# Patient Record
Sex: MALE | Race: WHITE | NOT HISPANIC OR LATINO | Employment: OTHER | URBAN - METROPOLITAN AREA
[De-identification: names, ages, dates, MRNs, and addresses within clinical notes are randomized per-mention and may not be internally consistent; named-entity substitution may affect disease eponyms.]

---

## 2023-08-24 ENCOUNTER — HOSPITAL ENCOUNTER (OUTPATIENT)
Dept: DATA CONVERSION | Facility: HOSPITAL | Age: 53
Discharge: HOME | End: 2023-08-24
Payer: COMMERCIAL

## 2023-08-24 DIAGNOSIS — M25.562 PAIN IN LEFT KNEE: ICD-10-CM

## 2023-08-24 DIAGNOSIS — I10 ESSENTIAL (PRIMARY) HYPERTENSION: ICD-10-CM

## 2023-08-24 DIAGNOSIS — Z87.828 PERSONAL HISTORY OF OTHER (HEALED) PHYSICAL INJURY AND TRAUMA: ICD-10-CM

## 2023-08-24 DIAGNOSIS — M70.42 PREPATELLAR BURSITIS, LEFT KNEE: ICD-10-CM

## 2023-08-24 DIAGNOSIS — M25.462 EFFUSION, LEFT KNEE: ICD-10-CM

## 2023-08-24 DIAGNOSIS — Z79.899 OTHER LONG TERM (CURRENT) DRUG THERAPY: ICD-10-CM

## 2023-09-05 ENCOUNTER — HOSPITAL ENCOUNTER (OUTPATIENT)
Dept: DATA CONVERSION | Facility: HOSPITAL | Age: 53
Discharge: HOME | End: 2023-09-05
Payer: COMMERCIAL

## 2023-09-05 DIAGNOSIS — I67.1 CEREBRAL ANEURYSM, NONRUPTURED (HHS-HCC): ICD-10-CM

## 2023-09-11 PROBLEM — R06.00 DYSPNEA: Status: ACTIVE | Noted: 2023-09-11

## 2023-09-11 PROBLEM — R41.3 MEMORY PROBLEM: Status: ACTIVE | Noted: 2023-09-11

## 2023-09-11 PROBLEM — S93.409A SPRAIN OF ANKLE: Status: ACTIVE | Noted: 2023-09-11

## 2023-09-11 PROBLEM — B95.8 STAPH INFECTION: Status: ACTIVE | Noted: 2020-05-14

## 2023-09-11 PROBLEM — Z95.2 S/P AVR (AORTIC VALVE REPLACEMENT): Status: ACTIVE | Noted: 2023-09-11

## 2023-09-11 PROBLEM — F33.1 MODERATE EPISODE OF RECURRENT MAJOR DEPRESSIVE DISORDER (MULTI): Status: ACTIVE | Noted: 2023-09-11

## 2023-09-11 PROBLEM — M50.20 HERNIATION OF INTERVERTEBRAL DISC OF CERVICAL REGION: Status: ACTIVE | Noted: 2023-09-11

## 2023-09-11 PROBLEM — S09.93XA INJURY OF FACE: Status: ACTIVE | Noted: 2023-09-11

## 2023-09-11 PROBLEM — L73.9 FOLLICULITIS: Status: ACTIVE | Noted: 2020-04-10

## 2023-09-11 PROBLEM — R01.1 HEART MURMUR: Status: ACTIVE | Noted: 2023-09-11

## 2023-09-11 PROBLEM — M16.0 OSTEOARTHRITIS OF BOTH HIPS: Status: ACTIVE | Noted: 2023-09-11

## 2023-09-11 PROBLEM — D75.1 POLYCYTHEMIA: Status: ACTIVE | Noted: 2023-09-11

## 2023-09-11 PROBLEM — G47.00 INSOMNIA: Status: ACTIVE | Noted: 2023-09-11
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
## 2023-09-11 PROBLEM — S80.819A ABRASION OF LOWER LIMB: Status: ACTIVE | Noted: 2023-09-11

## 2023-09-11 PROBLEM — I35.0 NONRHEUMATIC AORTIC VALVE STENOSIS: Status: ACTIVE | Noted: 2023-09-11

## 2023-09-11 PROBLEM — Z98.890 STATUS POST CERVICAL DISC REPLACEMENT: Status: ACTIVE | Noted: 2023-09-11

## 2023-09-11 PROBLEM — F41.0 PANIC ATTACK: Status: ACTIVE | Noted: 2023-09-11

## 2023-09-11 PROBLEM — S99.919A INJURY OF ANKLE: Status: ACTIVE | Noted: 2023-09-11

## 2023-09-11 PROBLEM — Z96.651 HISTORY OF TOTAL RIGHT KNEE REPLACEMENT (TKR): Status: ACTIVE | Noted: 2023-09-11

## 2023-09-11 PROBLEM — R10.9 FLANK PAIN: Status: ACTIVE | Noted: 2023-09-11

## 2023-09-11 PROBLEM — F41.9 ANXIETY DISORDER, UNSPECIFIED: Status: ACTIVE | Noted: 2023-09-11

## 2023-09-11 PROBLEM — Q23.1 BICUSPID AORTIC VALVE (HHS-HCC): Status: ACTIVE | Noted: 2019-06-08

## 2023-09-11 PROBLEM — I10 ESSENTIAL HYPERTENSION: Status: ACTIVE | Noted: 2023-09-11

## 2023-09-11 PROBLEM — R21 RASH: Status: ACTIVE | Noted: 2020-05-14

## 2023-09-11 PROBLEM — I31.9 PERICARDITIS (HHS-HCC): Status: ACTIVE | Noted: 2023-09-11

## 2023-09-11 PROBLEM — Q23.81 BICUSPID AORTIC VALVE: Status: ACTIVE | Noted: 2019-06-08

## 2023-09-11 PROBLEM — M54.12 CERVICAL RADICULOPATHY: Status: ACTIVE | Noted: 2023-09-11

## 2023-09-11 PROBLEM — I71.21 ASCENDING AORTIC ANEURYSM (CMS-HCC): Status: ACTIVE | Noted: 2023-09-11

## 2023-09-11 PROBLEM — R40.1 CLOUDED CONSCIOUSNESS: Status: ACTIVE | Noted: 2023-09-11

## 2023-09-11 RX ORDER — ACETAMINOPHEN 500 MG
3 TABLET ORAL
COMMUNITY
End: 2024-05-26 | Stop reason: HOSPADM

## 2023-09-11 RX ORDER — TESTOSTERONE CYPIONATE 200 MG/ML
0.8 INJECTION, SOLUTION INTRAMUSCULAR
COMMUNITY
Start: 2023-06-23 | End: 2024-03-11 | Stop reason: WASHOUT

## 2023-09-11 RX ORDER — MONTELUKAST SODIUM 10 MG/1
10 TABLET ORAL EVERY EVENING
COMMUNITY
Start: 2019-10-08 | End: 2024-03-11 | Stop reason: WASHOUT

## 2023-09-11 RX ORDER — ANASTROZOLE 1 MG/1
0.5 TABLET ORAL 3 TIMES WEEKLY
COMMUNITY
End: 2024-03-11 | Stop reason: WASHOUT

## 2023-09-11 RX ORDER — CETIRIZINE HYDROCHLORIDE 10 MG/1
10 TABLET ORAL DAILY
COMMUNITY
End: 2024-05-26 | Stop reason: HOSPADM

## 2023-09-11 RX ORDER — ASCORBIC ACID
1 CRYSTALS ORAL DAILY
COMMUNITY
End: 2024-05-26 | Stop reason: HOSPADM

## 2023-09-11 RX ORDER — TESTOSTERONE CYPIONATE 1000 MG/10ML
100 INJECTION, SOLUTION INTRAMUSCULAR
COMMUNITY
Start: 2019-01-15 | End: 2024-03-11 | Stop reason: WASHOUT

## 2023-09-11 RX ORDER — MELOXICAM 15 MG/1
15 TABLET ORAL DAILY
COMMUNITY
End: 2024-02-22 | Stop reason: WASHOUT

## 2023-09-11 RX ORDER — IBUPROFEN 800 MG/1
800 TABLET ORAL
COMMUNITY
Start: 2023-04-17 | End: 2024-03-11 | Stop reason: WASHOUT

## 2023-09-11 RX ORDER — CETIRIZINE HYDROCHLORIDE 10 MG/1
10 TABLET ORAL DAILY PRN
COMMUNITY
Start: 2019-06-27 | End: 2024-03-11 | Stop reason: WASHOUT

## 2023-09-11 RX ORDER — FLUOXETINE HYDROCHLORIDE 20 MG/1
20 CAPSULE ORAL DAILY
COMMUNITY
Start: 2023-08-01 | End: 2024-02-21 | Stop reason: SDUPTHER

## 2023-09-11 RX ORDER — TESTOSTERONE CYPIONATE 1000 MG/10ML
75 INJECTION, SOLUTION INTRAMUSCULAR
COMMUNITY

## 2023-09-11 RX ORDER — ANASTROZOLE 1 MG/1
0.5 TABLET ORAL 2 TIMES WEEKLY
COMMUNITY
Start: 2019-01-15

## 2023-09-11 RX ORDER — BUSPIRONE HYDROCHLORIDE 5 MG/1
5 TABLET ORAL 2 TIMES DAILY
COMMUNITY
Start: 2022-11-17 | End: 2023-02-15 | Stop reason: WASHOUT

## 2023-09-11 RX ORDER — SERTRALINE HYDROCHLORIDE 100 MG/1
100 TABLET, FILM COATED ORAL DAILY
COMMUNITY
Start: 2022-11-24 | End: 2024-03-11 | Stop reason: WASHOUT

## 2023-09-11 RX ORDER — MULTIVITAMIN/IRON/FOLIC ACID 18MG-0.4MG
TABLET ORAL
COMMUNITY
Start: 2019-06-27 | End: 2024-05-26 | Stop reason: HOSPADM

## 2023-09-11 RX ORDER — TAMOXIFEN CITRATE 20 MG/1
20 TABLET ORAL 2 TIMES WEEKLY
COMMUNITY

## 2023-09-11 RX ORDER — MELOXICAM 15 MG/1
15 TABLET ORAL DAILY PRN
COMMUNITY
Start: 2020-10-06 | End: 2024-03-11 | Stop reason: WASHOUT

## 2023-09-11 RX ORDER — TRAZODONE HYDROCHLORIDE 50 MG/1
50 TABLET ORAL NIGHTLY PRN
COMMUNITY
Start: 2023-08-06 | End: 2024-03-11 | Stop reason: SDUPTHER

## 2024-02-21 DIAGNOSIS — F41.1 GENERALIZED ANXIETY DISORDER: Primary | ICD-10-CM

## 2024-02-21 RX ORDER — FLUOXETINE HYDROCHLORIDE 20 MG/1
20 CAPSULE ORAL DAILY
Qty: 30 CAPSULE | Refills: 0 | Status: SHIPPED | OUTPATIENT
Start: 2024-02-21 | End: 2024-03-11 | Stop reason: SDUPTHER

## 2024-02-21 NOTE — TELEPHONE ENCOUNTER
Last seen 01/30/23.  Has upcoming appointment 03/11/24.  He is out of medication.  Previously prescribed by psychiatrist who he is no longer seeing.

## 2024-02-22 DIAGNOSIS — M50.20 HERNIATION OF INTERVERTEBRAL DISC OF CERVICAL REGION: Primary | ICD-10-CM

## 2024-02-22 RX ORDER — MELOXICAM 15 MG/1
15 TABLET ORAL DAILY
Qty: 30 TABLET | Refills: 1 | Status: SHIPPED | OUTPATIENT
Start: 2024-02-22 | End: 2024-04-22

## 2024-03-06 ENCOUNTER — HOSPITAL ENCOUNTER (EMERGENCY)
Facility: HOSPITAL | Age: 54
Discharge: HOME | End: 2024-03-06
Attending: EMERGENCY MEDICINE
Payer: COMMERCIAL

## 2024-03-06 VITALS
BODY MASS INDEX: 27.92 KG/M2 | WEIGHT: 195 LBS | OXYGEN SATURATION: 98 % | TEMPERATURE: 98.2 F | DIASTOLIC BLOOD PRESSURE: 116 MMHG | SYSTOLIC BLOOD PRESSURE: 142 MMHG | RESPIRATION RATE: 14 BRPM | HEIGHT: 70 IN | HEART RATE: 82 BPM

## 2024-03-06 DIAGNOSIS — L02.512 ABSCESS OF LEFT RING FINGER: Primary | ICD-10-CM

## 2024-03-06 PROCEDURE — 2500000004 HC RX 250 GENERAL PHARMACY W/ HCPCS (ALT 636 FOR OP/ED): Mod: JZ | Performed by: EMERGENCY MEDICINE

## 2024-03-06 PROCEDURE — 99284 EMERGENCY DEPT VISIT MOD MDM: CPT

## 2024-03-06 PROCEDURE — 96365 THER/PROPH/DIAG IV INF INIT: CPT

## 2024-03-06 RX ORDER — VANCOMYCIN 1.5 G/300ML
1500 INJECTION, SOLUTION INTRAVENOUS ONCE
Status: COMPLETED | OUTPATIENT
Start: 2024-03-06 | End: 2024-03-06

## 2024-03-06 RX ORDER — VANCOMYCIN HYDROCHLORIDE 1 G/20ML
INJECTION, POWDER, LYOPHILIZED, FOR SOLUTION INTRAVENOUS DAILY PRN
Status: DISCONTINUED | OUTPATIENT
Start: 2024-03-06 | End: 2024-03-06

## 2024-03-06 RX ORDER — CEPHALEXIN 500 MG/1
500 CAPSULE ORAL 4 TIMES DAILY
Qty: 28 CAPSULE | Refills: 0 | Status: SHIPPED | OUTPATIENT
Start: 2024-03-06 | End: 2024-03-13

## 2024-03-06 RX ADMIN — VANCOMYCIN 1.5 G: 1.5 INJECTION, SOLUTION INTRAVENOUS at 09:00

## 2024-03-06 ASSESSMENT — PAIN DESCRIPTION - LOCATION: LOCATION: HAND

## 2024-03-06 ASSESSMENT — LIFESTYLE VARIABLES
EVER HAD A DRINK FIRST THING IN THE MORNING TO STEADY YOUR NERVES TO GET RID OF A HANGOVER: NO
HAVE PEOPLE ANNOYED YOU BY CRITICIZING YOUR DRINKING: NO
EVER FELT BAD OR GUILTY ABOUT YOUR DRINKING: NO
HAVE YOU EVER FELT YOU SHOULD CUT DOWN ON YOUR DRINKING: NO

## 2024-03-06 ASSESSMENT — COLUMBIA-SUICIDE SEVERITY RATING SCALE - C-SSRS
1. IN THE PAST MONTH, HAVE YOU WISHED YOU WERE DEAD OR WISHED YOU COULD GO TO SLEEP AND NOT WAKE UP?: NO
2. HAVE YOU ACTUALLY HAD ANY THOUGHTS OF KILLING YOURSELF?: NO
6. HAVE YOU EVER DONE ANYTHING, STARTED TO DO ANYTHING, OR PREPARED TO DO ANYTHING TO END YOUR LIFE?: NO

## 2024-03-06 ASSESSMENT — PAIN SCALES - GENERAL: PAINLEVEL_OUTOF10: 7

## 2024-03-06 ASSESSMENT — PAIN - FUNCTIONAL ASSESSMENT: PAIN_FUNCTIONAL_ASSESSMENT: 0-10

## 2024-03-06 ASSESSMENT — PAIN DESCRIPTION - FREQUENCY: FREQUENCY: CONSTANT/CONTINUOUS

## 2024-03-06 NOTE — ED PROVIDER NOTES
HPI   Chief Complaint   Patient presents with    Finger Infection        HPI  See my MDM                  [place] Coma Scale Score: 15                     Patient History   No past medical history on file.  Past Surgical History:   Procedure Laterality Date    MR HEAD ANGIO WO IV CONTRAST  2/24/2023    MR HEAD ANGIO WO IV CONTRAST [facility]    MR HEAD ANGIO WO IV CONTRAST  9/5/2023    MR HEAD ANGIO WO IV CONTRAST [facility]    MR NECK ANGIO WO IV CONTRAST  12/20/2012    MR NECK ANGIO WO IV CONTRAST [facility]     Family History   Problem Relation Name Age of Onset    Autoimmune disease Mother      Hypertension Mother      PTSD Father      No Known Problems Sister      No Known Problems Brother      No Known Problems Brother      No Known Problems Daughter      No Known Problems Daughter      Other (watchman) Maternal Grandfather      Other (cath) Maternal Grandfather      Other (3x bypass) Maternal Grandfather      Heart disease Maternal Grandfather       Social History     Tobacco Use    Smoking status: Not on file    Smokeless tobacco: Not on file   Substance Use Topics    Alcohol use: Not on file    Drug use: Not on file       Physical Exam   ED Triage Vitals [03/06/24 0757]   Temperature Heart Rate Respirations BP   36.8 °C (98.2 °F) 82 14 (!) 142/116      Pulse Ox Temp src Heart Rate Source Patient Position   97 % -- -- --      BP Location FiO2 (%)     -- --       Physical Exam  CONSTITUTIONAL: Vital signs reviewed as charted, well-developed and in no distress  Eyes: Extraocular muscles are intact. Pupils equal round and reactive to light. Conjunctiva are pink.    ENT: Mucous membranes are moist. Tongue in the midline. Pharynx was without erythema or exudates, uvula midline  LUNGS: Breath sounds equal and clear to auscultation. Good air exchange, no wheezes rales or retractions, pulse oximetry is charted.  HEART: Regular rate and rhythm without murmur thrill or rub, strong tones,  auscultation is normal.  ABDOMEN: Soft and nontender without guarding rebound rigidity or mass. Bowel sounds are present and normal in all quadrants. There is no palpable masses or aneurysms identified. No hepatosplenomegaly, normal abdominal exam.  Neuro: The patient is awake, alert and oriented ×3. Moving all 4 extremities and answering questions appropriately.   MUSCULOSKELETAL: Examination of the left hand does show appears to be an abscess over the proximal phalanx dorsal surface with erythema and edema to the area.  Mild swelling into the dorsal aspect of the hand as well.  Does still have good range of motion.  Motor sensation pulses are intact distally.  PSYCH: Awake alert oriented, normal mood and affect.  Skin:  Dry, normal color, warm to the touch, no rash present.      ED Course & MDM   ED Course as of 03/06/24 1015   Wed Mar 06, 2024   0836 Consulted Dr. Veras, Ortho/hand who states none of the other hand specialist in his office are available today, however they would likely be available to see patient in the office tomorrow although cannot guarantee their availability [CRUZITO]   0933 Spoke with Karolyn at Dr. Leonard's Office and she will contact him to see if he can see the patient in ED today or in office tomorrow as he currently does not have any appointments until Friday, 2 days. [CRUZITO]   1003 Dr. Carbajal's office called back and scheduled patient for appointment 0850 tomorrow morning for close follow-up and further management. [CRUZITO]      ED Course User Index  [CRUZITO] Shelby Galicia MD         Diagnoses as of 03/06/24 1015   Abscess of left ring finger       Medical Decision Making  History obtained from: patient    Vital signs, nursing notes, current medications, past medical history, Surgical history, allergies, social history, family History were reviewed.         HPI:  Patient presenting emergency room today with abscess infection of the last fourth finger.  States this is the fourth abscess  "he has had over the last 10 years.  States it started for 5 days ago.  He has been on Bactrim for the last 3 after being seen at the urgent care.  No purulent drainage at this time.  He does have fluctuance present there is a landers present.  Denies dizziness, chest pain, shortness of breath, abdominal pain or lower extremity edema.  He is nontoxic well-appearing vital signs positive for hypertension otherwise unremarkable.      10 point ROS was reviewed and negative except Noted above in HPI.  DDX: as listed above          MDM Summary/considerations:  EMERGENCY DEPARTMENT COURSE and DIFFERENTIAL DIAGNOSIS/MDM:        The patient presented with a chief complaint of left fourth finger infection. The differential diagnosis associated with this patient's presentation includes abscess and cellulitis, extensor tendon tenosynovitis.       Vitals:    Vitals:    03/06/24 0757 03/06/24 0802   BP: (!) 142/116    Pulse: 82    Resp: 14    Temp: 36.8 °C (98.2 °F)    SpO2: 97% 98%   Weight: 88.5 kg (195 lb)    Height: 1.778 m (5' 10\")        ED Course as of 03/06/24 1015   Wed Mar 06, 2024   0836 Consulted Dr. Veras, Ortho/hand who states none of the other hand specialist in his office are available today, however they would likely be available to see patient in the office tomorrow although cannot guarantee their availability [CRUZITO]   0918 Spoke with Karolyn at Dr. Leonard's Office and she will contact him to see if he can see the patient in ED today or in office tomorrow as he currently does not have any appointments until Friday, 2 days. [CRUZITO]   1003 Dr. Carbajal's office called back and scheduled patient for appointment 0850 tomorrow morning for close follow-up and further management. [CRUZITO]      ED Course User Index  [CRUZITO] Shelby Galicia MD         Diagnoses as of 03/06/24 1015   Abscess of left ring finger       History Limited by:    None    Independent history obtained from:    None      External records " reviewed:    None      Diagnostics interpreted by me:    None      Discussions with other clinicians:    Orthopedic surgery      Chronic conditions impacting care:    None      Social determinants of health affecting care:    None    Diagnostic tests considered but not performed: none    ED Medications managed:    Medications   vancomycin 1.5 g in 300 mL (Xellia) IVPB 1.5 g (has no administration in time range)         Prescription drugs considered:    Antibiotics Keflex added to his current Bactrim regimen      Labs Reviewed - No data to display  No orders to display     Medications   vancomycin 1.5 g in 300 mL (Xellia) IVPB 1.5 g (has no administration in time range)     New Prescriptions    No medications on file     I estimate there is LOW risk for EPIGLOTTITIS, PNEUMONIA, MENINGITIS, OR URINARY TRACT INFECTION, thus I consider the discharge disposition reasonable. Also, there is no evidence for peritonitis, sepsis, or toxicity. We have discussed the diagnosis and risks, and we agree with discharging home to follow-up with their primary doctor. We also discussed returning to the Emergency Department immediately if new or worsening symptoms occur. We have discussed the symptoms which are most concerning (e.g., changing or worsening pain, trouble swallowing or breathing, neck stiffness, fever) that necessitate immediate return.    Was given IV dose of vancomycin here in the ED.  We did speak to orthopedic hand surgery Dr. Alcocer office he will see him first thing tomorrow morning at 815.  Patient did spontaneously started draining while here in the ED with 2 small dabs of pus coming out.  He was discharged home in stable condition will follow at orthopedic cancer's office for [place] tomorrow morning.  We did add Keflex to his Bactrim regimen.    All of the patient's questions were answered to the best of my ability.  Patient states understanding that they have been screened for an emergency today and we have  not found any etiology of symptoms that requires emergent treatment or admission to the hospital at this point. They understand that they have not had definitive care day and require follow-up for treatment of their condition. They also state understanding that they may have an emergent condition that may potentially have not of detected at this visit and they must return to the emergency department if they develop any worsening of symptoms or new complaints.            I saw this patient in conjunction with Dr. Galicia, please see her supervision note.    Critical Care: Not warranted at this time        This chart was completed using voice recognition transcription software. Please excuse any errors of transcription including grammatical, punctuation, syntax and spelling errors.  Please contact me with any questions regarding this chart.    Procedure  Procedures     AURORA Riggs-CNP  03/06/24 1017

## 2024-03-06 NOTE — PROGRESS NOTES
Cristhian Thornton, Age 53 is being initiated on pharmacy to dose vancomycin for L hand ring infection in the ED.  Patient is only ordered 1 dose of vancomycin therapy, as ED orders are not valid to continue when pt is transferred to a floor. Renal function is currently unknown.    Patient will be given an initial dose of 1500mg    Attending or ID Services must reorder if Vancomycin is to be continued.      Reviewed and approved by DIPAK PERALTA on 3/6/24 at 8:33 AM.

## 2024-03-06 NOTE — ED TRIAGE NOTES
L hand Ring finger infection starting Saturday morning. Seen at urgent care on Sunday put on Bactrim. No relief of symptoms yet, swelling and pain increasing. Aches and chills have begun this week.

## 2024-03-11 ENCOUNTER — OFFICE VISIT (OUTPATIENT)
Dept: PRIMARY CARE | Facility: CLINIC | Age: 54
End: 2024-03-11
Payer: COMMERCIAL

## 2024-03-11 VITALS
WEIGHT: 201 LBS | HEART RATE: 75 BPM | SYSTOLIC BLOOD PRESSURE: 132 MMHG | DIASTOLIC BLOOD PRESSURE: 82 MMHG | BODY MASS INDEX: 28.84 KG/M2 | OXYGEN SATURATION: 97 %

## 2024-03-11 DIAGNOSIS — F33.1 MODERATE EPISODE OF RECURRENT MAJOR DEPRESSIVE DISORDER (MULTI): Primary | ICD-10-CM

## 2024-03-11 DIAGNOSIS — Z13.1 SCREENING FOR DIABETES MELLITUS: ICD-10-CM

## 2024-03-11 DIAGNOSIS — F41.1 GENERALIZED ANXIETY DISORDER: ICD-10-CM

## 2024-03-11 DIAGNOSIS — Z13.220 LIPID SCREENING: ICD-10-CM

## 2024-03-11 DIAGNOSIS — Z12.5 SCREENING PSA (PROSTATE SPECIFIC ANTIGEN): ICD-10-CM

## 2024-03-11 PROCEDURE — 3075F SYST BP GE 130 - 139MM HG: CPT | Performed by: PHYSICIAN ASSISTANT

## 2024-03-11 PROCEDURE — 3079F DIAST BP 80-89 MM HG: CPT | Performed by: PHYSICIAN ASSISTANT

## 2024-03-11 PROCEDURE — 1036F TOBACCO NON-USER: CPT | Performed by: PHYSICIAN ASSISTANT

## 2024-03-11 PROCEDURE — 99213 OFFICE O/P EST LOW 20 MIN: CPT | Performed by: PHYSICIAN ASSISTANT

## 2024-03-11 RX ORDER — FLUOXETINE HYDROCHLORIDE 20 MG/1
20 CAPSULE ORAL DAILY
Qty: 90 CAPSULE | Refills: 1 | Status: SHIPPED | OUTPATIENT
Start: 2024-03-11

## 2024-03-11 RX ORDER — TRAZODONE HYDROCHLORIDE 50 MG/1
50 TABLET ORAL NIGHTLY PRN
Qty: 90 TABLET | Refills: 1 | Status: SHIPPED | OUTPATIENT
Start: 2024-03-11

## 2024-03-11 ASSESSMENT — PAIN SCALES - GENERAL: PAINLEVEL: 0-NO PAIN

## 2024-03-11 ASSESSMENT — ENCOUNTER SYMPTOMS
COUGH: 0
FATIGUE: 0
NAUSEA: 0
VOMITING: 0
MYALGIAS: 0
DIZZINESS: 0
FEVER: 0
SHORTNESS OF BREATH: 0

## 2024-03-11 ASSESSMENT — PATIENT HEALTH QUESTIONNAIRE - PHQ9
1. LITTLE INTEREST OR PLEASURE IN DOING THINGS: NOT AT ALL
2. FEELING DOWN, DEPRESSED OR HOPELESS: NOT AT ALL
SUM OF ALL RESPONSES TO PHQ9 QUESTIONS 1 AND 2: 0

## 2024-03-11 ASSESSMENT — COLUMBIA-SUICIDE SEVERITY RATING SCALE - C-SSRS
6. HAVE YOU EVER DONE ANYTHING, STARTED TO DO ANYTHING, OR PREPARED TO DO ANYTHING TO END YOUR LIFE?: NO
1. IN THE PAST MONTH, HAVE YOU WISHED YOU WERE DEAD OR WISHED YOU COULD GO TO SLEEP AND NOT WAKE UP?: NO
2. HAVE YOU ACTUALLY HAD ANY THOUGHTS OF KILLING YOURSELF?: NO

## 2024-03-11 NOTE — PROGRESS NOTES
Subjective   Patient ID: Cristhian Thornton is a 53 y.o. male who presents for Med Refill (Pt is here for medication refill on Fluoxetine and traxzadone / nr).    HPI:  OCD/anxiety - previously seeing psychiatrist. Stable on 20mg fluoxetine + 50mg trazodone for sleep. Has tried sleeping w/o trazodone but unable to. He was previously doing counseling but doesn't feel the need for it anymore.    He has been dealing w/recurrent staph infections. Most recent L ring finger. Saw Dr. Leonard and was rx'd keflex, nasal abx, and a body scrub.    Due for routine labs.    Review of Systems   Constitutional:  Negative for fatigue and fever.   Respiratory:  Negative for cough and shortness of breath.    Cardiovascular:  Negative for chest pain.   Gastrointestinal:  Negative for nausea and vomiting.   Musculoskeletal:  Negative for myalgias.   Skin:  Negative for rash.   Neurological:  Negative for dizziness.       Objective   /82   Pulse 75   Wt 91.2 kg (201 lb)   SpO2 97%   BMI 28.84 kg/m²     Physical Exam  Constitutional:       Appearance: Normal appearance.   HENT:      Head: Normocephalic and atraumatic.   Eyes:      Extraocular Movements: Extraocular movements intact.      Conjunctiva/sclera: Conjunctivae normal.   Cardiovascular:      Rate and Rhythm: Normal rate and regular rhythm.   Pulmonary:      Effort: Pulmonary effort is normal.      Breath sounds: Normal breath sounds. No wheezing or rhonchi.   Musculoskeletal:      Cervical back: Normal range of motion and neck supple.   Skin:     General: Skin is warm and dry.   Neurological:      General: No focal deficit present.      Mental Status: He is alert.   Psychiatric:         Mood and Affect: Mood normal.         Assessment/Plan   Problem List Items Addressed This Visit             ICD-10-CM    Anxiety disorder, unspecified F41.9    Relevant Medications    FLUoxetine (PROzac) 20 mg capsule    traZODone (Desyrel) 50 mg tablet    Moderate episode of recurrent  major depressive disorder (CMS/HCC) - Primary F33.1    Relevant Medications    traZODone (Desyrel) 50 mg tablet     Other Visit Diagnoses         Codes    Screening PSA (prostate specific antigen)     Z12.5    Relevant Orders    Prostate Specific Antigen    Screening for diabetes mellitus     Z13.1    Relevant Orders    Comprehensive Metabolic Panel    Lipid screening     Z13.220    Relevant Orders    Lipid Panel

## 2024-04-13 ENCOUNTER — LAB (OUTPATIENT)
Dept: LAB | Facility: LAB | Age: 54
End: 2024-04-13
Payer: COMMERCIAL

## 2024-04-13 DIAGNOSIS — Z12.5 SCREENING PSA (PROSTATE SPECIFIC ANTIGEN): ICD-10-CM

## 2024-04-13 DIAGNOSIS — Z13.1 SCREENING FOR DIABETES MELLITUS: ICD-10-CM

## 2024-04-13 DIAGNOSIS — Z13.220 LIPID SCREENING: ICD-10-CM

## 2024-04-13 LAB
ALBUMIN SERPL-MCNC: 4.3 G/DL (ref 3.5–5)
ALP BLD-CCNC: 79 U/L (ref 35–125)
ALT SERPL-CCNC: 28 U/L (ref 5–40)
ANION GAP SERPL CALC-SCNC: 11 MMOL/L
AST SERPL-CCNC: 29 U/L (ref 5–40)
BILIRUB SERPL-MCNC: 0.7 MG/DL (ref 0.1–1.2)
BUN SERPL-MCNC: 19 MG/DL (ref 8–25)
CALCIUM SERPL-MCNC: 9 MG/DL (ref 8.5–10.4)
CHLORIDE SERPL-SCNC: 104 MMOL/L (ref 97–107)
CHOLEST SERPL-MCNC: 144 MG/DL (ref 133–200)
CHOLEST/HDLC SERPL: 2.7 {RATIO}
CO2 SERPL-SCNC: 26 MMOL/L (ref 24–31)
CREAT SERPL-MCNC: 1.1 MG/DL (ref 0.4–1.6)
EGFRCR SERPLBLD CKD-EPI 2021: 81 ML/MIN/1.73M*2
GLUCOSE SERPL-MCNC: 95 MG/DL (ref 65–99)
HDLC SERPL-MCNC: 53 MG/DL
LDLC SERPL CALC-MCNC: 80 MG/DL (ref 65–130)
POTASSIUM SERPL-SCNC: 4.9 MMOL/L (ref 3.4–5.1)
PROT SERPL-MCNC: 6.7 G/DL (ref 5.9–7.9)
PSA SERPL-MCNC: 1.1 NG/ML
SODIUM SERPL-SCNC: 141 MMOL/L (ref 133–145)
TRIGL SERPL-MCNC: 53 MG/DL (ref 40–150)

## 2024-04-13 PROCEDURE — 80053 COMPREHEN METABOLIC PANEL: CPT

## 2024-04-13 PROCEDURE — 36415 COLL VENOUS BLD VENIPUNCTURE: CPT

## 2024-04-13 PROCEDURE — 84153 ASSAY OF PSA TOTAL: CPT

## 2024-04-13 PROCEDURE — 80061 LIPID PANEL: CPT

## 2024-04-22 DIAGNOSIS — M50.20 HERNIATION OF INTERVERTEBRAL DISC OF CERVICAL REGION: ICD-10-CM

## 2024-04-22 RX ORDER — MELOXICAM 15 MG/1
15 TABLET ORAL DAILY
Qty: 30 TABLET | Refills: 1 | Status: SHIPPED | OUTPATIENT
Start: 2024-04-22 | End: 2024-05-26 | Stop reason: HOSPADM

## 2024-05-21 ENCOUNTER — HOSPITAL ENCOUNTER (INPATIENT)
Facility: HOSPITAL | Age: 54
LOS: 4 days | Discharge: HOME HEALTH | DRG: 603 | End: 2024-05-25
Attending: STUDENT IN AN ORGANIZED HEALTH CARE EDUCATION/TRAINING PROGRAM | Admitting: INTERNAL MEDICINE
Payer: COMMERCIAL

## 2024-05-21 DIAGNOSIS — I33.0 ACUTE BACTERIAL ENDOCARDITIS (HHS-HCC): ICD-10-CM

## 2024-05-21 DIAGNOSIS — I38 ENDOCARDITIS, VALVE UNSPECIFIED: ICD-10-CM

## 2024-05-21 DIAGNOSIS — I33.9: ICD-10-CM

## 2024-05-21 DIAGNOSIS — L03.012 CELLULITIS OF FINGER OF LEFT HAND: Primary | ICD-10-CM

## 2024-05-21 DIAGNOSIS — B95.8 STAPH INFECTION: ICD-10-CM

## 2024-05-21 DIAGNOSIS — I33.0 INFECTIVE ENDOCARDITIS, DUE TO UNSPECIFIED ORGANISM, UNSPECIFIED CHRONICITY (HHS-HCC): ICD-10-CM

## 2024-05-21 DIAGNOSIS — R78.81 BACTEREMIA: ICD-10-CM

## 2024-05-21 PROBLEM — R79.89 LOW TESTOSTERONE: Status: ACTIVE | Noted: 2024-05-21

## 2024-05-21 LAB
ANION GAP SERPL CALC-SCNC: 12 MMOL/L
BASOPHILS # BLD AUTO: 0.06 X10*3/UL (ref 0–0.1)
BASOPHILS NFR BLD AUTO: 0.4 %
BUN SERPL-MCNC: 17 MG/DL (ref 8–25)
CALCIUM SERPL-MCNC: 9.5 MG/DL (ref 8.5–10.4)
CHLORIDE SERPL-SCNC: 99 MMOL/L (ref 97–107)
CO2 SERPL-SCNC: 26 MMOL/L (ref 24–31)
CREAT SERPL-MCNC: 1 MG/DL (ref 0.4–1.6)
CRP SERPL-MCNC: 5.8 MG/DL (ref 0–2)
EGFRCR SERPLBLD CKD-EPI 2021: >90 ML/MIN/1.73M*2
EOSINOPHIL # BLD AUTO: 0.03 X10*3/UL (ref 0–0.7)
EOSINOPHIL NFR BLD AUTO: 0.2 %
ERYTHROCYTE [DISTWIDTH] IN BLOOD BY AUTOMATED COUNT: 12.3 % (ref 11.5–14.5)
ERYTHROCYTE [SEDIMENTATION RATE] IN BLOOD BY WESTERGREN METHOD: 15 MM/H (ref 0–20)
GLUCOSE SERPL-MCNC: 101 MG/DL (ref 65–99)
HCT VFR BLD AUTO: 50.8 % (ref 41–52)
HGB BLD-MCNC: 17.6 G/DL (ref 13.5–17.5)
IMM GRANULOCYTES # BLD AUTO: 0.05 X10*3/UL (ref 0–0.7)
IMM GRANULOCYTES NFR BLD AUTO: 0.4 % (ref 0–0.9)
LYMPHOCYTES # BLD AUTO: 1.98 X10*3/UL (ref 1.2–4.8)
LYMPHOCYTES NFR BLD AUTO: 14.5 %
MCH RBC QN AUTO: 31 PG (ref 26–34)
MCHC RBC AUTO-ENTMCNC: 34.6 G/DL (ref 32–36)
MCV RBC AUTO: 89 FL (ref 80–100)
MONOCYTES # BLD AUTO: 0.92 X10*3/UL (ref 0.1–1)
MONOCYTES NFR BLD AUTO: 6.8 %
NEUTROPHILS # BLD AUTO: 10.58 X10*3/UL (ref 1.2–7.7)
NEUTROPHILS NFR BLD AUTO: 77.7 %
NRBC BLD-RTO: 0 /100 WBCS (ref 0–0)
PLATELET # BLD AUTO: 201 X10*3/UL (ref 150–450)
POTASSIUM SERPL-SCNC: 4 MMOL/L (ref 3.4–5.1)
RBC # BLD AUTO: 5.68 X10*6/UL (ref 4.5–5.9)
SODIUM SERPL-SCNC: 137 MMOL/L (ref 133–145)
WBC # BLD AUTO: 13.6 X10*3/UL (ref 4.4–11.3)

## 2024-05-21 PROCEDURE — 85652 RBC SED RATE AUTOMATED: CPT | Performed by: STUDENT IN AN ORGANIZED HEALTH CARE EDUCATION/TRAINING PROGRAM

## 2024-05-21 PROCEDURE — 96365 THER/PROPH/DIAG IV INF INIT: CPT

## 2024-05-21 PROCEDURE — 36415 COLL VENOUS BLD VENIPUNCTURE: CPT | Performed by: STUDENT IN AN ORGANIZED HEALTH CARE EDUCATION/TRAINING PROGRAM

## 2024-05-21 PROCEDURE — 85025 COMPLETE CBC W/AUTO DIFF WBC: CPT | Performed by: STUDENT IN AN ORGANIZED HEALTH CARE EDUCATION/TRAINING PROGRAM

## 2024-05-21 PROCEDURE — 87186 SC STD MICRODIL/AGAR DIL: CPT | Mod: WESLAB | Performed by: STUDENT IN AN ORGANIZED HEALTH CARE EDUCATION/TRAINING PROGRAM

## 2024-05-21 PROCEDURE — 2500000004 HC RX 250 GENERAL PHARMACY W/ HCPCS (ALT 636 FOR OP/ED): Performed by: NURSE PRACTITIONER

## 2024-05-21 PROCEDURE — 1210000001 HC SEMI-PRIVATE ROOM DAILY

## 2024-05-21 PROCEDURE — 2500000001 HC RX 250 WO HCPCS SELF ADMINISTERED DRUGS (ALT 637 FOR MEDICARE OP): Performed by: NURSE PRACTITIONER

## 2024-05-21 PROCEDURE — 87205 SMEAR GRAM STAIN: CPT | Mod: WESLAB | Performed by: STUDENT IN AN ORGANIZED HEALTH CARE EDUCATION/TRAINING PROGRAM

## 2024-05-21 PROCEDURE — 2500000004 HC RX 250 GENERAL PHARMACY W/ HCPCS (ALT 636 FOR OP/ED): Performed by: EMERGENCY MEDICINE

## 2024-05-21 PROCEDURE — 86140 C-REACTIVE PROTEIN: CPT | Performed by: STUDENT IN AN ORGANIZED HEALTH CARE EDUCATION/TRAINING PROGRAM

## 2024-05-21 PROCEDURE — 2500000004 HC RX 250 GENERAL PHARMACY W/ HCPCS (ALT 636 FOR OP/ED): Performed by: STUDENT IN AN ORGANIZED HEALTH CARE EDUCATION/TRAINING PROGRAM

## 2024-05-21 PROCEDURE — 96375 TX/PRO/DX INJ NEW DRUG ADDON: CPT

## 2024-05-21 PROCEDURE — 87040 BLOOD CULTURE FOR BACTERIA: CPT | Mod: WESLAB | Performed by: STUDENT IN AN ORGANIZED HEALTH CARE EDUCATION/TRAINING PROGRAM

## 2024-05-21 PROCEDURE — 80048 BASIC METABOLIC PNL TOTAL CA: CPT | Performed by: STUDENT IN AN ORGANIZED HEALTH CARE EDUCATION/TRAINING PROGRAM

## 2024-05-21 PROCEDURE — 2500000004 HC RX 250 GENERAL PHARMACY W/ HCPCS (ALT 636 FOR OP/ED): Mod: JZ

## 2024-05-21 PROCEDURE — 99285 EMERGENCY DEPT VISIT HI MDM: CPT | Mod: 25

## 2024-05-21 PROCEDURE — 96367 TX/PROPH/DG ADDL SEQ IV INF: CPT

## 2024-05-21 RX ORDER — ONDANSETRON HYDROCHLORIDE 2 MG/ML
4 INJECTION, SOLUTION INTRAVENOUS EVERY 8 HOURS PRN
Status: DISCONTINUED | OUTPATIENT
Start: 2024-05-21 | End: 2024-05-26 | Stop reason: HOSPADM

## 2024-05-21 RX ORDER — ONDANSETRON 4 MG/1
4 TABLET, FILM COATED ORAL EVERY 8 HOURS PRN
Status: DISCONTINUED | OUTPATIENT
Start: 2024-05-21 | End: 2024-05-26 | Stop reason: HOSPADM

## 2024-05-21 RX ORDER — FLUOXETINE HYDROCHLORIDE 20 MG/1
20 CAPSULE ORAL DAILY
Status: DISCONTINUED | OUTPATIENT
Start: 2024-05-22 | End: 2024-05-26 | Stop reason: HOSPADM

## 2024-05-21 RX ORDER — IBUPROFEN 200 MG
200 TABLET ORAL EVERY 6 HOURS PRN
COMMUNITY
End: 2024-05-26 | Stop reason: HOSPADM

## 2024-05-21 RX ORDER — ENOXAPARIN SODIUM 100 MG/ML
40 INJECTION SUBCUTANEOUS EVERY 24 HOURS
Status: DISCONTINUED | OUTPATIENT
Start: 2024-05-21 | End: 2024-05-26 | Stop reason: HOSPADM

## 2024-05-21 RX ORDER — ANASTROZOLE 1 MG/1
0.5 TABLET ORAL WEEKLY
Status: DISCONTINUED | OUTPATIENT
Start: 2024-05-23 | End: 2024-05-26 | Stop reason: HOSPADM

## 2024-05-21 RX ORDER — VANCOMYCIN 1.75 G/350ML
1250 INJECTION, SOLUTION INTRAVENOUS ONCE
Status: COMPLETED | OUTPATIENT
Start: 2024-05-21 | End: 2024-05-21

## 2024-05-21 RX ORDER — KETOROLAC TROMETHAMINE 30 MG/ML
30 INJECTION, SOLUTION INTRAMUSCULAR; INTRAVENOUS ONCE
Status: COMPLETED | OUTPATIENT
Start: 2024-05-21 | End: 2024-05-21

## 2024-05-21 RX ORDER — TRAZODONE HYDROCHLORIDE 50 MG/1
50 TABLET ORAL NIGHTLY
Status: DISCONTINUED | OUTPATIENT
Start: 2024-05-21 | End: 2024-05-26 | Stop reason: HOSPADM

## 2024-05-21 RX ORDER — KETOROLAC TROMETHAMINE 30 MG/ML
15 INJECTION, SOLUTION INTRAMUSCULAR; INTRAVENOUS ONCE
Status: COMPLETED | OUTPATIENT
Start: 2024-05-21 | End: 2024-05-21

## 2024-05-21 RX ORDER — KETOROLAC TROMETHAMINE 30 MG/ML
15 INJECTION, SOLUTION INTRAMUSCULAR; INTRAVENOUS EVERY 6 HOURS PRN
Status: DISCONTINUED | OUTPATIENT
Start: 2024-05-21 | End: 2024-05-26 | Stop reason: HOSPADM

## 2024-05-21 RX ORDER — ACETAMINOPHEN 325 MG/1
650 TABLET ORAL EVERY 4 HOURS PRN
Status: DISCONTINUED | OUTPATIENT
Start: 2024-05-21 | End: 2024-05-24

## 2024-05-21 RX ORDER — TESTOSTERONE CYPIONATE 1000 MG/10ML
75 INJECTION, SOLUTION INTRAMUSCULAR
Status: DISCONTINUED | OUTPATIENT
Start: 2024-05-26 | End: 2024-05-26 | Stop reason: HOSPADM

## 2024-05-21 RX ORDER — TAMOXIFEN CITRATE 10 MG/1
20 TABLET ORAL 2 TIMES WEEKLY
Status: DISCONTINUED | OUTPATIENT
Start: 2024-05-21 | End: 2024-05-26 | Stop reason: HOSPADM

## 2024-05-21 RX ORDER — VANCOMYCIN HYDROCHLORIDE 1 G/20ML
INJECTION, POWDER, LYOPHILIZED, FOR SOLUTION INTRAVENOUS DAILY PRN
Status: DISCONTINUED | OUTPATIENT
Start: 2024-05-21 | End: 2024-05-21

## 2024-05-21 RX ORDER — VANCOMYCIN HYDROCHLORIDE 1 G/20ML
INJECTION, POWDER, LYOPHILIZED, FOR SOLUTION INTRAVENOUS DAILY PRN
Status: DISCONTINUED | OUTPATIENT
Start: 2024-05-21 | End: 2024-05-26 | Stop reason: HOSPADM

## 2024-05-21 RX ORDER — VANCOMYCIN 1.5 G/300ML
1500 INJECTION, SOLUTION INTRAVENOUS EVERY 12 HOURS
Status: DISCONTINUED | OUTPATIENT
Start: 2024-05-21 | End: 2024-05-22

## 2024-05-21 RX ADMIN — ACETAMINOPHEN 650 MG: 325 TABLET ORAL at 22:27

## 2024-05-21 RX ADMIN — VANCOMYCIN 1250 MG: 1.75 INJECTION, SOLUTION INTRAVENOUS at 10:35

## 2024-05-21 RX ADMIN — VANCOMYCIN 1.5 G: 1.5 INJECTION, SOLUTION INTRAVENOUS at 23:03

## 2024-05-21 RX ADMIN — TRAZODONE HYDROCHLORIDE 50 MG: 50 TABLET ORAL at 23:03

## 2024-05-21 RX ADMIN — PIPERACILLIN SODIUM AND TAZOBACTAM SODIUM 3.38 G: 3; .375 INJECTION, SOLUTION INTRAVENOUS at 20:34

## 2024-05-21 RX ADMIN — KETOROLAC TROMETHAMINE 30 MG: 30 INJECTION, SOLUTION INTRAMUSCULAR at 17:45

## 2024-05-21 RX ADMIN — TAMOXIFEN CITRATE 20 MG: 10 TABLET ORAL at 23:02

## 2024-05-21 RX ADMIN — KETOROLAC TROMETHAMINE 15 MG: 30 INJECTION, SOLUTION INTRAMUSCULAR at 10:33

## 2024-05-21 RX ADMIN — PIPERACILLIN SODIUM AND TAZOBACTAM SODIUM 3.38 G: 3; .375 INJECTION, SOLUTION INTRAVENOUS at 10:34

## 2024-05-21 ASSESSMENT — ENCOUNTER SYMPTOMS
ENDOCRINE NEGATIVE: 1
RESPIRATORY NEGATIVE: 1
CHILLS: 1
NAUSEA: 1
CARDIOVASCULAR NEGATIVE: 1
PSYCHIATRIC NEGATIVE: 1
MUSCULOSKELETAL NEGATIVE: 1
WOUND: 1
EYES NEGATIVE: 1
NEUROLOGICAL NEGATIVE: 1

## 2024-05-21 ASSESSMENT — PAIN DESCRIPTION - ORIENTATION: ORIENTATION: LEFT

## 2024-05-21 ASSESSMENT — PAIN SCALES - GENERAL
PAINLEVEL_OUTOF10: 4
PAINLEVEL_OUTOF10: 2
PAINLEVEL_OUTOF10: 10 - WORST POSSIBLE PAIN
PAINLEVEL_OUTOF10: 1

## 2024-05-21 ASSESSMENT — COLUMBIA-SUICIDE SEVERITY RATING SCALE - C-SSRS
6. HAVE YOU EVER DONE ANYTHING, STARTED TO DO ANYTHING, OR PREPARED TO DO ANYTHING TO END YOUR LIFE?: NO
2. HAVE YOU ACTUALLY HAD ANY THOUGHTS OF KILLING YOURSELF?: NO
1. IN THE PAST MONTH, HAVE YOU WISHED YOU WERE DEAD OR WISHED YOU COULD GO TO SLEEP AND NOT WAKE UP?: NO

## 2024-05-21 ASSESSMENT — PAIN - FUNCTIONAL ASSESSMENT
PAIN_FUNCTIONAL_ASSESSMENT: 0-10

## 2024-05-21 ASSESSMENT — PAIN DESCRIPTION - LOCATION
LOCATION: FINGER (COMMENT WHICH ONE)
LOCATION: FINGER (COMMENT WHICH ONE)

## 2024-05-21 ASSESSMENT — PAIN DESCRIPTION - PAIN TYPE
TYPE: ACUTE PAIN
TYPE: ACUTE PAIN

## 2024-05-21 NOTE — ED TRIAGE NOTES
Infection started Sunday, 6th infection in 7 months. Dr. Carbajal wants him admitted for IV Antibiotics and infectious disease consult

## 2024-05-21 NOTE — ED PROVIDER NOTES
HPI   Chief Complaint   Patient presents with    Wound Infection       Patient presents with finger infection.  He was seen by his orthopedic specialist, Dr. Carbajal, who performed incision and drainage on his left index finger and sent him to the emergency department for need for admission.  Patient reports that approximately every 3 weeks, he has had serious infections requiring IV antibiotics.  He states that he believes that he is otherwise healthy except for high blood pressure.                          [place] Coma Scale Score: 15                     Patient History   No past medical history on file.  Past Surgical History:   Procedure Laterality Date    MR HEAD ANGIO WO IV CONTRAST  2/24/2023    MR HEAD ANGIO WO IV CONTRAST [facility]    MR HEAD ANGIO WO IV CONTRAST  9/5/2023    MR HEAD ANGIO WO IV CONTRAST [facility]    MR NECK ANGIO WO IV CONTRAST  12/20/2012    MR NECK ANGIO WO IV CONTRAST [facility]     Family History   Problem Relation Name Age of Onset    Autoimmune disease Mother      Hypertension Mother      PTSD Father      No Known Problems Sister      No Known Problems Brother      No Known Problems Brother      No Known Problems Daughter      No Known Problems Daughter      Other (watchman) Maternal Grandfather      Other (cath) Maternal Grandfather      Other (3x bypass) Maternal Grandfather      Heart disease Maternal Grandfather       Social History     Tobacco Use    Smoking status: Never    Smokeless tobacco: Never   Substance Use Topics    Alcohol use: Not on file    Drug use: Not on file       Physical Exam   ED Triage Vitals [05/21/24 0940]   Temp Heart Rate Respirations BP   -- 71 18 137/90      Pulse Ox Temp Source Heart Rate Source Patient Position   100 % Temporal Monitor Sitting      BP Location FiO2 (%)     Right arm --       Physical Exam  HENT:      Head: Normocephalic.   Eyes:      General: No scleral icterus.  Cardiovascular:      Rate and Rhythm:  Normal rate.      Comments: Brisk capillary refill of the left index finger.  Pulmonary:      Effort: Pulmonary effort is normal.   Musculoskeletal:      Comments: Swelling of the distal aspect of the left index finger with discoloration over the dorsal aspect spreading to the middle phalanx.   Neurological:      Mental Status: He is alert.      Comments: Motor and sensation intact in left index finger although motor limited secondary to pain.         ED Course & MDM   Diagnoses as of 05/21/24 1046   Cellulitis of finger of left hand       Medical Decision Making  Patient sent to the emergency department by his orthopedic specialist following incision and drainage.  Finger continues to be very swollen and erythematous with streaking up the hand and swelling.  Patient received broad-spectrum antibiotics, blood cultures were drawn, and patient accepted to hospitalist service for further management.  Parts of this chart were completed with dictation software, please excuse any errors in transcription.        Procedure  Procedures     Walt Verma MD  05/21/24 9424

## 2024-05-21 NOTE — CONSULTS
Vancomycin Dosing by Pharmacy- INITIAL    Cristhian Thornton is a 53 y.o. year old male who Pharmacy has been consulted for vancomycin dosing for cellulitis, skin and soft tissue. Based on the patient's indication and renal status this patient will be dosed based on a goal AUC of 400-600.     Renal function is currently stable.    Visit Vitals  BP (!) 136/93 (BP Location: Right arm, Patient Position: Sitting)   Pulse 83   Temp 36.8 °C (98.2 °F) (Oral)   Resp 18        Lab Results   Component Value Date    CREATININE 1.00 2024    CREATININE 1.10 2024    CREATININE 1.0 2023    CREATININE 1.2 2021    CREATININE 0.8 2020    CREATININE 0.9 2020        Patient weight is as follows:   Vitals:    24 0940   Weight: 91.2 kg (201 lb)       Cultures:  No results found for the encounter in last 14 days.        I/O last 3 completed shifts:  In: 250 (2.7 mL/kg) [IV Piggyback:250]  Out: - (0 mL/kg)   Weight: 91.2 kg   I/O during current shift:  No intake/output data recorded.    Temp (24hrs), Av.8 °C (98.2 °F), Min:36.8 °C (98.2 °F), Max:36.8 °C (98.2 °F)         Assessment/Plan     Patient has already been given a loading dose of 1250 mg.  Will initiate vancomycin maintenance,  1250 mg every 12 hours.    This dosing regimen is predicted by [employer] to result in the following pharmacokinetic parameters:  Loading dose: N/A  Regimen: 1250 mg IV every 12 hours.  Start time: 22:35 on 2024  Exposure target: AUC24 (range)400-600 mg/L.hr   AUC24,ss: 578 mg/L.hr  Probability of AUC24 > 400: 85 %  Ctrough,ss: 18.4 mg/L  Probability of Ctrough,ss > 20: 43 %  Probability of nephrotoxicity (Lodise DONNA ): 15 %      Follow-up level will be ordered on  with am labs unless clinically indicated sooner.  Will continue to monitor renal function daily while on vancomycin and order serum creatinine at least every 48 hours if not already ordered.  Follow for continued vancomycin needs, clinical  response, and signs/symptoms of toxicity.       DIPAK PERALTA, PharmD

## 2024-05-21 NOTE — CONSULTS
Vancomycin Dosing by Pharmacy- EMERGENCY DEPARTMENT    Cristhian Thornton is a 53 y.o. year old male who Pharmacy has been consulted to give a ONE TIME ONLY vancomycin dose in the Emergency Department for cellulitis, skin and soft tissue.     Visit Vitals  /87 (BP Location: Right arm, Patient Position: Sitting)   Pulse 78   Resp 20        Lab Results   Component Value Date    CREATININE 1.10 04/13/2024    CREATININE 1.0 01/30/2023    CREATININE 1.2 06/07/2021    CREATININE 0.8 11/07/2020    CREATININE 0.9 11/06/2020        Patient weight is   Wt Readings from Last 1 Encounters:   05/21/24 91.2 kg (201 lb)        Assessment/Plan     Patient will be given a one time dose of 1250 mg  Pharmacy will sign off at this time. If vancomycin is to be continued, please re-consult Pharmacy.       Cecile Izaguirre, PharmD

## 2024-05-21 NOTE — PROGRESS NOTES
Pharmacy Medication History Review    Cristhian Thornton is a 53 y.o. male admitted for Cellulitis of finger of left hand. Pharmacy reviewed the patient's [id number] medications and allergies for accuracy.    Medications ADDED:  Ibuprofen 200 mg  Medications CHANGED:  Anastrozole   Tamoxifen  Testosterone  Trazodone  Medications REMOVED:   Buspirone  Cholecalciferol     The list below reflects the updated PTA list. Comments regarding how patient may be taking medications differently can be found in the Admit Orders Activity  Prior to Admission Medications   Prescriptions Last Dose Informant   FLUoxetine (PROzac) 20 mg capsule 2024 Self   Sig: Take 1 capsule (20 mg) by mouth once daily.   anastrozole (Arimidex) 1 mg tablet Past Week Self   Sig: Take 0.5 tablets (0.5 mg total) by mouth 2 times a week.   b complex 0.4 mg tablet 2024 Self   Sig: Take by mouth.   calcium carbonate-vitamin D3 600 mg-20 mcg (800 unit) tablet 2024 Self   Sig: Take 3 tablets by mouth once daily with breakfast.   cetirizine (ZyrTEC) 10 mg tablet 2024 Self   Sig: Take 1 tablet (10 mg) by mouth once daily.   ibuprofen 200 mg tablet  Self   Sig: Take 1 tablet (200 mg) by mouth every 6 hours if needed for mild pain (1 - 3).   meloxicam (Mobic) 15 mg tablet 2024 Self   Sig: TAKE 1 TABLET BY MOUTH EVERY DAY   tamoxifen (Nolvadex) 20 mg tablet Past Week Self   Sig: Take 1 tablet (20 mg total) by mouth 2 times a week.   testosterone cypionate (Depo-Testosterone) 100 mg/mL injection Past Week Self   Si.75 mL (75 mg) every 7 days. Receives injection every    traZODone (Desyrel) 50 mg tablet 2024 Self   Sig: Take 1 tablet (50 mg) by mouth as needed at bedtime for sleep.   vitamin E mixed 100 unit tablet 2024 Self   Sig: Take 1 tablet by mouth once daily.      Facility-Administered Medications: None        The list below reflects the updated allergy list. Please review each documented allergy for  additional clarification and justification.  Allergies  Reviewed by Tonja Rubin RN on 5/21/2024        Severity Reactions Comments    Acyclovir Not Specified Unknown     Opioids - Morphine Analogues Not Specified Unknown     Hydromorphone Low Rash             Pharmacy has been updated to [facility].    Sources used to complete the med history include patient interview, PTA list, dispense history    Below are additional concerns with the patient's PTA list.  none    Cecile Izaguirre, PharmD  Please reach out via [employer] Secure Chat for questions

## 2024-05-21 NOTE — H&P
History Of Present Illness  Cristhian Thornton is a 53 y.o. male presenting with left index finger infection. States he noticed redness on left finger Sunday. He saw Dr. Leonard in office today. Dr. Leonard did I&D and instructed patient to come to ER for admission. Patient states he's unsure if he's had a fever, but has had chills today and still feels like he can't get warm. States site of infection is painful. Denies recent injury or trauma to site. Denies chest pain, shortness of breath, abdominal pain. Had some nausea related to pain on arrival, but resolved now that pain improving. States he has had multiple infections including right hip, right calf and left knee over the last several months.      Past Medical History  Past Medical History:   Diagnosis Date    Aortic valve stenosis     Infection     Multiple    Low testosterone        Surgical History  Past Surgical History:   Procedure Laterality Date    AORTIC VALVE REPLACEMENT      CERVICAL SPINE SURGERY      implant    ELBOW BURSA SURGERY Right     related to infection    HERNIA REPAIR      JOINT REPLACEMENT Right     knee    MR HEAD ANGIO WO IV CONTRAST  02/24/2023    MR HEAD ANGIO WO IV CONTRAST [facility]    MR HEAD ANGIO WO IV CONTRAST  09/05/2023    MR HEAD ANGIO WO IV CONTRAST [facility]    MR NECK ANGIO WO IV CONTRAST  12/20/2012    MR NECK ANGIO WO IV CONTRAST [facility]        Social History  He reports that he has never smoked. He has never used smokeless tobacco. He reports current alcohol use of about 1.0 standard drink of alcohol per week. He reports that he does not use drugs.    Family History  Family History   Problem Relation Name Age of Onset    Autoimmune disease Mother      Hypertension Mother      PTSD Father      Benign prostatic hyperplasia Father      No Known Problems Sister      Other (htn) Brother      Other (hld) Brother      No Known Problems Daughter      No Known Problems Daughter      Other  (watchman) Maternal Grandfather      Other (cath) Maternal Grandfather      Other (3x bypass) Maternal Grandfather      Heart disease Maternal Grandfather          Allergies  Acyclovir, Opioids - morphine analogues, and Hydromorphone    Review of Systems   Constitutional:  Positive for chills.   HENT: Negative.     Eyes: Negative.    Respiratory: Negative.     Cardiovascular: Negative.    Gastrointestinal:  Positive for nausea.   Endocrine: Negative.    Genitourinary: Negative.    Musculoskeletal: Negative.    Skin:  Positive for wound (See HPI).   Neurological: Negative.    Psychiatric/Behavioral: Negative.          Physical Exam  Constitutional:       Appearance: Normal appearance.   HENT:      Head: Normocephalic and atraumatic.      Mouth/Throat:      Mouth: Mucous membranes are moist.      Pharynx: Oropharynx is clear.   Eyes:      Extraocular Movements: Extraocular movements intact.      Conjunctiva/sclera: Conjunctivae normal.      Pupils: Pupils are equal, round, and reactive to light.   Cardiovascular:      Rate and Rhythm: Normal rate and regular rhythm.      Pulses: Normal pulses.      Heart sounds: Normal heart sounds.   Pulmonary:      Effort: Pulmonary effort is normal.      Breath sounds: Normal breath sounds.   Abdominal:      General: Bowel sounds are normal.      Palpations: Abdomen is soft.      Tenderness: There is no abdominal tenderness.   Musculoskeletal:         General: Signs of injury (Left finger ecchymotic, swollen. Left knuckles red, warm and swollen. Tender to light touch) present.      Cervical back: Normal range of motion and neck supple.   Skin:     General: Skin is warm and dry.      Capillary Refill: Capillary refill takes less than 2 seconds.   Neurological:      General: No focal deficit present.      Mental Status: He is alert and oriented to person, place, and time.   Psychiatric:         Mood and Affect: Mood normal.         Behavior: Behavior normal.          Last Recorded  "Vitals  Blood pressure 143/87, pulse 78, resp. rate 20, height 1.778 m (5' 10\"), weight 91.2 kg (201 lb), SpO2 100%.    Relevant Results  Results for orders placed or performed during the hospital encounter of 05/21/24 (from the past 24 hour(s))   CBC and Auto Differential   Result Value Ref Range    WBC 13.6 (H) 4.4 - 11.3 x10*3/uL    nRBC 0.0 0.0 - 0.0 /100 WBCs    RBC 5.68 4.50 - 5.90 x10*6/uL    Hemoglobin 17.6 (H) 13.5 - 17.5 g/dL    Hematocrit 50.8 41.0 - 52.0 %    MCV 89 80 - 100 fL    MCH 31.0 26.0 - 34.0 pg    MCHC 34.6 32.0 - 36.0 g/dL    RDW 12.3 11.5 - 14.5 %    Platelets 201 150 - 450 x10*3/uL    Neutrophils % 77.7 40.0 - 80.0 %    Immature Granulocytes %, Automated 0.4 0.0 - 0.9 %    Lymphocytes % 14.5 13.0 - 44.0 %    Monocytes % 6.8 2.0 - 10.0 %    Eosinophils % 0.2 0.0 - 6.0 %    Basophils % 0.4 0.0 - 2.0 %    Neutrophils Absolute 10.58 (H) 1.20 - 7.70 x10*3/uL    Immature Granulocytes Absolute, Automated 0.05 0.00 - 0.70 x10*3/uL    Lymphocytes Absolute 1.98 1.20 - 4.80 x10*3/uL    Monocytes Absolute 0.92 0.10 - 1.00 x10*3/uL    Eosinophils Absolute 0.03 0.00 - 0.70 x10*3/uL    Basophils Absolute 0.06 0.00 - 0.10 x10*3/uL   Basic Metabolic Panel   Result Value Ref Range    Glucose 101 (H) 65 - 99 mg/dL    Sodium 137 133 - 145 mmol/L    Potassium 4.0 3.4 - 5.1 mmol/L    Chloride 99 97 - 107 mmol/L    Bicarbonate 26 24 - 31 mmol/L    Urea Nitrogen 17 8 - 25 mg/dL    Creatinine 1.00 0.40 - 1.60 mg/dL    eGFR >90 >60 mL/min/1.73m*2    Calcium 9.5 8.5 - 10.4 mg/dL    Anion Gap 12 <=19 mmol/L   Sedimentation rate, automated   Result Value Ref Range    Sedimentation Rate 15 0 - 20 mm/h   C-reactive protein   Result Value Ref Range    C-Reactive Protein 5.80 (H) 0.00 - 2.00 mg/dL            Assessment/Plan   Principal Problem:    Cellulitis of finger of left hand   IV antibiotics    S/P I&D in orthopedic surgeon's office    Wound and blood cultures sent in ER    CRP elevated    Consult ID    Clean site " with sterile saline and cover [facility]    Wound care nurse    Toradol for pain as needed   Active Problems:    Moderate episode of recurrent major depressive disorder (Multi)   Continue Prozac     Low testosterone   Continue testosterone, Arimidex and Tamoxifen     DVT prophylaxis    Lovenox       AURORA Edmondson-CNP

## 2024-05-22 LAB
ALBUMIN SERPL-MCNC: 3.6 G/DL (ref 3.5–5)
ALP BLD-CCNC: 77 U/L (ref 35–125)
ALT SERPL-CCNC: 21 U/L (ref 5–40)
ANION GAP SERPL CALC-SCNC: 12 MMOL/L
AST SERPL-CCNC: 21 U/L (ref 5–40)
BASOPHILS # BLD AUTO: 0.06 X10*3/UL (ref 0–0.1)
BASOPHILS NFR BLD AUTO: 0.5 %
BILIRUB SERPL-MCNC: 1.1 MG/DL (ref 0.1–1.2)
BUN SERPL-MCNC: 20 MG/DL (ref 8–25)
CALCIUM SERPL-MCNC: 8.4 MG/DL (ref 8.5–10.4)
CHLORIDE SERPL-SCNC: 105 MMOL/L (ref 97–107)
CO2 SERPL-SCNC: 22 MMOL/L (ref 24–31)
CREAT SERPL-MCNC: 1.1 MG/DL (ref 0.4–1.6)
EGFRCR SERPLBLD CKD-EPI 2021: 81 ML/MIN/1.73M*2
EOSINOPHIL # BLD AUTO: 0.1 X10*3/UL (ref 0–0.7)
EOSINOPHIL NFR BLD AUTO: 0.8 %
ERYTHROCYTE [DISTWIDTH] IN BLOOD BY AUTOMATED COUNT: 12.2 % (ref 11.5–14.5)
GLUCOSE BLD MANUAL STRIP-MCNC: 123 MG/DL (ref 74–99)
GLUCOSE SERPL-MCNC: 94 MG/DL (ref 65–99)
HCT VFR BLD AUTO: 46.1 % (ref 41–52)
HGB BLD-MCNC: 15.6 G/DL (ref 13.5–17.5)
IMM GRANULOCYTES # BLD AUTO: 0.06 X10*3/UL (ref 0–0.7)
IMM GRANULOCYTES NFR BLD AUTO: 0.5 % (ref 0–0.9)
LYMPHOCYTES # BLD AUTO: 2.33 X10*3/UL (ref 1.2–4.8)
LYMPHOCYTES NFR BLD AUTO: 19.3 %
MCH RBC QN AUTO: 30.5 PG (ref 26–34)
MCHC RBC AUTO-ENTMCNC: 33.8 G/DL (ref 32–36)
MCV RBC AUTO: 90 FL (ref 80–100)
MONOCYTES # BLD AUTO: 1.02 X10*3/UL (ref 0.1–1)
MONOCYTES NFR BLD AUTO: 8.4 %
NEUTROPHILS # BLD AUTO: 8.52 X10*3/UL (ref 1.2–7.7)
NEUTROPHILS NFR BLD AUTO: 70.5 %
NRBC BLD-RTO: 0 /100 WBCS (ref 0–0)
PLATELET # BLD AUTO: 171 X10*3/UL (ref 150–450)
POTASSIUM SERPL-SCNC: 4 MMOL/L (ref 3.4–5.1)
PROT SERPL-MCNC: 6.5 G/DL (ref 5.9–7.9)
RBC # BLD AUTO: 5.11 X10*6/UL (ref 4.5–5.9)
SODIUM SERPL-SCNC: 139 MMOL/L (ref 133–145)
VANCOMYCIN SERPL-MCNC: 15.5 UG/ML (ref 10–20)
WBC # BLD AUTO: 12.1 X10*3/UL (ref 4.4–11.3)

## 2024-05-22 PROCEDURE — 87081 CULTURE SCREEN ONLY: CPT | Mod: WESLAB | Performed by: INTERNAL MEDICINE

## 2024-05-22 PROCEDURE — 99222 1ST HOSP IP/OBS MODERATE 55: CPT

## 2024-05-22 PROCEDURE — 36415 COLL VENOUS BLD VENIPUNCTURE: CPT | Performed by: NURSE PRACTITIONER

## 2024-05-22 PROCEDURE — 1200000002 HC GENERAL ROOM WITH TELEMETRY DAILY

## 2024-05-22 PROCEDURE — 2500000004 HC RX 250 GENERAL PHARMACY W/ HCPCS (ALT 636 FOR OP/ED): Performed by: NURSE PRACTITIONER

## 2024-05-22 PROCEDURE — 85025 COMPLETE CBC W/AUTO DIFF WBC: CPT | Performed by: NURSE PRACTITIONER

## 2024-05-22 PROCEDURE — 2500000001 HC RX 250 WO HCPCS SELF ADMINISTERED DRUGS (ALT 637 FOR MEDICARE OP): Performed by: NURSE PRACTITIONER

## 2024-05-22 PROCEDURE — 82947 ASSAY GLUCOSE BLOOD QUANT: CPT

## 2024-05-22 PROCEDURE — B246ZZ4 ULTRASONOGRAPHY OF RIGHT AND LEFT HEART, TRANSESOPHAGEAL: ICD-10-PCS | Performed by: INTERNAL MEDICINE

## 2024-05-22 PROCEDURE — 84075 ASSAY ALKALINE PHOSPHATASE: CPT | Performed by: NURSE PRACTITIONER

## 2024-05-22 PROCEDURE — 2500000004 HC RX 250 GENERAL PHARMACY W/ HCPCS (ALT 636 FOR OP/ED): Mod: JZ | Performed by: INTERNAL MEDICINE

## 2024-05-22 PROCEDURE — 80202 ASSAY OF VANCOMYCIN: CPT

## 2024-05-22 RX ORDER — VANCOMYCIN 1 G/200ML
1 INJECTION, SOLUTION INTRAVENOUS EVERY 12 HOURS
Status: DISCONTINUED | OUTPATIENT
Start: 2024-05-22 | End: 2024-05-23

## 2024-05-22 RX ORDER — CEFAZOLIN SODIUM 2 G/100ML
2 INJECTION, SOLUTION INTRAVENOUS EVERY 6 HOURS
Status: DISCONTINUED | OUTPATIENT
Start: 2024-05-22 | End: 2024-05-24

## 2024-05-22 RX ORDER — SODIUM CHLORIDE 9 MG/ML
100 INJECTION, SOLUTION INTRAVENOUS CONTINUOUS
Status: DISCONTINUED | OUTPATIENT
Start: 2024-05-23 | End: 2024-05-26 | Stop reason: HOSPADM

## 2024-05-22 RX ADMIN — VANCOMYCIN 1 G: 1 INJECTION, SOLUTION INTRAVENOUS at 11:06

## 2024-05-22 RX ADMIN — KETOROLAC TROMETHAMINE 15 MG: 30 INJECTION, SOLUTION INTRAMUSCULAR at 19:53

## 2024-05-22 RX ADMIN — KETOROLAC TROMETHAMINE 15 MG: 30 INJECTION, SOLUTION INTRAMUSCULAR at 12:32

## 2024-05-22 RX ADMIN — FLUOXETINE 20 MG: 20 CAPSULE ORAL at 08:42

## 2024-05-22 RX ADMIN — PIPERACILLIN SODIUM AND TAZOBACTAM SODIUM 3.38 G: 3; .375 INJECTION, SOLUTION INTRAVENOUS at 08:42

## 2024-05-22 RX ADMIN — PIPERACILLIN SODIUM AND TAZOBACTAM SODIUM 3.38 G: 3; .375 INJECTION, SOLUTION INTRAVENOUS at 02:46

## 2024-05-22 RX ADMIN — CEFAZOLIN SODIUM 2 G: 2 INJECTION, SOLUTION INTRAVENOUS at 14:08

## 2024-05-22 RX ADMIN — KETOROLAC TROMETHAMINE 15 MG: 30 INJECTION, SOLUTION INTRAMUSCULAR at 02:47

## 2024-05-22 RX ADMIN — ACETAMINOPHEN 650 MG: 325 TABLET ORAL at 14:25

## 2024-05-22 RX ADMIN — CEFAZOLIN SODIUM 2 G: 2 INJECTION, SOLUTION INTRAVENOUS at 19:36

## 2024-05-22 RX ADMIN — TRAZODONE HYDROCHLORIDE 50 MG: 50 TABLET ORAL at 21:22

## 2024-05-22 RX ADMIN — VANCOMYCIN 1 G: 1 INJECTION, SOLUTION INTRAVENOUS at 22:53

## 2024-05-22 SDOH — ECONOMIC STABILITY: TRANSPORTATION INSECURITY
IN THE PAST 12 MONTHS, HAS THE LACK OF TRANSPORTATION KEPT YOU FROM MEDICAL APPOINTMENTS OR FROM GETTING MEDICATIONS?: NO

## 2024-05-22 SDOH — ECONOMIC STABILITY: TRANSPORTATION INSECURITY
IN THE PAST 12 MONTHS, HAS LACK OF TRANSPORTATION KEPT YOU FROM MEETINGS, WORK, OR FROM GETTING THINGS NEEDED FOR DAILY LIVING?: NO

## 2024-05-22 SDOH — ECONOMIC STABILITY: INCOME INSECURITY: IN THE LAST 12 MONTHS, WAS THERE A TIME WHEN YOU WERE NOT ABLE TO PAY THE MORTGAGE OR RENT ON TIME?: NO

## 2024-05-22 SDOH — SOCIAL STABILITY: SOCIAL INSECURITY: DOES ANYONE TRY TO KEEP YOU FROM HAVING/CONTACTING OTHER FRIENDS OR DOING THINGS OUTSIDE YOUR HOME?: NO

## 2024-05-22 SDOH — SOCIAL STABILITY: SOCIAL INSECURITY: HAVE YOU HAD THOUGHTS OF HARMING ANYONE ELSE?: NO

## 2024-05-22 SDOH — SOCIAL STABILITY: SOCIAL INSECURITY: HAS ANYONE EVER THREATENED TO HURT YOUR FAMILY OR YOUR PETS?: NO

## 2024-05-22 SDOH — SOCIAL STABILITY: SOCIAL INSECURITY: ARE YOU OR HAVE YOU BEEN THREATENED OR ABUSED PHYSICALLY, EMOTIONALLY, OR SEXUALLY BY ANYONE?: NO

## 2024-05-22 SDOH — ECONOMIC STABILITY: HOUSING INSECURITY
IN THE LAST 12 MONTHS, WAS THERE A TIME WHEN YOU DID NOT HAVE A STEADY PLACE TO SLEEP OR SLEPT IN A SHELTER (INCLUDING NOW)?: NO

## 2024-05-22 SDOH — ECONOMIC STABILITY: HOUSING INSECURITY: IN THE LAST 12 MONTHS, HOW MANY PLACES HAVE YOU LIVED?: 1

## 2024-05-22 SDOH — ECONOMIC STABILITY: INCOME INSECURITY: HOW HARD IS IT FOR YOU TO PAY FOR THE VERY BASICS LIKE FOOD, HOUSING, MEDICAL CARE, AND HEATING?: NOT HARD AT ALL

## 2024-05-22 SDOH — SOCIAL STABILITY: SOCIAL INSECURITY: ABUSE: ADULT

## 2024-05-22 SDOH — SOCIAL STABILITY: SOCIAL INSECURITY: DO YOU FEEL ANYONE HAS EXPLOITED OR TAKEN ADVANTAGE OF YOU FINANCIALLY OR OF YOUR PERSONAL PROPERTY?: NO

## 2024-05-22 SDOH — SOCIAL STABILITY: SOCIAL INSECURITY: ARE THERE ANY APPARENT SIGNS OF INJURIES/BEHAVIORS THAT COULD BE RELATED TO ABUSE/NEGLECT?: NO

## 2024-05-22 SDOH — SOCIAL STABILITY: SOCIAL INSECURITY: HAVE YOU HAD ANY THOUGHTS OF HARMING ANYONE ELSE?: NO

## 2024-05-22 SDOH — SOCIAL STABILITY: SOCIAL INSECURITY: DO YOU FEEL UNSAFE GOING BACK TO THE PLACE WHERE YOU ARE LIVING?: NO

## 2024-05-22 ASSESSMENT — PATIENT HEALTH QUESTIONNAIRE - PHQ9
SUM OF ALL RESPONSES TO PHQ9 QUESTIONS 1 & 2: 0
2. FEELING DOWN, DEPRESSED OR HOPELESS: NOT AT ALL
1. LITTLE INTEREST OR PLEASURE IN DOING THINGS: NOT AT ALL

## 2024-05-22 ASSESSMENT — COGNITIVE AND FUNCTIONAL STATUS - GENERAL
DAILY ACTIVITIY SCORE: 24
MOBILITY SCORE: 24
DAILY ACTIVITIY SCORE: 24
PATIENT BASELINE BEDBOUND: NO
DAILY ACTIVITIY SCORE: 24

## 2024-05-22 ASSESSMENT — ACTIVITIES OF DAILY LIVING (ADL)
BATHING: INDEPENDENT
JUDGMENT_ADEQUATE_SAFELY_COMPLETE_DAILY_ACTIVITIES: YES
HEARING - LEFT EAR: FUNCTIONAL
FEEDING YOURSELF: INDEPENDENT
WALKS IN HOME: INDEPENDENT
TOILETING: INDEPENDENT
ADEQUATE_TO_COMPLETE_ADL: YES
HEARING - RIGHT EAR: FUNCTIONAL
LACK_OF_TRANSPORTATION: NO
PATIENT'S MEMORY ADEQUATE TO SAFELY COMPLETE DAILY ACTIVITIES?: YES
GROOMING: INDEPENDENT
DRESSING YOURSELF: INDEPENDENT

## 2024-05-22 ASSESSMENT — PAIN - FUNCTIONAL ASSESSMENT
PAIN_FUNCTIONAL_ASSESSMENT: 0-10

## 2024-05-22 ASSESSMENT — LIFESTYLE VARIABLES
AUDIT-C TOTAL SCORE: 0
AUDIT-C TOTAL SCORE: 0
SKIP TO QUESTIONS 9-10: 1
HOW OFTEN DO YOU HAVE 6 OR MORE DRINKS ON ONE OCCASION: NEVER
HOW OFTEN DO YOU HAVE A DRINK CONTAINING ALCOHOL: NEVER
HOW MANY STANDARD DRINKS CONTAINING ALCOHOL DO YOU HAVE ON A TYPICAL DAY: PATIENT DOES NOT DRINK

## 2024-05-22 ASSESSMENT — PAIN SCALES - GENERAL
PAINLEVEL_OUTOF10: 3
PAINLEVEL_OUTOF10: 0 - NO PAIN
PAINLEVEL_OUTOF10: 4
PAINLEVEL_OUTOF10: 6
PAINLEVEL_OUTOF10: 6
PAINLEVEL_OUTOF10: 2
PAINLEVEL_OUTOF10: 1
PAINLEVEL_OUTOF10: 5 - MODERATE PAIN
PAINLEVEL_OUTOF10: 2
PAINLEVEL_OUTOF10: 3
PAINLEVEL_OUTOF10: 1

## 2024-05-22 ASSESSMENT — PAIN DESCRIPTION - DESCRIPTORS: DESCRIPTORS: PENETRATING;SHARP

## 2024-05-22 ASSESSMENT — ENCOUNTER SYMPTOMS: CHILLS: 1

## 2024-05-22 ASSESSMENT — PAIN DESCRIPTION - LOCATION
LOCATION: FINGER (COMMENT WHICH ONE)

## 2024-05-22 ASSESSMENT — PAIN DESCRIPTION - ORIENTATION
ORIENTATION: LEFT

## 2024-05-22 ASSESSMENT — PAIN SCALES - PAIN ASSESSMENT IN ADVANCED DEMENTIA (PAINAD): TOTALSCORE: MEDICATION (SEE MAR)

## 2024-05-22 NOTE — PROGRESS NOTES
Vancomycin Dosing by Pharmacy- FOLLOW UP    Cristhian Thornton is a 53 y.o. year old male who Pharmacy has been consulted for vancomycin dosing for cellulitis, skin and soft tissue. Based on the patient's indication and renal status this patient is being dosed based on a goal AUC of 400-600.     Renal function is currently stable.    Current vancomycin dose: 1500 mg given every 12 hours    Estimated vancomycin AUC on current dose: 721 mg/L.hr     Visit Vitals  /77 (BP Location: Right arm, Patient Position: Sitting)   Pulse 69   Temp 36.7 °C (98.1 °F) (Oral)   Resp 18        Lab Results   Component Value Date    CREATININE 1.10 2024    CREATININE 1.00 2024    CREATININE 1.10 2024    CREATININE 1.0 2023    CREATININE 1.2 2021    CREATININE 0.8 2020    CREATININE 0.9 2020        Patient weight is as follows:   Vitals:    24 0940   Weight: 91.2 kg (201 lb)     I/O last 3 completed shifts:  In: 1000 (11 mL/kg) [P.O.:400; IV Piggyback:600]  Out: 0 (0 mL/kg)   Weight: 91.2 kg   I/O during current shift:  No intake/output data recorded.    Temp (24hrs), Av.8 °C (98.2 °F), Min:36.7 °C (98.1 °F), Max:36.8 °C (98.2 °F)      Assessment/Plan    Above goal AUC. Orders placed for new vancomcyin regimen of 1000 every 12 hours to begin at  @1100.    This dosing regimen is predicted by [employer] to result in the following pharmacokinetic parameters:    Regimen: 1000 mg IV every 12 hours.  Start time: 1100 on 2024  Exposure target: AUC24 (range)400-600 mg/L.hr   AUC24,ss: 487 mg/L.hr  Probability of AUC24 > 400: 80 %  Ctrough,ss: 16.1 mg/L  Probability of Ctrough,ss > 20: 25 %  Probability of nephrotoxicity (Lodise DONNA ): 11 %      The next level will be obtained on  at AM labs. May be obtained sooner if clinically indicated.   Will continue to monitor renal function daily while on vancomycin and order serum creatinine at least every 48 hours if not already  ordered.  Follow for continued vancomycin needs, clinical response, and signs/symptoms of toxicity.       Fatou Orellana, PharmD

## 2024-05-22 NOTE — CARE PLAN
The patient's goals for the shift include rest    The clinical goals for the shift include improve pain level    Over the shift, the patient did make progress toward the following goals.   Problem: Safety - Adult  Goal: Free from fall injury  Outcome: Progressing     Problem: Pain - Adult  Goal: Verbalizes/displays adequate comfort level or baseline comfort level  Outcome: Progressing     Problem: Chronic Conditions and Co-morbidities  Goal: Patient's chronic conditions and co-morbidity symptoms are monitored and maintained or improved  Outcome: Progressing

## 2024-05-22 NOTE — CONSULTS
INFECTIOUS DISEASES CONSULTATION NOTE      Referred by Jonathan Nur MD    Reason For Consult  Left index finger cellulitis/abscess  Bacteremia    History Of Present Illness  Cristhian Thornton is a 53 y.o. male with acute pyogenic infection of the left index finger.  He states that about 15 years ago he had an MRSA right olecranon bursitis which required incision and drainage at [facility].  There were no skin or soft tissue infections until about 8 months ago, when he had a pyogenic cutaneous infection over the right posterior arm that was treated with incision and drainage.  Subsequently he developed 2 additional episodes, 1 on the right hip and 1 on the right lower leg.  Another episode developed spontaneously over the left third finger, and more recently several weeks ago he had another episode overlying the left knee.  The latter episode resolved with soaking and squeezing purulent material, and he was seen for this by Dr. Phillips.    Abruptly, 2 days ago, he spontaneously developed painful erythema and swelling of the tip of the left index finger.  No injury.  When the patient was seen by Dr. Leonard several months ago for the left third finger infection, he was advised to do chlorhexidine and mupirocin topical treatments for possible MRSA colonization     Past Medical History  Congenital bicuspid aortic valve, S/P bovine aortic valve and aortic arch replacement  Low testosterone level  Surgical History  Cervical spine surgery with disc spacer placement  Right total knee arthroplasty  Hernia repair  Left third finger incision and drainage  Right olecranon bursa incision and drainage    Social History  Tobacco use: Non-smoker  Alcohol use: Social alcohol consumption    Family History  Autoimmune disease    Allergies  Acyclovir, Opioids - morphine analogues, and Hydromorphone     Review of Systems  Detailed review of systems completed.  No significant additional positives beyond what is mentioned  above    Physical Exam  Vital signs:  Visit Vitals  /88 (BP Location: Right arm, Patient Position: Lying)   Pulse 76   Temp 37.3 °C (99.1 °F) (Oral)   Resp 17      General: Healthy appearing middle-aged man in no acute distress.  Not toxic  HEENT:  No scleral icterus or conjunctival suffusion, oral mucosa moist  Nodes:  Negative  Lungs:  Clear to auscultation  Heart:  S1, S2 normal, buzzing-quality short systolic murmur appreciated throughout the left precordium.  No diastolic murmur  Abdomen:  Soft, nontender. No palpable organs or masses  Back:  No spinal or CVA tenderness  Genitalia:  Not examined  Extremities: Diffuse erythema and swelling of the left index finger with violaceous discoloration of the distal phalanx and erythema and swelling involving the MCP joint and surrounding tissue.  No fluctuance or crepitus appreciated.  No proximal lymphangitis or regional lymphadenopathy  Neurologic:  Alert.  Otherwise grossly non-focal.  No meningismus  Skin: No mucocutaneous signs of infectious endocarditis    Relevant Results  Results from last 72 hours   Lab Units 05/22/24  0523   WBC AUTO x10*3/uL 12.1*   HEMOGLOBIN g/dL 15.6   HEMATOCRIT % 46.1   PLATELETS AUTO x10*3/uL 171   NEUTROS PCT AUTO % 70.5   LYMPHS PCT AUTO % 19.3   MONOS PCT AUTO % 8.4   EOS PCT AUTO % 0.8     Results from last 72 hours   Lab Units 05/22/24  0523   CREATININE mg/dL 1.10   ANION GAP mmol/L 12   EGFR mL/min/1.73m*2 81     Results from last 72 hours   Lab Units 05/22/24  0523   AST U/L 21   ALT U/L 21   ALK PHOS U/L 77   BILIRUBIN TOTAL mg/dL 1.1     Microbiology:  Blood (5/21): GPC clusters X2  Abscess (5/21): Moderate GPC on stain, culture pending  Imaging:      ASSESSMENT:  Abscess/cellulitis left index finger.  GPC septicemia  Patient has had a remarkable sequence of pyogenic skin and soft tissue infections over the last several months, and is now admitted with a new episode, acute suppurative inflammation of the left index  finger.  He has a systolic murmur consistent with his prosthetic aortic valve, and no other physical findings to suggest infectious endocarditis, but we must maintain a high degree of suspicion for that entity.  May also be colonized with Staph aureus    Prosthetic aortic valve/arch  See above.  Rule out endocarditis      PLANS:  -   Continue vancomycin, pharmacy dosing  -   Change Zosyn to cefazolin  -   Consult Dr. Baltazar/TTE  -   Orthopedic follow-up with Dr. Carbajal  -   Await further observation, and identification of bloodstream organism  -   Anticipate repeat surveillance blood cultures 5/23  -   Culture nares, groin, axilla to rule out Staph aureus    Text message sent to Dr. Baltazar, responded  Spoke with Dr. Carbajal     THANK YOU FOR ASKING ME TO ASSIST YOU IN THE CARE OF YOUR PATIENT    Jonathan Lopez MD  [facility] [employer]  Office:  [phone]

## 2024-05-22 NOTE — CARE PLAN
Problem: Pain - Adult  Goal: Verbalizes/displays adequate comfort level or baseline comfort level  Outcome: Progressing   The patient's goals for the shift include rest    The clinical goals for the shift include improve pain level

## 2024-05-22 NOTE — CONSULTS
"Nutrition Assessement Note    Nutrition Assessment    Reason for Assessment: Admission nursing screening    Spoke with pt at bedside. Pt reported to be eating well and increased appetite. Pt reported decreased appetite d/t multiple infections. States he has had multiple infections including right hip, right calf and left knee over the last several months. Discussed eating more protein during infections. Pt has no other nutrition related questions or concerns.     Reason for Hospital Admission:  Cristhian Thornton is a 53 y.o. male who is admitted for cellulitis of finger of left hand.     Past Medical History:   Diagnosis Date    Aortic valve stenosis     Infection     Multiple    Low testosterone       Past Surgical History:   Procedure Laterality Date    AORTIC VALVE REPLACEMENT      CERVICAL SPINE SURGERY      implant    ELBOW BURSA SURGERY Right     related to infection    HERNIA REPAIR      JOINT REPLACEMENT Right     knee    MR HEAD ANGIO WO IV CONTRAST  02/24/2023    MR HEAD ANGIO WO IV CONTRAST [facility]    MR HEAD ANGIO WO IV CONTRAST  09/05/2023    MR HEAD ANGIO WO IV CONTRAST [facility]    MR NECK ANGIO WO IV CONTRAST  12/20/2012    MR NECK ANGIO WO IV CONTRAST [facility]     Nutrition History:  Food and Nutrient History: pt reported to be eating well and appetite has improved     Food Allergies/Intolerances:  None  GI Symptoms: None  Oral Problems: None    Anthropometrics:  Ht: 177.8 cm (5' 10\"), Wt: 91.2 kg (201 lb), BMI: 28.84  IBW/kg (Dietitian Calculated): 72.73 kg  Percent of IBW: 125.4 %  Adjusted Body Weight (kg): 77.27 kg    Weight Change:  Daily Weight  05/21/24 : 91.2 kg (201 lb)  03/11/24 : 91.2 kg (201 lb)  03/06/24 : 88.5 kg (195 lb)  07/20/23 : 81.6 kg (180 lb)  03/03/23 : 86.8 kg (191 lb 6.4 oz)  01/30/23 : 86.8 kg (191 lb 6.4 oz)  05/09/22 : 89.4 kg (197 lb)  01/04/22 : 87.1 kg (192 lb)  12/10/21 : 86.2 kg (190 lb)  11/22/21 : 87.3 kg (192 lb 8 oz)     Weight " History / % Weight Change: pt denies any weight changes             Nutrition Focused Physical Exam Findings:                       Nutrition Significant Labs:  Lab Results   Component Value Date    WBC 12.1 (H) 05/22/2024    HGB 15.6 05/22/2024    HCT 46.1 05/22/2024     05/22/2024    CHOL 144 04/13/2024    TRIG 53 04/13/2024    HDL 53.0 04/13/2024    ALT 21 05/22/2024    AST 21 05/22/2024     05/22/2024    K 4.0 05/22/2024     05/22/2024    CREATININE 1.10 05/22/2024    BUN 20 05/22/2024    CO2 22 (L) 05/22/2024    TSH 1.96 06/29/2020    PSA 1.10 04/13/2024    INR 3.8 (H) 03/08/2021    HGBA1C 5.0 10/28/2020     Nutrition Specific Medications:  [START ON 5/23/2024] anastrozole, 0.5 mg, oral, Weekly  enoxaparin, 40 mg, subcutaneous, q24h  FLUoxetine, 20 mg, oral, Daily  piperacillin-tazobactam, 3.375 g, intravenous, q6h  tamoxifen, 20 mg, oral, Once per day on Monday Thursday  [START ON 5/26/2024] testosterone cypionate, 75 mg, intramuscular, q7 days  traZODone, 50 mg, oral, Nightly  vancomycin (Xellia) 1 g in 200 mL, 1 g, intravenous, q12h           Dietary Orders (From admission, onward)       Start     Ordered    05/21/24 1933  Adult diet Regular  Diet effective now        Question:  Diet type  Answer:  Regular    05/21/24 1932                  Estimated Needs:   Estimated Energy Needs  Total Energy Estimated Needs (kCal): 1931 kCal  Total Estimated Energy Need per Day (kCal/kg): 25 kCal/kg  Method for Estimating Needs: ABW    Estimated Protein Needs  Total Protein Estimated Needs (g):  (77-93)  Total Protein Estimated Needs (g/kg):  (1-1.2)  Method for Estimating Needs: ABW    Estimated Fluid Needs  Total Fluid Estimated Needs (mL): 1931 mL  Method for Estimating Needs: 1 mL/kcal        Nutrition Diagnosis   Nutrition Diagnosis:       Nutrition Diagnosis  Patient has Nutrition Diagnosis: No       Nutrition Interventions/Recommendations   Nutrition Interventions and  Recommendations:    Nutrition Prescription:  Individualized Nutrition Prescription Provided for : 1935 kcals, 77-93 gm protein via diet    Nutrition Interventions:   Food and/or Nutrient Delivery Interventions  Interventions: Meals and snacks  Meals and Snacks: General healthful diet  Goal: provide diet as ordered    Education Documentation  No documentation found.           Nutrition Monitoring and Evaluation   Monitoring/Evaluation:   Food/Nutrient Related History Monitoring  Monitoring and Evaluation Plan: Energy intake  Energy Intake: Estimated energy intake  Criteria: pt to consume >/= 75% estimated needs            Time Spent/Follow-up:   Follow Up  Time Spent (min): 20 minutes  Last Date of Nutrition Visit: 05/22/24  Nutrition Follow-Up Needed?: 7-10 days  Follow up Comment: 5/30/24

## 2024-05-22 NOTE — PROGRESS NOTES
MSW met with pt at bedside to introduce self. Pt lives in a multistory home with his wife. Pt PCP is Amberly Del Castillo and uses [facility] on [address]. Pt reports being completely independent working, driving. [facility] following for potential need for IV abx. MSW briefly explained to him what that what look like if ID decides he needs IV abx at home. Pt showed understanding.     Safe dc plan TBD-possible IV abx   05/22/24 1204   Discharge Planning   Living Arrangements Spouse/significant other   Support Systems Spouse/significant other   Assistance Needed none   Type of Residence Private residence   Number of Stairs to Enter Residence 3   Number of Stairs Within Residence 12   Home or Post Acute Services None   Patient expects to be discharged to: Home unless pt needs IV abx-then pt will need HHC, will follow for ID rec   Does the patient need discharge transport arranged? No   Financial Resource Strain   How hard is it for you to pay for the very basics like food, housing, medical care, and heating? Not hard   Housing Stability   In the last 12 months, was there a time when you were not able to pay the mortgage or rent on time? N   In the last 12 months, how many places have you lived? 1   In the last 12 months, was there a time when you did not have a steady place to sleep or slept in a shelter (including now)? N   Transportation Needs   In the past 12 months, has lack of transportation kept you from medical appointments or from getting medications? no   In the past 12 months, has lack of transportation kept you from meetings, work, or from getting things needed for daily living? No

## 2024-05-22 NOTE — CONSULTS
Inpatient consult to Cardiology  Consult performed by: AURORA Melgar-CNP  Consult ordered by: Jonathan Lopez MD  Reason for consult: Prosthetic valve, positive blood cultures      History Of Present Illness:    Cristhian Thornton is a 53 y.o. male presenting with left index finger infection. Patient follows with Dr. Baltazar for cardiology. He has a past medical history of bicuspid aortic valve s/p bioprosthetic aortic valve replacement with ascending aorta replacement on 11/03/2024. Patient reports over the last several months he has had infections to the right hip, right calf and left knee. Patient reports pain in his left index finger as well as redness beginning on Sunday. He saw Dr. Leonard in office who completed an I&D and instructed him to present to the emergency department. He denies chest pain, pressure or palpitations. Denies shortness of breath.  Lab work was completed in the emergency department revealing sodium 137, potassium 4.0, creatinine 1.0 and hemoglobin of 17.6.  Patient's C-reactive protein was elevated at 5.80.  Blood and wound cultures were drawn. Patient was started on IV antibiotics and admitted to the hospital for further assessment and management.     Review of Systems   Constitutional: Positive for chills.   Skin:         Redness and pain of left index finger   All other systems reviewed and are negative.         Last Recorded Vitals:  Vitals:    05/21/24 1834 05/21/24 2354 05/22/24 0818 05/22/24 1255   BP: (!) 136/93 129/77 126/78 124/88   BP Location: Right arm Right arm Right arm Right arm   Patient Position: Sitting Sitting Lying Lying   Pulse: 83 69 72 76   Resp: 18 18 17 17   Temp:  36.7 °C (98.1 °F) 37.2 °C (99 °F) 37.3 °C (99.1 °F)   TempSrc:  Oral Oral Oral   SpO2: 96% 98% 97% 98%   Weight:       Height:           Last Labs:  CBC - 5/22/2024:  5:23 AM  12.1 15.6 171    46.1      CMP - 5/22/2024:  5:23 AM  8.4 6.5 21 --- 1.1   _ 3.6 21 77      PTT - No results in last  year.  _   _ _     Hemoglobin A1C   Date/Time Value Ref Range Status   10/28/2020 12:52 PM 5.0 4.0 - 6.0 % Final     Comment:     Hemoglobin A1C levels are related to mean blood glucose during the   preceding 2-3 months. The relationship table below may be used as a   general guide. Each 1% increase in HGB A1C is a reflection of an   increase in mean glucose of approximately 30 mg/dl.   Reference: Diabetes Care, volume 29, supplement 1 Jan. 2006                        HGB A1C ................. Approx. Mean Glucose   _______________________________________________   6%   ...............................  120 mg/dl   7%   ...............................  150 mg/dl   8%   ...............................  180 mg/dl   9%   ...............................  210 mg/dl   10%  ...............................  240 mg/dl  Performed at [address] [address]     06/29/2020 07:05 AM 4.9 4.0 - 6.0 % Final     Comment:     Hemoglobin A1C levels are related to mean blood glucose during the   preceding 2-3 months. The relationship table below may be used as a   general guide. Each 1% increase in HGB A1C is a reflection of an   increase in mean glucose of approximately 30 mg/dl.   Reference: Diabetes Care, volume 29, supplement 1 Jan. 2006                        HGB A1C ................. Approx. Mean Glucose   _______________________________________________   6%   ...............................  120 mg/dl   7%   ...............................  150 mg/dl   8%   ...............................  180 mg/dl   9%   ...............................  210 mg/dl   10%  ...............................  240 mg/dl  Performed at [place] [address] [address] [facility] [address]       LDL Calculated   Date/Time Value Ref Range Status   04/13/2024 08:08 AM 80 65 - 130 mg/dL Final   01/30/2023 10:18 AM 77 65 - 130 MG/DL Final   11/04/2020 04:02 AM 36 (L) 65 - 130 MG/DL Final   06/29/2020 07:05 AM 65 65 - 130 MG/DL Final      Last  "I/O:  I/O last 3 completed shifts:  In: 1000 (11 mL/kg) [P.O.:400; IV Piggyback:600]  Out: 0 (0 mL/kg)   Weight: 91.2 kg     Past Cardiology Tests (Last 3 Years):  EKG:  No results found for this or any previous visit from the past 1095 days.    Echo:  No results found for this or any previous visit from the past 1095 days.    Ejection Fractions:  No results found for: \"EF\"  Cath:  No results found for this or any previous visit from the past 1095 days.    Stress Test:  No results found for this or any previous visit from the past 1095 days.    Cardiac Imaging:  No results found for this or any previous visit from the past 1095 days.      Past Medical History:  He has a past medical history of Aortic valve stenosis, Infection, and Low testosterone.    Past Surgical History:  He has a past surgical history that includes MR angio neck wo IV contrast (12/20/2012); MR angio head wo IV contrast (02/24/2023); MR angio head wo IV contrast (09/05/2023); Joint replacement (Right); Aortic valve replacement; Elbow bursa surgery (Right); Cervical spine surgery; and Hernia repair.      Social History:  He reports that he has never smoked. He has never used smokeless tobacco. He reports current alcohol use of about 1.0 standard drink of alcohol per week. He reports that he does not use drugs.    Family History:  Family History   Problem Relation Name Age of Onset   • Autoimmune disease Mother     • Hypertension Mother     • PTSD Father     • Benign prostatic hyperplasia Father     • No Known Problems Sister     • Other (htn) Brother     • Other (hld) Brother     • No Known Problems Daughter     • No Known Problems Daughter     • Other (watchman) Maternal Grandfather     • Other (cath) Maternal Grandfather     • Other (3x bypass) Maternal Grandfather     • Heart disease Maternal Grandfather          Allergies:  Acyclovir, Opioids - morphine analogues, and Hydromorphone    Inpatient Medications:  Scheduled medications   Medication " Dose Route Frequency   • [START ON 5/23/2024] anastrozole  0.5 mg oral Weekly   • ceFAZolin  2 g intravenous q6h   • enoxaparin  40 mg subcutaneous q24h   • FLUoxetine  20 mg oral Daily   • tamoxifen  20 mg oral Once per day on Monday Thursday   • [START ON 5/26/2024] testosterone cypionate  75 mg intramuscular q7 days   • traZODone  50 mg oral Nightly   • vancomycin (Xellia) 1 g in 200 mL  1 g intravenous q12h     PRN medications   Medication   • acetaminophen   • benzocaine-menthol   • ketorolac   • ondansetron    Or   • ondansetron   • vancomycin     Continuous Medications   Medication Dose Last Rate   • [START ON 5/23/2024] sodium chloride 0.9%  100 mL/hr       Outpatient Medications:  Current Outpatient Medications   Medication Instructions   • anastrozole (ARIMIDEX) 0.5 mg, oral, 2 times weekly   • b complex 0.4 mg tablet oral   • calcium carbonate-vitamin D3 600 mg-20 mcg (800 unit) tablet 3 tablets, oral, Daily with breakfast   • cetirizine (ZYRTEC) 10 mg, oral, Daily   • FLUoxetine (PROZAC) 20 mg, oral, Daily   • ibuprofen 200 mg, oral, Every 6 hours PRN   • meloxicam (MOBIC) 15 mg, oral, Daily   • tamoxifen (NOLVADEX) 20 mg, oral, 2 times weekly   • testosterone cypionate (DEPO-TESTOSTERONE) 75 mg, Every 7 days, Receives injection every Sunday   • traZODone (DESYREL) 50 mg, oral, Nightly PRN   • vitamin E mixed 100 unit tablet 1 tablet, oral, Daily       Physical Exam  HENT:      Head: Normocephalic and atraumatic.   Cardiovascular:      Rate and Rhythm: Normal rate and regular rhythm.      Heart sounds: Murmur heard.      Systolic murmur is present with a grade of 2/6.      No friction rub. No gallop.      Comments: Murmur is loudest at second intercostal space left of the sternal border   Pulmonary:      Effort: Pulmonary effort is normal.      Breath sounds: No wheezing, rhonchi or rales.   Abdominal:      General: Bowel sounds are normal.   Musculoskeletal:      Right lower leg: No edema.      Left  lower leg: No edema.      Comments: Left finger dressing: clean, dry and intact   Skin:     Capillary Refill: Capillary refill takes less than 2 seconds.   Neurological:      Mental Status: He is alert and oriented to person, place, and time.   Psychiatric:         Mood and Affect: Mood normal.         Assessment/Plan   Cellulitis of Left Index Finger   Septicemia  Bicuspid Aortic Valve & Aortic Valve Stenosis s/p Bioprosthetic Aortic Valve Replacement and Aortic Arch Repair 11/03/2020    Impression and Plan:    5/22: As above.  Patient presenting with cellulitis of left finger.  History of bicuspid aortic valve requiring a bioprosthetic aortic valve replacement and aortic arch repair.  Cardiac catheterization completed in October 2020 revealing normal-appearing coronary arteries.  Echocardiogram at this time revealed a preserved ejection fraction of greater than 60%.  Blood cultures resulted with gram-positive cultures.  Wound cultures with moderate gram-positive cocci.  On my exam the patient is alert and oriented.  Appears euvolemic on examination.  He is breathing comfortably on room air with pulse oximetry of 98%.  Blood pressures have been well-controlled with last recorded at 124/88.  Patient does have a slightly elevated temperature at 37.3 °C.  Transthoracic echocardiogram has been ordered to be completed.  Given this patient's history there is suspicion for endocarditis, so I have discussed with him further workup via a transesophageal echocardiogram which he is agreeable to. We will plan for this tomorrow with Dr. Quick.  He will be n.p.o. at midnight and we will start him on IV fluids at midnight leading up to the procedure.  We will follow with you.       Code Status:  Full Code    I spent 60 minutes in the professional and overall care of this patient.        Kasandra Thompson, APRN-CNP

## 2024-05-22 NOTE — PROGRESS NOTES
"Cristhian Thornton is a 53 y.o. male on day 1 of admission presenting with Cellulitis of finger of left hand.    Subjective   Patient resting in bed. States his pain is controlled with current regimen. Denies chest pain, shortness of breath, abdominal pain. Chills seem improved as well.        Objective     Physical Exam  Constitutional:       Appearance: Normal appearance.   HENT:      Head: Normocephalic and atraumatic.      Mouth/Throat:      Mouth: Mucous membranes are moist.      Pharynx: Oropharynx is clear.   Eyes:      Extraocular Movements: Extraocular movements intact.      Conjunctiva/sclera: Conjunctivae normal.      Pupils: Pupils are equal, round, and reactive to light.   Cardiovascular:      Rate and Rhythm: Normal rate.      Pulses: Normal pulses.      Heart sounds: Normal heart sounds.   Pulmonary:      Effort: Pulmonary effort is normal.      Breath sounds: Normal breath sounds.   Abdominal:      General: Bowel sounds are normal.      Palpations: Abdomen is soft.      Tenderness: There is no abdominal tenderness.   Musculoskeletal:      Cervical back: Normal range of motion and neck supple.      Comments: Left index finger covered with sterile, intact dressing. Knuckles remain red and warm today, but swelling improved.    Skin:     General: Skin is warm and dry.      Capillary Refill: Capillary refill takes less than 2 seconds.   Neurological:      General: No focal deficit present.      Mental Status: He is alert and oriented to person, place, and time.   Psychiatric:         Mood and Affect: Mood normal.         Behavior: Behavior normal.         Last Recorded Vitals  Blood pressure 126/78, pulse 72, temperature 37.2 °C (99 °F), temperature source Oral, resp. rate 17, height 1.778 m (5' 10\"), weight 91.2 kg (201 lb), SpO2 97%.  Intake/Output last 3 Shifts:  I/O last 3 completed shifts:  In: 1000 (11 mL/kg) [P.O.:400; IV Piggyback:600]  Out: 0 (0 mL/kg)   Weight: 91.2 kg     Relevant Results  Results " for orders placed or performed during the hospital encounter of 05/21/24 (from the past 24 hour(s))   Comprehensive metabolic panel   Result Value Ref Range    Glucose 94 65 - 99 mg/dL    Sodium 139 133 - 145 mmol/L    Potassium 4.0 3.4 - 5.1 mmol/L    Chloride 105 97 - 107 mmol/L    Bicarbonate 22 (L) 24 - 31 mmol/L    Urea Nitrogen 20 8 - 25 mg/dL    Creatinine 1.10 0.40 - 1.60 mg/dL    eGFR 81 >60 mL/min/1.73m*2    Calcium 8.4 (L) 8.5 - 10.4 mg/dL    Albumin 3.6 3.5 - 5.0 g/dL    Alkaline Phosphatase 77 35 - 125 U/L    Total Protein 6.5 5.9 - 7.9 g/dL    AST 21 5 - 40 U/L    Bilirubin, Total 1.1 0.1 - 1.2 mg/dL    ALT 21 5 - 40 U/L    Anion Gap 12 <=19 mmol/L   CBC and Auto Differential   Result Value Ref Range    WBC 12.1 (H) 4.4 - 11.3 x10*3/uL    nRBC 0.0 0.0 - 0.0 /100 WBCs    RBC 5.11 4.50 - 5.90 x10*6/uL    Hemoglobin 15.6 13.5 - 17.5 g/dL    Hematocrit 46.1 41.0 - 52.0 %    MCV 90 80 - 100 fL    MCH 30.5 26.0 - 34.0 pg    MCHC 33.8 32.0 - 36.0 g/dL    RDW 12.2 11.5 - 14.5 %    Platelets 171 150 - 450 x10*3/uL    Neutrophils % 70.5 40.0 - 80.0 %    Immature Granulocytes %, Automated 0.5 0.0 - 0.9 %    Lymphocytes % 19.3 13.0 - 44.0 %    Monocytes % 8.4 2.0 - 10.0 %    Eosinophils % 0.8 0.0 - 6.0 %    Basophils % 0.5 0.0 - 2.0 %    Neutrophils Absolute 8.52 (H) 1.20 - 7.70 x10*3/uL    Immature Granulocytes Absolute, Automated 0.06 0.00 - 0.70 x10*3/uL    Lymphocytes Absolute 2.33 1.20 - 4.80 x10*3/uL    Monocytes Absolute 1.02 (H) 0.10 - 1.00 x10*3/uL    Eosinophils Absolute 0.10 0.00 - 0.70 x10*3/uL    Basophils Absolute 0.06 0.00 - 0.10 x10*3/uL   Vancomycin   Result Value Ref Range    Vancomycin 15.5 10.0 - 20.0 ug/mL         Assessment/Plan   Principal Problem:    Cellulitis of finger of left hand              IV antibiotics               S/P I&D in orthopedic surgeon's office               Wound and blood cultures sent in ER - continue to monitor               CRP elevated               Consult ID     Consult orthopedic surgery               Clean site with sterile saline and cover with Kerlix               Wound care nurse - appreciate noel               Toradol for pain as needed   Active Problems:    Moderate episode of recurrent major depressive disorder (Multi)              Continue Prozac     Low testosterone              Continue testosterone, Arimidex and Tamoxifen     DVT prophylaxis               Lovenox      Nyla Ring, APRN-CNP

## 2024-05-22 NOTE — CONSULTS
Wound Care Consult     Visit Date: 5/22/2024      Patient Name: Cristhian Thornton         MRN: [id number]           YOB: 1970    Consulted for wound care. A 53 y.o. male patient admitted for   Cellulitis of finger of left hand [L03.012]   Past Medical History:   Diagnosis Date    Aortic valve stenosis     Infection     Multiple    Low testosterone       Past Surgical History:   Procedure Laterality Date    AORTIC VALVE REPLACEMENT      CERVICAL SPINE SURGERY      implant    ELBOW BURSA SURGERY Right     related to infection    HERNIA REPAIR      JOINT REPLACEMENT Right     knee    MR HEAD ANGIO WO IV CONTRAST  02/24/2023    MR HEAD ANGIO WO IV CONTRAST [facility]    MR HEAD ANGIO WO IV CONTRAST  09/05/2023    MR HEAD ANGIO WO IV CONTRAST [facility]    MR NECK ANGIO WO IV CONTRAST  12/20/2012    MR NECK ANGIO WO IV CONTRAST [facility]      Scheduled medications  [START ON 5/23/2024] anastrozole, 0.5 mg, oral, Weekly  enoxaparin, 40 mg, subcutaneous, q24h  FLUoxetine, 20 mg, oral, Daily  piperacillin-tazobactam, 3.375 g, intravenous, q6h  tamoxifen, 20 mg, oral, Once per day on Monday Thursday  [START ON 5/26/2024] testosterone cypionate, 75 mg, intramuscular, q7 days  traZODone, 50 mg, oral, Nightly  vancomycin (Xellia) 1 g in 200 mL, 1 g, intravenous, q12h      Continuous medications     PRN medications  PRN medications: acetaminophen, benzocaine-menthol, ketorolac, ondansetron **OR** ondansetron, vancomycin     Allergies   Allergen Reactions    Acyclovir Unknown    Opioids - Morphine Analogues Unknown    Hydromorphone Rash        No results found for the last 90 days.         Pertinent Labs:   Albumin   Date Value Ref Range Status   05/22/2024 3.6 3.5 - 5.0 g/dL Final       Wound Assessment:       Reason for Consult: Left middle finger infection        Wound History: Left middle finger I&D performed in Dr. Leonard's office and referred for admission.     Wound Team  Assessment: Reviewed record, no photograph at time of this note, dressing change orders in place for nursing to change per shift, clean with saline, cover with 2x2, wrap with kerlix and secure with tape. No further needs at this time and will defer continued care and evaluation to medical team. Reconsult as needed.      Thelma Martinez RN bedside nurse updated via [facility], continue pressure injury prevention interventions and nursing to continue to follow provider orders. Re consult wound RN PRN.    Idania Knox BSN RN [facility] OMS  5/22/2024  11:21 AM

## 2024-05-22 NOTE — NURSING NOTE
Patient stated that DR Carbajal changed dressing yesterday and then said that someone would be in today form surgery to look at wound and re wrap  right index finger, dressing was reinforced during night shift,dressing is clean dry and intact. Patient is A&Ox4.

## 2024-05-22 NOTE — CONSULTS
Reason For Consult  Left index finger cellulitis and abscess    History Of Present Illness  Cristhian Thornton is a 53 y.o. male presenting with severe pain and swelling and cellulitis of his left index finger.  I had seen the patient yesterday in the office for what appeared to be a fairly significant cellulitis of his left hand and index finger.  An I&D of the finger was performed in the office and the patient was sent to the ER for admission due to the severity of the cellulitis.  States it took a while for him to get actually admitted and started on IV antibiotics and since doing the IV antibiotics he is feeling somewhat better he states that pain in the finger is lessened to some degree.  No other new issues.  Had blood cultures drawn which are positive at this point as well as the wound culture from the finger showing gram-positive cocci.       Past Medical History  He has a past medical history of Aortic valve stenosis, Infection, and Low testosterone.    Surgical History  He has a past surgical history that includes MR angio neck wo IV contrast (12/20/2012); MR angio head wo IV contrast (02/24/2023); MR angio head wo IV contrast (09/05/2023); Joint replacement (Right); Aortic valve replacement; Elbow bursa surgery (Right); Cervical spine surgery; and Hernia repair.     Social History  He reports that he has never smoked. He has never used smokeless tobacco. He reports current alcohol use of about 1.0 standard drink of alcohol per week. He reports that he does not use drugs.    Family History  Family History   Problem Relation Name Age of Onset    Autoimmune disease Mother      Hypertension Mother      PTSD Father      Benign prostatic hyperplasia Father      No Known Problems Sister      Other (htn) Brother      Other (hld) Brother      No Known Problems Daughter      No Known Problems Daughter      Other (watchman) Maternal Grandfather      Other (cath) Maternal Grandfather      Other (3x bypass) Maternal  "Grandfather      Heart disease Maternal Grandfather          Allergies  Acyclovir, Opioids - morphine analogues, and Hydromorphone    Review of Systems  Notes pain about the index finger does not feel systemically ill otherwise at this point     Physical Exam  Normocephalic atraumatic respirations nonlabored heart rate regular rate and rhythm abdomen soft nontender  Left index finger with notable swelling appears slightly improved over yesterday erythema present up to the level of the MCP joint able to actively flex and extend at the MCP and PIP joint and to some degree the DIP joint with some discomfort more towards the DIP joint some tenderness palpation more towards the distal aspect of the finger packing removed from the wound and some viscous purulent material was obtained.  Notable ecchymosis on the dorsum of the finger consistent with I&D yesterday.  Sensation intact light touch otherwise brisk cap refill to the fingertip     Last Recorded Vitals  Blood pressure 124/88, pulse 76, temperature 37.3 °C (99.1 °F), temperature source Oral, resp. rate 17, height 1.778 m (5' 10\"), weight 91.2 kg (201 lb), SpO2 98%.    Relevant Results  Results for orders placed or performed during the hospital encounter of 05/21/24 (from the past 24 hour(s))   Comprehensive metabolic panel   Result Value Ref Range    Glucose 94 65 - 99 mg/dL    Sodium 139 133 - 145 mmol/L    Potassium 4.0 3.4 - 5.1 mmol/L    Chloride 105 97 - 107 mmol/L    Bicarbonate 22 (L) 24 - 31 mmol/L    Urea Nitrogen 20 8 - 25 mg/dL    Creatinine 1.10 0.40 - 1.60 mg/dL    eGFR 81 >60 mL/min/1.73m*2    Calcium 8.4 (L) 8.5 - 10.4 mg/dL    Albumin 3.6 3.5 - 5.0 g/dL    Alkaline Phosphatase 77 35 - 125 U/L    Total Protein 6.5 5.9 - 7.9 g/dL    AST 21 5 - 40 U/L    Bilirubin, Total 1.1 0.1 - 1.2 mg/dL    ALT 21 5 - 40 U/L    Anion Gap 12 <=19 mmol/L   CBC and Auto Differential   Result Value Ref Range    WBC 12.1 (H) 4.4 - 11.3 x10*3/uL    nRBC 0.0 0.0 - 0.0 /100 " WBCs    RBC 5.11 4.50 - 5.90 x10*6/uL    Hemoglobin 15.6 13.5 - 17.5 g/dL    Hematocrit 46.1 41.0 - 52.0 %    MCV 90 80 - 100 fL    MCH 30.5 26.0 - 34.0 pg    MCHC 33.8 32.0 - 36.0 g/dL    RDW 12.2 11.5 - 14.5 %    Platelets 171 150 - 450 x10*3/uL    Neutrophils % 70.5 40.0 - 80.0 %    Immature Granulocytes %, Automated 0.5 0.0 - 0.9 %    Lymphocytes % 19.3 13.0 - 44.0 %    Monocytes % 8.4 2.0 - 10.0 %    Eosinophils % 0.8 0.0 - 6.0 %    Basophils % 0.5 0.0 - 2.0 %    Neutrophils Absolute 8.52 (H) 1.20 - 7.70 x10*3/uL    Immature Granulocytes Absolute, Automated 0.06 0.00 - 0.70 x10*3/uL    Lymphocytes Absolute 2.33 1.20 - 4.80 x10*3/uL    Monocytes Absolute 1.02 (H) 0.10 - 1.00 x10*3/uL    Eosinophils Absolute 0.10 0.00 - 0.70 x10*3/uL    Basophils Absolute 0.06 0.00 - 0.10 x10*3/uL   Vancomycin   Result Value Ref Range    Vancomycin 15.5 10.0 - 20.0 ug/mL         Assessment/Plan     53-year-old male with a fairly severe left index finger cellulitis.  At this point we will start the patient doing some Betadine soaks and wound packing with iodoform.  Will keep close eye on this and reassess tomorrow.  If not seeing significant clinical improvement we will consider I&D tomorrow.  Will hold patient n.p.o. overnight for now.  Appreciate ID assessment and recommendations.  Case discussed directly with Dr. Lopez as well    I spent 30 minutes in the professional and overall care of this patient.      Cristhian Carbajal MD

## 2024-05-23 ENCOUNTER — APPOINTMENT (OUTPATIENT)
Dept: CARDIOLOGY | Facility: HOSPITAL | Age: 54
DRG: 603 | End: 2024-05-23
Payer: COMMERCIAL

## 2024-05-23 ENCOUNTER — DOCUMENTATION (OUTPATIENT)
Dept: RESEARCH | Age: 54
End: 2024-05-23

## 2024-05-23 LAB
ALBUMIN SERPL-MCNC: 3.8 G/DL (ref 3.5–5)
ALP BLD-CCNC: 76 U/L (ref 35–125)
ALT SERPL-CCNC: 19 U/L (ref 5–40)
ANION GAP SERPL CALC-SCNC: 13 MMOL/L
AORTIC VALVE MEAN GRADIENT: 34.1 MMHG
AORTIC VALVE PEAK VELOCITY: 3.71 M/S
AST SERPL-CCNC: 19 U/L (ref 5–40)
AV PEAK GRADIENT: 55.1 MMHG
AVA (PEAK VEL): 0.99 CM2
AVA (VTI): 0.99 CM2
BACTERIA SPEC CULT: ABNORMAL
BASOPHILS # BLD AUTO: 0.05 X10*3/UL (ref 0–0.1)
BASOPHILS NFR BLD AUTO: 0.5 %
BILIRUB SERPL-MCNC: 0.7 MG/DL (ref 0.1–1.2)
BUN SERPL-MCNC: 14 MG/DL (ref 8–25)
CALCIUM SERPL-MCNC: 8.8 MG/DL (ref 8.5–10.4)
CHLORIDE SERPL-SCNC: 104 MMOL/L (ref 97–107)
CO2 SERPL-SCNC: 21 MMOL/L (ref 24–31)
CREAT SERPL-MCNC: 1 MG/DL (ref 0.4–1.6)
EGFRCR SERPLBLD CKD-EPI 2021: >90 ML/MIN/1.73M*2
EJECTION FRACTION APICAL 4 CHAMBER: 51.4
EOSINOPHIL # BLD AUTO: 0.06 X10*3/UL (ref 0–0.7)
EOSINOPHIL NFR BLD AUTO: 0.6 %
ERYTHROCYTE [DISTWIDTH] IN BLOOD BY AUTOMATED COUNT: 12.1 % (ref 11.5–14.5)
GLUCOSE SERPL-MCNC: 84 MG/DL (ref 65–99)
GRAM STN SPEC: ABNORMAL
GRAM STN SPEC: ABNORMAL
HCT VFR BLD AUTO: 46.7 % (ref 41–52)
HGB BLD-MCNC: 15.7 G/DL (ref 13.5–17.5)
IMM GRANULOCYTES # BLD AUTO: 0.03 X10*3/UL (ref 0–0.7)
IMM GRANULOCYTES NFR BLD AUTO: 0.3 % (ref 0–0.9)
LEFT ATRIUM VOLUME AREA LENGTH INDEX BSA: 36.7 ML/M2
LEFT VENTRICLE INTERNAL DIMENSION DIASTOLE: 4.49 CM (ref 3.5–6)
LEFT VENTRICULAR OUTFLOW TRACT DIAMETER: 1.86 CM
LV EJECTION FRACTION BIPLANE: 57 %
LYMPHOCYTES # BLD AUTO: 1.96 X10*3/UL (ref 1.2–4.8)
LYMPHOCYTES NFR BLD AUTO: 18.2 %
MCH RBC QN AUTO: 30.3 PG (ref 26–34)
MCHC RBC AUTO-ENTMCNC: 33.6 G/DL (ref 32–36)
MCV RBC AUTO: 90 FL (ref 80–100)
MITRAL VALVE E/A RATIO: 0.97
MITRAL VALVE E/E' RATIO: 7.83
MONOCYTES # BLD AUTO: 0.83 X10*3/UL (ref 0.1–1)
MONOCYTES NFR BLD AUTO: 7.7 %
NEUTROPHILS # BLD AUTO: 7.86 X10*3/UL (ref 1.2–7.7)
NEUTROPHILS NFR BLD AUTO: 72.7 %
NRBC BLD-RTO: 0 /100 WBCS (ref 0–0)
PLATELET # BLD AUTO: 196 X10*3/UL (ref 150–450)
POTASSIUM SERPL-SCNC: 4.1 MMOL/L (ref 3.4–5.1)
PROT SERPL-MCNC: 7 G/DL (ref 5.9–7.9)
RBC # BLD AUTO: 5.19 X10*6/UL (ref 4.5–5.9)
RIGHT VENTRICLE FREE WALL PEAK S': 12.19 CM/S
RIGHT VENTRICLE PEAK SYSTOLIC PRESSURE: 25.6 MMHG
SODIUM SERPL-SCNC: 138 MMOL/L (ref 133–145)
VANCOMYCIN SERPL-MCNC: 8.9 UG/ML (ref 10–20)
WBC # BLD AUTO: 10.8 X10*3/UL (ref 4.4–11.3)

## 2024-05-23 PROCEDURE — 2500000001 HC RX 250 WO HCPCS SELF ADMINISTERED DRUGS (ALT 637 FOR MEDICARE OP): Performed by: NURSE PRACTITIONER

## 2024-05-23 PROCEDURE — 93318 ECHO TRANSESOPHAGEAL INTRAOP: CPT | Performed by: INTERNAL MEDICINE

## 2024-05-23 PROCEDURE — 80053 COMPREHEN METABOLIC PANEL: CPT | Performed by: NURSE PRACTITIONER

## 2024-05-23 PROCEDURE — 2500000005 HC RX 250 GENERAL PHARMACY W/O HCPCS: Performed by: INTERNAL MEDICINE

## 2024-05-23 PROCEDURE — 99153 MOD SED SAME PHYS/QHP EA: CPT

## 2024-05-23 PROCEDURE — 87040 BLOOD CULTURE FOR BACTERIA: CPT | Mod: WESLAB | Performed by: INTERNAL MEDICINE

## 2024-05-23 PROCEDURE — 2500000004 HC RX 250 GENERAL PHARMACY W/ HCPCS (ALT 636 FOR OP/ED): Performed by: NURSE PRACTITIONER

## 2024-05-23 PROCEDURE — 2500000001 HC RX 250 WO HCPCS SELF ADMINISTERED DRUGS (ALT 637 FOR MEDICARE OP): Performed by: INTERNAL MEDICINE

## 2024-05-23 PROCEDURE — 99231 SBSQ HOSP IP/OBS SF/LOW 25: CPT | Performed by: INTERNAL MEDICINE

## 2024-05-23 PROCEDURE — 93005 ELECTROCARDIOGRAM TRACING: CPT

## 2024-05-23 PROCEDURE — 93306 TTE W/DOPPLER COMPLETE: CPT | Performed by: INTERNAL MEDICINE

## 2024-05-23 PROCEDURE — 99152 MOD SED SAME PHYS/QHP 5/>YRS: CPT

## 2024-05-23 PROCEDURE — 36415 COLL VENOUS BLD VENIPUNCTURE: CPT | Performed by: INTERNAL MEDICINE

## 2024-05-23 PROCEDURE — 2500000004 HC RX 250 GENERAL PHARMACY W/ HCPCS (ALT 636 FOR OP/ED): Performed by: INTERNAL MEDICINE

## 2024-05-23 PROCEDURE — 1200000002 HC GENERAL ROOM WITH TELEMETRY DAILY

## 2024-05-23 PROCEDURE — 99152 MOD SED SAME PHYS/QHP 5/>YRS: CPT | Performed by: INTERNAL MEDICINE

## 2024-05-23 PROCEDURE — 80202 ASSAY OF VANCOMYCIN: CPT | Performed by: INTERNAL MEDICINE

## 2024-05-23 PROCEDURE — 99153 MOD SED SAME PHYS/QHP EA: CPT | Performed by: INTERNAL MEDICINE

## 2024-05-23 PROCEDURE — 36415 COLL VENOUS BLD VENIPUNCTURE: CPT | Performed by: NURSE PRACTITIONER

## 2024-05-23 PROCEDURE — 2500000004 HC RX 250 GENERAL PHARMACY W/ HCPCS (ALT 636 FOR OP/ED): Mod: JZ | Performed by: INTERNAL MEDICINE

## 2024-05-23 PROCEDURE — 93318 ECHO TRANSESOPHAGEAL INTRAOP: CPT

## 2024-05-23 PROCEDURE — 2500000004 HC RX 250 GENERAL PHARMACY W/ HCPCS (ALT 636 FOR OP/ED)

## 2024-05-23 PROCEDURE — 85025 COMPLETE CBC W/AUTO DIFF WBC: CPT | Performed by: NURSE PRACTITIONER

## 2024-05-23 PROCEDURE — 93306 TTE W/DOPPLER COMPLETE: CPT

## 2024-05-23 RX ORDER — VANCOMYCIN 1.5 G/300ML
1500 INJECTION, SOLUTION INTRAVENOUS EVERY 12 HOURS
Status: DISCONTINUED | OUTPATIENT
Start: 2024-05-23 | End: 2024-05-26 | Stop reason: HOSPADM

## 2024-05-23 RX ORDER — MIDAZOLAM HYDROCHLORIDE 1 MG/ML
INJECTION, SOLUTION INTRAMUSCULAR; INTRAVENOUS AS NEEDED
Status: DISCONTINUED | OUTPATIENT
Start: 2024-05-23 | End: 2024-05-26 | Stop reason: HOSPADM

## 2024-05-23 RX ORDER — FENTANYL CITRATE 50 UG/ML
INJECTION, SOLUTION INTRAMUSCULAR; INTRAVENOUS AS NEEDED
Status: DISCONTINUED | OUTPATIENT
Start: 2024-05-23 | End: 2024-05-26 | Stop reason: HOSPADM

## 2024-05-23 RX ORDER — DIPHENHYDRAMINE HCL 25 MG
25 TABLET ORAL EVERY 6 HOURS PRN
Status: DISCONTINUED | OUTPATIENT
Start: 2024-05-23 | End: 2024-05-24

## 2024-05-23 RX ADMIN — FLUOXETINE 20 MG: 20 CAPSULE ORAL at 10:27

## 2024-05-23 RX ADMIN — MIDAZOLAM 1 MG: 1 INJECTION INTRAMUSCULAR; INTRAVENOUS at 13:17

## 2024-05-23 RX ADMIN — Medication 3 L/MIN: at 13:13

## 2024-05-23 RX ADMIN — MIDAZOLAM 2 MG: 1 INJECTION INTRAMUSCULAR; INTRAVENOUS at 13:10

## 2024-05-23 RX ADMIN — VANCOMYCIN 1.5 G: 1.5 INJECTION, SOLUTION INTRAVENOUS at 23:03

## 2024-05-23 RX ADMIN — SODIUM CHLORIDE 100 ML/HR: 900 INJECTION, SOLUTION INTRAVENOUS at 00:03

## 2024-05-23 RX ADMIN — VANCOMYCIN 1 G: 1 INJECTION, SOLUTION INTRAVENOUS at 10:26

## 2024-05-23 RX ADMIN — FENTANYL CITRATE 50 MCG: 50 INJECTION, SOLUTION INTRAMUSCULAR; INTRAVENOUS at 13:10

## 2024-05-23 RX ADMIN — CEFAZOLIN SODIUM 2 G: 2 INJECTION, SOLUTION INTRAVENOUS at 02:12

## 2024-05-23 RX ADMIN — CEFAZOLIN SODIUM 2 G: 2 INJECTION, SOLUTION INTRAVENOUS at 15:05

## 2024-05-23 RX ADMIN — FENTANYL CITRATE 25 MCG: 50 INJECTION, SOLUTION INTRAMUSCULAR; INTRAVENOUS at 13:19

## 2024-05-23 RX ADMIN — TAMOXIFEN CITRATE 20 MG: 10 TABLET ORAL at 10:26

## 2024-05-23 RX ADMIN — KETOROLAC TROMETHAMINE 15 MG: 30 INJECTION, SOLUTION INTRAMUSCULAR at 22:26

## 2024-05-23 RX ADMIN — CEFAZOLIN SODIUM 2 G: 2 INJECTION, SOLUTION INTRAVENOUS at 21:00

## 2024-05-23 RX ADMIN — SODIUM CHLORIDE 100 ML/HR: 900 INJECTION, SOLUTION INTRAVENOUS at 15:11

## 2024-05-23 RX ADMIN — BENZOCAINE 2 SPRAY: 200 SPRAY DENTAL; ORAL; PERIODONTAL at 13:06

## 2024-05-23 RX ADMIN — TRAZODONE HYDROCHLORIDE 50 MG: 50 TABLET ORAL at 21:00

## 2024-05-23 ASSESSMENT — COGNITIVE AND FUNCTIONAL STATUS - GENERAL
WALKING IN HOSPITAL ROOM: A LITTLE
DAILY ACTIVITIY SCORE: 24
MOBILITY SCORE: 18
TURNING FROM BACK TO SIDE WHILE IN FLAT BAD: A LITTLE
MOVING TO AND FROM BED TO CHAIR: A LITTLE
CLIMB 3 TO 5 STEPS WITH RAILING: A LITTLE
MOVING FROM LYING ON BACK TO SITTING ON SIDE OF FLAT BED WITH BEDRAILS: A LITTLE
STANDING UP FROM CHAIR USING ARMS: A LITTLE

## 2024-05-23 ASSESSMENT — PAIN DESCRIPTION - ORIENTATION: ORIENTATION: LEFT

## 2024-05-23 ASSESSMENT — PAIN DESCRIPTION - DESCRIPTORS: DESCRIPTORS: PENETRATING

## 2024-05-23 ASSESSMENT — PAIN SCALES - GENERAL
PAINLEVEL_OUTOF10: 0 - NO PAIN
PAINLEVEL_OUTOF10: 0 - NO PAIN
PAINLEVEL_OUTOF10: 4
PAINLEVEL_OUTOF10: 0 - NO PAIN
PAINLEVEL_OUTOF10: 0 - NO PAIN

## 2024-05-23 ASSESSMENT — PAIN - FUNCTIONAL ASSESSMENT
PAIN_FUNCTIONAL_ASSESSMENT: 0-10

## 2024-05-23 ASSESSMENT — PAIN DESCRIPTION - LOCATION: LOCATION: FINGER (COMMENT WHICH ONE)

## 2024-05-23 NOTE — CARE PLAN
Problem: Pain - Adult  Goal: Verbalizes/displays adequate comfort level or baseline comfort level  Outcome: Progressing     Problem: Safety - Adult  Goal: Free from fall injury  Outcome: Progressing     Problem: Discharge Planning  Goal: Discharge to home or other facility with appropriate resources  Outcome: Progressing     Problem: Chronic Conditions and Co-morbidities  Goal: Patient's chronic conditions and co-morbidity symptoms are monitored and maintained or improved  Outcome: Progressing   The patient's goals for the shift include rest    The clinical goals for the shift include MART

## 2024-05-23 NOTE — PROGRESS NOTES
Spiritual Care Visit    Clinical Encounter Type  Visited With: Patient  Routine Visit: Introduction  Continue Visiting: No         Values/Beliefs  Spiritual Requests During Hospitalization: [place] only    Sacramental Encounters  Communion: Does not want communion  Communion Given Indicator: No  Sacrament of Sick-Anointing: Patient declined anointing     Xavier Leigh

## 2024-05-23 NOTE — PROGRESS NOTES
05/23/24 1642   Discharge Planning   Patient expects to be discharged to: Home with [facility] IV infusion     On IV antibiotics. Per ID note, patient will discharge home on IV antibiotics.  Cultures pending. Will need PICC line.  Confirmed with patient and spouse at bedside that spouse will be teachable caregiver. Patient would like [facility]. Will wait for ID recommendations.

## 2024-05-23 NOTE — NURSING NOTE
Assumed patient care. BSSR received from previous Nurse. Seen patient lying on bed awake, no distress or discomfort noted. Needs attended. Safety measures ensured and call light in reach.   Plan of care ongoing.    2140H:  Dressing changed done with betadine soaks as ordered every shift.  Patient well tolerated.

## 2024-05-23 NOTE — PROGRESS NOTES
Subjective Data:  none    Overnight Events:    none     Objective Data:  Last Recorded Vitals:  Vitals:    05/23/24 1312 05/23/24 1314 05/23/24 1321 05/23/24 1345   BP:  148/89 138/85 132/84   BP Location:       Patient Position:       Pulse:  76 82 78   Resp:  20 16 18   Temp:       TempSrc:       SpO2: 97% 97% 97% 98%   Weight:       Height:           Last Labs:  CBC - 5/23/2024:  6:19 AM  10.8 15.7 196    46.7      CMP - 5/23/2024:  6:19 AM  8.8 7.0 19 --- 0.7   _ 3.8 19 76      PTT - No results in last year.  _   _ _     HGBA1C   Date/Time Value Ref Range Status   10/28/2020 12:52 PM 5.0 4.0 - 6.0 % Final     Comment:     Hemoglobin A1C levels are related to mean blood glucose during the   preceding 2-3 months. The relationship table below may be used as a   general guide. Each 1% increase in HGB A1C is a reflection of an   increase in mean glucose of approximately 30 mg/dl.   Reference: Diabetes Care, volume 29, supplement 1 Jan. 2006                        HGB A1C ................. Approx. Mean Glucose   _______________________________________________   6%   ...............................  120 mg/dl   7%   ...............................  150 mg/dl   8%   ...............................  180 mg/dl   9%   ...............................  210 mg/dl   10%  ...............................  240 mg/dl  Performed at [address] [place] [address]     06/29/2020 07:05 AM 4.9 4.0 - 6.0 % Final     Comment:     Hemoglobin A1C levels are related to mean blood glucose during the   preceding 2-3 months. The relationship table below may be used as a   general guide. Each 1% increase in HGB A1C is a reflection of an   increase in mean glucose of approximately 30 mg/dl.   Reference: Diabetes Care, volume 29, supplement 1 Jan. 2006                        HGB A1C ................. Approx. Mean Glucose   _______________________________________________   6%   ...............................  120 mg/dl   7%    "...............................  150 mg/dl   8%   ...............................  180 mg/dl   9%   ...............................  210 mg/dl   10%  ...............................  240 mg/dl  Performed at [address] [address]       LDLCALC   Date/Time Value Ref Range Status   04/13/2024 08:08 AM 80 65 - 130 mg/dL Final   01/30/2023 10:18 AM 77 65 - 130 MG/DL Final   11/04/2020 04:02 AM 36 65 - 130 MG/DL Final   06/29/2020 07:05 AM 65 65 - 130 MG/DL Final      Last I/O:  I/O last 3 completed shifts:  In: 2890 (31.7 mL/kg) [P.O.:650; I.V.:1590 (17.4 mL/kg); IV Piggyback:650]  Out: 0 (0 mL/kg)   Weight: 91.2 kg     Past Cardiology Tests (Last 3 Years):  EKG:  No results found for this or any previous visit from the past 1095 days.    Echo:  Transthoracic Echo (TTE) Complete 05/23/2024    Ejection Fractions:  No results found for: \"EF\"  Cath:  No results found for this or any previous visit from the past 1095 days.    Stress Test:  No results found for this or any previous visit from the past 1095 days.    Cardiac Imaging:  No results found for this or any previous visit from the past 1095 days.      Inpatient Medications:  Scheduled medications   Medication Dose Route Frequency    anastrozole  0.5 mg oral Weekly    ceFAZolin  2 g intravenous q6h    enoxaparin  40 mg subcutaneous q24h    FLUoxetine  20 mg oral Daily    tamoxifen  20 mg oral Once per day on Monday Thursday    [START ON 5/26/2024] testosterone cypionate  75 mg intramuscular q7 days    traZODone  50 mg oral Nightly    vancomycin 1.5 g in 300 mL  1,500 mg intravenous q12h     PRN medications   Medication    acetaminophen    benzocaine    benzocaine-menthol    fentaNYL PF    ketorolac    midazolam    ondansetron    Or    ondansetron    oxygen    vancomycin     Continuous Medications   Medication Dose Last Rate    oxygen   3 L/min (05/23/24 1313)    sodium chloride 0.9%  100 mL/hr 100 mL/hr (05/23/24 1358)       Physical " Exam:         Assessment/Plan   Cellulitis of Left Index Finger   Septicemia  Bicuspid Aortic Valve & Aortic Valve Stenosis s/p Bioprosthetic Aortic Valve Replacement and Aortic Arch Repair 11/03/2020     Impression and Plan:     5/22: As above.  Patient presenting with cellulitis of left finger.  History of bicuspid aortic valve requiring a bioprosthetic aortic valve replacement and aortic arch repair.  Cardiac catheterization completed in October 2020 revealing normal-appearing coronary arteries.  Echocardiogram at this time revealed a preserved ejection fraction of greater than 60%.  Blood cultures resulted with gram-positive cultures.  Wound cultures with moderate gram-positive cocci.  On my exam the patient is alert and oriented.  Appears euvolemic on examination.  He is breathing comfortably on room air with pulse oximetry of 98%.  Blood pressures have been well-controlled with last recorded at 124/88.  Patient does have a slightly elevated temperature at 37.3 °C.  Transthoracic echocardiogram has been ordered to be completed.  Given this patient's history there is suspicion for endocarditis, so I have discussed with him further workup via a transesophageal echocardiogram which he is agreeable to. We will plan for this tomorrow with Dr. Quick.  He will be n.p.o. at midnight and we will start him on IV fluids at midnight leading up to the procedure.  We will follow with you.    5/23: EEG was performed without complications and showed no evidence of prosthetic aortic valve endocarditis and his mitral, tricuspid, and pulmonic valves were unremarkable as well.  No obvious abscess noted.  He did have turbulence in his aortic valve prosthesis consistent with moderate valvular stenosis but no evidence of left ventricular outflow tract obstruction.  I spoke with his infectious disease consultant, Dr. Lopez who will treat him with prolonged intravenous antibiotic therapy in the setting of his sepsis.      Code  Status:  Full Code    I spent 15 minutes in the professional and overall care of this patient.        Gustavo Quick, DO

## 2024-05-23 NOTE — PROGRESS NOTES
"Cristhian Thornton is a 53 y.o. male on day 2 of admission presenting with Cellulitis of finger of left hand.    Subjective   HPI   Patient seen and examined, complains with discomfort in his left index.  He was soaking his left index on Betadine.  Waiting for the ultrasound to see him.  just came from the echo. No fever or chills.  Patient denies any chest pain, shortness of breath in room air.  Patient tolerates well her diet with no nausea vomiting or abdominal pain.   No headache or dizziness.      Objective     Last Recorded Vitals  Blood pressure 132/84, pulse 78, temperature 36.6 °C (97.9 °F), temperature source Oral, resp. rate 18, height 1.778 m (5' 10\"), weight 91.2 kg (201 lb), SpO2 98%.      Intake/Output Summary (Last 24 hours) at 5/23/2024 1347  Last data filed at 5/23/2024 1344  Gross per 24 hour   Intake 2390 ml   Output 0 ml   Net 2390 ml         Physical Exam   General: alert, no diaphoresis   HENT: mucous membranes moist, external ears normal, no rhinorrhea   Eyes: no icterus or injection, no discharge   Lungs: CTA BL   Heart: RRR, no murmurs, no LE edema BL   GI: abdomen soft, nontender, nondistended, BS present   MSK: no joint effusion or deformity   Skin: no rashes, erythema, or ecchymosis   Neuro: grossly normal cognition, motor strength, sensation   Relevant Results    Lab Results   Component Value Date    WBC 10.8 05/23/2024    HGB 15.7 05/23/2024    HCT 46.7 05/23/2024    MCV 90 05/23/2024     05/23/2024       Lab Results   Component Value Date    GLUCOSE 84 05/23/2024    CALCIUM 8.8 05/23/2024     05/23/2024    K 4.1 05/23/2024    CO2 21 (L) 05/23/2024     05/23/2024    BUN 14 05/23/2024    CREATININE 1.00 05/23/2024       Lab Results   Component Value Date    HGBA1C 5.0 10/28/2020       Transthoracic Echo (TTE) Complete    Result Date: 5/23/2024           [address] [address]            Phone [phone] TRANSTHORACIC " ECHOCARDIOGRAM REPORT  Patient Name:     SARMAD SHETTY      Reading Physician:  [id number] Agustin Craig MD Study Date:       5/23/2024            Ordering Provider:  [id number] CONOR MOBLEY SABI MRN/PID:          [id number]             Fellow: Accession#:       [id number]         Nurse: Date of           1970 / 53      Sonographer:        [facility] Birth/Age:        years Gender:           M                    Additional Staff: Height:           177.80 cm            Admit Date: Weight:           91.17 kg             Admission Status:   Inpatient - Routine BSA / BMI:        2.09 m2 / 28.84      Department          [facility]                   kg/m2                Location: Blood Pressure: 124 /81 mmHg Study Type:    TRANSTHORACIC ECHO (TTE) COMPLETE Diagnosis/ICD: Endocarditis, valve unspecified-I38 Indication:    endocarditis CPT Codes:     Echo Complete w Full [id number] Patient History: Pertinent         AAA, BAV, HTN, nonrheumatic AS, pericarditis, S/P AVR [place] History:          2020. Study Detail: The following Echo studies were performed: 2D, M-Mode, Doppler and               color flow.  PHYSICIAN INTERPRETATION: Left Ventricle: Left ventricular systolic function is normal, with an estimated ejection fraction of 60-65%. There are no regional wall motion abnormalities. The left ventricular cavity size is normal. Spectral Doppler shows a normal pattern of left ventricular diastolic filling. Left Atrium: The left atrium is normal in size. Right Ventricle: The right ventricle is normal in size. There is normal right ventricular global systolic function. Right Atrium: The right atrium is normal in size. Aortic Valve: The aortic valve is trileaflet. There is diffuse moderate aortic valve thickening. There is evidence of moderate to severe aortic valve stenosis. There is no evidence of aortic valve regurgitation. The peak instantaneous gradient of  the aortic valve is 55.1 mmHg. The mean gradient of the aortic valve is 34.1 mmHg. Mitral Valve: The mitral valve is normal in structure. There is trace to mild mitral valve regurgitation. Tricuspid Valve: The tricuspid valve is structurally normal. There is trace to mild tricuspid regurgitation. The Doppler estimated RVSP is within normal limits at 25.6 mmHg. Pulmonic Valve: The pulmonic valve is not well visualized. There is no indication of pulmonic valve regurgitation. Pericardium: There is no pericardial effusion noted. Aorta: The aortic root is normal.  CONCLUSIONS:  1. Left ventricular systolic function is normal with a 60-65% estimated ejection fraction.  2. Trace to mild mitral valve regurgitation.  3. RVSP within normal limits.  4. Trace to mild tricuspid regurgitation.  5. Moderate to severe aortic valve stenosis. QUANTITATIVE DATA SUMMARY: 2D MEASUREMENTS:                          Normal Ranges: LAs:           3.55 cm   (2.7-4.0cm) IVSd:          1.29 cm   (0.6-1.1cm) LVPWd:         1.09 cm   (0.6-1.1cm) LVIDd:         4.49 cm   (3.9-5.9cm) LVIDs:         3.16 cm LV Mass Index: 93.3 g/m2 LV % FS        29.6 % LA VOLUME:                               Normal Ranges: LA Vol A4C:        71.2 ml    (22+/-6mL/m2) LA Vol A2C:        72.1 ml LA Vol BP:         76.8 ml LA Vol Index A4C:  34.0 ml/m2 LA Vol Index A2C:  34.5 ml/m2 LA Vol Index BP:   36.7 ml/m2 LA Area A4C:       22.6 cm2 LA Area A2C:       21.2 cm2 LA Major Axis A4C: 6.1 cm LA Major Axis A2C: 5.3 cm LA Volume Index:   36.5 ml/m2 LA Vol A4C:        71.0 ml LA Vol A2C:        72.0 ml RA VOLUME BY A/L METHOD:                               Normal Ranges: RA Vol A4C:        59.0 ml    (8.3-19.5ml) RA Vol Index A4C:  28.2 ml/m2 RA Area A4C:       20.4 cm2 RA Major Axis A4C: 6.0 cm AORTA MEASUREMENTS:                      Normal Ranges: Ao Sinus, d: 2.40 cm (2.1-3.5cm) Asc Ao, d:   3.00 cm (2.1-3.4cm) LV SYSTOLIC FUNCTION BY 2D PLANIMETRY (MOD):                      Normal Ranges: EF-A4C View: 51.4 % (>=55%) EF-A2C View: 61.9 % EF-Biplane:  56.6 % LV DIASTOLIC FUNCTION:                        Normal Ranges: MV Peak E:    0.98 m/s (0.7-1.2 m/s) MV Peak A:    1.01 m/s (0.42-0.7 m/s) E/A Ratio:    0.97     (1.0-2.2) MV e'         0.12 m/s (>8.0) MV lateral e' 0.16 m/s MV medial e'  0.09 m/s E/e' Ratio:   7.83     (<8.0) MITRAL VALVE:                 Normal Ranges: MV DT: 193 msec (150-240msec) AORTIC VALVE:                                    Normal Ranges: AoV Vmax:                3.71 m/s  (<=1.7m/s) AoV Peak P.1 mmHg (<20mmHg) AoV Mean P.1 mmHg (1.7-11.5mmHg) LVOT Max Jose Enrique:            1.36 m/s  (<=1.1m/s) AoV VTI:                 77.26 cm  (18-25cm) LVOT VTI:                28.26 cm LVOT Diameter:           1.86 cm   (1.8-2.4cm) AoV Area, VTI:           0.99 cm2  (2.5-5.5cm2) AoV Area,Vmax:           0.99 cm2  (2.5-4.5cm2) AoV Dimensionless Index: 0.37  RIGHT VENTRICLE: RV Basal 5.01 cm RV Mid   3.50 cm RV Major 9.0 cm RV s'    0.12 m/s TRICUSPID VALVE/RVSP:                             Normal Ranges: Peak TR Velocity: 2.38 m/s RV Syst Pressure: 25.6 mmHg (< 30mmHg) IVC Diam:         1.25 cm AORTA: Asc Ao Diam 3.03 cm  [id number] Agustin Craig MD Electronically signed on 2024 at 9:42:06 AM  ** Final **     Scheduled medications  anastrozole, 0.5 mg, oral, Weekly  ceFAZolin, 2 g, intravenous, q6h  enoxaparin, 40 mg, subcutaneous, q24h  FLUoxetine, 20 mg, oral, Daily  tamoxifen, 20 mg, oral, Once per day on   [START ON 2024] testosterone cypionate, 75 mg, intramuscular, q7 days  traZODone, 50 mg, oral, Nightly  vancomycin 1.5 g in 300 mL, 1,500 mg, intravenous, q12h      Continuous medications  oxygen, , Last Rate: 3 L/min (24 1313)  sodium chloride 0.9%, 100 mL/hr, Last Rate: 100 mL/hr (24 1044)      PRN medications  PRN medications: acetaminophen, benzocaine, benzocaine-menthol, fentaNYL PF, ketorolac,  midazolam, ondansetron **OR** ondansetron, oxygen, vancomycin    Assessment/Plan   Principal Problem:    Cellulitis of finger of left hand  Active Problems:    Moderate episode of recurrent major depressive disorder (Multi)    Low testosterone  Cellulitis of finger of left hand              IV antibiotics               S/P I&D in orthopedic surgeon's office               Wound and blood cultures sent in ER - continue to monitor               CRP elevated               Follow up with  ID               Follow up with orthopedic surgery               Clean site with sterile saline and cover with Kerlix               Wound care nurse - appreciate recs               Toradol for pain as needed   Active Problems:    Moderate episode of recurrent major depressive disorder (Multi)              Continue Prozac     Low testosterone              Continue testosterone, Arimidex and Tamoxifen     DVT prophylaxis               Lovenox     Discharge plan:  Anticipate discharging patient home vs [facility] when medically clear    I spent 35 minutes in the professional and overall care of this patient.    AURORA Martinez-CNP

## 2024-05-23 NOTE — RESEARCH NOTES
Artificial Intelligence Monitoring in Nursing (AIMS Nursing) Study    Principle Investigator - Dr. Des Ellis  Research Coordinator - SHRUTI Woodson     Patient Name - Cristhian Thornton  Date - 5/23/2024 6:06 PM  Location - [place]    Cristhian Thornton was approached by SHRUTI Woodson to talk about participating in the AIMS Nursing Study. The consent form was signed by the patient and they were given a copy for their records. Study protocol was followed and patient was given study contact information.     SHRUTI Woodson

## 2024-05-23 NOTE — CARE PLAN
The patient's goals for the shift include rest    The clinical goals for the shift include Adequate sleep, keep NPO status at Midnight    Over the shift, the patient did not make progress toward the following goals. Barriers to progression include . Recommendations to address these barriers include .      Problem: Pain - Adult  Goal: Verbalizes/displays adequate comfort level or baseline comfort level  5/23/2024 0558 by Yoel Mckenna RN  Outcome: Progressing  5/23/2024 0133 by Yoel Mckenna RN  Flowsheets (Taken 5/23/2024 0133)  Verbalizes/displays adequate comfort level or baseline comfort level:   Encourage patient to monitor pain and request assistance   Administer analgesics based on type and severity of pain and evaluate response   Consider cultural and social influences on pain and pain management   Assess pain using appropriate pain scale   Implement non-pharmacological measures as appropriate and evaluate response   Notify Licensed Independent Practitioner if interventions unsuccessful or patient reports new pain     Problem: Safety - Adult  Goal: Free from fall injury  5/23/2024 0558 by Yoel Mckenna RN  Outcome: Progressing  5/23/2024 0133 by Yoel Mckenna RN  Flowsheets (Taken 5/23/2024 0133)  Free from fall injury:   Instruct family/caregiver on patient safety   Based on caregiver fall risk screen, instruct family/caregiver to ask for assistance with transferring infant if caregiver noted to have fall risk factors     Problem: Discharge Planning  Goal: Discharge to home or other facility with appropriate resources  5/23/2024 0558 by Yoel Mckenna RN  Outcome: Progressing  5/23/2024 0133 by Yoel Mckenna RN  Flowsheets (Taken 5/23/2024 0133)  Discharge to home or other facility with appropriate resources:   Identify barriers to discharge with patient and caregiver   Identify discharge learning needs (meds, wound care, etc)   Refer to discharge planning if patient needs post-hospital services based on physician order  or complex needs related to functional status, cognitive ability or social support system   Arrange for needed discharge resources and transportation as appropriate   Arrange for interpreters to assist at discharge as needed     Problem: Chronic Conditions and Co-morbidities  Goal: Patient's chronic conditions and co-morbidity symptoms are monitored and maintained or improved  5/23/2024 0558 by Yoel Mckenna RN  Outcome: Progressing  5/23/2024 0133 by Yoel Mckenna RN  Flowsheets (Taken 5/23/2024 0133)  Care Plan - Patient's Chronic Conditions and Co-Morbidity Symptoms are Monitored and Maintained or Improved:   Monitor and assess patient's chronic conditions and comorbid symptoms for stability, deterioration, or improvement   Collaborate with multidisciplinary team to address chronic and comorbid conditions and prevent exacerbation or deterioration   Update acute care plan with appropriate goals if chronic or comorbid symptoms are exacerbated and prevent overall improvement and discharge

## 2024-05-23 NOTE — PROGRESS NOTES
INFECTIOUS DISEASES PROGRESS NOTE    Consulted / following patient for:  Left index finger abscess/cellulitis  Staph aureus septicemia    Subjective   Interval History:   Overall feeling better and finger and hand feel better.  Has developed some loose stool which she attributes to the antibiotics  Is familiar with PICC line from outpatient therapy 15 years ago    Objective   PHYSICAL EXAMINATION  Vital signs:  Visit Vitals  /81 (BP Location: Right arm, Patient Position: Lying)   Pulse 75   Temp 36.6 °C (97.9 °F) (Oral)   Resp 18      General: Alert, not toxic, no distress  HEENT:  No scleral icterus or conjunctival suffusion, oral mucosa moist  Nodes:  Negative  Lungs:  Clear to auscultation  Heart:  S1, S2 normal, buzzing-quality short systolic murmur appreciated throughout the left precordium, unchanged from prior exam.  No diastolic murmur  Abdomen:  Soft, nontender. No palpable organs or masses  Extremities: Dry dressing in place on the left index finger, not removed.  Erythema and swelling over the MCP and surrounding tissue markedly improved compared with prior exam.  No proximal lymphangitis or regional lymphadenopathy  Neurologic:  Alert.  Otherwise grossly non-focal.    Skin: No mucocutaneous signs of infectious endocarditis    Relevant Results  WBC: 13,600 --> 12,100 ---> 10,800    Results from last 72 hours   Lab Units 05/23/24  0619   CREATININE mg/dL 1.00   ANION GAP mmol/L 13   EGFR mL/min/1.73m*2 >90     Results from last 72 hours   Lab Units 05/23/24  0619   AST U/L 19   ALT U/L 19   ALK PHOS U/L 76   BILIRUBIN TOTAL mg/dL 0.7     Microbiology:  Blood (5/21): Staph aureus X2, susceptibility pending  Blood (5/23): Pending X2  Index finger wound: Staph aureus, susceptibility pending  Staph aureus colonization swab nares/axilla/groin: Pending      Imaging:  TTE: Pending  MART: Scheduled for this afternoon if needed      ASSESSMENT:  Abscess/cellulitis left index finger.  Staph aureus  septicemia  Patient has had a remarkable sequence of pyogenic skin and soft tissue infections over the last several months, and was admitted with a new episode, acute suppurative inflammation of the left index finger.  He has a systolic murmur consistent with his prosthetic aortic valve, and no other physical findings to suggest infectious endocarditis, but we must maintain a high degree of suspicion for that entity.  May also be colonized with Staph aureus.    Examination today shows improvement in the appearance of the left hand, with repeat blood cultures and echocardiogram pending.  Staph aureus in the bloodstream, as expected, susceptibility pending.  Leukocytosis resolved     Prosthetic aortic valve/arch  See above.  Rule out endocarditis        PLANS:  -   Continue vancomycin, pharmacy dosing, and cefazolin  -   Await TTE, possible MART  -   Orthopedic follow-up with Dr. Carbajal  -   Await Staph aureus susceptibility and incubation of repeat blood cultures  -   When sterile bloodstream is achieved, anticipate placement of PICC line for outpatient IV antibiotic therapy  -   Await culture of nares, groin, axilla to rule out Staph aureus colonization    Jonathan Lopez MD  [facility]  Office:  [phone]

## 2024-05-23 NOTE — PROGRESS NOTES
Vancomycin Dosing by Pharmacy- FOLLOW UP    Cristhian Thornton is a 53 y.o. year old male who Pharmacy has been consulted for vancomycin dosing for cellulitis, skin and soft tissue. Based on the patient's indication and renal status this patient is being dosed based on a goal AUC of 400-600.     Renal function is currently stable.    Current vancomycin dose: 1000 mg given every 12 hours    Estimated vancomycin AUC on current dose: 310 mg/L.hr     Visit Vitals  /81 (BP Location: Right arm, Patient Position: Lying)   Pulse 75   Temp 36.6 °C (97.9 °F) (Oral)   Resp 18        Lab Results   Component Value Date    CREATININE 1.00 2024    CREATININE 1.10 2024    CREATININE 1.00 2024    CREATININE 1.10 2024        Patient weight is as follows:   Vitals:    24 0940   Weight: 91.2 kg (201 lb)       Cultures:  Susceptibility data for the encounter in last 14 days.  Collected Specimen Info Organism   24 Tissue/Biopsy from Skin/Superficial Abscess Staphylococcus aureus   24 Blood culture from Peripheral Venipuncture Staphylococcus aureus   24 Blood culture from Peripheral Venipuncture Staphylococcus aureus        I/O last 3 completed shifts:  In: 2890 (31.7 mL/kg) [P.O.:650; I.V.:1590 (17.4 mL/kg); IV Piggyback:650]  Out: 0 (0 mL/kg)   Weight: 91.2 kg   I/O during current shift:  I/O this shift:  In: 500 [I.V.:500]  Out: -     Temp (24hrs), Av.9 °C (98.5 °F), Min:36.6 °C (97.9 °F), Max:37.3 °C (99.1 °F)      Assessment/Plan    Below goal AUC. Orders placed for new vancomcyin regimen of 1500 every 12 hours to begin at  @ 2300.     This dosing regimen is predicted by [employer] to result in the following pharmacokinetic parameters:    Regimen: 1500 mg IV every 12 hours.  Start time: 2300 on 2024  Exposure target: AUC24 (range)400-600 mg/L.hr   AUC24,ss: 465 mg/L.hr  Probability of AUC24 > 400: 87 %  Ctrough,ss: 10.7 mg/L  Probability of Ctrough,ss > 20: 0  %  Probability of nephrotoxicity (Lodise DONNA 2009): 6 %      The next level will be obtained on 5/24 at AM labs. May be obtained sooner if clinically indicated.   Will continue to monitor renal function daily while on vancomycin and order serum creatinine at least every 48 hours if not already ordered.  Follow for continued vancomycin needs, clinical response, and signs/symptoms of toxicity.       Fatou Orellana, PharmD

## 2024-05-24 ENCOUNTER — HOME INFUSION (OUTPATIENT)
Dept: INFUSION THERAPY | Age: 54
End: 2024-05-24
Payer: COMMERCIAL

## 2024-05-24 ENCOUNTER — HOME HEALTH ADMISSION (OUTPATIENT)
Dept: HOME HEALTH SERVICES | Facility: HOME HEALTH | Age: 54
End: 2024-05-24
Payer: COMMERCIAL

## 2024-05-24 ENCOUNTER — PHARMACY VISIT (OUTPATIENT)
Dept: PHARMACY | Facility: CLINIC | Age: 54
End: 2024-05-24
Payer: COMMERCIAL

## 2024-05-24 ENCOUNTER — APPOINTMENT (OUTPATIENT)
Dept: CARDIOLOGY | Facility: HOSPITAL | Age: 54
DRG: 603 | End: 2024-05-24
Payer: COMMERCIAL

## 2024-05-24 ENCOUNTER — DOCUMENTATION (OUTPATIENT)
Dept: HOME HEALTH SERVICES | Facility: HOME HEALTH | Age: 54
End: 2024-05-24
Payer: COMMERCIAL

## 2024-05-24 PROBLEM — R79.89 LOW TESTOSTERONE: Status: RESOLVED | Noted: 2024-05-21 | Resolved: 2024-05-24

## 2024-05-24 PROBLEM — F33.1 MODERATE EPISODE OF RECURRENT MAJOR DEPRESSIVE DISORDER (MULTI): Status: RESOLVED | Noted: 2023-09-11 | Resolved: 2024-05-24

## 2024-05-24 LAB
ALBUMIN SERPL-MCNC: 3.5 G/DL (ref 3.5–5)
ALP BLD-CCNC: 72 U/L (ref 35–125)
ALT SERPL-CCNC: 16 U/L (ref 5–40)
ANION GAP SERPL CALC-SCNC: 10 MMOL/L
AST SERPL-CCNC: 17 U/L (ref 5–40)
BACTERIA BLD AEROBE CULT: ABNORMAL
BACTERIA BLD AEROBE CULT: ABNORMAL
BACTERIA BLD CULT: ABNORMAL
BACTERIA BLD CULT: ABNORMAL
BASOPHILS # BLD AUTO: 0.05 X10*3/UL (ref 0–0.1)
BASOPHILS NFR BLD AUTO: 0.7 %
BILIRUB SERPL-MCNC: 0.6 MG/DL (ref 0.1–1.2)
BUN SERPL-MCNC: 12 MG/DL (ref 8–25)
CALCIUM SERPL-MCNC: 8.7 MG/DL (ref 8.5–10.4)
CHLORIDE SERPL-SCNC: 106 MMOL/L (ref 97–107)
CO2 SERPL-SCNC: 24 MMOL/L (ref 24–31)
CREAT SERPL-MCNC: 0.9 MG/DL (ref 0.4–1.6)
EGFRCR SERPLBLD CKD-EPI 2021: >90 ML/MIN/1.73M*2
EOSINOPHIL # BLD AUTO: 0.12 X10*3/UL (ref 0–0.7)
EOSINOPHIL NFR BLD AUTO: 1.7 %
ERYTHROCYTE [DISTWIDTH] IN BLOOD BY AUTOMATED COUNT: 12 % (ref 11.5–14.5)
GLUCOSE SERPL-MCNC: 77 MG/DL (ref 65–99)
GRAM STN SPEC: ABNORMAL
HCT VFR BLD AUTO: 44 % (ref 41–52)
HGB BLD-MCNC: 15 G/DL (ref 13.5–17.5)
IMM GRANULOCYTES # BLD AUTO: 0.02 X10*3/UL (ref 0–0.7)
IMM GRANULOCYTES NFR BLD AUTO: 0.3 % (ref 0–0.9)
LYMPHOCYTES # BLD AUTO: 2 X10*3/UL (ref 1.2–4.8)
LYMPHOCYTES NFR BLD AUTO: 28.1 %
MCH RBC QN AUTO: 30.5 PG (ref 26–34)
MCHC RBC AUTO-ENTMCNC: 34.1 G/DL (ref 32–36)
MCV RBC AUTO: 89 FL (ref 80–100)
MONOCYTES # BLD AUTO: 0.69 X10*3/UL (ref 0.1–1)
MONOCYTES NFR BLD AUTO: 9.7 %
NEUTROPHILS # BLD AUTO: 4.24 X10*3/UL (ref 1.2–7.7)
NEUTROPHILS NFR BLD AUTO: 59.5 %
NRBC BLD-RTO: 0 /100 WBCS (ref 0–0)
PLATELET # BLD AUTO: 193 X10*3/UL (ref 150–450)
POTASSIUM SERPL-SCNC: 3.8 MMOL/L (ref 3.4–5.1)
PROT SERPL-MCNC: 6.5 G/DL (ref 5.9–7.9)
RBC # BLD AUTO: 4.92 X10*6/UL (ref 4.5–5.9)
SODIUM SERPL-SCNC: 140 MMOL/L (ref 133–145)
STAPHYLOCOCCUS SPEC CULT: ABNORMAL
VANCOMYCIN SERPL-MCNC: 14 UG/ML (ref 10–20)
WBC # BLD AUTO: 7.1 X10*3/UL (ref 4.4–11.3)

## 2024-05-24 PROCEDURE — 2500000001 HC RX 250 WO HCPCS SELF ADMINISTERED DRUGS (ALT 637 FOR MEDICARE OP): Performed by: INTERNAL MEDICINE

## 2024-05-24 PROCEDURE — 2500000005 HC RX 250 GENERAL PHARMACY W/O HCPCS

## 2024-05-24 PROCEDURE — 80053 COMPREHEN METABOLIC PANEL: CPT | Performed by: NURSE PRACTITIONER

## 2024-05-24 PROCEDURE — 36415 COLL VENOUS BLD VENIPUNCTURE: CPT | Performed by: NURSE PRACTITIONER

## 2024-05-24 PROCEDURE — 85025 COMPLETE CBC W/AUTO DIFF WBC: CPT | Performed by: NURSE PRACTITIONER

## 2024-05-24 PROCEDURE — 80202 ASSAY OF VANCOMYCIN: CPT | Performed by: INTERNAL MEDICINE

## 2024-05-24 PROCEDURE — 2500000004 HC RX 250 GENERAL PHARMACY W/ HCPCS (ALT 636 FOR OP/ED)

## 2024-05-24 PROCEDURE — 36569 INSJ PICC 5 YR+ W/O IMAGING: CPT

## 2024-05-24 PROCEDURE — 02HV33Z INSERTION OF INFUSION DEVICE INTO SUPERIOR VENA CAVA, PERCUTANEOUS APPROACH: ICD-10-PCS | Performed by: INTERNAL MEDICINE

## 2024-05-24 PROCEDURE — 2780000003 HC OR 278 NO HCPCS

## 2024-05-24 PROCEDURE — 1200000002 HC GENERAL ROOM WITH TELEMETRY DAILY

## 2024-05-24 PROCEDURE — 2500000004 HC RX 250 GENERAL PHARMACY W/ HCPCS (ALT 636 FOR OP/ED): Performed by: ORTHOPAEDIC SURGERY

## 2024-05-24 PROCEDURE — C1751 CATH, INF, PER/CENT/MIDLINE: HCPCS

## 2024-05-24 PROCEDURE — 2500000001 HC RX 250 WO HCPCS SELF ADMINISTERED DRUGS (ALT 637 FOR MEDICARE OP): Performed by: NURSE PRACTITIONER

## 2024-05-24 PROCEDURE — RXMED WILLOW AMBULATORY MEDICATION CHARGE

## 2024-05-24 PROCEDURE — 2500000004 HC RX 250 GENERAL PHARMACY W/ HCPCS (ALT 636 FOR OP/ED): Mod: JZ | Performed by: INTERNAL MEDICINE

## 2024-05-24 RX ORDER — LIDOCAINE HYDROCHLORIDE 20 MG/ML
INJECTION, SOLUTION INFILTRATION; PERINEURAL
Status: COMPLETED
Start: 2024-05-24 | End: 2024-05-24

## 2024-05-24 RX ORDER — HYDROMORPHONE HYDROCHLORIDE 1 MG/ML
0.6 INJECTION, SOLUTION INTRAMUSCULAR; INTRAVENOUS; SUBCUTANEOUS
Status: DISCONTINUED | OUTPATIENT
Start: 2024-05-24 | End: 2024-05-26 | Stop reason: HOSPADM

## 2024-05-24 RX ORDER — MUPIROCIN 20 MG/G
OINTMENT TOPICAL 2 TIMES DAILY
Qty: 88 G | Refills: 1 | Status: SHIPPED | OUTPATIENT
Start: 2024-05-24 | End: 2024-06-23

## 2024-05-24 RX ORDER — MUPIROCIN 20 MG/G
OINTMENT TOPICAL 2 TIMES DAILY
Status: DISCONTINUED | OUTPATIENT
Start: 2024-05-24 | End: 2024-05-26 | Stop reason: HOSPADM

## 2024-05-24 RX ORDER — VANCOMYCIN 1.5 G/300ML
1.5 INJECTION, SOLUTION INTRAVENOUS EVERY 12 HOURS
Qty: 23400 ML | Refills: 0 | Status: SHIPPED | OUTPATIENT
Start: 2024-05-24 | End: 2024-05-24

## 2024-05-24 RX ORDER — VANCOMYCIN 1.5 G/300ML
1.5 INJECTION, SOLUTION INTRAVENOUS EVERY 12 HOURS
Qty: 23400 ML | Refills: 0 | Status: SHIPPED | OUTPATIENT
Start: 2024-05-24 | End: 2024-07-02

## 2024-05-24 RX ORDER — LIDOCAINE HYDROCHLORIDE 10 MG/ML
10 INJECTION INFILTRATION; PERINEURAL ONCE
Status: DISCONTINUED | OUTPATIENT
Start: 2024-05-24 | End: 2024-05-26 | Stop reason: HOSPADM

## 2024-05-24 RX ORDER — LIDOCAINE HYDROCHLORIDE 10 MG/ML
5 INJECTION INFILTRATION; PERINEURAL ONCE
Status: DISCONTINUED | OUTPATIENT
Start: 2024-05-24 | End: 2024-05-26 | Stop reason: HOSPADM

## 2024-05-24 RX ORDER — DIPHENHYDRAMINE HYDROCHLORIDE 50 MG/ML
25 INJECTION INTRAMUSCULAR; INTRAVENOUS 2 TIMES DAILY PRN
Status: DISCONTINUED | OUTPATIENT
Start: 2024-05-24 | End: 2024-05-26 | Stop reason: HOSPADM

## 2024-05-24 RX ADMIN — DIPHENHYDRAMINE HYDROCHLORIDE 25 MG: 50 INJECTION, SOLUTION INTRAMUSCULAR; INTRAVENOUS at 09:33

## 2024-05-24 RX ADMIN — TRAZODONE HYDROCHLORIDE 50 MG: 50 TABLET ORAL at 21:19

## 2024-05-24 RX ADMIN — SODIUM CHLORIDE 100 ML/HR: 900 INJECTION, SOLUTION INTRAVENOUS at 05:35

## 2024-05-24 RX ADMIN — HYDROMORPHONE HYDROCHLORIDE 0.6 MG: 1 INJECTION, SOLUTION INTRAMUSCULAR; INTRAVENOUS; SUBCUTANEOUS at 22:36

## 2024-05-24 RX ADMIN — SODIUM CHLORIDE 100 ML/HR: 900 INJECTION, SOLUTION INTRAVENOUS at 17:57

## 2024-05-24 RX ADMIN — HYDROMORPHONE HYDROCHLORIDE 0.6 MG: 1 INJECTION, SOLUTION INTRAMUSCULAR; INTRAVENOUS; SUBCUTANEOUS at 09:33

## 2024-05-24 RX ADMIN — FLUOXETINE 20 MG: 20 CAPSULE ORAL at 08:50

## 2024-05-24 RX ADMIN — CEFAZOLIN SODIUM 2 G: 2 INJECTION, SOLUTION INTRAVENOUS at 08:50

## 2024-05-24 RX ADMIN — MUPIROCIN: 20 OINTMENT TOPICAL at 22:21

## 2024-05-24 RX ADMIN — CEFAZOLIN SODIUM 2 G: 2 INJECTION, SOLUTION INTRAVENOUS at 01:31

## 2024-05-24 RX ADMIN — LIDOCAINE HYDROCHLORIDE 5 ML: 20 INJECTION, SOLUTION INFILTRATION; PERINEURAL at 08:45

## 2024-05-24 RX ADMIN — MUPIROCIN: 20 OINTMENT TOPICAL at 12:32

## 2024-05-24 RX ADMIN — VANCOMYCIN 1.5 G: 1.5 INJECTION, SOLUTION INTRAVENOUS at 12:32

## 2024-05-24 RX ADMIN — DIPHENHYDRAMINE HYDROCHLORIDE 25 MG: 50 INJECTION, SOLUTION INTRAMUSCULAR; INTRAVENOUS at 22:34

## 2024-05-24 ASSESSMENT — PAIN DESCRIPTION - ORIENTATION
ORIENTATION: LEFT
ORIENTATION: LEFT

## 2024-05-24 ASSESSMENT — COGNITIVE AND FUNCTIONAL STATUS - GENERAL
DAILY ACTIVITIY SCORE: 24
MOBILITY SCORE: 24

## 2024-05-24 ASSESSMENT — PAIN - FUNCTIONAL ASSESSMENT
PAIN_FUNCTIONAL_ASSESSMENT: 0-10
PAIN_FUNCTIONAL_ASSESSMENT: WONG-BAKER FACES
PAIN_FUNCTIONAL_ASSESSMENT: 0-10
PAIN_FUNCTIONAL_ASSESSMENT: 0-10

## 2024-05-24 ASSESSMENT — PAIN SCALES - GENERAL
PAINLEVEL_OUTOF10: 4
PAINLEVEL_OUTOF10: 3
PAINLEVEL_OUTOF10: 2
PAINLEVEL_OUTOF10: 0 - NO PAIN
PAINLEVEL_OUTOF10: 9
PAINLEVEL_OUTOF10: 0 - NO PAIN

## 2024-05-24 ASSESSMENT — PAIN DESCRIPTION - LOCATION
LOCATION: FINGER (COMMENT WHICH ONE)
LOCATION: FINGER (COMMENT WHICH ONE)

## 2024-05-24 ASSESSMENT — PAIN DESCRIPTION - DESCRIPTORS: DESCRIPTORS: ACHING

## 2024-05-24 NOTE — CARE PLAN
The patient's goals for the shift include rest    The clinical goals for the shift include Adequate sleep    Over the shift, the patient did not make progress toward the following goals. Barriers to progression include . Recommendations to address these barriers include .      Problem: Pain - Adult  Goal: Verbalizes/displays adequate comfort level or baseline comfort level  Outcome: Progressing     Problem: Safety - Adult  Goal: Free from fall injury  Outcome: Progressing     Problem: Discharge Planning  Goal: Discharge to home or other facility with appropriate resources  Outcome: Progressing     Problem: Chronic Conditions and Co-morbidities  Goal: Patient's chronic conditions and co-morbidity symptoms are monitored and maintained or improved  Outcome: Progressing     Problem: Pain  Goal: My pain/discomfort is manageable  Outcome: Progressing     Problem: Safety  Goal: Patient will be injury free during hospitalization  Outcome: Progressing  Goal: I will remain free of falls  5/24/2024 0553 by Yoel Mckenna RN  Outcome: Progressing  5/24/2024 0351 by Yoel Mckenna RN  Flowsheets (Taken 5/24/2024 0351)  Resident will remain free of falls:   Utilize fall response kit   Apply bed/chair alarms as appropriate   Assist with toileting as orderd   Maintain bed at position as ordered (chair height, low bed)   Consider camera monitoring   Assess and monitor medications that may increase fall risk   Use gait belt for all transfers   Consider transfer to room close to nurses' station   Consult with physical therapy as needed   Visual checks per facility policy     Problem: Daily Care  Goal: Daily care needs are met  5/24/2024 0553 by Yoel Mckenna RN  Outcome: Progressing  5/24/2024 0351 by Yoel Mckenna RN  Flowsheets (Taken 5/24/2024 0351)  Daily care needs are met:   Assess and monitor ability to perform self care and identify potential discharge needs   Assist patient with activities of daily living as needed   Provide mouth  care   Assess skin integrity/risk for skin breakdown   Include patient/family/caregiver in decisions related to daily care   Encourage independent activity per ability     Problem: Psychosocial Needs  Goal: Demonstrates ability to cope with hospitalization/illness  5/24/2024 0553 by Yoel Mckenna RN  Outcome: Progressing  5/24/2024 0351 by Yoel Mckenna RN  Flowsheets (Taken 5/24/2024 0351)  Demonstrates ability to cope with hospitalization/illness:   Encourage verbalization of feelings/concerns/expectations   Encourage participation in diversional activities   Include resident/family/caregiver in decisions related to psychosocial needs   Assist resident to identify and practice own strengths and abilities   Encourage resident to set and complete small goals for self   Reinforce positive adaptation of new coping behaviors   Provide low-stimulation environment as needed  Goal: Collaborate with me, my family, and caregiver to identify my specific goals  5/24/2024 0553 by Yoel Mckenna RN  Outcome: Progressing  5/24/2024 0351 by Yoel Mckenna RN  Flowsheets  Taken 5/23/2024 1057 by Xavier Leigh  Spiritual Requests During Hospitalization: [place] only  Taken 5/22/2024 0344 by Karolyn Riddle RN  Cultural Requests During Hospitalization: none     Problem: Discharge Barriers  Goal: My discharge needs are met  5/24/2024 0553 by Yoel Mckenna RN  Outcome: Progressing  5/24/2024 0351 by Yoel Mckenna RN  Flowsheets (Taken 5/24/2024 0351)  Resident's discharge needs are met:   Identify potential discharge barriers on admission and throughout stay   Involve resident/family/caregiver in discharge planning process

## 2024-05-24 NOTE — HH CARE COORDINATION
Home Care received a Referral for Infusion and Nursing. We have processed the referral for a Start of Care on 5/26.     If you have any questions or concerns, please feel free to contact us at [phone]. Follow the prompts, enter your five digit zip code, and you will be directed to your care team on [facility] 1.

## 2024-05-24 NOTE — PROGRESS NOTES
"Cristhian Thornton is a 53 y.o. male on day 3 of admission presenting with Cellulitis of finger of left hand.    Subjective   Doing better but does seem still fairly persistently swollen down towards the tip no other new issues no other new concerns.       Objective     Physical Exam: Proximal erythema on left index finger is largely resolved starting about the level of the PIP joint distally on the dorsal surface of the finger there is a lot of flexion bolus masses consistent with intracutaneous abscesses.  Able to flex and extend finger fairly readily.  Sensation intact light touch over the tip of the finger    Last Recorded Vitals  Blood pressure 127/76, pulse 63, temperature 36.9 °C (98.4 °F), temperature source Oral, resp. rate 18, height 1.778 m (5' 10\"), weight 91.2 kg (201 lb), SpO2 99%.  Intake/Output last 3 Shifts:  I/O last 3 completed shifts:  In: 3790 (41.6 mL/kg) [I.V.:3190 (35 mL/kg); IV Piggyback:600]  Out: 0 (0 mL/kg)   Weight: 91.2 kg     Relevant Results  Results for orders placed or performed during the hospital encounter of 05/21/24 (from the past 24 hour(s))   Transthoracic Echo (TTE) Complete   Result Value Ref Range    AV pk minh 3.71 m/s    LVOT diam 1.86 cm    AV mn grad 34.1 mmHg    MV E/A ratio 0.97     LV Biplane EF 57 %    LA vol index A/L 36.7 ml/m2    MV avg E/e' ratio 7.83     RV free wall pk S' 12.19 cm/s    RVSP 25.6 mmHg    LVIDd 4.49 cm    AV pk grad 55.1 mmHg    Aortic Valve Area by Continuity of VTI 0.99 cm2    Aortic Valve Area by Continuity of Peak Velocity 0.99 cm2    LV A4C EF 51.4    Vancomycin   Result Value Ref Range    Vancomycin 14.0 10.0 - 20.0 ug/mL   CBC and Auto Differential   Result Value Ref Range    WBC 7.1 4.4 - 11.3 x10*3/uL    nRBC 0.0 0.0 - 0.0 /100 WBCs    RBC 4.92 4.50 - 5.90 x10*6/uL    Hemoglobin 15.0 13.5 - 17.5 g/dL    Hematocrit 44.0 41.0 - 52.0 %    MCV 89 80 - 100 fL    MCH 30.5 26.0 - 34.0 pg    MCHC 34.1 32.0 - 36.0 g/dL    RDW 12.0 11.5 - 14.5 %    " Platelets 193 150 - 450 x10*3/uL    Neutrophils % 59.5 40.0 - 80.0 %    Immature Granulocytes %, Automated 0.3 0.0 - 0.9 %    Lymphocytes % 28.1 13.0 - 44.0 %    Monocytes % 9.7 2.0 - 10.0 %    Eosinophils % 1.7 0.0 - 6.0 %    Basophils % 0.7 0.0 - 2.0 %    Neutrophils Absolute 4.24 1.20 - 7.70 x10*3/uL    Immature Granulocytes Absolute, Automated 0.02 0.00 - 0.70 x10*3/uL    Lymphocytes Absolute 2.00 1.20 - 4.80 x10*3/uL    Monocytes Absolute 0.69 0.10 - 1.00 x10*3/uL    Eosinophils Absolute 0.12 0.00 - 0.70 x10*3/uL    Basophils Absolute 0.05 0.00 - 0.10 x10*3/uL   Comprehensive metabolic panel   Result Value Ref Range    Glucose 77 65 - 99 mg/dL    Sodium 140 133 - 145 mmol/L    Potassium 3.8 3.4 - 5.1 mmol/L    Chloride 106 97 - 107 mmol/L    Bicarbonate 24 24 - 31 mmol/L    Urea Nitrogen 12 8 - 25 mg/dL    Creatinine 0.90 0.40 - 1.60 mg/dL    eGFR >90 >60 mL/min/1.73m*2    Calcium 8.7 8.5 - 10.4 mg/dL    Albumin 3.5 3.5 - 5.0 g/dL    Alkaline Phosphatase 72 35 - 125 U/L    Total Protein 6.5 5.9 - 7.9 g/dL    AST 17 5 - 40 U/L    Bilirubin, Total 0.6 0.1 - 1.2 mg/dL    ALT 16 5 - 40 U/L    Anion Gap 10 <=19 mmol/L               Assessment/Plan   Principal Problem:    Cellulitis of finger of left hand  Active Problems:    Moderate episode of recurrent major depressive disorder (Multi)    Low testosterone    53-year-old male with a left index finger abscess and cellulitis.  Cellulitis appears to be resolving.  Discussed with the patient was like he is developing some intracutaneous abscesses which I see somewhat commonly with MRSA infection.  Recommended bedside I&D of these.  Risks and benefits of this were discussed patient agreeable therefore after prep with Betadine alcohol finger is anesthetized with 10 cc of 2% lidocaine.  After is allowed to set up for several minutes I&D incision site previously was enlarged proximally and distally and subcu tissue spread epidermal layer was then elevated off the deeper  skin structures to unroofed the abscess.  Wound packed with iodoform gauze and Xeroform placed on the raw area of skin.  Otherwise would anticipate likely clearance for discharge tomorrow as long as this is looking better.  Follow-up in office Tuesday as previously planned.  Continue with twice daily soaks and packing changes at home       I spent 30 minutes in the professional and overall care of this patient.      Cristhian Carbajal MD

## 2024-05-24 NOTE — CARE PLAN
The patient's goals for the shift include rest    The clinical goals for the shift include Get PICC line inserted      Problem: Pain - Adult  Goal: Verbalizes/displays adequate comfort level or baseline comfort level  Outcome: Progressing     Problem: Safety - Adult  Goal: Free from fall injury  Outcome: Progressing     Problem: Discharge Planning  Goal: Discharge to home or other facility with appropriate resources  Outcome: Progressing     Problem: Chronic Conditions and Co-morbidities  Goal: Patient's chronic conditions and co-morbidity symptoms are monitored and maintained or improved  Outcome: Progressing     Problem: Pain  Goal: My pain/discomfort is manageable  Outcome: Progressing     Problem: Safety  Goal: Patient will be injury free during hospitalization  Outcome: Progressing  Goal: I will remain free of falls  Outcome: Progressing     Problem: Daily Care  Goal: Daily care needs are met  Outcome: Progressing     Problem: Psychosocial Needs  Goal: Demonstrates ability to cope with hospitalization/illness  Outcome: Progressing  Goal: Collaborate with me, my family, and caregiver to identify my specific goals  Outcome: Progressing     Problem: Discharge Barriers  Goal: My discharge needs are met  Outcome: Progressing     Problem: Fall/Injury  Goal: Not fall by end of shift  Outcome: Progressing  Goal: Be free from injury by end of the shift  Outcome: Progressing  Goal: Verbalize understanding of personal risk factors for fall in the hospital  Outcome: Progressing  Goal: Verbalize understanding of risk factor reduction measures to prevent injury from fall in the home  Outcome: Progressing  Goal: Use assistive devices by end of the shift  Outcome: Progressing  Goal: Pace activities to prevent fatigue by end of the shift  Outcome: Progressing     Problem: Pain  Goal: Takes deep breaths with improved pain control throughout the shift  Outcome: Progressing  Goal: Turns in bed with improved pain control throughout  the shift  Outcome: Progressing  Goal: Walks with improved pain control throughout the shift  Outcome: Progressing  Goal: Performs ADL's with improved pain control throughout shift  Outcome: Progressing  Goal: Participates in PT with improved pain control throughout the shift  Outcome: Progressing  Goal: Free from opioid side effects throughout the shift  Outcome: Progressing  Goal: Free from acute confusion related to pain meds throughout the shift  Outcome: Progressing

## 2024-05-24 NOTE — PROGRESS NOTES
05/24/24 1746   Discharge Planning   Patient expects to be discharged to: Home with [facility] IV infusion     Insurance approval obtained for IV antibiotic therapy.  IV antibiotic cost approved by patient.  Confirmed SOC for [facility] on Sunday 5/26.  Patient will  receive both doses of IV Vanco on Saturday then discharge home after the second dose in the evening.  [facility] will start infusions on Sunday in the am.  Patient's spouse will be teachable caregiver. PICC line has been placed. Patient updated at bedside and is agreeable.  Spouse will provide transportation home.     DC PLAN IS SECURE FOR 5/25 AFTER SECOND DOSE OF IV VANCO HAS BEEN GIVEN

## 2024-05-24 NOTE — PROGRESS NOTES
Reviewed pt info as correct  Dx - stap infection  Allergies - acyclovir, opioids, hydromorphone  No medication interactions  Labs are - weekly cbc w diff, cr, esr, crp, vanco trough  Pt has SL PICC placed 5/24 to flush per [facility] protocol  Med and med system - vancomycin 1.5 gm HP thru 7/2  Care plan done today    Cristhian Thornton is a 53 y.o. patient discharged from hospital to [facility] for skilled nursing and pharmacy services.  Pt ordered vancomycin 1.5 gm q12 SOC 5/26. Med ordered verified as accurate and appropriate for therapy  Dr Lopez following.    Spoke at length with pt. Verified correct address and phone number. Reviewed [facility] servieces and answered all questions. RN to call pt with time of appt. Pt agreeable to delivery by 9 pm this evening and confirmed will refrigerate med as appropriate.  to call when dropping off medication.    Pharmacy to mix 5/24 and deliver straight with supplies to match.  12x vancomycin 1.5 gm HP  DOS - 5/26 - 5/31    Follow up - 5/31 - check labs, check progress, deliver straight

## 2024-05-24 NOTE — PROGRESS NOTES
"Sarmad Thornton is a 53 y.o. male on day 3 of admission presenting with Cellulitis of finger of left hand.    Subjective   HPI   Patient seen and examined, dr carrillo at bedside dressing change. No fever or chills.  Patient denies any chest pain, shortness of breath in room air.  Patient tolerates well her diet with no nausea vomiting or abdominal pain.   No headache or dizziness.      Objective     Last Recorded Vitals  Blood pressure 121/77, pulse 71, temperature 36.8 °C (98.2 °F), temperature source Oral, resp. rate 18, height 1.778 m (5' 10\"), weight 91.2 kg (201 lb), SpO2 96%.      Intake/Output Summary (Last 24 hours) at 5/24/2024 1043  Last data filed at 5/24/2024 0852  Gross per 24 hour   Intake 2600 ml   Output 0 ml   Net 2600 ml         Physical Exam   General: alert, no diaphoresis   HENT: mucous membranes moist, external ears normal, no rhinorrhea   Eyes: no icterus or injection, no discharge   Lungs: CTA BL   Heart: RRR, no murmurs, no LE edema BL   GI: abdomen soft, nontender, nondistended, BS present   MSK: no joint effusion or deformity   Skin: no rashes, erythema, or ecchymosis   Neuro: grossly normal cognition, motor strength, sensation   Relevant Results    Lab Results   Component Value Date    WBC 7.1 05/24/2024    HGB 15.0 05/24/2024    HCT 44.0 05/24/2024    MCV 89 05/24/2024     05/24/2024       Lab Results   Component Value Date    GLUCOSE 77 05/24/2024    CALCIUM 8.7 05/24/2024     05/24/2024    K 3.8 05/24/2024    CO2 24 05/24/2024     05/24/2024    BUN 12 05/24/2024    CREATININE 0.90 05/24/2024       Lab Results   Component Value Date    HGBA1C 5.0 10/28/2020       Transthoracic Echo (TTE) Complete    Result Date: 5/23/2024           [facility] [id number] [place], [place] [address]            Phone [phone] TRANSTHORACIC ECHOCARDIOGRAM REPORT  Patient Name:     SARMAD THORNTON      Reading Physician:  Kaylie Craig"                                   MD Study Date:       5/23/2024            Ordering Provider:  [id number] CONOR LEANDRA CELESTIN MRN/PID:          [id number]             Fellow: Accession#:       [id number]         Nurse: Date of           1970 / 53      Sonographer:        Azra Swift RDCS Birth/Age:        years Gender:           M                    Additional Staff: Height:           177.80 cm            Admit Date: Weight:           91.17 kg             Admission Status:   Inpatient - Routine BSA / BMI:        2.09 m2 / 28.84      Department          [facility]                   kg/m2                Location: Blood Pressure: 124 /81 mmHg Study Type:    TRANSTHORACIC ECHO (TTE) COMPLETE Diagnosis/ICD: Endocarditis, valve unspecified-I38 Indication:    endocarditis CPT Codes:     Echo Complete w Full [id number] Patient History: Pertinent         AAA, BAV, HTN, nonrheumatic AS, pericarditis, S/P AVR [place] History:          2020. Study Detail: The following Echo studies were performed: 2D, M-Mode, Doppler and               color flow.  PHYSICIAN INTERPRETATION: Left Ventricle: Left ventricular systolic function is normal, with an estimated ejection fraction of 60-65%. There are no regional wall motion abnormalities. The left ventricular cavity size is normal. Spectral Doppler shows a normal pattern of left ventricular diastolic filling. Left Atrium: The left atrium is normal in size. Right Ventricle: The right ventricle is normal in size. There is normal right ventricular global systolic function. Right Atrium: The right atrium is normal in size. Aortic Valve: The aortic valve is trileaflet. There is diffuse moderate aortic valve thickening. There is evidence of moderate to severe aortic valve stenosis. There is no evidence of aortic valve regurgitation. The peak instantaneous gradient of the aortic valve is 55.1 mmHg. The mean gradient of the aortic valve is 34.1 mmHg. Mitral Valve: The mitral valve is normal in  structure. There is trace to mild mitral valve regurgitation. Tricuspid Valve: The tricuspid valve is structurally normal. There is trace to mild tricuspid regurgitation. The Doppler estimated RVSP is within normal limits at 25.6 mmHg. Pulmonic Valve: The pulmonic valve is not well visualized. There is no indication of pulmonic valve regurgitation. Pericardium: There is no pericardial effusion noted. Aorta: The aortic root is normal.  CONCLUSIONS:  1. Left ventricular systolic function is normal with a 60-65% estimated ejection fraction.  2. Trace to mild mitral valve regurgitation.  3. RVSP within normal limits.  4. Trace to mild tricuspid regurgitation.  5. Moderate to severe aortic valve stenosis. QUANTITATIVE DATA SUMMARY: 2D MEASUREMENTS:                          Normal Ranges: LAs:           3.55 cm   (2.7-4.0cm) IVSd:          1.29 cm   (0.6-1.1cm) LVPWd:         1.09 cm   (0.6-1.1cm) LVIDd:         4.49 cm   (3.9-5.9cm) LVIDs:         3.16 cm LV Mass Index: 93.3 g/m2 LV % FS        29.6 % LA VOLUME:                               Normal Ranges: LA Vol A4C:        71.2 ml    (22+/-6mL/m2) LA Vol A2C:        72.1 ml LA Vol BP:         76.8 ml LA Vol Index A4C:  34.0 ml/m2 LA Vol Index A2C:  34.5 ml/m2 LA Vol Index BP:   36.7 ml/m2 LA Area A4C:       22.6 cm2 LA Area A2C:       21.2 cm2 LA Major Axis A4C: 6.1 cm LA Major Axis A2C: 5.3 cm LA Volume Index:   36.5 ml/m2 LA Vol A4C:        71.0 ml LA Vol A2C:        72.0 ml RA VOLUME BY A/L METHOD:                               Normal Ranges: RA Vol A4C:        59.0 ml    (8.3-19.5ml) RA Vol Index A4C:  28.2 ml/m2 RA Area A4C:       20.4 cm2 RA Major Axis A4C: 6.0 cm AORTA MEASUREMENTS:                      Normal Ranges: Ao Sinus, d: 2.40 cm (2.1-3.5cm) Asc Ao, d:   3.00 cm (2.1-3.4cm) LV SYSTOLIC FUNCTION BY 2D PLANIMETRY (MOD):                     Normal Ranges: EF-A4C View: 51.4 % (>=55%) EF-A2C View: 61.9 % EF-Biplane:  56.6 % LV DIASTOLIC FUNCTION:                         Normal Ranges: MV Peak E:    0.98 m/s (0.7-1.2 m/s) MV Peak A:    1.01 m/s (0.42-0.7 m/s) E/A Ratio:    0.97     (1.0-2.2) MV e'         0.12 m/s (>8.0) MV lateral e' 0.16 m/s MV medial e'  0.09 m/s E/e' Ratio:   7.83     (<8.0) MITRAL VALVE:                 Normal Ranges: MV DT: 193 msec (150-240msec) AORTIC VALVE:                                    Normal Ranges: AoV Vmax:                3.71 m/s  (<=1.7m/s) AoV Peak P.1 mmHg (<20mmHg) AoV Mean P.1 mmHg (1.7-11.5mmHg) LVOT Max Jose Enrique:            1.36 m/s  (<=1.1m/s) AoV VTI:                 77.26 cm  (18-25cm) LVOT VTI:                28.26 cm LVOT Diameter:           1.86 cm   (1.8-2.4cm) AoV Area, VTI:           0.99 cm2  (2.5-5.5cm2) AoV Area,Vmax:           0.99 cm2  (2.5-4.5cm2) AoV Dimensionless Index: 0.37  RIGHT VENTRICLE: RV Basal 5.01 cm RV Mid   3.50 cm RV Major 9.0 cm RV s'    0.12 m/s TRICUSPID VALVE/RVSP:                             Normal Ranges: Peak TR Velocity: 2.38 m/s RV Syst Pressure: 25.6 mmHg (< 30mmHg) IVC Diam:         1.25 cm AORTA: Asc Ao Diam 3.03 cm  [id number] Agustin Craig MD Electronically signed on 2024 at 9:42:06 AM  ** Final **     Scheduled medications  anastrozole, 0.5 mg, oral, Weekly  enoxaparin, 40 mg, subcutaneous, q24h  FLUoxetine, 20 mg, oral, Daily  lidocaine, 10 mL, subcutaneous, Once  tamoxifen, 20 mg, oral, Once per day on   [START ON 2024] testosterone cypionate, 75 mg, intramuscular, q7 days  traZODone, 50 mg, oral, Nightly  vancomycin 1.5 g in 300 mL, 1,500 mg, intravenous, q12h      Continuous medications  oxygen, , Last Rate: 3 L/min (24 1313)  sodium chloride 0.9%, 100 mL/hr, Last Rate: 100 mL/hr (24 0751)      PRN medications  PRN medications: benzocaine, benzocaine-menthol, diphenhydrAMINE, fentaNYL PF, HYDROmorphone, ketorolac, midazolam, ondansetron **OR** ondansetron, oxygen, vancomycin    Assessment/Plan   Principal Problem:     Cellulitis of finger of left hand  Active Problems:    Moderate episode of recurrent major depressive disorder (Multi)    Low testosterone  Cellulitis of finger of left hand              IV antibiotics  per ID              S/P I&D in orthopedic surgeon's office               Wound and blood cultures sent in ER - continue to monitor               CRP elevated               Follow up with  ID               Follow up with orthopedic surgery               Clean site with sterile saline and cover with Kerlix               Wound care nurse - appreciate recs               Toradol for pain as needed   Active Problems:    Moderate episode of recurrent major depressive disorder (Multi)              Continue Prozac     Low testosterone              Continue testosterone, Arimidex and Tamoxifen     DVT prophylaxis               Lovenox     Discharge plan:  Anticipate discharging patient [facility] today with IV ATB per ID     I spent 35 minutes in the professional and overall care of this patient.    AURORA Martinez-CNP

## 2024-05-24 NOTE — DISCHARGE SUMMARY
Discharge Diagnosis  Cellulitis of finger of left hand  No problems updated.        Issues Requiring Follow-Up  Follow up with your PCP Amberly Del Castillo PA-C, within 1 week     Imaging results   Transesophageal Echo (MART)    Result Date: 5/23/2024           [place] [id number]            Phone [phone] TRANSTHORACIC ECHOCARDIOGRAM REPORT  Patient Name:     SARMAD SHETTY      Reading Physician:   [id number] Sandra Serra DO Study Date:       5/23/2024            Ordering Provider:   [id number] SANDRA SERRA MRN/PID:          [id number]             Fellow: Accession#:       [id number]         Nurse: Date of           1970 / 53      Sonographer:         Azra Swift Birth/Age:        years Gender:           M                    Additional Staff: Height:           177.80 cm            Admit Date: Weight:           91.17 kg             Admission Status:    Inpatient - Routine BSA / BMI:        2.09 m2 / 28.84      Department Location: [facility]                   kg/m2 Blood Pressure: 124 /81 mmHg Study Type:    TRANSESOPHAGEAL ECHO (MART) Diagnosis/ICD: Bacteremia-R78.81 Indication:    Endocarditis CPT Codes:     MART Complete-[id number] Patient History: Pertinent History: AAA BAV, Htn, AS, Bicuspid Valve, Pericarditis, S/P AVR                    Bovine 2020. Study Detail: The following Echo studies were performed: 2D and color flow.  PHYSICIAN INTERPRETATION: MART Details: The MART probe used was 51120. Technically adequate omniplane transesophageal echocardiogram performed. MART Medication: The patient was sedated using moderate sedation. MART Procedure: The probe was passed without difficulty. The following complications were encountered during the procedure: Patient tolerated the procedure well without any apparent complications. Left Ventricle: Left ventricular systolic function is normal. There are no  regional wall motion abnormalities. The left ventricular cavity size is normal. Left ventricular diastolic filling was not assessed. Left Atrium: The left atrium is normal in size. Right Ventricle: The right ventricle is normal in size. There is normal right ventricular global systolic function. Right Atrium: The right atrium is normal in size. Aortic Valve: There is a prosthetic aortic valve present. There is evidence of moderate aortic valve stenosis. There is no evidence of aortic valve regurgitation. No evidence of prosthetic aortic valve endocarditis TTE from 5/23/24 confirmed moderate aortic valve stenosis. Mitral Valve: The mitral valve is normal in structure. There is no evidence of mitral valve regurgitation. Tricuspid Valve: The tricuspid valve is structurally normal. There is trace tricuspid regurgitation. Pulmonic Valve: The pulmonic valve is structurally normal. There is no indication of pulmonic valve regurgitation. Pericardium: There is no pericardial effusion noted. Aorta: The aortic root is normal.  CONCLUSIONS:  1. Left ventricular systolic function is normal.  2. Moderate aortic valve stenosis.  3. No evidence of prosthetic aortic valve endocarditis         TTE from 5/23/24 confirmed moderate aortic valve stenosis. QUANTITATIVE DATA SUMMARY:  [id number] Gustavo Quick DO Electronically signed on 5/23/2024 at 3:07:39 PM  ** Final **     Transthoracic Echo (TTE) Complete    Result Date: 5/23/2024           [place] [id number]            Phone [phone] TRANSTHORACIC ECHOCARDIOGRAM REPORT  Patient Name:     SARMAD SHETTY      Donis Physician:  [id number] Agustin Craig MD Study Date:       5/23/2024            Ordering Provider:  [id number] CONOR CELESTIN MRN/PID:          [id number]             Fellow: Accession#:       [id number]         Nurse: Date of           1970 / 53      Sonographer:         Azra Swift [facility] Birth/Age:        years Gender:           M                    Additional Staff: Height:           177.80 cm            Admit Date: Weight:           91.17 kg             Admission Status:   Inpatient - Routine BSA / BMI:        2.09 m2 / 28.84      Department          [facility]                   kg/m2                Location: Blood Pressure: 124 /81 mmHg Study Type:    TRANSTHORACIC ECHO (TTE) COMPLETE Diagnosis/ICD: Endocarditis, valve unspecified-I38 Indication:    endocarditis CPT Codes:     Echo Complete w Full Doppler-[id number] Patient History: Pertinent         AAA, BAV, HTN, nonrheumatic AS, pericarditis, S/P AVR Bovine History:          2020. Study Detail: The following Echo studies were performed: 2D, M-Mode, Doppler and               color flow.  PHYSICIAN INTERPRETATION: Left Ventricle: Left ventricular systolic function is normal, with an estimated ejection fraction of 60-65%. There are no regional wall motion abnormalities. The left ventricular cavity size is normal. Spectral Doppler shows a normal pattern of left ventricular diastolic filling. Left Atrium: The left atrium is normal in size. Right Ventricle: The right ventricle is normal in size. There is normal right ventricular global systolic function. Right Atrium: The right atrium is normal in size. Aortic Valve: The aortic valve is trileaflet. There is diffuse moderate aortic valve thickening. There is evidence of moderate to severe aortic valve stenosis. There is no evidence of aortic valve regurgitation. The peak instantaneous gradient of the aortic valve is 55.1 mmHg. The mean gradient of the aortic valve is 34.1 mmHg. Mitral Valve: The mitral valve is normal in structure. There is trace to mild mitral valve regurgitation. Tricuspid Valve: The tricuspid valve is structurally normal. There is trace to mild tricuspid regurgitation. The Doppler estimated RVSP is within normal limits at 25.6 mmHg. Pulmonic Valve: The pulmonic  valve is not well visualized. There is no indication of pulmonic valve regurgitation. Pericardium: There is no pericardial effusion noted. Aorta: The aortic root is normal.  CONCLUSIONS:  1. Left ventricular systolic function is normal with a 60-65% estimated ejection fraction.  2. Trace to mild mitral valve regurgitation.  3. RVSP within normal limits.  4. Trace to mild tricuspid regurgitation.  5. Moderate to severe aortic valve stenosis. QUANTITATIVE DATA SUMMARY: 2D MEASUREMENTS:                          Normal Ranges: LAs:           3.55 cm   (2.7-4.0cm) IVSd:          1.29 cm   (0.6-1.1cm) LVPWd:         1.09 cm   (0.6-1.1cm) LVIDd:         4.49 cm   (3.9-5.9cm) LVIDs:         3.16 cm LV Mass Index: 93.3 g/m2 LV % FS        29.6 % LA VOLUME:                               Normal Ranges: LA Vol A4C:        71.2 ml    (22+/-6mL/m2) LA Vol A2C:        72.1 ml LA Vol BP:         76.8 ml LA Vol Index A4C:  34.0 ml/m2 LA Vol Index A2C:  34.5 ml/m2 LA Vol Index BP:   36.7 ml/m2 LA Area A4C:       22.6 cm2 LA Area A2C:       21.2 cm2 LA Major Axis A4C: 6.1 cm LA Major Axis A2C: 5.3 cm LA Volume Index:   36.5 ml/m2 LA Vol A4C:        71.0 ml LA Vol A2C:        72.0 ml RA VOLUME BY A/L METHOD:                               Normal Ranges: RA Vol A4C:        59.0 ml    (8.3-19.5ml) RA Vol Index A4C:  28.2 ml/m2 RA Area A4C:       20.4 cm2 RA Major Axis A4C: 6.0 cm AORTA MEASUREMENTS:                      Normal Ranges: Ao Sinus, d: 2.40 cm (2.1-3.5cm) Asc Ao, d:   3.00 cm (2.1-3.4cm) LV SYSTOLIC FUNCTION BY 2D PLANIMETRY (MOD):                     Normal Ranges: EF-A4C View: 51.4 % (>=55%) EF-A2C View: 61.9 % EF-Biplane:  56.6 % LV DIASTOLIC FUNCTION:                        Normal Ranges: MV Peak E:    0.98 m/s (0.7-1.2 m/s) MV Peak A:    1.01 m/s (0.42-0.7 m/s) E/A Ratio:    0.97     (1.0-2.2) MV e'         0.12 m/s (>8.0) MV lateral e' 0.16 m/s MV medial e'  0.09 m/s E/e' Ratio:   7.83     (<8.0) MITRAL VALVE:                  Normal Ranges: MV DT: 193 msec (150-240msec) AORTIC VALVE:                                    Normal Ranges: AoV Vmax:                3.71 m/s  (<=1.7m/s) AoV Peak P.1 mmHg (<20mmHg) AoV Mean P.1 mmHg (1.7-11.5mmHg) LVOT Max Jose Enrique:            1.36 m/s  (<=1.1m/s) AoV VTI:                 77.26 cm  (18-25cm) LVOT VTI:                28.26 cm LVOT Diameter:           1.86 cm   (1.8-2.4cm) AoV Area, VTI:           0.99 cm2  (2.5-5.5cm2) AoV Area,Vmax:           0.99 cm2  (2.5-4.5cm2) AoV Dimensionless Index: 0.37  RIGHT VENTRICLE: RV Basal 5.01 cm RV Mid   3.50 cm RV Major 9.0 cm RV s'    0.12 m/s TRICUSPID VALVE/RVSP:                             Normal Ranges: Peak TR Velocity: 2.38 m/s RV Syst Pressure: 25.6 mmHg (< 30mmHg) IVC Diam:         1.25 cm AORTA: Asc Ao Diam 3.03 cm  [id number] Agustin Craig MD Electronically signed on 2024 at 9:42:06 AM  ** Final **       Hospital Course  HPI from admission:  Cristhian Thornton is a 53 y.o. male presenting with left index finger infection. States he noticed redness on left finger . He saw Dr. Leonard in office today. Dr. Leonard did I&D and instructed patient to come to ER for admission. Patient states he's unsure if he's had a fever, but has had chills today and still feels like he can't get warm. States site of infection is painful. Denies recent injury or trauma to site. Denies chest pain, shortness of breath, abdominal pain. Had some nausea related to pain on arrival, but resolved now that pain improving. States he has had multiple infections including right hip, right calf and left knee over the last several months.     Brief Hospital course:  Admitted with cellulitis of finger of left hand.  Evaluated by infectious disease Dr. Johnson patient will go with IV antibiotic vancomycin 1.5 g IV every 12 hours through 7/2. Use mupirocin ointment twice a day for 3 days.  Labs weekly and has to follow-up with infectious disease on  7/30 at 9:30 AM.  Evaluated by surgeon Dr. Uribe had I&D of left index finger abscess with cellulitis patient has to soak and packing dressing at home twice a day.  Patient has to follow-up with Dr. Uribe on Tuesday.  Patient will be discharged with oxycodone 10 mg daily as needed with change of dressing for 5 days.   arranged his home health care IV infusion will start tomorrow morning.  Patient will discharge home with RN.  Patient is hemodynamically stable      Physical exam  Physical Exam  General: alert, no diaphoresis   HENT: mucous membranes moist, external ears normal, no rhinorrhea   Eyes: no icterus or injection, no discharge   Lungs: CTA BL   Heart: RRR, no murmurs, no LE edema BL   GI: abdomen soft, nontender, nondistended, BS present   MSK: no joint effusion or deformity   Skin: no rashes, erythema, or ecchymosis   Neuro: grossly normal cognition, motor strength, sensation     Relevant Results    Lab Results   Component Value Date    WBC 5.9 05/25/2024    HGB 14.5 05/25/2024    HCT 42.3 05/25/2024    MCV 90 05/25/2024     05/25/2024     Lab Results   Component Value Date    GLUCOSE 89 05/25/2024    CALCIUM 8.5 05/25/2024     05/25/2024    K 3.9 05/25/2024    CO2 24 05/25/2024     05/25/2024    BUN 12 05/25/2024    CREATININE 0.90 05/25/2024     Lab Results   Component Value Date    INR 3.8 (H) 03/08/2021    INR 1.8 (H) 02/08/2021    INR 2.8 (H) 01/19/2021    PROTIME 15.4 (H) 11/07/2020    PROTIME 13.2 (H) 11/06/2020    PROTIME 13.5 (H) 11/05/2020            Medication List      START taking these medications     mupirocin 2 % ointment; Commonly known as: Bactroban; Apply topically 2   times a day.   oxyCODONE 5 mg immediate release tablet; Commonly known as: Roxicodone;   Take 2 tablets (10 mg) by mouth once daily as needed for severe pain (7 -   10) for up to 5 days. Take 10 mg of oxycodone before dressing change   vancomycin 1.5 g in 300 mL IVPB; Commonly  known as: Xellia; Infuse 300   mL (1.5 g) at 200 mL/hr over 90 minutes into a venous catheter every 12   hours. Routine PICC line care Every Monday or Tuesday CBC with   differential, creatinine, ESR, CRP, vancomycin trough Fax all lab results   to Dr. Lopez [phone] Follow-up appointment with Dr. Lopez 7/3 at 9:30   AM     CONTINUE taking these medications     anastrozole 1 mg tablet; Commonly known as: Arimidex   FLUoxetine 20 mg capsule; Commonly known as: PROzac; Take 1 capsule (20   mg) by mouth once daily.   tamoxifen 20 mg tablet; Commonly known as: Nolvadex   testosterone cypionate 100 mg/mL injection; Commonly known as:   Depo-Testosterone   traZODone 50 mg tablet; Commonly known as: Desyrel; Take 1 tablet (50   mg) by mouth as needed at bedtime for sleep.     STOP taking these medications     b complex 0.4 mg tablet   calcium carbonate-vitamin D3 600 mg-20 mcg (800 unit) tablet   cetirizine 10 mg tablet; Commonly known as: ZyrTEC   ibuprofen 200 mg tablet   meloxicam 15 mg tablet; Commonly known as: Mobic   vitamin E mixed 100 unit tablet       Outpatient Follow-Up  Future Appointments   Date Time Provider Department Center   7/18/2024  9:00 AM Fco Baltazar DO [id number] [place]         Time overall spent on discharge summary 55 minutes    AURORA Martinez-CNP

## 2024-05-24 NOTE — NURSING NOTE
Assumed patient care. BSSR received from previous Nurse. Seen patient lying on bed awake, no distress or discomfort noted. Safety measures ensured and call light in reach.   Plan of care ongoing.    2035H:  Dressing changed per order.  Patient well tolerated.

## 2024-05-24 NOTE — PROGRESS NOTES
INFECTIOUS DISEASES PROGRESS NOTE    Consulted / following patient for:  Left index finger abscess/cellulitis  Staph aureus septicemia    Subjective   Interval History:   Overall feeling better and finger and hand feel better.  Had further bedside debridement this morning by Dr. Carbajal    Objective   PHYSICAL EXAMINATION  Vital signs:  Visit Vitals  /77 (BP Location: Left arm, Patient Position: Lying)   Pulse 71   Temp 36.8 °C (98.2 °F) (Oral)   Resp 18      General: Alert, not toxic, no distress  HEENT:  No scleral icterus or conjunctival suffusion, oral mucosa moist  Nodes:  Negative  Lungs:  Clear to auscultation  Heart:  S1, S2 normal, buzzing-quality short systolic murmur appreciated throughout the left precordium, unchanged from prior exam.  No diastolic murmur  Abdomen:  Soft, nontender. No palpable organs or masses  Extremities: Dry dressing in place on the left index finger, not removed.  Erythema and swelling over the MCP and surrounding tissue markedly improved compared with prior exam.  No proximal lymphangitis or regional lymphadenopathy  Neurologic:  Alert.  Otherwise grossly non-focal.    Skin: No mucocutaneous signs of infectious endocarditis    Relevant Results  WBC: 13,600 --> 12,100 ---> 10,800 ---> 7100    Results from last 72 hours   Lab Units 05/24/24  0532   CREATININE mg/dL 0.90   ANION GAP mmol/L 10   EGFR mL/min/1.73m*2 >90     Results from last 72 hours   Lab Units 05/24/24  0532   AST U/L 17   ALT U/L 16   ALK PHOS U/L 72   BILIRUBIN TOTAL mg/dL 0.6     Microbiology:  Blood (5/21): MRSA X2  Blood (5/23): Negative X2  Index finger wound: MRSA  Staph aureus colonization swab nares/axilla/groin: Positive MRSA      Imaging:  TTE:  No vegetations noted  MART:  No vegetations noted, no compromise of valve integrity    ASSESSMENT:  Abscess/cellulitis left index finger.  Staph aureus septicemia  Patient has had a remarkable sequence of pyogenic skin and soft tissue infections over the  "last several months, and was admitted with a new episode, acute suppurative inflammation of the left index finger.  He has a systolic murmur consistent with his prosthetic aortic valve, and no other physical findings to suggest infectious endocarditis, no echocardiographic evidence for valve dysfunction or vegetations.  No embolic lesions.    Niccoli stable.  Despite the negative echocardiogram I strongly suggest prolonged course of intravenous vancomycin, explained to the patient in detail.  Will also need to undergo \"decolonization\" for skin and mucosal MRSA     Prosthetic aortic valve/arch  See above.          PLANS:  -   Continue vancomycin 1.5 g IV every 12 hours through 7/2  -   Stop cefazolin  -   Orthopedic follow-up with Dr. Carbajal  -   Mupirocin ointment intranasally twice daily for 3 days, on repeat at the end of therapy  -   Discussed outpatient chlorhexidine showers  -   Place PICC line  -   When all arrangements are complete, discharge okay from my perspective.  Detailed orders are in the medical record with a hard copy on the chart and the patient has an appointment to see me in follow-up on 7/3 at 9:30 AM    DR. TRIPLETT COVERING 5/25-5/27 AND WILL SEE PRN YOUR CALL    Jonathan Lopez MD  [facility] [employer]  Office:  [phone]  "

## 2024-05-24 NOTE — PROCEDURES
Vascular Access Team Procedure Note     Visit Date: 5/24/2024      Patient Name: Cristhian Thonrton         MRN: [id number]             Procedure: 4FR single lumen picc line inserted to R brachial vein without any difficulty, brisk blood return noted and flushes easily with NS, curos cap applied, length 37cm, arm circumference 33cm, external length 1 cm. Pt tolerated well. Tip confirmation with 3CG Sapiens, RN aware that picc line is good to use.                           Mahogany Ricketts RN  5/24/2024  4:30 PM

## 2024-05-24 NOTE — PROGRESS NOTES
Vancomycin Dosing by Pharmacy- FOLLOW UP    Cristhian Thornton is a 53 y.o. year old male who Pharmacy has been consulted for vancomycin dosing for cellulitis, skin and soft tissue. Based on the patient's indication and renal status this patient is being dosed based on a goal AUC of 400-600.     Renal function is currently stable.    Current vancomycin dose: 1500 mg given every 12 hours    Estimated vancomycin AUC on current dose: 426 mg/L.hr     Visit Vitals  /76 (BP Location: Left arm, Patient Position: Lying)   Pulse 63   Temp 36.9 °C (98.4 °F) (Oral)   Resp 18        Lab Results   Component Value Date    CREATININE 0.90 2024    CREATININE 1.00 2024    CREATININE 1.10 2024    CREATININE 1.00 2024        Patient weight is as follows:   Vitals:    24 0940   Weight: 91.2 kg (201 lb)       Cultures:  Susceptibility data for the encounter in last 14 days.  Collected Specimen Info Organism Clindamycin Erythromycin Oxacillin Tetracycline Trimethoprim/Sulfamethoxazole Vancomycin   24 Tissue/Biopsy from Skin/Superficial Abscess Methicillin Resistant Staphylococcus aureus (MRSA)  S  R  R  S  S  S   24 Blood culture from Peripheral Venipuncture Methicillin Resistant Staphylococcus aureus (MRSA)  S  R  R  S  S  S   24 Blood culture from Peripheral Venipuncture Staphylococcus aureus              I/O last 3 completed shifts:  In: 3790 (41.6 mL/kg) [I.V.:3190 (35 mL/kg); IV Piggyback:600]  Out: 0 (0 mL/kg)   Weight: 91.2 kg   I/O during current shift:  No intake/output data recorded.    Temp (24hrs), Av.8 °C (98.2 °F), Min:36.6 °C (97.9 °F), Max:36.9 °C (98.4 °F)      Assessment/Plan    Within goal AUC range. Continue current vancomycin regimen.    This dosing regimen is predicted by [employer] to result in the following pharmacokinetic parameters:  Loading dose: N/A  Regimen: 1500 mg IV every 12 hours.  Start time: 11:03 on 2024  Exposure target: AUC24 (range)400-600  mg/L.hr   AUC24,ss: 426 mg/L.hr  Probability of AUC24 > 400: 70 %  Ctrough,ss: 9.3 mg/L  Probability of Ctrough,ss > 20: 0 %  Probability of nephrotoxicity (Lodise DONNA 2009): 5 %      The next level will be obtained on 05-31 with am labs. May be obtained sooner if clinically indicated.   Will continue to monitor renal function daily while on vancomycin and order serum creatinine at least every 48 hours if not already ordered.  Follow for continued vancomycin needs, clinical response, and signs/symptoms of toxicity.       DIPAK PERALTA, PharmD

## 2024-05-25 VITALS
RESPIRATION RATE: 18 BRPM | BODY MASS INDEX: 28.77 KG/M2 | OXYGEN SATURATION: 94 % | WEIGHT: 201 LBS | HEART RATE: 62 BPM | DIASTOLIC BLOOD PRESSURE: 84 MMHG | SYSTOLIC BLOOD PRESSURE: 115 MMHG | HEIGHT: 70 IN | TEMPERATURE: 97.7 F

## 2024-05-25 LAB
ALBUMIN SERPL-MCNC: 3.4 G/DL (ref 3.5–5)
ALP BLD-CCNC: 66 U/L (ref 35–125)
ALT SERPL-CCNC: 14 U/L (ref 5–40)
ANION GAP SERPL CALC-SCNC: 10 MMOL/L
AST SERPL-CCNC: 17 U/L (ref 5–40)
BASOPHILS # BLD AUTO: 0.05 X10*3/UL (ref 0–0.1)
BASOPHILS NFR BLD AUTO: 0.9 %
BILIRUB SERPL-MCNC: 0.4 MG/DL (ref 0.1–1.2)
BUN SERPL-MCNC: 12 MG/DL (ref 8–25)
CALCIUM SERPL-MCNC: 8.5 MG/DL (ref 8.5–10.4)
CHLORIDE SERPL-SCNC: 107 MMOL/L (ref 97–107)
CO2 SERPL-SCNC: 24 MMOL/L (ref 24–31)
CREAT SERPL-MCNC: 0.9 MG/DL (ref 0.4–1.6)
EGFRCR SERPLBLD CKD-EPI 2021: >90 ML/MIN/1.73M*2
EOSINOPHIL # BLD AUTO: 0.14 X10*3/UL (ref 0–0.7)
EOSINOPHIL NFR BLD AUTO: 2.4 %
ERYTHROCYTE [DISTWIDTH] IN BLOOD BY AUTOMATED COUNT: 11.9 % (ref 11.5–14.5)
GLUCOSE SERPL-MCNC: 89 MG/DL (ref 65–99)
HCT VFR BLD AUTO: 42.3 % (ref 41–52)
HGB BLD-MCNC: 14.5 G/DL (ref 13.5–17.5)
IMM GRANULOCYTES # BLD AUTO: 0.01 X10*3/UL (ref 0–0.7)
IMM GRANULOCYTES NFR BLD AUTO: 0.2 % (ref 0–0.9)
LYMPHOCYTES # BLD AUTO: 1.98 X10*3/UL (ref 1.2–4.8)
LYMPHOCYTES NFR BLD AUTO: 33.7 %
MCH RBC QN AUTO: 30.7 PG (ref 26–34)
MCHC RBC AUTO-ENTMCNC: 34.3 G/DL (ref 32–36)
MCV RBC AUTO: 90 FL (ref 80–100)
MONOCYTES # BLD AUTO: 0.46 X10*3/UL (ref 0.1–1)
MONOCYTES NFR BLD AUTO: 7.8 %
NEUTROPHILS # BLD AUTO: 3.24 X10*3/UL (ref 1.2–7.7)
NEUTROPHILS NFR BLD AUTO: 55 %
NRBC BLD-RTO: 0 /100 WBCS (ref 0–0)
PLATELET # BLD AUTO: 190 X10*3/UL (ref 150–450)
POTASSIUM SERPL-SCNC: 3.9 MMOL/L (ref 3.4–5.1)
PROT SERPL-MCNC: 6.3 G/DL (ref 5.9–7.9)
RBC # BLD AUTO: 4.72 X10*6/UL (ref 4.5–5.9)
SODIUM SERPL-SCNC: 141 MMOL/L (ref 133–145)
WBC # BLD AUTO: 5.9 X10*3/UL (ref 4.4–11.3)

## 2024-05-25 PROCEDURE — 85025 COMPLETE CBC W/AUTO DIFF WBC: CPT | Performed by: NURSE PRACTITIONER

## 2024-05-25 PROCEDURE — 2500000001 HC RX 250 WO HCPCS SELF ADMINISTERED DRUGS (ALT 637 FOR MEDICARE OP): Performed by: NURSE PRACTITIONER

## 2024-05-25 PROCEDURE — 2500000004 HC RX 250 GENERAL PHARMACY W/ HCPCS (ALT 636 FOR OP/ED): Performed by: ORTHOPAEDIC SURGERY

## 2024-05-25 PROCEDURE — 80053 COMPREHEN METABOLIC PANEL: CPT | Performed by: NURSE PRACTITIONER

## 2024-05-25 PROCEDURE — 2500000004 HC RX 250 GENERAL PHARMACY W/ HCPCS (ALT 636 FOR OP/ED): Mod: JZ | Performed by: INTERNAL MEDICINE

## 2024-05-25 PROCEDURE — 1200000002 HC GENERAL ROOM WITH TELEMETRY DAILY

## 2024-05-25 PROCEDURE — 2500000004 HC RX 250 GENERAL PHARMACY W/ HCPCS (ALT 636 FOR OP/ED)

## 2024-05-25 RX ORDER — OXYCODONE HYDROCHLORIDE 5 MG/1
10 TABLET ORAL DAILY PRN
Qty: 5 TABLET | Refills: 0 | Status: SHIPPED | OUTPATIENT
Start: 2024-05-25 | End: 2024-05-30

## 2024-05-25 RX ORDER — CEFAZOLIN SODIUM 1 G/50ML
1 SOLUTION INTRAVENOUS EVERY 8 HOURS
Status: DISCONTINUED | OUTPATIENT
Start: 2024-05-25 | End: 2024-05-25

## 2024-05-25 RX ADMIN — DIPHENHYDRAMINE HYDROCHLORIDE 25 MG: 50 INJECTION, SOLUTION INTRAMUSCULAR; INTRAVENOUS at 10:23

## 2024-05-25 RX ADMIN — MUPIROCIN: 20 OINTMENT TOPICAL at 10:24

## 2024-05-25 RX ADMIN — SODIUM CHLORIDE 100 ML/HR: 900 INJECTION, SOLUTION INTRAVENOUS at 03:59

## 2024-05-25 RX ADMIN — VANCOMYCIN 1.5 G: 1.5 INJECTION, SOLUTION INTRAVENOUS at 00:17

## 2024-05-25 RX ADMIN — FLUOXETINE 20 MG: 20 CAPSULE ORAL at 10:23

## 2024-05-25 RX ADMIN — HYDROMORPHONE HYDROCHLORIDE 0.6 MG: 1 INJECTION, SOLUTION INTRAMUSCULAR; INTRAVENOUS; SUBCUTANEOUS at 10:23

## 2024-05-25 RX ADMIN — VANCOMYCIN 1.5 G: 1.5 INJECTION, SOLUTION INTRAVENOUS at 10:00

## 2024-05-25 RX ADMIN — VANCOMYCIN 1.5 G: 1.5 INJECTION, SOLUTION INTRAVENOUS at 21:00

## 2024-05-25 ASSESSMENT — PAIN - FUNCTIONAL ASSESSMENT: PAIN_FUNCTIONAL_ASSESSMENT: 0-10

## 2024-05-25 ASSESSMENT — PAIN DESCRIPTION - ORIENTATION: ORIENTATION: LEFT

## 2024-05-25 ASSESSMENT — PAIN DESCRIPTION - LOCATION: LOCATION: FINGER (COMMENT WHICH ONE)

## 2024-05-25 ASSESSMENT — PAIN SCALES - GENERAL
PAINLEVEL_OUTOF10: 5 - MODERATE PAIN
PAINLEVEL_OUTOF10: 4

## 2024-05-25 NOTE — CARE PLAN
Problem: Pain - Adult  Goal: Verbalizes/displays adequate comfort level or baseline comfort level  Outcome: Progressing     Problem: Safety - Adult  Goal: Free from fall injury  Outcome: Progressing     Problem: Discharge Planning  Goal: Discharge to home or other facility with appropriate resources  Outcome: Progressing     Problem: Chronic Conditions and Co-morbidities  Goal: Patient's chronic conditions and co-morbidity symptoms are monitored and maintained or improved  Outcome: Progressing     Problem: Pain  Goal: My pain/discomfort is manageable  Outcome: Progressing     Problem: Safety  Goal: Patient will be injury free during hospitalization  Outcome: Progressing  Goal: I will remain free of falls  Outcome: Progressing     Problem: Psychosocial Needs  Goal: Demonstrates ability to cope with hospitalization/illness  Outcome: Progressing  Goal: Collaborate with me, my family, and caregiver to identify my specific goals  Outcome: Progressing     Problem: Discharge Barriers  Goal: My discharge needs are met  Outcome: Progressing   The patient's goals for the shift include rest    The clinical goals for the shift include Discharge patient with all his needs for IV ATB at home

## 2024-05-25 NOTE — DOCUMENTATION CLARIFICATION NOTE
"    PATIENT:               SARMAD SHETTY  ACCT #:                  [id number]  MRN:                       [id number]  :                       1970  ADMIT DATE:       2024 9:43 AM  DISCH DATE:  RESPONDING PROVIDER #:        [id number]          PROVIDER RESPONSE TEXT:    Bacteremia without sepsis    CDI QUERY TEXT:    Clarification      Instruction:  Based on your assessment of the patient and the clinical information, please provide the requested documentation by clicking on the appropriate radio button and enter any additional information if prompted.    Question: Sepsis was documented in the medical record. Based on the documentation and the clinical information, can the diagnosis be further clarified as    When answering this query, please exercise your independent professional judgment. The fact that a question is being asked, does not imply that any particular answer is desired or expected.    The patient's clinical indicators include:  Clinical Information:  53 year old presented with left index finger infection    Documented Diagnosis:  24- Cardiology Consult- \"I spoke with his infectious disease consultant, Dr. Lopez who will treat him with prolonged intravenous antibiotic therapy in the setting of his sepsis\".    Clinical Indicators:  24- ER --Vital Signs: 36.8, 71, 18, 137/90, 100%  24-WBC: 13.6  24-Microbiology Results: Nasal Swab- Isolated MRSA  -Band Neutrophil Count/percent Band Neutrophil: none  -Lactic acid: not ordered  24- BUN/Creat:  17/1.00  24--Blood cultures: + MRSA  Gram Stain- Gram positive cocci, clusters  24- Bilirubin: 1.1  24 -MAP: 105  24- Kasie Coma Scale: 15  24- PAO2/FIO2: 100% on Room Air  -Procalcitonin: not ordered  24- Platelets: 201  -Other clinical indicators:  24- ID Consult- \"Staph aureus septicemia\".  24- Cellulitis of Left Index Finger, Septicemia\".  24- GONZALO Prater CNP- \" Cellulitis of finger of " "left hand\".    Treatment:  No pressors  5/21/24- Zosyn 3.375 GM IV x 1 and then q6  5/21/24- Vancomycin 1.25 GM IV x 1  5/21/24- Vancomycin 1.5GM IV q12  5/22/24- Vancomycin 1 GM IV q12  5/22/24- Ancef 2 GM IV q6  5/24/24- Vancomycin 1.5 GM IV q 12 for 39 days      Risk Factors:  Cellulitis  Options provided:  -- Sepsis was a differential diagnosis and ruled out after study  -- Sepsis with other organ dysfunction, Please specify sepsis associated organ dysfunction below  -- Bacteremia without sepsis  -- Other - I will add my own diagnosis  -- Refer to Clinical Documentation Reviewer    Query created by: Armida Russo on 5/24/2024 1:00 PM      Electronically signed by:  ALCON LANDIS 5/25/2024 5:17 PM          "

## 2024-05-25 NOTE — PROGRESS NOTES
"Cristhian Thornton is a 53 y.o. male on day 4 of admission presenting with Cellulitis of finger of left hand.    Subjective   HPI   Patient seen and examined, no pain in his left finger.  But very painful when they change the dressing.  Did not state that he knows how to change his send he had wound to back before and also his wife will help him.  To change his dressing.  Wants to go home agreed to leave late after the second dose of Vanco around 10 PM.  No fever or chills. Patient denies any chest pain, shortness of breath in room air.  Patient tolerates well her diet with no nausea vomiting or abdominal pain.      Objective     Last Recorded Vitals  Blood pressure 144/77, pulse 59, temperature 36.6 °C (97.9 °F), temperature source Oral, resp. rate 18, height 1.778 m (5' 10\"), weight 91.2 kg (201 lb), SpO2 94%.      Intake/Output Summary (Last 24 hours) at 5/25/2024 1535  Last data filed at 5/25/2024 1410  Gross per 24 hour   Intake 2478.33 ml   Output --   Net 2478.33 ml         Physical Exam   General: alert, no diaphoresis   HENT: mucous membranes moist, external ears normal, no rhinorrhea   Eyes: no icterus or injection, no discharge   Lungs: CTA BL   Heart: RRR, no murmurs, no LE edema BL   GI: abdomen soft, nontender, nondistended, BS present   MSK: no joint effusion or deformity   Skin: no rashes, erythema, or ecchymosis   Neuro: grossly normal cognition, motor strength, sensation   Relevant Results    Lab Results   Component Value Date    WBC 5.9 05/25/2024    HGB 14.5 05/25/2024    HCT 42.3 05/25/2024    MCV 90 05/25/2024     05/25/2024       Lab Results   Component Value Date    GLUCOSE 89 05/25/2024    CALCIUM 8.5 05/25/2024     05/25/2024    K 3.9 05/25/2024    CO2 24 05/25/2024     05/25/2024    BUN 12 05/25/2024    CREATININE 0.90 05/25/2024       Lab Results   Component Value Date    HGBA1C 5.0 10/28/2020       Transthoracic Echo (TTE) Complete    Result Date: 5/23/2024           [facility] " [place] [id number]            Phone [phone] TRANSTHORACIC ECHOCARDIOGRAM REPORT  Patient Name:     SARMAD COOLEY DIOGENES      Reading Physician:  [id number] Agustin Craig MD Study Date:       5/23/2024            Ordering Provider:  [id number] CONOR CELESTIN MRN/PID:          [id number]             Fellow: Accession#:       [id number]         Nurse: Date of           1970 / 53      Sonographer:        Azra Swift RDCS Birth/Age:        years Gender:           M                    Additional Staff: Height:           177.80 cm            Admit Date: Weight:           91.17 kg             Admission Status:   Inpatient - Routine BSA / BMI:        2.09 m2 / 28.84      Department          [facility]                   kg/m2                Location: Blood Pressure: 124 /81 mmHg Study Type:    TRANSTHORACIC ECHO (TTE) COMPLETE Diagnosis/ICD: Endocarditis, valve unspecified-I38 Indication:    endocarditis CPT Codes:     Echo Complete w Full [id number] Patient History: Pertinent         AAA, BAV, HTN, nonrheumatic AS, pericarditis, S/P AVR [place] History:          2020. Study Detail: The following Echo studies were performed: 2D, M-Mode, Doppler and               color flow.  PHYSICIAN INTERPRETATION: Left Ventricle: Left ventricular systolic function is normal, with an estimated ejection fraction of 60-65%. There are no regional wall motion abnormalities. The left ventricular cavity size is normal. Spectral Doppler shows a normal pattern of left ventricular diastolic filling. Left Atrium: The left atrium is normal in size. Right Ventricle: The right ventricle is normal in size. There is normal right ventricular global systolic function. Right Atrium: The right atrium is normal in size. Aortic Valve: The aortic valve is trileaflet. There is diffuse moderate aortic valve thickening. There is evidence of moderate to severe  aortic valve stenosis. There is no evidence of aortic valve regurgitation. The peak instantaneous gradient of the aortic valve is 55.1 mmHg. The mean gradient of the aortic valve is 34.1 mmHg. Mitral Valve: The mitral valve is normal in structure. There is trace to mild mitral valve regurgitation. Tricuspid Valve: The tricuspid valve is structurally normal. There is trace to mild tricuspid regurgitation. The Doppler estimated RVSP is within normal limits at 25.6 mmHg. Pulmonic Valve: The pulmonic valve is not well visualized. There is no indication of pulmonic valve regurgitation. Pericardium: There is no pericardial effusion noted. Aorta: The aortic root is normal.  CONCLUSIONS:  1. Left ventricular systolic function is normal with a 60-65% estimated ejection fraction.  2. Trace to mild mitral valve regurgitation.  3. RVSP within normal limits.  4. Trace to mild tricuspid regurgitation.  5. Moderate to severe aortic valve stenosis. QUANTITATIVE DATA SUMMARY: 2D MEASUREMENTS:                          Normal Ranges: LAs:           3.55 cm   (2.7-4.0cm) IVSd:          1.29 cm   (0.6-1.1cm) LVPWd:         1.09 cm   (0.6-1.1cm) LVIDd:         4.49 cm   (3.9-5.9cm) LVIDs:         3.16 cm LV Mass Index: 93.3 g/m2 LV % FS        29.6 % LA VOLUME:                               Normal Ranges: LA Vol A4C:        71.2 ml    (22+/-6mL/m2) LA Vol A2C:        72.1 ml LA Vol BP:         76.8 ml LA Vol Index A4C:  34.0 ml/m2 LA Vol Index A2C:  34.5 ml/m2 LA Vol Index BP:   36.7 ml/m2 LA Area A4C:       22.6 cm2 LA Area A2C:       21.2 cm2 LA Major Axis A4C: 6.1 cm LA Major Axis A2C: 5.3 cm LA Volume Index:   36.5 ml/m2 LA Vol A4C:        71.0 ml LA Vol A2C:        72.0 ml RA VOLUME BY A/L METHOD:                               Normal Ranges: RA Vol A4C:        59.0 ml    (8.3-19.5ml) RA Vol Index A4C:  28.2 ml/m2 RA Area A4C:       20.4 cm2 RA Major Axis A4C: 6.0 cm AORTA MEASUREMENTS:                      Normal Ranges: Ao Sinus,  d: 2.40 cm (2.1-3.5cm) Asc Ao, d:   3.00 cm (2.1-3.4cm) LV SYSTOLIC FUNCTION BY 2D PLANIMETRY (MOD):                     Normal Ranges: EF-A4C View: 51.4 % (>=55%) EF-A2C View: 61.9 % EF-Biplane:  56.6 % LV DIASTOLIC FUNCTION:                        Normal Ranges: MV Peak E:    0.98 m/s (0.7-1.2 m/s) MV Peak A:    1.01 m/s (0.42-0.7 m/s) E/A Ratio:    0.97     (1.0-2.2) MV e'         0.12 m/s (>8.0) MV lateral e' 0.16 m/s MV medial e'  0.09 m/s E/e' Ratio:   7.83     (<8.0) MITRAL VALVE:                 Normal Ranges: MV DT: 193 msec (150-240msec) AORTIC VALVE:                                    Normal Ranges: AoV Vmax:                3.71 m/s  (<=1.7m/s) AoV Peak P.1 mmHg (<20mmHg) AoV Mean P.1 mmHg (1.7-11.5mmHg) LVOT Max Jose Enrique:            1.36 m/s  (<=1.1m/s) AoV VTI:                 77.26 cm  (18-25cm) LVOT VTI:                28.26 cm LVOT Diameter:           1.86 cm   (1.8-2.4cm) AoV Area, VTI:           0.99 cm2  (2.5-5.5cm2) AoV Area,Vmax:           0.99 cm2  (2.5-4.5cm2) AoV Dimensionless Index: 0.37  RIGHT VENTRICLE: RV Basal 5.01 cm RV Mid   3.50 cm RV Major 9.0 cm RV s'    0.12 m/s TRICUSPID VALVE/RVSP:                             Normal Ranges: Peak TR Velocity: 2.38 m/s RV Syst Pressure: 25.6 mmHg (< 30mmHg) IVC Diam:         1.25 cm AORTA: Asc Ao Diam 3.03 cm  [id number] Agustin Craig MD Electronically signed on 2024 at 9:42:06 AM  ** Final **     Scheduled medications  anastrozole, 0.5 mg, oral, Weekly  enoxaparin, 40 mg, subcutaneous, q24h  FLUoxetine, 20 mg, oral, Daily  lidocaine, 10 mL, subcutaneous, Once  lidocaine, 5 mL, infiltration, Once  mupirocin, , Topical, BID  tamoxifen, 20 mg, oral, Once per day on   [START ON 2024] testosterone cypionate, 75 mg, intramuscular, q7 days  traZODone, 50 mg, oral, Nightly  vancomycin 1.5 g in 300 mL, 1,500 mg, intravenous, q12h      Continuous medications  oxygen, , Last Rate: 3 L/min (24  1313)  sodium chloride 0.9%, 100 mL/hr, Last Rate: Stopped (05/25/24 1246)      PRN medications  PRN medications: alteplase, benzocaine, benzocaine-menthol, diphenhydrAMINE, fentaNYL PF, HYDROmorphone, ketorolac, midazolam, ondansetron **OR** ondansetron, oxygen, vancomycin    Assessment/Plan   Principal Problem:    Cellulitis of finger of left hand  Cellulitis of finger of left hand              IV antibiotics  per ID              S/P I&D              Follow up with orthopedic surgery                 Wound care nurse - [facility]               Toradol for pain as needed   Active Problems:    Moderate episode of recurrent major depressive disorder (Multi)              Continue Prozac     Low testosterone              Continue testosterone, Arimidex and Tamoxifen     DVT prophylaxis               Lovenox     Discharge plan:  Anticipate discharging patient [facility] today with IV ATB per ID     I spent 35 minutes in the professional and overall care of this patient.    Ayah Prater, APRN-CNP

## 2024-05-25 NOTE — NURSING NOTE
Assumed patient care. BSSR received from previous Nurse. Seen patient lying on bed awake, no distress or discomfort noted. Wife was in the room. Safety measures ensured and call light in reach.   Plan of care ongoing.    2245H:  Dressing done on left index finger as ordered.  Patient well tolerated.

## 2024-05-25 NOTE — PROGRESS NOTES
Patient with an active discharge. Patient can leave today after second Vancomycin dose. Patient will start with [facility] for IV infusions tomorrow, 5/26/2024. Patient with a safe discharge plan. Will follow as needed.      05/25/24 0828   Discharge Planning   Home or Post Acute Services In home services   Type of Home Care Services Home nursing visits   Patient expects to be discharged to: Home with [facility] IV infusions

## 2024-05-25 NOTE — PROGRESS NOTES
"Cristhian Thornton is a 53 y.o. male on day 4 of admission presenting with Cellulitis of finger of left hand.    Subjective   Pain and symptoms overall doing better.  Awaiting discharge home today.  On antibiotics.       Objective     Physical Exam  Left hand: Dressings removed.  Packing removed.  Raw skin over the distal aspect of the finger.  Localized erythema.  No significant purulent fluid.  Decreased finger flexion and extension at the DIP.  Good cap refill.  Last Recorded Vitals  Blood pressure 144/77, pulse 59, temperature 36.6 °C (97.9 °F), temperature source Oral, resp. rate 18, height 1.778 m (5' 10\"), weight 91.2 kg (201 lb), SpO2 94%.  Intake/Output last 3 Shifts:  I/O last 3 completed shifts:  In: 3490 (38.3 mL/kg) [P.O.:1190; I.V.:2000 (21.9 mL/kg); IV Piggyback:300]  Out: - (0 mL/kg)   Weight: 91.2 kg     Relevant Results      Scheduled medications  anastrozole, 0.5 mg, oral, Weekly  enoxaparin, 40 mg, subcutaneous, q24h  FLUoxetine, 20 mg, oral, Daily  lidocaine, 10 mL, subcutaneous, Once  lidocaine, 5 mL, infiltration, Once  mupirocin, , Topical, BID  tamoxifen, 20 mg, oral, Once per day on Monday Thursday  [START ON 5/26/2024] testosterone cypionate, 75 mg, intramuscular, q7 days  traZODone, 50 mg, oral, Nightly  vancomycin 1.5 g in 300 mL, 1,500 mg, intravenous, q12h      Continuous medications  oxygen, , Last Rate: 3 L/min (05/23/24 1313)  sodium chloride 0.9%, 100 mL/hr, Last Rate: 100 mL/hr (05/25/24 1040)      PRN medications  PRN medications: alteplase, benzocaine, benzocaine-menthol, diphenhydrAMINE, fentaNYL PF, HYDROmorphone, ketorolac, midazolam, ondansetron **OR** ondansetron, oxygen, vancomycin  Results for orders placed or performed during the hospital encounter of 05/21/24 (from the past 24 hour(s))   CBC and Auto Differential   Result Value Ref Range    WBC 5.9 4.4 - 11.3 x10*3/uL    nRBC 0.0 0.0 - 0.0 /100 WBCs    RBC 4.72 4.50 - 5.90 x10*6/uL    Hemoglobin 14.5 13.5 - 17.5 g/dL    " Hematocrit 42.3 41.0 - 52.0 %    MCV 90 80 - 100 fL    MCH 30.7 26.0 - 34.0 pg    MCHC 34.3 32.0 - 36.0 g/dL    RDW 11.9 11.5 - 14.5 %    Platelets 190 150 - 450 x10*3/uL    Neutrophils % 55.0 40.0 - 80.0 %    Immature Granulocytes %, Automated 0.2 0.0 - 0.9 %    Lymphocytes % 33.7 13.0 - 44.0 %    Monocytes % 7.8 2.0 - 10.0 %    Eosinophils % 2.4 0.0 - 6.0 %    Basophils % 0.9 0.0 - 2.0 %    Neutrophils Absolute 3.24 1.20 - 7.70 x10*3/uL    Immature Granulocytes Absolute, Automated 0.01 0.00 - 0.70 x10*3/uL    Lymphocytes Absolute 1.98 1.20 - 4.80 x10*3/uL    Monocytes Absolute 0.46 0.10 - 1.00 x10*3/uL    Eosinophils Absolute 0.14 0.00 - 0.70 x10*3/uL    Basophils Absolute 0.05 0.00 - 0.10 x10*3/uL   Comprehensive metabolic panel   Result Value Ref Range    Glucose 89 65 - 99 mg/dL    Sodium 141 133 - 145 mmol/L    Potassium 3.9 3.4 - 5.1 mmol/L    Chloride 107 97 - 107 mmol/L    Bicarbonate 24 24 - 31 mmol/L    Urea Nitrogen 12 8 - 25 mg/dL    Creatinine 0.90 0.40 - 1.60 mg/dL    eGFR >90 >60 mL/min/1.73m*2    Calcium 8.5 8.5 - 10.4 mg/dL    Albumin 3.4 (L) 3.5 - 5.0 g/dL    Alkaline Phosphatase 66 35 - 125 U/L    Total Protein 6.3 5.9 - 7.9 g/dL    AST 17 5 - 40 U/L    Bilirubin, Total 0.4 0.1 - 1.2 mg/dL    ALT 14 5 - 40 U/L    Anion Gap 10 <=19 mmol/L                 Assessment/Plan   Principal Problem:    Cellulitis of finger of left hand    Status post I&D of left index finger abscess with cellulitis.  Plan to continue antibiotics per infectious disease.  Appreciate their help. Continue with twice daily soaks and packing changes at home.  Okay for discharge home.  Follow-up with Dr. Uribe as scheduled this Tuesday.             Ronnie Grijalva MD

## 2024-05-25 NOTE — NURSING NOTE
1350 Patient was given enough supplies for 5 days worth of dressing changes per ortho's request.

## 2024-05-25 NOTE — CARE PLAN
The patient's goals for the shift include rest    The clinical goals for the shift include Pain control.  Adequate sleep    Over the shift, the patient did not make progress toward the following goals. Barriers to progression include . Recommendations to address these barriers include .      Problem: Pain - Adult  Goal: Verbalizes/displays adequate comfort level or baseline comfort level  5/25/2024 0558 by Yoel Mckenna RN  Outcome: Progressing  5/25/2024 0210 by Yoel Mckenna RN  Flowsheets (Taken 5/23/2024 0133)  Verbalizes/displays adequate comfort level or baseline comfort level:   Encourage patient to monitor pain and request assistance   Administer analgesics based on type and severity of pain and evaluate response   Consider cultural and social influences on pain and pain management   Assess pain using appropriate pain scale   Implement non-pharmacological measures as appropriate and evaluate response   Notify Licensed Independent Practitioner if interventions unsuccessful or patient reports new pain     Problem: Safety - Adult  Goal: Free from fall injury  5/25/2024 0558 by Yoel Mckenna RN  Outcome: Progressing  5/25/2024 0210 by Yoel Mckenna RN  Flowsheets (Taken 5/23/2024 0133)  Free from fall injury:   Instruct family/caregiver on patient safety   Based on caregiver fall risk screen, instruct family/caregiver to ask for assistance with transferring infant if caregiver noted to have fall risk factors     Problem: Discharge Planning  Goal: Discharge to home or other facility with appropriate resources  5/25/2024 0558 by Yoel Mckenna RN  Outcome: Progressing  5/25/2024 0210 by Yoel Mckenna RN  Flowsheets (Taken 5/23/2024 0133)  Discharge to home or other facility with appropriate resources:   Identify barriers to discharge with patient and caregiver   Identify discharge learning needs (meds, wound care, etc)   Refer to discharge planning if patient needs post-hospital services based on physician order or complex  needs related to functional status, cognitive ability or social support system   Arrange for needed discharge resources and transportation as appropriate   Arrange for interpreters to assist at discharge as needed     Problem: Chronic Conditions and Co-morbidities  Goal: Patient's chronic conditions and co-morbidity symptoms are monitored and maintained or improved  5/25/2024 0558 by Yoel Mckenna RN  Outcome: Progressing  5/25/2024 0210 by Yoel Mckenna RN  Flowsheets (Taken 5/23/2024 0133)  Care Plan - Patient's Chronic Conditions and Co-Morbidity Symptoms are Monitored and Maintained or Improved:   Monitor and assess patient's chronic conditions and comorbid symptoms for stability, deterioration, or improvement   Collaborate with multidisciplinary team to address chronic and comorbid conditions and prevent exacerbation or deterioration   Update acute care plan with appropriate goals if chronic or comorbid symptoms are exacerbated and prevent overall improvement and discharge     Problem: Pain  Goal: My pain/discomfort is manageable  Outcome: Progressing     Problem: Safety  Goal: I will remain free of falls  Outcome: Progressing     Problem: Daily Care  Goal: Daily care needs are met  Outcome: Met     Problem: Psychosocial Needs  Goal: Demonstrates ability to cope with hospitalization/illness  Outcome: Progressing  Goal: Collaborate with me, my family, and caregiver to identify my specific goals  Outcome: Progressing     Problem: Discharge Barriers  Goal: My discharge needs are met  Outcome: Progressing     Problem: Fall/Injury  Goal: Not fall by end of shift  Outcome: Met  Goal: Be free from injury by end of the shift  Outcome: Met  Goal: Verbalize understanding of personal risk factors for fall in the hospital  Outcome: Met  Goal: Verbalize understanding of risk factor reduction measures to prevent injury from fall in the home  Outcome: Met  Goal: Use assistive devices by end of the shift  Outcome: Met  Goal: Pace  activities to prevent fatigue by end of the shift  Outcome: Met

## 2024-05-26 ENCOUNTER — HOME CARE VISIT (OUTPATIENT)
Dept: HOME HEALTH SERVICES | Facility: HOME HEALTH | Age: 54
End: 2024-05-26
Payer: COMMERCIAL

## 2024-05-26 VITALS
SYSTOLIC BLOOD PRESSURE: 128 MMHG | OXYGEN SATURATION: 98 % | DIASTOLIC BLOOD PRESSURE: 88 MMHG | WEIGHT: 194 LBS | HEART RATE: 80 BPM | TEMPERATURE: 98 F | RESPIRATION RATE: 16 BRPM | BODY MASS INDEX: 27.77 KG/M2 | HEIGHT: 70 IN

## 2024-05-26 PROCEDURE — 0023 HH SOC

## 2024-05-26 PROCEDURE — G0299 HHS/HOSPICE OF RN EA 15 MIN: HCPCS

## 2024-05-26 RX ADMIN — VANCOMYCIN 1.5 G: 1.5 INJECTION, SOLUTION INTRAVENOUS at 10:30

## 2024-05-26 RX ADMIN — SODIUM CHLORIDE 10 ML: 9 INJECTION, SOLUTION INTRAVENOUS at 11:30

## 2024-05-26 RX ADMIN — SODIUM CHLORIDE 10 ML: 9 INJECTION, SOLUTION INTRAVENOUS at 10:30

## 2024-05-27 ENCOUNTER — DOCUMENTATION (OUTPATIENT)
Dept: PRIMARY CARE | Facility: CLINIC | Age: 54
End: 2024-05-27
Payer: COMMERCIAL

## 2024-05-27 LAB
BACTERIA BLD CULT: NORMAL
BACTERIA BLD CULT: NORMAL

## 2024-05-28 ENCOUNTER — PATIENT OUTREACH (OUTPATIENT)
Dept: PRIMARY CARE | Facility: CLINIC | Age: 54
End: 2024-05-28
Payer: COMMERCIAL

## 2024-05-28 ASSESSMENT — ENCOUNTER SYMPTOMS
PERSON REPORTING PAIN: PATIENT
APPETITE LEVEL: GOOD
PAIN LOCATION: LEFT HAND
MUSCLE WEAKNESS: 1
CHANGE IN APPETITE: UNCHANGED
PAIN: 1
SKIN LESIONS: 1
PAIN LOCATION - PAIN SEVERITY: 5/10

## 2024-05-28 ASSESSMENT — PAIN SCALES - GENERAL: PAINLEVEL_OUTOF10: 0 - NO PAIN

## 2024-05-28 ASSESSMENT — ACTIVITIES OF DAILY LIVING (ADL)
OASIS_M1830: 05
ENTERING_EXITING_HOME: SUPERVISION

## 2024-05-31 ENCOUNTER — HOME INFUSION (OUTPATIENT)
Dept: INFUSION THERAPY | Age: 54
End: 2024-05-31

## 2024-05-31 ENCOUNTER — LAB REQUISITION (OUTPATIENT)
Dept: LAB | Facility: LAB | Age: 54
End: 2024-05-31
Payer: COMMERCIAL

## 2024-05-31 ENCOUNTER — HOME CARE VISIT (OUTPATIENT)
Dept: HOME HEALTH SERVICES | Facility: HOME HEALTH | Age: 54
End: 2024-05-31
Payer: COMMERCIAL

## 2024-05-31 VITALS
OXYGEN SATURATION: 99 % | HEART RATE: 74 BPM | DIASTOLIC BLOOD PRESSURE: 82 MMHG | RESPIRATION RATE: 16 BRPM | TEMPERATURE: 98.2 F | SYSTOLIC BLOOD PRESSURE: 130 MMHG

## 2024-05-31 DIAGNOSIS — L03.012 CELLULITIS OF LEFT FINGER: ICD-10-CM

## 2024-05-31 DIAGNOSIS — F33.1 MAJOR DEPRESSIVE DISORDER, RECURRENT, MODERATE (MULTI): ICD-10-CM

## 2024-05-31 DIAGNOSIS — F41.1 GENERALIZED ANXIETY DISORDER: ICD-10-CM

## 2024-05-31 LAB
BASOPHILS # BLD AUTO: 0.09 X10*3/UL (ref 0–0.1)
BASOPHILS NFR BLD AUTO: 1 %
CREAT SERPL-MCNC: 0.9 MG/DL (ref 0.4–1.6)
CRP SERPL-MCNC: 0.3 MG/DL (ref 0–2)
EGFRCR SERPLBLD CKD-EPI 2021: >90 ML/MIN/1.73M*2
EOSINOPHIL # BLD AUTO: 0.33 X10*3/UL (ref 0–0.7)
EOSINOPHIL NFR BLD AUTO: 3.8 %
ERYTHROCYTE [DISTWIDTH] IN BLOOD BY AUTOMATED COUNT: 12.2 % (ref 11.5–14.5)
ERYTHROCYTE [SEDIMENTATION RATE] IN BLOOD BY WESTERGREN METHOD: 4 MM/H (ref 0–20)
HCT VFR BLD AUTO: 50.5 % (ref 41–52)
HGB BLD-MCNC: 17 G/DL (ref 13.5–17.5)
IMM GRANULOCYTES # BLD AUTO: 0.02 X10*3/UL (ref 0–0.7)
IMM GRANULOCYTES NFR BLD AUTO: 0.2 % (ref 0–0.9)
LYMPHOCYTES # BLD AUTO: 2.01 X10*3/UL (ref 1.2–4.8)
LYMPHOCYTES NFR BLD AUTO: 23.2 %
MCH RBC QN AUTO: 30.5 PG (ref 26–34)
MCHC RBC AUTO-ENTMCNC: 33.7 G/DL (ref 32–36)
MCV RBC AUTO: 91 FL (ref 80–100)
MONOCYTES # BLD AUTO: 0.7 X10*3/UL (ref 0.1–1)
MONOCYTES NFR BLD AUTO: 8.1 %
NEUTROPHILS # BLD AUTO: 5.52 X10*3/UL (ref 1.2–7.7)
NEUTROPHILS NFR BLD AUTO: 63.7 %
NRBC BLD-RTO: 0 /100 WBCS (ref 0–0)
PLATELET # BLD AUTO: 246 X10*3/UL (ref 150–450)
RBC # BLD AUTO: 5.57 X10*6/UL (ref 4.5–5.9)
VANCOMYCIN TROUGH SERPL-MCNC: 9.7 UG/ML (ref 10–20)
WBC # BLD AUTO: 8.7 X10*3/UL (ref 4.4–11.3)

## 2024-05-31 PROCEDURE — 82565 ASSAY OF CREATININE: CPT

## 2024-05-31 PROCEDURE — G0299 HHS/HOSPICE OF RN EA 15 MIN: HCPCS

## 2024-05-31 PROCEDURE — 86140 C-REACTIVE PROTEIN: CPT

## 2024-05-31 PROCEDURE — 85025 COMPLETE CBC W/AUTO DIFF WBC: CPT

## 2024-05-31 PROCEDURE — 80202 ASSAY OF VANCOMYCIN: CPT

## 2024-05-31 PROCEDURE — 85652 RBC SED RATE AUTOMATED: CPT

## 2024-05-31 RX ADMIN — SODIUM CHLORIDE 10 ML: 9 INJECTION, SOLUTION INTRAVENOUS at 09:00

## 2024-05-31 NOTE — PROGRESS NOTES
Chart review - Cristhian Thornton is a 53 y.o. patient on home care for staph infection.  Med - vancomycin 1.5 gm q12 thru 7/2. Dr. Lopez following    Labs from 5/31 reviewed- vanco trough 9.7, will not adjust at this fill, monitor at next fill    [facility] call to pt, tolerating infusions well, confirmed doses in home thru 5/13. Agreeable to delivery of another week of medication and supplies.     Pharmacy to mix 5/31 and deliver straight with supplies to match:  14x vanco 1.5 gm HP  DOS: 6/1 - 6/7    Follow up 6/7 - check labs, check progress, deliver straight

## 2024-06-03 ENCOUNTER — PATIENT MESSAGE (OUTPATIENT)
Dept: PRIMARY CARE | Facility: CLINIC | Age: 54
End: 2024-06-03
Payer: COMMERCIAL

## 2024-06-03 DIAGNOSIS — R11.0 NAUSEA: Primary | ICD-10-CM

## 2024-06-03 RX ORDER — ONDANSETRON 4 MG/1
4 TABLET, FILM COATED ORAL EVERY 8 HOURS PRN
Qty: 21 TABLET | Refills: 0 | Status: SHIPPED | OUTPATIENT
Start: 2024-06-03 | End: 2024-06-10

## 2024-06-04 ENCOUNTER — HOME CARE VISIT (OUTPATIENT)
Dept: HOME HEALTH SERVICES | Facility: HOME HEALTH | Age: 54
End: 2024-06-04
Payer: COMMERCIAL

## 2024-06-04 ENCOUNTER — LAB REQUISITION (OUTPATIENT)
Dept: LAB | Facility: LAB | Age: 54
End: 2024-06-04
Payer: COMMERCIAL

## 2024-06-04 ENCOUNTER — HOME INFUSION (OUTPATIENT)
Dept: INFUSION THERAPY | Age: 54
End: 2024-06-04

## 2024-06-04 DIAGNOSIS — T82.898A OCCLUSION OF PERIPHERALLY INSERTED CENTRAL CATHETER (PICC) LINE, INITIAL ENCOUNTER (CMS-HCC): Primary | ICD-10-CM

## 2024-06-04 DIAGNOSIS — F33.1 MAJOR DEPRESSIVE DISORDER, RECURRENT, MODERATE (MULTI): ICD-10-CM

## 2024-06-04 DIAGNOSIS — L03.012 CELLULITIS OF LEFT FINGER: ICD-10-CM

## 2024-06-04 DIAGNOSIS — F41.1 GENERALIZED ANXIETY DISORDER: ICD-10-CM

## 2024-06-04 LAB
BASOPHILS # BLD AUTO: 0.07 X10*3/UL (ref 0–0.1)
BASOPHILS NFR BLD AUTO: 1.1 %
CREAT SERPL-MCNC: 0.9 MG/DL (ref 0.4–1.6)
CRP SERPL-MCNC: <0.3 MG/DL (ref 0–2)
EGFRCR SERPLBLD CKD-EPI 2021: >90 ML/MIN/1.73M*2
EOSINOPHIL # BLD AUTO: 0.31 X10*3/UL (ref 0–0.7)
EOSINOPHIL NFR BLD AUTO: 4.9 %
ERYTHROCYTE [DISTWIDTH] IN BLOOD BY AUTOMATED COUNT: 12.2 % (ref 11.5–14.5)
ERYTHROCYTE [SEDIMENTATION RATE] IN BLOOD BY WESTERGREN METHOD: 6 MM/H (ref 0–20)
HCT VFR BLD AUTO: 48.3 % (ref 41–52)
HGB BLD-MCNC: 16.2 G/DL (ref 13.5–17.5)
IMM GRANULOCYTES # BLD AUTO: 0.02 X10*3/UL (ref 0–0.7)
IMM GRANULOCYTES NFR BLD AUTO: 0.3 % (ref 0–0.9)
LYMPHOCYTES # BLD AUTO: 1.87 X10*3/UL (ref 1.2–4.8)
LYMPHOCYTES NFR BLD AUTO: 29.3 %
MCH RBC QN AUTO: 29.9 PG (ref 26–34)
MCHC RBC AUTO-ENTMCNC: 33.5 G/DL (ref 32–36)
MCV RBC AUTO: 89 FL (ref 80–100)
MONOCYTES # BLD AUTO: 0.4 X10*3/UL (ref 0.1–1)
MONOCYTES NFR BLD AUTO: 6.3 %
NEUTROPHILS # BLD AUTO: 3.71 X10*3/UL (ref 1.2–7.7)
NEUTROPHILS NFR BLD AUTO: 58.1 %
NRBC BLD-RTO: 0 /100 WBCS (ref 0–0)
PLATELET # BLD AUTO: 205 X10*3/UL (ref 150–450)
RBC # BLD AUTO: 5.41 X10*6/UL (ref 4.5–5.9)
VANCOMYCIN TROUGH SERPL-MCNC: 9.6 UG/ML (ref 10–20)
WBC # BLD AUTO: 6.4 X10*3/UL (ref 4.4–11.3)

## 2024-06-04 PROCEDURE — 85652 RBC SED RATE AUTOMATED: CPT

## 2024-06-04 PROCEDURE — 86140 C-REACTIVE PROTEIN: CPT

## 2024-06-04 PROCEDURE — G0299 HHS/HOSPICE OF RN EA 15 MIN: HCPCS

## 2024-06-04 PROCEDURE — 82565 ASSAY OF CREATININE: CPT

## 2024-06-04 PROCEDURE — 85025 COMPLETE CBC W/AUTO DIFF WBC: CPT

## 2024-06-04 PROCEDURE — 80202 ASSAY OF VANCOMYCIN: CPT

## 2024-06-04 RX ADMIN — SODIUM CHLORIDE 10 ML: 9 INJECTION, SOLUTION INTRAVENOUS at 08:50

## 2024-06-04 NOTE — PROGRESS NOTES
Tel call from Wyatt ROMANO. Patient with no blood return from picc line and getting sluggish. Cathflo requested.    Order entered into [employer] and sent to [facility] for insurance check. Approval to send received from intake.    Processed fill for 1 x cathflo and 1 x swi PFS for straight delivery 6/4/24 for dose on 6/5/24.    Chat  message sent to Fawn Schneider letting her know delivery today by 7p.

## 2024-06-05 ENCOUNTER — HOME CARE VISIT (OUTPATIENT)
Dept: HOME HEALTH SERVICES | Facility: HOME HEALTH | Age: 54
End: 2024-06-05
Payer: COMMERCIAL

## 2024-06-05 ENCOUNTER — TELEPHONE (OUTPATIENT)
Dept: INPATIENT UNIT | Facility: HOSPITAL | Age: 54
End: 2024-06-05
Payer: COMMERCIAL

## 2024-06-05 PROCEDURE — G0299 HHS/HOSPICE OF RN EA 15 MIN: HCPCS

## 2024-06-05 RX ADMIN — SODIUM CHLORIDE 10 ML: 9 INJECTION, SOLUTION INTRAVENOUS at 10:00

## 2024-06-05 RX ADMIN — SODIUM CHLORIDE 20 ML: 9 INJECTION, SOLUTION INTRAVENOUS at 12:00

## 2024-06-06 ENCOUNTER — HOME INFUSION (OUTPATIENT)
Dept: INFUSION THERAPY | Age: 54
End: 2024-06-06
Payer: COMMERCIAL

## 2024-06-06 NOTE — PROGRESS NOTES
Chart review - Cristhian Thornton is a 53 y.o. patient on home care for L index finger abscess/cellulitis, staph infection.  Med - vancomycin 1.5 gm q12 thru 7/2. Dr. Lopez following     Labs from 6/4 reviewed, vanco trough 9.6, creatinine stable at 0.9. Secure chat to Dr. Lopez, no adjustments made at this time. Continue to monitor.     [facility] call to pt, tolerating infusions well, confirmed doses in home thru 6/7. Agreeable to delivery of another week of medication and supplies.      Pharmacy to mix 6/7 and deliver straight with supplies to match:  14x vanco 1.5 gm HP  DOS: 6/8 - 6/14     Follow up 6/13 - check labs, check progress, deliver OVN

## 2024-06-07 ENCOUNTER — TREATMENT (OUTPATIENT)
Dept: OCCUPATIONAL THERAPY | Facility: CLINIC | Age: 54
End: 2024-06-07
Payer: COMMERCIAL

## 2024-06-07 DIAGNOSIS — M20.012 MALLET FINGER OF LEFT FINGER(S): ICD-10-CM

## 2024-06-07 DIAGNOSIS — M20.012 MALLET DEFORMITY OF LEFT INDEX FINGER: Primary | ICD-10-CM

## 2024-06-07 DIAGNOSIS — L03.114 CELLULITIS OF HAND, LEFT: ICD-10-CM

## 2024-06-07 PROCEDURE — 97165 OT EVAL LOW COMPLEX 30 MIN: CPT | Mod: GO | Performed by: OCCUPATIONAL THERAPIST

## 2024-06-07 PROCEDURE — L3933 FO W/O JOINTS CF: HCPCS | Performed by: OCCUPATIONAL THERAPIST

## 2024-06-07 ASSESSMENT — PAIN - FUNCTIONAL ASSESSMENT: PAIN_FUNCTIONAL_ASSESSMENT: 0-10

## 2024-06-07 ASSESSMENT — PAIN SCALES - GENERAL: PAINLEVEL_OUTOF10: 4

## 2024-06-07 ASSESSMENT — PAIN DESCRIPTION - DESCRIPTORS: DESCRIPTORS: SORE

## 2024-06-07 NOTE — PROGRESS NOTES
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    Relevant Orders    Follow Up In Occupational Therapy     Other Visit Diagnoses         Codes    Mallet finger of left finger(s)     M20.012          Insurance Type: Payor: XO1 / Plan: XO1 HEALTH PLAN / Product Type: *No Product type* /    Assessment:     OT Assessment Results: Decreased ADL status, Decreased upper extremity range of motion, Decreased upper extremity strength, Decreased fine motor control  Strengths: Ability to acquire knowledge, Coping skills, Insight into problems  Plan:  Treatment Interventions: UE strengthening/ROM, Neuromuscular reeducation, UE splinting  Treatment Interventions: UE strengthening/ROM, Neuromuscular reeducation, UE splinting  OT Plan: Recheck splint PRN, recheck in 2 weeks  Frequency: 1 x week  Duration: 6 weeks  Onset Date: 05/19/24  Certification Period Start Date: 06/07/24  Certification Period End Date: 09/05/24  Number of Treatments Authorized: MN  Rehab Potential: Good  Plan of Care Agreement: Patient    Subjective   Current Problem:  1. Mallet deformity of left index finger  Follow Up In Occupational Therapy      2. Cellulitis of hand, left  Follow Up In Occupational Therapy      3. Mallet finger of left finger(s)  Referral to Occupational Therapy        General:      General  Reason for Referral: ADL impairment Left hand  Referred By: Dr. Carbajal  Past Medical History Relevant to Rehab: pt works in  construction (pmhx of other hand injuries), left index finger cellulitis, absess, pain I and D; antibiotics, relates other infections.  Preferred Learning Style: verbal, visual, written  Precautions:  UE Weight Bearing Status: Left Non-Weight Bearing, Other (Comment)  Splinting: maintain DIP in extension RIF  Precautions Comment: open wound, healing, 2mm  Pain:  Pain Assessment  Pain Assessment: 0-10  Pain Score: 4  Pain Location: Hand  Pain Orientation: Left  Pain Radiating Towards: volar tip at incision  Pain Descriptors: Sore  Pain Frequency: Intermittent  Patient's Stated Pain Goal: No pain    Objective   Cognition:  Overall Cognitive Status: Within Functional Limits           Home Living:  Type of Home: House  Prior Function:  Level of [place]: Independent with ADLs and functional transfers, Independent with homemaking with ambulation  Hand Dominance: Right  Prior Function Comments: left handed with some sports  IADL History:  Occupation: Full time employment  Type of Occupation: construction/contractor  ADL:  ADL Comments: UEFI completed see for details  Modalities:  NA     Splinting:  Location: FO  Type: Mallet extension  Splinting: Custom Fabrication  Splinting Education: Fitting, [place], [place], Wear schedule, Precautions  Splinting Comments: fabricated mallet DIP L index extension with coban, padding, wound care  Therapy/Activity:    Pt instructed with PIP flexion and light use with left hand gripping, avoid forces to tip of left index, use of splint full time; wound care per Dr. Carbajal.             Sensation:  Light Touch: Partial deficits in the LUE  Strength: NA     Extremities:   and LUE   LUE: Within Functional Limits  except Left index finger, -10 DIP extension; passive correction to -5/0 with dorsal edema.      Outcome Measures: OT Adult Other Outcome Measures  Other Outcome Measures: UEFI score 75/80    OP EDUCATION:  Education  Individual(s) Educated: Patient  Education Provided:  Diagnosis & Precautions, Symptom management, Orthotics wearing schedule and precautions, POC discussed and agreed upon, Monitor for infection, Wound care  Home Program: AROM, Activity modification, Orthotic wearing schedule, care and precautions, Wound/scar care, Handout issued  Diagnosis and Precautions: pain and weakness left hand with index flexion; precautions with healing no DIP flexion  Risk and Benefits Discussed with Patient/Caregiver/Other: yes  Patient/Caregiver Demonstrated Understanding: yes  Plan of Care Discussed and Agreed Upon: yes  Patient Response to Education: Patient/Caregiver Verbalized Understanding of Information, Patient/Caregiver Performed Return Demonstration of Exercises/Activities, Patient/Caregiver Asked Appropriate Questions    Goals:  Active       OT Problem       Patient will complete indep don doff of left index protective mallet splint and verbalize good wound care and precautions. (Progressing)       Start:  06/07/24    Expected End:  09/05/24            PATIENT WILL improve PIP flexion to 95 degrees with splint in place and demo light use spontaneously with left index finger.       Start:  06/07/24    Expected End:  09/05/24            PATIENT WILL SHOW A SIGNIFICANT CHANGE IN UEFI 80/80 PATIENT REPORTED OUTCOME TOOL TO DEMONSTRATE SUBJECTIVE IMPROVEMENT       Start:  06/07/24    Expected End:  09/05/24            PATIENT WILL DEMONSTRATE INDEPENDENCE IN HOME PROGRAM FOR SUPPORT OF PROGRESSION (Progressing)       Start:  06/07/24    Expected End:  09/05/24            PATIENT WILL REPORT PAIN OF 0-2/10 DEMONSTRATING A REDUCTION OF OVERALL PAIN       Start:  06/07/24    Expected End:  09/05/24

## 2024-06-11 ASSESSMENT — ENCOUNTER SYMPTOMS
PAIN LOCATION - PAIN SEVERITY: 4/10
APPETITE LEVEL: GOOD
PERSON REPORTING PAIN: PATIENT
PAIN LOCATION: LEFT HAND
SKIN LESIONS: 1
PAIN: 1
MUSCLE WEAKNESS: 1
CHANGE IN APPETITE: UNCHANGED
PAIN LOCATION - PAIN SEVERITY: 4/10
PAIN: 1
SKIN LESIONS: 1
APPETITE LEVEL: GOOD
PAIN LOCATION: LEFT HAND
CHANGE IN APPETITE: UNCHANGED
PERSON REPORTING PAIN: PATIENT

## 2024-06-12 ENCOUNTER — HOME CARE VISIT (OUTPATIENT)
Dept: HOME HEALTH SERVICES | Facility: HOME HEALTH | Age: 54
End: 2024-06-12
Payer: COMMERCIAL

## 2024-06-12 ENCOUNTER — LAB REQUISITION (OUTPATIENT)
Dept: LAB | Facility: LAB | Age: 54
End: 2024-06-12
Payer: COMMERCIAL

## 2024-06-12 VITALS
DIASTOLIC BLOOD PRESSURE: 76 MMHG | SYSTOLIC BLOOD PRESSURE: 124 MMHG | OXYGEN SATURATION: 99 % | HEART RATE: 68 BPM | RESPIRATION RATE: 16 BRPM | TEMPERATURE: 97 F

## 2024-06-12 DIAGNOSIS — L03.012 CELLULITIS OF LEFT FINGER: ICD-10-CM

## 2024-06-12 DIAGNOSIS — F33.1 MAJOR DEPRESSIVE DISORDER, RECURRENT, MODERATE (MULTI): ICD-10-CM

## 2024-06-12 DIAGNOSIS — F41.1 GENERALIZED ANXIETY DISORDER: ICD-10-CM

## 2024-06-12 LAB
BASOPHILS # BLD AUTO: 0.08 X10*3/UL (ref 0–0.1)
BASOPHILS NFR BLD AUTO: 2 %
CREAT SERPL-MCNC: 1 MG/DL (ref 0.4–1.6)
EGFRCR SERPLBLD CKD-EPI 2021: >90 ML/MIN/1.73M*2
EOSINOPHIL # BLD AUTO: 0.19 X10*3/UL (ref 0–0.7)
EOSINOPHIL NFR BLD AUTO: 4.7 %
ERYTHROCYTE [DISTWIDTH] IN BLOOD BY AUTOMATED COUNT: 12.5 % (ref 11.5–14.5)
ERYTHROCYTE [SEDIMENTATION RATE] IN BLOOD BY WESTERGREN METHOD: 3 MM/H (ref 0–20)
HCT VFR BLD AUTO: 46.7 % (ref 41–52)
HGB BLD-MCNC: 16 G/DL (ref 13.5–17.5)
IMM GRANULOCYTES # BLD AUTO: 0.01 X10*3/UL (ref 0–0.7)
IMM GRANULOCYTES NFR BLD AUTO: 0.2 % (ref 0–0.9)
LYMPHOCYTES # BLD AUTO: 1.3 X10*3/UL (ref 1.2–4.8)
LYMPHOCYTES NFR BLD AUTO: 32.1 %
MCH RBC QN AUTO: 30.5 PG (ref 26–34)
MCHC RBC AUTO-ENTMCNC: 34.3 G/DL (ref 32–36)
MCV RBC AUTO: 89 FL (ref 80–100)
MONOCYTES # BLD AUTO: 0.32 X10*3/UL (ref 0.1–1)
MONOCYTES NFR BLD AUTO: 7.9 %
NEUTROPHILS # BLD AUTO: 2.15 X10*3/UL (ref 1.2–7.7)
NEUTROPHILS NFR BLD AUTO: 53.1 %
NRBC BLD-RTO: 0 /100 WBCS (ref 0–0)
PLATELET # BLD AUTO: 169 X10*3/UL (ref 150–450)
RBC # BLD AUTO: 5.24 X10*6/UL (ref 4.5–5.9)
VANCOMYCIN TROUGH SERPL-MCNC: 9.1 UG/ML (ref 10–20)
WBC # BLD AUTO: 4.1 X10*3/UL (ref 4.4–11.3)

## 2024-06-12 PROCEDURE — 82565 ASSAY OF CREATININE: CPT

## 2024-06-12 PROCEDURE — G0299 HHS/HOSPICE OF RN EA 15 MIN: HCPCS

## 2024-06-12 PROCEDURE — 85652 RBC SED RATE AUTOMATED: CPT

## 2024-06-12 PROCEDURE — 80202 ASSAY OF VANCOMYCIN: CPT

## 2024-06-12 PROCEDURE — 85025 COMPLETE CBC W/AUTO DIFF WBC: CPT

## 2024-06-12 ASSESSMENT — ENCOUNTER SYMPTOMS
APPETITE LEVEL: GOOD
PAIN: 1
CHANGE IN APPETITE: UNCHANGED
PAIN LOCATION: LEFT HAND
CHANGE IN APPETITE: UNCHANGED
PERSON REPORTING PAIN: PATIENT
APPETITE LEVEL: GOOD
PAIN LOCATION: LEFT HAND
SKIN LESIONS: 1
CHANGE IN APPETITE: UNCHANGED
PAIN: 1
PAIN LOCATION - PAIN SEVERITY: 3/10
APPETITE LEVEL: GOOD
MUSCLE WEAKNESS: 1
SKIN LESIONS: 1
MUSCLE WEAKNESS: 1
MUSCLE WEAKNESS: 1
SKIN LESIONS: 1
PERSON REPORTING PAIN: PATIENT
PAIN LOCATION - PAIN SEVERITY: 3/10
PAIN LOCATION: LEFT HAND
PAIN: 1
PERSON REPORTING PAIN: PATIENT

## 2024-06-13 ENCOUNTER — HOME INFUSION (OUTPATIENT)
Dept: INFUSION THERAPY | Age: 54
End: 2024-06-13
Payer: COMMERCIAL

## 2024-06-13 NOTE — PROGRESS NOTES
Chart Review - patient ordered Vanco 1.5G IV q12 [facility] 7/2 for Left index finger abscess/cellulitis    Review of labs from 6/12 ESR/creatinine wnl  Vanco trough 9.1  No changes to Vanco dose needed      Dispense x7 doses of vanco for cmpd 6/13 and delivery on 6/14  Infusions 6/15 through 6/21    NF 6/20 ovn lab check

## 2024-06-17 ENCOUNTER — LAB REQUISITION (OUTPATIENT)
Dept: LAB | Facility: LAB | Age: 54
End: 2024-06-17
Payer: COMMERCIAL

## 2024-06-17 ENCOUNTER — HOME CARE VISIT (OUTPATIENT)
Dept: HOME HEALTH SERVICES | Facility: HOME HEALTH | Age: 54
End: 2024-06-17
Payer: COMMERCIAL

## 2024-06-17 DIAGNOSIS — L03.012 CELLULITIS OF LEFT FINGER: ICD-10-CM

## 2024-06-17 DIAGNOSIS — F41.1 GENERALIZED ANXIETY DISORDER: ICD-10-CM

## 2024-06-17 DIAGNOSIS — F33.1 MAJOR DEPRESSIVE DISORDER, RECURRENT, MODERATE (MULTI): ICD-10-CM

## 2024-06-17 LAB
BASOPHILS # BLD AUTO: 0.06 X10*3/UL (ref 0–0.1)
BASOPHILS NFR BLD AUTO: 1.3 %
CREAT SERPL-MCNC: 1.1 MG/DL (ref 0.4–1.6)
CRP SERPL-MCNC: <0.3 MG/DL (ref 0–2)
EGFRCR SERPLBLD CKD-EPI 2021: 81 ML/MIN/1.73M*2
EOSINOPHIL # BLD AUTO: 0.18 X10*3/UL (ref 0–0.7)
EOSINOPHIL NFR BLD AUTO: 4 %
ERYTHROCYTE [DISTWIDTH] IN BLOOD BY AUTOMATED COUNT: 12.8 % (ref 11.5–14.5)
ERYTHROCYTE [SEDIMENTATION RATE] IN BLOOD BY WESTERGREN METHOD: 5 MM/H (ref 0–20)
HCT VFR BLD AUTO: 48.7 % (ref 41–52)
HGB BLD-MCNC: 16.5 G/DL (ref 13.5–17.5)
IMM GRANULOCYTES # BLD AUTO: 0.01 X10*3/UL (ref 0–0.7)
IMM GRANULOCYTES NFR BLD AUTO: 0.2 % (ref 0–0.9)
LYMPHOCYTES # BLD AUTO: 1.42 X10*3/UL (ref 1.2–4.8)
LYMPHOCYTES NFR BLD AUTO: 31.3 %
MCH RBC QN AUTO: 30.3 PG (ref 26–34)
MCHC RBC AUTO-ENTMCNC: 33.9 G/DL (ref 32–36)
MCV RBC AUTO: 89 FL (ref 80–100)
MONOCYTES # BLD AUTO: 0.54 X10*3/UL (ref 0.1–1)
MONOCYTES NFR BLD AUTO: 11.9 %
NEUTROPHILS # BLD AUTO: 2.33 X10*3/UL (ref 1.2–7.7)
NEUTROPHILS NFR BLD AUTO: 51.3 %
NRBC BLD-RTO: 0 /100 WBCS (ref 0–0)
PLATELET # BLD AUTO: 163 X10*3/UL (ref 150–450)
RBC # BLD AUTO: 5.45 X10*6/UL (ref 4.5–5.9)
VANCOMYCIN TROUGH SERPL-MCNC: 9.5 UG/ML (ref 10–20)
WBC # BLD AUTO: 4.5 X10*3/UL (ref 4.4–11.3)

## 2024-06-17 PROCEDURE — 85652 RBC SED RATE AUTOMATED: CPT

## 2024-06-17 PROCEDURE — 85025 COMPLETE CBC W/AUTO DIFF WBC: CPT

## 2024-06-17 PROCEDURE — 82565 ASSAY OF CREATININE: CPT

## 2024-06-17 PROCEDURE — 80202 ASSAY OF VANCOMYCIN: CPT

## 2024-06-17 PROCEDURE — 86140 C-REACTIVE PROTEIN: CPT

## 2024-06-17 PROCEDURE — G0299 HHS/HOSPICE OF RN EA 15 MIN: HCPCS

## 2024-06-17 RX ADMIN — SODIUM CHLORIDE 10 ML: 9 INJECTION, SOLUTION INTRAVENOUS at 08:45

## 2024-06-20 ENCOUNTER — HOME INFUSION (OUTPATIENT)
Dept: INFUSION THERAPY | Age: 54
End: 2024-06-20
Payer: COMMERCIAL

## 2024-06-20 NOTE — PROGRESS NOTES
Cristhian Thornton is receiving vanco for L hand abscess/cellulitis thru 7/2     Followed by Dr Lopez    Labs from 6/17 show vanc trough 9.5, all others unremarkable     RX contacted pt, confirmed doses in home thru 6/21. He is agreeable to delivery that day for another week of meds and supplies/flushes to match.    Mixing 6/20 and dispensing 6/21 with supplies to match   14x vanco HP  DOS 6/22-6/28    Follow up 6/27 check labs, RMDR OVN

## 2024-06-23 ASSESSMENT — ENCOUNTER SYMPTOMS
SKIN LESIONS: 1
CHANGE IN APPETITE: UNCHANGED
PAIN LOCATION - PAIN SEVERITY: 3/10
PERSON REPORTING PAIN: PATIENT
PAIN LOCATION: LEFT HAND
APPETITE LEVEL: GOOD
PAIN: 1

## 2024-06-25 ENCOUNTER — LAB REQUISITION (OUTPATIENT)
Dept: LAB | Facility: LAB | Age: 54
End: 2024-06-25
Payer: COMMERCIAL

## 2024-06-25 ENCOUNTER — HOME CARE VISIT (OUTPATIENT)
Dept: HOME HEALTH SERVICES | Facility: HOME HEALTH | Age: 54
End: 2024-06-25
Payer: COMMERCIAL

## 2024-06-25 DIAGNOSIS — L03.012 CELLULITIS OF LEFT FINGER: ICD-10-CM

## 2024-06-25 DIAGNOSIS — F41.1 GENERALIZED ANXIETY DISORDER: ICD-10-CM

## 2024-06-25 DIAGNOSIS — F33.1 MAJOR DEPRESSIVE DISORDER, RECURRENT, MODERATE (MULTI): ICD-10-CM

## 2024-06-25 LAB
BASOPHILS # BLD AUTO: 0.07 X10*3/UL (ref 0–0.1)
BASOPHILS NFR BLD AUTO: 1.3 %
CREAT SERPL-MCNC: 1 MG/DL (ref 0.4–1.6)
CRP SERPL-MCNC: <0.3 MG/DL (ref 0–2)
EGFRCR SERPLBLD CKD-EPI 2021: >90 ML/MIN/1.73M*2
EOSINOPHIL # BLD AUTO: 0.12 X10*3/UL (ref 0–0.7)
EOSINOPHIL NFR BLD AUTO: 2.2 %
ERYTHROCYTE [DISTWIDTH] IN BLOOD BY AUTOMATED COUNT: 12.4 % (ref 11.5–14.5)
ERYTHROCYTE [SEDIMENTATION RATE] IN BLOOD BY WESTERGREN METHOD: 1 MM/H (ref 0–20)
HCT VFR BLD AUTO: 47.9 % (ref 41–52)
HGB BLD-MCNC: 16.2 G/DL (ref 13.5–17.5)
IMM GRANULOCYTES # BLD AUTO: 0.01 X10*3/UL (ref 0–0.7)
IMM GRANULOCYTES NFR BLD AUTO: 0.2 % (ref 0–0.9)
LYMPHOCYTES # BLD AUTO: 1.39 X10*3/UL (ref 1.2–4.8)
LYMPHOCYTES NFR BLD AUTO: 25.2 %
MCH RBC QN AUTO: 30.3 PG (ref 26–34)
MCHC RBC AUTO-ENTMCNC: 33.8 G/DL (ref 32–36)
MCV RBC AUTO: 90 FL (ref 80–100)
MONOCYTES # BLD AUTO: 0.45 X10*3/UL (ref 0.1–1)
MONOCYTES NFR BLD AUTO: 8.2 %
NEUTROPHILS # BLD AUTO: 3.48 X10*3/UL (ref 1.2–7.7)
NEUTROPHILS NFR BLD AUTO: 62.9 %
NRBC BLD-RTO: 0 /100 WBCS (ref 0–0)
PLATELET # BLD AUTO: 172 X10*3/UL (ref 150–450)
RBC # BLD AUTO: 5.35 X10*6/UL (ref 4.5–5.9)
VANCOMYCIN TROUGH SERPL-MCNC: 10.1 UG/ML (ref 10–20)
WBC # BLD AUTO: 5.5 X10*3/UL (ref 4.4–11.3)

## 2024-06-25 PROCEDURE — 85025 COMPLETE CBC W/AUTO DIFF WBC: CPT

## 2024-06-25 PROCEDURE — 80202 ASSAY OF VANCOMYCIN: CPT

## 2024-06-25 PROCEDURE — 82565 ASSAY OF CREATININE: CPT

## 2024-06-25 PROCEDURE — G0299 HHS/HOSPICE OF RN EA 15 MIN: HCPCS

## 2024-06-25 PROCEDURE — 86140 C-REACTIVE PROTEIN: CPT

## 2024-06-25 PROCEDURE — 85652 RBC SED RATE AUTOMATED: CPT

## 2024-06-27 ENCOUNTER — HOME INFUSION (OUTPATIENT)
Dept: INFUSION THERAPY | Age: 54
End: 2024-06-27
Payer: COMMERCIAL

## 2024-06-27 NOTE — PROGRESS NOTES
Patient ordered IV Vanco 1.5g q12 for left hand cellulitis/abscess    Labs from 6/25 unremarkable  CRP and ESR WNL  Vanco trough 10.1 - appropriate for cellulitis  Creatinine WNL    Telephone call with patient - appt with Dr Lopez on 7/3 at 9:30a - PICC line to be removed at this appt and patient then driving to [place].  Denies drainage, redness of left hand - some pain from tendon damage, may need surgery later     Dispense x9 doses of Vanco in HP for cmpd on 6/27 and delivery on 6/28  Infusions 6/29 through am dose on 7/3    7/3 confirm PICC pulled and discharge

## 2024-06-28 ENCOUNTER — APPOINTMENT (OUTPATIENT)
Dept: OCCUPATIONAL THERAPY | Facility: CLINIC | Age: 54
End: 2024-06-28
Payer: COMMERCIAL

## 2024-07-03 ENCOUNTER — HOME INFUSION (OUTPATIENT)
Dept: INFUSION THERAPY | Age: 54
End: 2024-07-03
Payer: COMMERCIAL

## 2024-07-03 ENCOUNTER — HOME CARE VISIT (OUTPATIENT)
Dept: HOME HEALTH SERVICES | Facility: HOME HEALTH | Age: 54
End: 2024-07-03
Payer: COMMERCIAL

## 2024-07-03 DIAGNOSIS — B95.62 BACTEREMIA DUE TO METHICILLIN RESISTANT STAPHYLOCOCCUS AUREUS: Primary | ICD-10-CM

## 2024-07-03 DIAGNOSIS — R78.81 BACTEREMIA DUE TO METHICILLIN RESISTANT STAPHYLOCOCCUS AUREUS: Primary | ICD-10-CM

## 2024-07-03 NOTE — PROGRESS NOTES
Cristhian Thornton is a 52 yo male on home care for L hand cellulitis/abscess treated with vancomycin 1.25 gm q12 thru 7/3    ID note from 7/3 indicates PICC line removed in office and IV therapy complete    Gold copy to intake  Chart forwarded to patient care rep to discharge from pharmacy service

## 2024-07-04 ASSESSMENT — ENCOUNTER SYMPTOMS
MUSCLE WEAKNESS: 1
APPETITE LEVEL: GOOD
PERSON REPORTING PAIN: PATIENT
DENIES PAIN: 1
SKIN LESIONS: 1
CHANGE IN APPETITE: UNCHANGED

## 2024-07-07 DIAGNOSIS — F33.1 MODERATE EPISODE OF RECURRENT MAJOR DEPRESSIVE DISORDER (MULTI): ICD-10-CM

## 2024-07-07 DIAGNOSIS — F41.1 GENERALIZED ANXIETY DISORDER: ICD-10-CM

## 2024-07-07 ASSESSMENT — ACTIVITIES OF DAILY LIVING (ADL)
OASIS_M1830: 00
HOME_HEALTH_OASIS: 00

## 2024-07-07 ASSESSMENT — ENCOUNTER SYMPTOMS: DENIES PAIN: 1

## 2024-07-08 ENCOUNTER — LAB (OUTPATIENT)
Dept: LAB | Facility: LAB | Age: 54
End: 2024-07-08
Payer: COMMERCIAL

## 2024-07-08 DIAGNOSIS — M16.0 PRIMARY OSTEOARTHRITIS OF BOTH HIPS: Primary | ICD-10-CM

## 2024-07-08 DIAGNOSIS — L03.012 CELLULITIS OF FINGER OF LEFT HAND: ICD-10-CM

## 2024-07-08 LAB
ERYTHROCYTE [DISTWIDTH] IN BLOOD BY AUTOMATED COUNT: 12.6 % (ref 11.5–14.5)
HCT VFR BLD AUTO: 50.1 % (ref 41–52)
HGB BLD-MCNC: 16.9 G/DL (ref 13.5–17.5)
MCH RBC QN AUTO: 30 PG (ref 26–34)
MCHC RBC AUTO-ENTMCNC: 33.7 G/DL (ref 32–36)
MCV RBC AUTO: 89 FL (ref 80–100)
NRBC BLD-RTO: 0 /100 WBCS (ref 0–0)
PLATELET # BLD AUTO: 209 X10*3/UL (ref 150–450)
RBC # BLD AUTO: 5.64 X10*6/UL (ref 4.5–5.9)
WBC # BLD AUTO: 6.4 X10*3/UL (ref 4.4–11.3)

## 2024-07-08 PROCEDURE — 85027 COMPLETE CBC AUTOMATED: CPT

## 2024-07-08 RX ORDER — TRAZODONE HYDROCHLORIDE 50 MG/1
50 TABLET ORAL NIGHTLY PRN
Qty: 90 TABLET | Refills: 1 | Status: SHIPPED | OUTPATIENT
Start: 2024-07-08

## 2024-07-08 RX ORDER — FLUOXETINE HYDROCHLORIDE 20 MG/1
20 CAPSULE ORAL DAILY
Qty: 90 CAPSULE | Refills: 1 | Status: SHIPPED | OUTPATIENT
Start: 2024-07-08

## 2024-07-08 NOTE — TELEPHONE ENCOUNTER
----- Message from Cristhian Thornton sent at 7/7/2024  8:27 PM EDT -----  Regarding: Meloxicam  Contact: [phone]  Good morning!  Somehow my meloxicam prescription got deleted while I was in the hospital, can I please get a refill?

## 2024-07-09 ENCOUNTER — DOCUMENTATION (OUTPATIENT)
Dept: OCCUPATIONAL THERAPY | Facility: CLINIC | Age: 54
End: 2024-07-09
Payer: COMMERCIAL

## 2024-07-09 NOTE — PROGRESS NOTES
Occupational Therapy    Discharge Summary    Name: Cristhian Thornton  MRN: [id number]  : 1970  Date: 24    Discharge Summary: OT    Discharge Information: Date of discharge 24    Therapy Summary: Pt seen once for splinting    Discharge Status: per last note     Rehab Discharge Reason: Failed to schedule and/or keep follow-up appointment(s)

## 2024-07-10 RX ORDER — MELOXICAM 15 MG/1
15 TABLET ORAL DAILY
COMMUNITY
End: 2024-07-10 | Stop reason: SDUPTHER

## 2024-07-10 NOTE — TELEPHONE ENCOUNTER
Called and spoke with pt, who states that while in the hospital, pt was taken off of meloxicam, as he was given vancomycin at the time. Pt would like to go back on meloxicam and needs a refill. Pt is taking meloxicam, 15mg tablets, takes one tablet once a day, for arthritis pain. Pt has scheduled follow up appt for hospital discharge on Monday, July 15th at 11:20am.

## 2024-07-11 RX ORDER — MELOXICAM 15 MG/1
15 TABLET ORAL DAILY
Qty: 30 TABLET | Refills: 1 | Status: SHIPPED | OUTPATIENT
Start: 2024-07-11

## 2024-07-15 ENCOUNTER — OFFICE VISIT (OUTPATIENT)
Dept: PRIMARY CARE | Facility: CLINIC | Age: 54
End: 2024-07-15
Payer: COMMERCIAL

## 2024-07-15 VITALS
DIASTOLIC BLOOD PRESSURE: 82 MMHG | BODY MASS INDEX: 28.35 KG/M2 | WEIGHT: 197.6 LBS | HEART RATE: 87 BPM | OXYGEN SATURATION: 98 % | SYSTOLIC BLOOD PRESSURE: 120 MMHG

## 2024-07-15 DIAGNOSIS — M13.0 POLYARTHRITIS: Primary | ICD-10-CM

## 2024-07-15 DIAGNOSIS — Z12.11 SCREEN FOR COLON CANCER: ICD-10-CM

## 2024-07-15 PROCEDURE — 99214 OFFICE O/P EST MOD 30 MIN: CPT | Performed by: PHYSICIAN ASSISTANT

## 2024-07-15 PROCEDURE — 3074F SYST BP LT 130 MM HG: CPT | Performed by: PHYSICIAN ASSISTANT

## 2024-07-15 PROCEDURE — 3079F DIAST BP 80-89 MM HG: CPT | Performed by: PHYSICIAN ASSISTANT

## 2024-07-15 PROCEDURE — 1036F TOBACCO NON-USER: CPT | Performed by: PHYSICIAN ASSISTANT

## 2024-07-15 ASSESSMENT — COLUMBIA-SUICIDE SEVERITY RATING SCALE - C-SSRS
1. IN THE PAST MONTH, HAVE YOU WISHED YOU WERE DEAD OR WISHED YOU COULD GO TO SLEEP AND NOT WAKE UP?: NO
6. HAVE YOU EVER DONE ANYTHING, STARTED TO DO ANYTHING, OR PREPARED TO DO ANYTHING TO END YOUR LIFE?: NO
2. HAVE YOU ACTUALLY HAD ANY THOUGHTS OF KILLING YOURSELF?: NO

## 2024-07-15 ASSESSMENT — PATIENT HEALTH QUESTIONNAIRE - PHQ9
SUM OF ALL RESPONSES TO PHQ9 QUESTIONS 1 AND 2: 0
2. FEELING DOWN, DEPRESSED OR HOPELESS: NOT AT ALL
1. LITTLE INTEREST OR PLEASURE IN DOING THINGS: NOT AT ALL

## 2024-07-15 ASSESSMENT — ENCOUNTER SYMPTOMS
VOMITING: 0
SHORTNESS OF BREATH: 0
NAUSEA: 0
COUGH: 0

## 2024-07-15 ASSESSMENT — PAIN SCALES - GENERAL: PAINLEVEL: 0-NO PAIN

## 2024-07-15 NOTE — PROGRESS NOTES
Subjective   Patient ID: Cristhian Thornton is a 53 y.o. male who presents for Follow-up (Pt is here for ER follow up due to staph infection on his finger / nr).    HPI   Pt here for hospital FU. Over the past 7mos has had 6 different skin infections - thigh, calf, knee, ring finger, knee, pointer finger. He had I&D of pointer finger by Dr. Leonard 5/2024 and was referred to ED for admission. He was admitted x 5 days and tx w/IV abx. Managed by ID (Dr. Lopez).     He was discharged w/picc line in place. Last dose of vancomycin 7/3 picc line removed. He started a probiotic and is feeling better. He was also given instructions to use bactroban x3 days every month and hibiclens 1-2x per week.      He has a hx of polyarthritis. Daughter was dx w/RA. He has not had any testing for this but did have nml CRP + ESR w/routine labs.      Review of Systems   Respiratory:  Negative for cough and shortness of breath.    Cardiovascular:  Negative for chest pain.   Gastrointestinal:  Negative for nausea and vomiting.   Skin:  Negative for rash.       Objective   /82   Pulse 87   Wt 89.6 kg (197 lb 9.6 oz)   SpO2 98%   BMI 28.35 kg/m²     Physical Exam  Constitutional:       Appearance: Normal appearance.   HENT:      Head: Normocephalic and atraumatic.   Eyes:      Extraocular Movements: Extraocular movements intact.      Conjunctiva/sclera: Conjunctivae normal.   Cardiovascular:      Rate and Rhythm: Normal rate and regular rhythm.      Heart sounds: Murmur (holosystolic murmur LSB 3/5) heard.   Pulmonary:      Effort: Pulmonary effort is normal.      Breath sounds: Normal breath sounds. No wheezing or rhonchi.   Musculoskeletal:      Cervical back: Normal range of motion and neck supple.   Skin:     General: Skin is warm and dry.   Neurological:      General: No focal deficit present.      Mental Status: He is alert.   Psychiatric:         Mood and Affect: Mood normal.         Assessment/Plan   Problem List Items  Addressed This Visit    None  Visit Diagnoses         Codes    Polyarthritis    -  Primary M13.0    Relevant Orders    Rheumatoid factor    Citrulline Antibody, IgG    SOLANGE    Screen for colon cancer     Z12.11    Relevant Orders    Cologuard® colon cancer screening          No current infection, recovering well. Continue mupirocin + hibiclens as discussed w/ID. Will FU 3/2025 for CPE, sooner as needed.    >30 mins spent on pt case today

## 2024-07-18 ENCOUNTER — OFFICE VISIT (OUTPATIENT)
Dept: CARDIOLOGY | Facility: CLINIC | Age: 54
End: 2024-07-18
Payer: COMMERCIAL

## 2024-07-18 VITALS
DIASTOLIC BLOOD PRESSURE: 74 MMHG | SYSTOLIC BLOOD PRESSURE: 126 MMHG | BODY MASS INDEX: 28.41 KG/M2 | WEIGHT: 198 LBS | OXYGEN SATURATION: 98 % | HEART RATE: 73 BPM

## 2024-07-18 DIAGNOSIS — I35.0 NONRHEUMATIC AORTIC VALVE STENOSIS: ICD-10-CM

## 2024-07-18 DIAGNOSIS — I10 ESSENTIAL HYPERTENSION: ICD-10-CM

## 2024-07-18 DIAGNOSIS — Z95.2 S/P AVR (AORTIC VALVE REPLACEMENT): Primary | ICD-10-CM

## 2024-07-18 DIAGNOSIS — Q23.1 BICUSPID AORTIC VALVE (HHS-HCC): ICD-10-CM

## 2024-07-18 PROCEDURE — 1036F TOBACCO NON-USER: CPT | Performed by: INTERNAL MEDICINE

## 2024-07-18 PROCEDURE — 3074F SYST BP LT 130 MM HG: CPT | Performed by: INTERNAL MEDICINE

## 2024-07-18 PROCEDURE — 3078F DIAST BP <80 MM HG: CPT | Performed by: INTERNAL MEDICINE

## 2024-07-18 PROCEDURE — 99214 OFFICE O/P EST MOD 30 MIN: CPT | Performed by: INTERNAL MEDICINE

## 2024-07-18 ASSESSMENT — ENCOUNTER SYMPTOMS
WEIGHT LOSS: 0
LOSS OF SENSATION IN FEET: 0
ORTHOPNEA: 0
COUGH: 0
OCCASIONAL FEELINGS OF UNSTEADINESS: 0
DEPRESSION: 0
SHORTNESS OF BREATH: 0
DYSPNEA ON EXERTION: 0
MYALGIAS: 0
WHEEZING: 0
NEAR-SYNCOPE: 0
DIZZINESS: 0
CLAUDICATION: 0
IRREGULAR HEARTBEAT: 0
PND: 0
WEIGHT GAIN: 0
WEAKNESS: 0
DIAPHORESIS: 0
PALPITATIONS: 0
FEVER: 0
SYNCOPE: 0

## 2024-07-18 NOTE — PROGRESS NOTES
Subjective      Chief Complaint   Patient presents with    annual appt      Hosp follow up         54 yo male with h/o bicuspid AoV and severe Aortic stenosis s/p AVR/Arch repair in 2020, cardiac catheterization at the time revealed normal coronaries. I sees me on a regular basis. He was hospitalized May 2024 with 6 different infections in 7 months (thigh, calf, left knee, left ring finger). He was admitted, cultures positive for GPC. 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 Inconclusive for veg, MART was without veg. He was on prolonged IV Abx via picc line, last was July 3. He does physical labor all day. He has no functional limitations.           Review of Systems   Constitutional: Negative for diaphoresis, fever, weight gain and weight loss.   Eyes:  Negative for visual disturbance.   Cardiovascular:  Negative for chest pain, claudication, dyspnea on exertion, irregular heartbeat, leg swelling, near-syncope, orthopnea, palpitations, paroxysmal nocturnal dyspnea and syncope.   Respiratory:  Negative for cough, shortness of breath and wheezing.    Musculoskeletal:  Negative for muscle weakness and myalgias.   Neurological:  Negative for dizziness and weakness.   All other systems reviewed and are negative.       Past Medical History:   Diagnosis Date    Aortic valve stenosis     Infection     Multiple    Low testosterone         Past Surgical History:   Procedure Laterality Date    AORTIC VALVE REPLACEMENT      CERVICAL SPINE SURGERY      implant    ELBOW BURSA SURGERY Right     related to infection    HERNIA REPAIR      JOINT REPLACEMENT Right     knee    MR HEAD ANGIO WO IV CONTRAST  02/24/2023    MR HEAD ANGIO WO IV CONTRAST [facility]    MR HEAD ANGIO WO IV CONTRAST  09/05/2023    MR HEAD ANGIO WO IV CONTRAST [facility]    MR NECK ANGIO WO IV CONTRAST  12/20/2012    MR NECK ANGIO WO IV CONTRAST [facility]        Social History      Socioeconomic History    Marital status:      Spouse name: Not on file    Number of children: Not on file    Years of education: Not on file    Highest education level: Not on file   Occupational History    Not on file   Tobacco Use    Smoking status: Never    Smokeless tobacco: Never   Substance and Sexual Activity    Alcohol use: Yes     Alcohol/week: 1.0 standard drink of alcohol     Types: 1 Standard drinks or equivalent per week    Drug use: Never    Sexual activity: Not on file   Other Topics Concern    Not on file   Social History Narrative    Not on file     Social Determinants of Health     Financial Resource Strain: Low Risk  (5/22/2024)    Overall Financial Resource Strain (CARDIA)     Difficulty of Paying Living Expenses: Not hard at all   Food Insecurity: Not on file   Transportation Needs: No Transportation Needs (7/3/2024)    OASIS : Transportation     Lack of Transportation (Medical): No     Lack of Transportation (Non-Medical): No     Patient Unable or Declines to Respond: No   Physical Activity: Not on file   Stress: Not on file   Social Connections: Feeling Socially Integrated (7/3/2024)    OASIS : Social Isolation     Frequency of experiencing loneliness or isolation: Never   Intimate Partner Violence: Not on file   Housing Stability: Low Risk  (5/22/2024)    Housing Stability Vital Sign     Unable to Pay for Housing in the Last Year: No     Number of Places Lived in the Last Year: 1     Unstable Housing in the Last Year: No        Family History   Problem Relation Name Age of Onset    Autoimmune disease Mother      Hypertension Mother      PTSD Father      Benign prostatic hyperplasia Father      No Known Problems Sister      Other (htn) Brother      Other (hld) Brother      No Known Problems Daughter      No Known Problems Daughter      Other (watchman) Maternal Grandfather      Other (cath) Maternal Grandfather      Other (3x bypass) Maternal Grandfather      Heart disease  Maternal Grandfather          OBJECTIVE:    Vitals:    07/18/24 0956   BP: 126/74   Pulse: 73   SpO2: 98%        Vitals reviewed.   Constitutional:       Appearance: Normal and healthy appearance. Not in distress.   Pulmonary:      Effort: Pulmonary effort is normal.      Breath sounds: Normal breath sounds.   Cardiovascular:      Normal rate. Regular rhythm. Normal S1. Normal S2.       Murmurs: There is a grade 2/6 early systolic murmur at the URSB.      No gallop.  No click.   Pulses:     Intact distal pulses.   Edema:     Peripheral edema absent.   Skin:     General: Skin is warm and dry.   Neurological:      General: No focal deficit present.          Lab Review:   Lab Results   Component Value Date    CHOL 144 04/13/2024    TRIG 53 04/13/2024    HDL 53.0 04/13/2024       Lab Results   Component Value Date    LDLCALC 80 04/13/2024        No problem-specific Assessment & Plan notes found for this encounter.

## 2024-07-25 LAB — NONINV COLON CA DNA+OCC BLD SCRN STL QL: NEGATIVE

## 2024-09-19 DIAGNOSIS — M16.0 PRIMARY OSTEOARTHRITIS OF BOTH HIPS: ICD-10-CM

## 2024-09-19 RX ORDER — MELOXICAM 15 MG/1
15 TABLET ORAL DAILY
Qty: 30 TABLET | Refills: 3 | Status: SHIPPED | OUTPATIENT
Start: 2024-09-19

## 2024-09-24 ENCOUNTER — LAB (OUTPATIENT)
Dept: LAB | Facility: LAB | Age: 54
End: 2024-09-24
Payer: COMMERCIAL

## 2024-09-24 DIAGNOSIS — M13.0 POLYARTHRITIS: ICD-10-CM

## 2024-09-24 LAB
CCP IGG SERPL-ACNC: <1 U/ML
RHEUMATOID FACT SER NEPH-ACNC: <10 IU/ML (ref 0–15)

## 2024-09-24 PROCEDURE — 86038 ANTINUCLEAR ANTIBODIES: CPT

## 2024-09-24 PROCEDURE — 86200 CCP ANTIBODY: CPT

## 2024-09-24 PROCEDURE — 86431 RHEUMATOID FACTOR QUANT: CPT

## 2024-09-25 LAB — ANA SER QL HEP2 SUBST: NEGATIVE

## 2025-02-05 DIAGNOSIS — M16.0 PRIMARY OSTEOARTHRITIS OF BOTH HIPS: ICD-10-CM

## 2025-02-05 RX ORDER — MELOXICAM 15 MG/1
15 TABLET ORAL DAILY
Qty: 30 TABLET | Refills: 1 | Status: SHIPPED | OUTPATIENT
Start: 2025-02-05

## 2025-02-07 ENCOUNTER — OFFICE VISIT (OUTPATIENT)
Dept: URGENT CARE | Age: 55
End: 2025-02-07
Payer: COMMERCIAL

## 2025-02-07 VITALS
SYSTOLIC BLOOD PRESSURE: 148 MMHG | TEMPERATURE: 96.8 F | HEART RATE: 73 BPM | HEIGHT: 70 IN | WEIGHT: 195 LBS | DIASTOLIC BLOOD PRESSURE: 90 MMHG | OXYGEN SATURATION: 99 % | BODY MASS INDEX: 27.92 KG/M2

## 2025-02-07 DIAGNOSIS — J01.90 ACUTE SINUSITIS, RECURRENCE NOT SPECIFIED, UNSPECIFIED LOCATION: Primary | ICD-10-CM

## 2025-02-07 RX ORDER — AMOXICILLIN AND CLAVULANATE POTASSIUM 875; 125 MG/1; MG/1
875 TABLET, FILM COATED ORAL 2 TIMES DAILY
Qty: 14 TABLET | Refills: 0 | Status: SHIPPED | OUTPATIENT
Start: 2025-02-07 | End: 2025-02-14

## 2025-02-07 ASSESSMENT — ENCOUNTER SYMPTOMS
COUGH: 1
SORE THROAT: 1
SHORTNESS OF BREATH: 0
RHINORRHEA: 1
SHORTNESS OF BREATH: 1
CONSTITUTIONAL NEGATIVE: 1

## 2025-02-07 NOTE — PATIENT INSTRUCTIONS
Consider taking Mucinex for congestion. Make sure to drink plenty of fluids while taking this medication as it increases its effectiveness.     Consider Zyrtec or Claritin    Consider Flonase Nasal Spray    Consider taking Sudafed to help decrease any congestion. If no history of high blood pressure.  Consider Corcedin BP for high blood pressure.    Use throat lozenges, buckwheat honey, gargling salt water to help relieve sore throat symptoms.     Recommend inhaling steam from a hot shower or hot bath as well as using saline sinus rinse for congestion. Recommend Minister[facility] Med sinus rinse. Make sure to use bottled or distilled water.

## 2025-02-07 NOTE — PROGRESS NOTES
Subjective   Patient ID: Cristhian Thornton is a 54 y.o. male. They present today with a chief complaint of Nasal Congestion (Has had chest cold for a couple weeks but now it feels he has a sinus infection. Burning up both nostrils ) and Generalized Body Aches.    History of Present Illness  54-year-old male presents clinic today with complaints of nasal and chest congestion.  Patient states been ongoing for couple weeks.  Patient states that he has tried over-the-counter medication to get the congestion persist.  He denies sore throat.  States that there is popping in his ears.  Denies chest pain or shortness of breath.  Denies known fever.  Denies body aches or chills.      Cough  This is a new problem. The current episode started 1 to 4 weeks ago. The problem has been waxing and waning. The problem occurs every few minutes. The cough is Productive of purulent sputum. Associated symptoms include ear congestion, nasal congestion, rhinorrhea and a sore throat. Pertinent negatives include no chest pain or shortness of breath. Nothing aggravates the symptoms.       Past Medical History  Allergies as of 02/07/2025 - Reviewed 02/07/2025   Allergen Reaction Noted    Acyclovir Unknown 09/11/2023    Opioids - morphine analogues Unknown 09/11/2023    Hydromorphone Rash 09/11/2023       (Not in a hospital admission)       Past Medical History:   Diagnosis Date    Aortic valve stenosis     Infection     Multiple    Low testosterone        Past Surgical History:   Procedure Laterality Date    AORTIC VALVE REPLACEMENT      CERVICAL SPINE SURGERY      implant    ELBOW BURSA SURGERY Right     related to infection    HERNIA REPAIR      JOINT REPLACEMENT Right     knee    MR HEAD ANGIO WO IV CONTRAST  02/24/2023    MR HEAD ANGIO WO IV CONTRAST [facility]    MR HEAD ANGIO WO IV CONTRAST  09/05/2023    MR HEAD ANGIO WO IV CONTRAST [facility]    MR NECK ANGIO WO IV CONTRAST  12/20/2012    MR NECK ANGIO WO IV CONTRAST  [facility]        reports that he has never smoked. He has never used smokeless tobacco. He reports current alcohol use of about 1.0 standard drink of alcohol per week. He reports that he does not use drugs.    Review of Systems  Review of Systems   Constitutional: Negative.    HENT:  Positive for rhinorrhea and sore throat.    Respiratory:  Positive for cough. Negative for shortness of breath.    Cardiovascular:  Negative for chest pain.                                  Objective    Vitals:    02/07/25 1030   BP: 148/90   Pulse: 73   Temp: 36 °C (96.8 °F)   TempSrc: Temporal   SpO2: 99%   Weight: 88.5 kg (195 lb)   Height: 1.778 m (5' 10\")     No LMP for male patient.    Physical Exam  Constitutional:       General: He is not in acute distress.     Appearance: Normal appearance.   HENT:      Right Ear: Tympanic membrane and ear canal normal.      Left Ear: Tympanic membrane and ear canal normal.      Mouth/Throat:      Mouth: Mucous membranes are moist.      Pharynx: No oropharyngeal exudate or posterior oropharyngeal erythema.   Cardiovascular:      Rate and Rhythm: Normal rate and regular rhythm.      Heart sounds: Normal heart sounds.   Pulmonary:      Effort: Pulmonary effort is normal.      Breath sounds: Normal breath sounds.   Neurological:      Mental Status: He is alert.         Procedures    Point of Care Test & Imaging Results from this visit  No results found for this visit on 02/07/25.   No results found.    Diagnostic study results (if any) were reviewed by Shane Edmonds PA-C.    Assessment/Plan   Allergies, medications, history, and pertinent labs/EKGs/Imaging reviewed by Shane Edmonds PA-C.     Medical Decision Making  Patient pleasant cooperative on examination.    Cardiopulmonary exam within normal limits.  No other acute abnormality noted on physical examination.  Vital signs stable.    Based on patient's continued congestion despite over-the-counter medications I started him on " Augmentin for suspected sinusitis.  Advised him on proper over-the-counter medications to supplement with.    Monitor for persistent signs and if this occurs please follow-up with primary care for further evaluation.    Patient agreement with plan.  Patient alert and oriented, no acute distress upon discharge.    Orders and Diagnoses  There are no diagnoses linked to this encounter.    Medical Admin Record      Patient disposition: Home    Electronically signed by Shane Edmonds PA-C  10:41 AM

## 2025-03-07 NOTE — PROGRESS NOTES
"Subjective   Patient ID: Cristhian Thornton is a 54 y.o. male who presents for Annual Exam (Pt is here for physical / nr) and Neck Pain (Pt has been having neck pain for months ).    HPI   Hx severe aortic stenosis s/p AVR/arch repair 2020 (managed by cardiology), OCD/anxiety, recurrent staph infx, low testosterone    Low testosterone - managed by [facility]. Stable on testosterone, anastrazole, tamoxifen    Neck pain - Last week he was standing on a painters platform and fell and landed on the ground. Felt a pull in the R side of his neck. Has been having neck tightness since then w/limited ROM. Has occasional HA, no vision changes, dizziness. Has chronic L arm paresthesias since C5-C6 disc implant 2019. Denies paresthesias otherwise.   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     Leg weakness - pain in b/l legs, R worse than L. Occurs w/prolonged use. Worse w/climbing stairs - states FDC up, legs feel \"exhausted.\" He did have a fall 2mos ago and injured his back. Denies numbness/tingling in the LE, back pain. Nonsmoker.    HM: cologuard 7/19/24, due for PSA    Review of Systems   HENT:  Negative for hearing loss.    Eyes:  Negative for visual disturbance.   Respiratory:  Negative for shortness of breath.    Cardiovascular:  Negative for chest pain and leg swelling.   Gastrointestinal:  Negative for abdominal pain and blood in stool.   Musculoskeletal:  Positive for myalgias, neck pain and neck stiffness. Negative for back pain.   Neurological:  Positive for weakness (b/l lower extremities) and headaches. Negative for dizziness and light-headedness.       Objective   /82   Pulse 67   Wt 91.4 kg (201 lb 9.6 oz)   SpO2 98%   BMI 28.93 kg/m²     Physical Exam  Vitals reviewed.   Constitutional:       Appearance: Normal appearance.   HENT:      Head: Normocephalic and atraumatic.      Right Ear: Tympanic membrane and ear canal normal.      Left " Ear: Tympanic membrane and ear canal normal.      Nose: Nose normal.      Mouth/Throat:      Mouth: Mucous membranes are moist.      Pharynx: No oropharyngeal exudate.   Eyes:      Extraocular Movements: Extraocular movements intact.      Conjunctiva/sclera: Conjunctivae normal.      Pupils: Pupils are equal, round, and reactive to light.   Cardiovascular:      Rate and Rhythm: Normal rate and regular rhythm.      Heart sounds: Normal heart sounds.   Pulmonary:      Effort: Pulmonary effort is normal.      Breath sounds: Normal breath sounds. No wheezing.   Abdominal:      General: There is no distension.      Palpations: Abdomen is soft.      Tenderness: There is no abdominal tenderness.   Musculoskeletal:         General: No tenderness.      Cervical back: Normal range of motion and neck supple.   Skin:     General: Skin is warm and dry.      Findings: No rash.   Neurological:      General: No focal deficit present.      Mental Status: He is alert. Mental status is at baseline.   Psychiatric:         Mood and Affect: Mood normal.         Assessment/Plan   Problem List Items Addressed This Visit    None  Visit Diagnoses         Codes    Routine medical exam    -  Primary Z00.00    Vitamin D deficiency     E55.9    Relevant Orders    Vitamin D 25-Hydroxy,Total (for eval of Vitamin D levels)    Screening PSA (prostate specific antigen)     Z12.5    Relevant Orders    Prostate Specific Antigen    Screening for diabetes mellitus     Z13.1    Relevant Orders    Comprehensive Metabolic Panel    Lipid screening     Z13.220    Relevant Orders    Lipid Panel    Screening for hematuria or proteinuria     Z13.89    Relevant Orders    Urinalysis with Reflex Culture and Microscopic    Muscle ache     M79.10    Relevant Orders    CK    Sedimentation Rate    C-reactive protein    Vascular US ankle brachial index (NIKHIL) without exercise    Neck pain     M54.2    Relevant Orders    XR cervical spine 2-3 views

## 2025-03-10 ENCOUNTER — HOSPITAL ENCOUNTER (OUTPATIENT)
Dept: RADIOLOGY | Facility: CLINIC | Age: 55
Discharge: HOME | End: 2025-03-10
Payer: COMMERCIAL

## 2025-03-10 ENCOUNTER — OFFICE VISIT (OUTPATIENT)
Dept: PRIMARY CARE | Facility: CLINIC | Age: 55
End: 2025-03-10
Payer: COMMERCIAL

## 2025-03-10 VITALS
DIASTOLIC BLOOD PRESSURE: 82 MMHG | WEIGHT: 201.6 LBS | BODY MASS INDEX: 28.93 KG/M2 | HEART RATE: 67 BPM | OXYGEN SATURATION: 98 % | SYSTOLIC BLOOD PRESSURE: 120 MMHG

## 2025-03-10 DIAGNOSIS — F41.1 GENERALIZED ANXIETY DISORDER: ICD-10-CM

## 2025-03-10 DIAGNOSIS — Z13.89 SCREENING FOR HEMATURIA OR PROTEINURIA: ICD-10-CM

## 2025-03-10 DIAGNOSIS — Z12.5 SCREENING PSA (PROSTATE SPECIFIC ANTIGEN): ICD-10-CM

## 2025-03-10 DIAGNOSIS — E55.9 VITAMIN D DEFICIENCY: ICD-10-CM

## 2025-03-10 DIAGNOSIS — Z13.220 LIPID SCREENING: ICD-10-CM

## 2025-03-10 DIAGNOSIS — M54.2 NECK PAIN: ICD-10-CM

## 2025-03-10 DIAGNOSIS — Z13.1 SCREENING FOR DIABETES MELLITUS: ICD-10-CM

## 2025-03-10 DIAGNOSIS — M79.10 MUSCLE ACHE: ICD-10-CM

## 2025-03-10 DIAGNOSIS — F33.1 MODERATE EPISODE OF RECURRENT MAJOR DEPRESSIVE DISORDER: ICD-10-CM

## 2025-03-10 DIAGNOSIS — Z00.00 ROUTINE MEDICAL EXAM: Primary | ICD-10-CM

## 2025-03-10 PROBLEM — A41.02 MRSA (METHICILLIN RESISTANT STAPHYLOCOCCUS AUREUS) SEPTICEMIA (MULTI): Status: RESOLVED | Noted: 2024-07-03 | Resolved: 2025-03-10

## 2025-03-10 PROBLEM — L03.012 CELLULITIS OF FINGER OF LEFT HAND: Status: RESOLVED | Noted: 2024-05-21 | Resolved: 2025-03-10

## 2025-03-10 PROBLEM — S09.93XA INJURY OF FACE: Status: RESOLVED | Noted: 2023-09-11 | Resolved: 2025-03-10

## 2025-03-10 PROBLEM — R06.00 DYSPNEA: Status: RESOLVED | Noted: 2023-09-11 | Resolved: 2025-03-10

## 2025-03-10 PROBLEM — M25.529 ELBOW PAIN: Status: RESOLVED | Noted: 2025-03-10 | Resolved: 2025-03-10
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
## 2025-03-10 PROBLEM — L03.114 CELLULITIS OF HAND, LEFT: Status: RESOLVED | Noted: 2024-06-07 | Resolved: 2025-03-10

## 2025-03-10 PROBLEM — S42.409A CLOSED FRACTURE OF DISTAL END OF HUMERUS: Status: RESOLVED | Noted: 2025-03-10 | Resolved: 2025-03-10

## 2025-03-10 PROBLEM — R41.3 MEMORY PROBLEM: Status: RESOLVED | Noted: 2023-09-11 | Resolved: 2025-03-10

## 2025-03-10 PROBLEM — M99.04 SOMATIC DYSFUNCTION OF SACRAL REGION: Status: ACTIVE | Noted: 2025-03-10

## 2025-03-10 PROBLEM — S99.919A INJURY OF ANKLE: Status: RESOLVED | Noted: 2023-09-11 | Resolved: 2025-03-10

## 2025-03-10 PROBLEM — D75.1 POLYCYTHEMIA: Status: RESOLVED | Noted: 2023-09-11 | Resolved: 2025-03-10

## 2025-03-10 PROBLEM — S80.819A ABRASION OF LOWER LIMB: Status: RESOLVED | Noted: 2023-09-11 | Resolved: 2025-03-10

## 2025-03-10 PROBLEM — M41.9 SCOLIOSIS: Status: ACTIVE | Noted: 2025-03-10

## 2025-03-10 PROBLEM — M77.12 LATERAL EPICONDYLITIS OF LEFT ELBOW: Status: ACTIVE | Noted: 2025-03-10

## 2025-03-10 PROBLEM — R10.9 FLANK PAIN: Status: RESOLVED | Noted: 2023-09-11 | Resolved: 2025-03-10

## 2025-03-10 PROBLEM — L02.512 ABSCESS OF LEFT INDEX FINGER: Status: RESOLVED | Noted: 2024-07-03 | Resolved: 2025-03-10

## 2025-03-10 PROBLEM — I10 ESSENTIAL HYPERTENSION: Status: RESOLVED | Noted: 2023-09-11 | Resolved: 2025-03-10

## 2025-03-10 PROBLEM — R21 RASH: Status: RESOLVED | Noted: 2020-05-14 | Resolved: 2025-03-10

## 2025-03-10 PROBLEM — R40.1 CLOUDED CONSCIOUSNESS: Status: RESOLVED | Noted: 2023-09-11 | Resolved: 2025-03-10

## 2025-03-10 PROBLEM — I31.9 PERICARDITIS (HHS-HCC): Status: RESOLVED | Noted: 2023-09-11 | Resolved: 2025-03-10

## 2025-03-10 PROBLEM — M70.40 PREPATELLAR BURSITIS: Status: ACTIVE | Noted: 2025-03-10

## 2025-03-10 PROCEDURE — 72040 X-RAY EXAM NECK SPINE 2-3 VW: CPT | Performed by: RADIOLOGY

## 2025-03-10 PROCEDURE — 72040 X-RAY EXAM NECK SPINE 2-3 VW: CPT

## 2025-03-10 PROCEDURE — 1036F TOBACCO NON-USER: CPT | Performed by: PHYSICIAN ASSISTANT

## 2025-03-10 PROCEDURE — 99396 PREV VISIT EST AGE 40-64: CPT | Performed by: PHYSICIAN ASSISTANT

## 2025-03-10 RX ORDER — TRAZODONE HYDROCHLORIDE 50 MG/1
50 TABLET ORAL NIGHTLY PRN
Qty: 90 TABLET | Refills: 3 | Status: SHIPPED | OUTPATIENT
Start: 2025-03-10

## 2025-03-10 RX ORDER — FLUOXETINE HYDROCHLORIDE 20 MG/1
20 CAPSULE ORAL DAILY
Qty: 90 CAPSULE | Refills: 3 | Status: SHIPPED | OUTPATIENT
Start: 2025-03-10

## 2025-03-10 ASSESSMENT — ENCOUNTER SYMPTOMS
SHORTNESS OF BREATH: 0
BACK PAIN: 0
LIGHT-HEADEDNESS: 0
DEPRESSION: 0
WEAKNESS: 1
LOSS OF SENSATION IN FEET: 0
NECK STIFFNESS: 1
HEADACHES: 1
MYALGIAS: 1
NECK PAIN: 1
BLOOD IN STOOL: 0
DIZZINESS: 0
ABDOMINAL PAIN: 0
OCCASIONAL FEELINGS OF UNSTEADINESS: 0

## 2025-03-10 ASSESSMENT — PAIN SCALES - GENERAL: PAINLEVEL_OUTOF10: 4

## 2025-03-10 NOTE — PATIENT INSTRUCTIONS
Sometimes, the brain loses its communication with certain muscles, causing these muscles to become weak.  To compensate for this, other surrounding muscles have to work harder, which can lead to muscle fatigue and frequent spasms. Neurokinetic therapy (NKT) is a type of physical therapy that focuses on re-establishing the connection between the brain and the muscles.     To learn more about NKT or to schedule an appointment, please call:  [facility].  [phone] [email or web address]

## 2025-03-31 DIAGNOSIS — M16.0 PRIMARY OSTEOARTHRITIS OF BOTH HIPS: ICD-10-CM

## 2025-03-31 RX ORDER — MELOXICAM 15 MG/1
15 TABLET ORAL DAILY
Qty: 30 TABLET | Refills: 11 | Status: SHIPPED | OUTPATIENT
Start: 2025-03-31

## 2025-04-02 ENCOUNTER — APPOINTMENT (OUTPATIENT)
Dept: RADIOLOGY | Facility: HOSPITAL | Age: 55
End: 2025-04-02
Payer: COMMERCIAL

## 2025-04-02 ENCOUNTER — HOSPITAL ENCOUNTER (EMERGENCY)
Facility: HOSPITAL | Age: 55
Discharge: HOME | End: 2025-04-02
Payer: COMMERCIAL

## 2025-04-02 VITALS
BODY MASS INDEX: 27.77 KG/M2 | OXYGEN SATURATION: 100 % | TEMPERATURE: 98.6 F | HEIGHT: 70 IN | DIASTOLIC BLOOD PRESSURE: 83 MMHG | HEART RATE: 71 BPM | WEIGHT: 194 LBS | SYSTOLIC BLOOD PRESSURE: 137 MMHG | RESPIRATION RATE: 16 BRPM

## 2025-04-02 DIAGNOSIS — M54.31 SCIATICA OF RIGHT SIDE: Primary | ICD-10-CM

## 2025-04-02 PROCEDURE — 96372 THER/PROPH/DIAG INJ SC/IM: CPT | Performed by: NURSE PRACTITIONER

## 2025-04-02 PROCEDURE — 2500000004 HC RX 250 GENERAL PHARMACY W/ HCPCS (ALT 636 FOR OP/ED): Performed by: NURSE PRACTITIONER

## 2025-04-02 PROCEDURE — 99284 EMERGENCY DEPT VISIT MOD MDM: CPT | Mod: 25

## 2025-04-02 PROCEDURE — 72100 X-RAY EXAM L-S SPINE 2/3 VWS: CPT | Performed by: RADIOLOGY

## 2025-04-02 PROCEDURE — 93971 EXTREMITY STUDY: CPT | Mod: FOREIGN READ | Performed by: RADIOLOGY

## 2025-04-02 PROCEDURE — 93971 EXTREMITY STUDY: CPT

## 2025-04-02 PROCEDURE — 72100 X-RAY EXAM L-S SPINE 2/3 VWS: CPT

## 2025-04-02 RX ORDER — NAPROXEN 500 MG/1
500 TABLET ORAL 2 TIMES DAILY PRN
Qty: 14 TABLET | Refills: 0 | Status: SHIPPED | OUTPATIENT
Start: 2025-04-02 | End: 2025-04-09

## 2025-04-02 RX ORDER — PREDNISONE 20 MG/1
40 TABLET ORAL ONCE
Status: COMPLETED | OUTPATIENT
Start: 2025-04-02 | End: 2025-04-02

## 2025-04-02 RX ORDER — METHOCARBAMOL 500 MG/1
500 TABLET, FILM COATED ORAL 3 TIMES DAILY PRN
Qty: 15 TABLET | Refills: 0 | Status: SHIPPED | OUTPATIENT
Start: 2025-04-02 | End: 2025-04-07

## 2025-04-02 RX ORDER — KETOROLAC TROMETHAMINE 30 MG/ML
30 INJECTION, SOLUTION INTRAMUSCULAR; INTRAVENOUS ONCE
Status: COMPLETED | OUTPATIENT
Start: 2025-04-02 | End: 2025-04-02

## 2025-04-02 RX ORDER — PREDNISONE 20 MG/1
40 TABLET ORAL DAILY
Qty: 10 TABLET | Refills: 0 | Status: SHIPPED | OUTPATIENT
Start: 2025-04-02 | End: 2025-04-07

## 2025-04-02 RX ADMIN — KETOROLAC TROMETHAMINE 30 MG: 30 INJECTION, SOLUTION INTRAMUSCULAR at 11:24

## 2025-04-02 RX ADMIN — PREDNISONE 40 MG: 20 TABLET ORAL at 11:24

## 2025-04-02 ASSESSMENT — PAIN SCALES - GENERAL
PAINLEVEL_OUTOF10: 5 - MODERATE PAIN
PAINLEVEL_OUTOF10: 10 - WORST POSSIBLE PAIN
PAINLEVEL_OUTOF10: 0 - NO PAIN

## 2025-04-02 ASSESSMENT — PAIN DESCRIPTION - LOCATION: LOCATION: LEG

## 2025-04-02 ASSESSMENT — COLUMBIA-SUICIDE SEVERITY RATING SCALE - C-SSRS
2. HAVE YOU ACTUALLY HAD ANY THOUGHTS OF KILLING YOURSELF?: NO
6. HAVE YOU EVER DONE ANYTHING, STARTED TO DO ANYTHING, OR PREPARED TO DO ANYTHING TO END YOUR LIFE?: NO
1. IN THE PAST MONTH, HAVE YOU WISHED YOU WERE DEAD OR WISHED YOU COULD GO TO SLEEP AND NOT WAKE UP?: NO

## 2025-04-02 ASSESSMENT — PAIN - FUNCTIONAL ASSESSMENT
PAIN_FUNCTIONAL_ASSESSMENT: 0-10
PAIN_FUNCTIONAL_ASSESSMENT: 0-10

## 2025-04-02 NOTE — ED PROVIDER NOTES
HPI   Chief Complaint   Patient presents with    Leg Pain    Back Pain       HPI  See my MDM      Patient History   Past Medical History:   Diagnosis Date    Abscess of left index finger 07/03/2024    Aortic valve stenosis     Closed fracture of distal end of humerus 03/10/2025    Elbow pain 03/10/2025    Infection     Multiple    Low testosterone     MRSA (methicillin resistant Staphylococcus aureus) septicemia (Multi) 07/03/2024     Past Surgical History:   Procedure Laterality Date    AORTIC VALVE REPLACEMENT      CERVICAL SPINE SURGERY      implant    ELBOW BURSA SURGERY Right     related to infection    HERNIA REPAIR      JOINT REPLACEMENT Right     knee    MR HEAD ANGIO WO IV CONTRAST  02/24/2023    MR HEAD ANGIO WO IV CONTRAST [facility]    MR HEAD ANGIO WO IV CONTRAST  09/05/2023    MR HEAD ANGIO WO IV CONTRAST [facility]    MR NECK ANGIO WO IV CONTRAST  12/20/2012    MR NECK ANGIO WO IV CONTRAST [facility]     Family History   Problem Relation Name Age of Onset    Autoimmune disease Mother      Hypertension Mother      PTSD Father      Benign prostatic hyperplasia Father      No Known Problems Sister      Other (htn) Brother      Other (hld) Brother      No Known Problems Daughter      No Known Problems Daughter      Other (watchman) Maternal Grandfather      Other (cath) Maternal Grandfather      Other (3x bypass) Maternal Grandfather      Heart disease Maternal Grandfather       Social History     Tobacco Use    Smoking status: Never    Smokeless tobacco: Never   Substance Use Topics    Alcohol use: Yes     Alcohol/week: 1.0 standard drink of alcohol     Types: 1 Standard drinks or equivalent per week    Drug use: Never       Physical Exam   ED Triage Vitals [04/02/25 1102]   Temperature Heart Rate Respirations BP   37 °C (98.6 °F) 78 16 (!) 145/97      Pulse Ox Temp Source Heart Rate Source Patient Position   100 % Oral -- --      BP Location FiO2 (%)     -- --       Physical  Exam  CONSTITUTIONAL: Vital signs reviewed as charted, well-developed and in no distress  Eyes: Extraocular muscles are intact. Pupils equal round and reactive to light. Conjunctiva are pink.    ENT: Mucous membranes are moist. Tongue in the midline. Pharynx was without erythema or exudates, uvula midline  LUNGS: Breath sounds equal and clear to auscultation. Good air exchange, no wheezes rales or retractions, pulse oximetry is charted.  HEART: Regular rate and rhythm without murmur thrill or rub, strong tones, auscultation is normal.  ABDOMEN: Soft and nontender without guarding rebound rigidity or mass. Bowel sounds are present and normal in all quadrants. There is no palpable masses or aneurysms identified. No hepatosplenomegaly, normal abdominal exam.  Neuro: The patient is awake, alert and oriented ×3. Moving all 4 extremities and answering questions appropriately.   MUSCULOSKELETAL: No midline tenderness of cervical thoracic or lumbar spine.  Does have right lower paraspinal muscle tenderness in the lumbar spine.  Lower extremity muscle exquisite 5 and equal bilaterally.  Sensation and pulse are intact.  Deep tendon reflexes present equal bilaterally.  No saddle anesthesia.  Does have some mild tenderness to the right calf.  L2 Adduct Thigh (cross legs) 5/5 bilaterally  L3 Extend Knee 5/5 bilaterally  L4 Dorsiflex Ankle (Up) 5/5 bilaterally  L5 Point Great Toe Up 5/5 bilaterally  L2 L3-L4 Knee Reflex normal bilaterally   S1 Flex Knee 5/5 bilaterally  S2 Plantarflex Toes 5/5 bilaterally  PSYCH: Awake alert oriented, normal mood and affect.  Skin:  Dry, normal color, warm to the touch, no rash present.        ED Course & MDM   Diagnoses as of 04/02/25 1312   Sciatica of right side                 No data recorded     Kasie Coma Scale Score: 15 (04/02/25 1104 : Sammie Munguia RN)                           Medical Decision Making  History obtained from: patient    Vital signs, nursing notes, current medications,  past medical history, Surgical history, allergies, social history, family History were reviewed.         HPI:  Patient is a 54-year-old male presenting emergency room today for evaluation of acute on chronic right-sided low back pain.  States he is also been having right calf pain for about 3 weeks since he traveled up into a condo.  Denies edema.  Denies dizziness, chest pain, shortness of breath, abdominal pain or lower extremity edema.  He is nontoxic and well-appearing.  Does admit to recent travel.  Denies personal or family history of embolism.  Denies history of malignancy.  Denies history of IV drug abuse.  He is nontoxic well-appearing.  Does admit he had a follow-up of  painters [place] about a month ago had an x-ray of his neck by his PCP.  States he has had a longstanding history of sciatica and thinks that is where this pain is coming from.      10 point ROS was reviewed and negative except Noted above in HPI.  DDX: as listed above          MDM Summary/considerations:    Labs Reviewed - No data to display  XR lumbar spine 2-3 views   Final Result   Moderate mid to lower lumbar degenerative changes.   No fracture identified        MACRO:   None        Signed by: Manny Santiago 4/2/2025 12:09 PM   Dictation workstation:   [id number]      Lower extremity venous duplex right   Final Result   No evidence for DVT within the right lower extremity.     Signed by Keny Juarez MD        Medications   ketorolac (Toradol) injection 30 mg (30 mg intramuscular Given 4/2/25 1124)   predniSONE (Deltasone) tablet 40 mg (40 mg oral Given 4/2/25 1124)     Discharge Medication List as of 4/2/2025 12:21 PM        START taking these medications    Details   methocarbamol (Robaxin) 500 mg tablet Take 1 tablet (500 mg) by mouth 3 times a day as needed for muscle spasms for up to 5 days., Starting Wed 4/2/2025, Until Mon 4/7/2025 at 2359, Normal      naproxen (Naprosyn) 500 mg tablet Take 1 tablet (500 mg) by mouth 2 times a day  as needed for moderate pain (4 - 6) for up to 7 days., Starting Wed 4/2/2025, Until Wed 4/9/2025 at 2359, Normal      predniSONE (Deltasone) 20 mg tablet Take 2 tablets (40 mg) by mouth once daily for 5 days., Starting Wed 4/2/2025, Until Mon 4/7/2025, Normal           I estimate there is a low risk for abdominal aortic aneurysm, cauda equina syndrome, epidural mass lesion, spinal stenosis, herniated disc causing severe stenosis, thus I considered the discharge disposition reasonable. I have discussed the diagnosis and risks with the patient and we agreed with discharging home to follow up with her primary care doctor. I also discussed returning to the emergency department immediately if new or worsening symptoms occur. I have discussed the symptoms which are most concerning such as saddle anesthesia, urinary or bowel incontinence or retention, and changing or worsening pain that would necessitate immediate return.      Ultrasound shows no evidence of embolism, x-ray shows degenerative changes but no acute process.  Patient will be discharged home stable condition was feeling better after meds here in the ER.  Recommended steroids and anti-inflammatories.  Will follow with PCP for reevaluation does have a scheduled appointment tomorrow to start physical therapy.    All of the patient's questions were answered to the best of my ability.  Patient states understanding that they have been screened for an emergency today and we have not found any etiology of symptoms that requires emergent treatment or admission to the hospital at this point. They understand that they have not had definitive care day and require follow-up for treatment of their condition. They also state understanding that they may have an emergent condition that may potentially have not of detected at this visit and they must return to the emergency department if they develop any worsening of symptoms or new complaints.      I have evaluated this  patient, my supervising physician was available for consultation.            Critical Care: Not warranted at this time        This chart was completed using voice recognition transcription software. Please excuse any errors of transcription including grammatical, punctuation, syntax and spelling errors.  Please contact me with any questions regarding this chart.    Procedure  Procedures     AURORA Riggs-BHARGAV  04/02/25 9222

## 2025-04-02 NOTE — ED TRIAGE NOTES
Pt arrives to ED with c/o chronic mid lower back pain as well as R calf/knee pain that started about 3 days ago. States the pain was so bad he could not work yesterday. Attempted to work today however pain got worse while ambulating. Endorses tingling to R foot as well. R pedal pulses intact. Denies any hx blood clots, denies tobacco use, denies taking blood thinners. Pt also endorses neck pain r/t a fall, states he had an xray done by his PCP and is due to follow up with neuro for this pain.

## 2025-04-04 ENCOUNTER — TELEPHONE (OUTPATIENT)
Dept: PRIMARY CARE | Facility: CLINIC | Age: 55
End: 2025-04-04
Payer: COMMERCIAL

## 2025-04-04 LAB
25(OH)D3+25(OH)D2 SERPL-MCNC: 67 NG/ML (ref 30–100)
ALBUMIN SERPL-MCNC: 4.1 G/DL (ref 3.6–5.1)
ALP SERPL-CCNC: 55 U/L (ref 35–144)
ALT SERPL-CCNC: 19 U/L (ref 9–46)
ANION GAP SERPL CALCULATED.4IONS-SCNC: 10 MMOL/L (CALC) (ref 7–17)
APPEARANCE UR: CLEAR
AST SERPL-CCNC: 19 U/L (ref 10–35)
BACTERIA #/AREA URNS HPF: ABNORMAL /HPF
BACTERIA UR CULT: ABNORMAL
BILIRUB SERPL-MCNC: 0.4 MG/DL (ref 0.2–1.2)
BILIRUB UR QL STRIP: NEGATIVE
BUN SERPL-MCNC: 16 MG/DL (ref 7–25)
CALCIUM SERPL-MCNC: 8.7 MG/DL (ref 8.6–10.3)
CHLORIDE SERPL-SCNC: 108 MMOL/L (ref 98–110)
CHOLEST SERPL-MCNC: 127 MG/DL
CHOLEST/HDLC SERPL: 2.6 (CALC)
CK SERPL-CCNC: 154 U/L (ref 23–325)
CO2 SERPL-SCNC: 24 MMOL/L (ref 20–32)
COLOR UR: YELLOW
CREAT SERPL-MCNC: 0.99 MG/DL (ref 0.7–1.3)
CRP SERPL-MCNC: 6.8 MG/L
EGFRCR SERPLBLD CKD-EPI 2021: 91 ML/MIN/1.73M2
ERYTHROCYTE [SEDIMENTATION RATE] IN BLOOD BY WESTERGREN METHOD: 2 MM/H
GLUCOSE SERPL-MCNC: 90 MG/DL (ref 65–99)
GLUCOSE UR QL STRIP: NEGATIVE
HDLC SERPL-MCNC: 48 MG/DL
HGB UR QL STRIP: NEGATIVE
HYALINE CASTS #/AREA URNS LPF: ABNORMAL /LPF
KETONES UR QL STRIP: ABNORMAL
LDLC SERPL CALC-MCNC: 65 MG/DL (CALC)
LEUKOCYTE ESTERASE UR QL STRIP: NEGATIVE
NITRITE UR QL STRIP: NEGATIVE
NONHDLC SERPL-MCNC: 79 MG/DL (CALC)
PH UR STRIP: 5.5 [PH] (ref 5–8)
POTASSIUM SERPL-SCNC: 4 MMOL/L (ref 3.5–5.3)
PROT SERPL-MCNC: 6.3 G/DL (ref 6.1–8.1)
PROT UR QL STRIP: ABNORMAL
PSA SERPL-MCNC: 0.89 NG/ML
RBC #/AREA URNS HPF: ABNORMAL /HPF
SERVICE CMNT-IMP: ABNORMAL
SODIUM SERPL-SCNC: 142 MMOL/L (ref 135–146)
SP GR UR STRIP: 1.03 (ref 1–1.03)
SQUAMOUS #/AREA URNS HPF: ABNORMAL /HPF
TRIGL SERPL-MCNC: 62 MG/DL
WBC #/AREA URNS HPF: ABNORMAL /HPF

## 2025-04-04 NOTE — TELEPHONE ENCOUNTER
Labs all normal including cholesterol, kidney/liver function, prostate, etc. Additional labs w/no evidence of muscle breakdown. Recommend vascular testing as discussed as next step of investigating leg weakness.

## 2025-05-05 ENCOUNTER — TELEPHONE (OUTPATIENT)
Facility: CLINIC | Age: 55
End: 2025-05-05
Payer: COMMERCIAL

## 2025-05-05 NOTE — TELEPHONE ENCOUNTER
Call placed to patient regarding message he sent to Dr. Baltazar. No answer. Left message for patient to call office.

## 2025-05-06 ENCOUNTER — OFFICE VISIT (OUTPATIENT)
Facility: CLINIC | Age: 55
End: 2025-05-06
Payer: COMMERCIAL

## 2025-05-06 VITALS
OXYGEN SATURATION: 98 % | SYSTOLIC BLOOD PRESSURE: 110 MMHG | WEIGHT: 192 LBS | HEART RATE: 78 BPM | DIASTOLIC BLOOD PRESSURE: 70 MMHG | BODY MASS INDEX: 27.55 KG/M2

## 2025-05-06 DIAGNOSIS — Q23.81 BICUSPID AORTIC VALVE: ICD-10-CM

## 2025-05-06 DIAGNOSIS — Z95.2 S/P AVR (AORTIC VALVE REPLACEMENT): Primary | ICD-10-CM

## 2025-05-06 PROCEDURE — 99213 OFFICE O/P EST LOW 20 MIN: CPT | Performed by: INTERNAL MEDICINE

## 2025-05-06 PROCEDURE — 1036F TOBACCO NON-USER: CPT | Performed by: INTERNAL MEDICINE

## 2025-05-06 ASSESSMENT — ENCOUNTER SYMPTOMS
PND: 0
PALPITATIONS: 0
LOSS OF SENSATION IN FEET: 1
WEIGHT LOSS: 0
IRREGULAR HEARTBEAT: 0
DEPRESSION: 0
CLAUDICATION: 0
DIZZINESS: 0
FEVER: 0
MYALGIAS: 0
DIAPHORESIS: 0
COUGH: 0
WEAKNESS: 0
ORTHOPNEA: 0
FATIGUE: 1
DYSPNEA ON EXERTION: 0
OCCASIONAL FEELINGS OF UNSTEADINESS: 0
WHEEZING: 0
SHORTNESS OF BREATH: 0
SYNCOPE: 0
CHILLS: 1
WEIGHT GAIN: 0
NEAR-SYNCOPE: 0

## 2025-05-06 ASSESSMENT — PAIN SCALES - GENERAL: PAINLEVEL_OUTOF10: 6

## 2025-05-06 ASSESSMENT — PATIENT HEALTH QUESTIONNAIRE - PHQ9
2. FEELING DOWN, DEPRESSED OR HOPELESS: NOT AT ALL
1. LITTLE INTEREST OR PLEASURE IN DOING THINGS: NOT AT ALL
SUM OF ALL RESPONSES TO PHQ9 QUESTIONS 1 AND 2: 0

## 2025-05-06 ASSESSMENT — LIFESTYLE VARIABLES: TOTAL SCORE: 0

## 2025-05-06 NOTE — PROGRESS NOTES
Subjective      No chief complaint on file.       55 yo male with h/o bicuspid AoV and severe Aortic stenosis s/p AVR/Arch repair in 2020, cardiac catheterization at the time revealed normal coronaries. I sees me on a regular basis. He was hospitalized May 2024 with 6 different infections in 7 months (thigh, calf, left knee, left ring finger). He was admitted, cultures positive for GPC. 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 Inconclusive for veg, MART was without veg. He was on prolonged IV Abx via picc line, last was July 3.     He mentions over the last few weeks he has experienced a significant decline in functional capacity, lack of appetite. He mentions chills/rigors with this at night occasionally.        Review of Systems   Constitutional: Positive for chills and malaise/fatigue. Negative for diaphoresis, fever, weight gain and weight loss.   Eyes:  Negative for visual disturbance.   Cardiovascular:  Negative for chest pain, claudication, dyspnea on exertion, irregular heartbeat, leg swelling, near-syncope, orthopnea, palpitations, paroxysmal nocturnal dyspnea and syncope.   Respiratory:  Negative for cough, shortness of breath and wheezing.    Musculoskeletal:  Negative for muscle weakness and myalgias.   Neurological:  Negative for dizziness and weakness.   All other systems reviewed and are negative.       Medical History[1]     Surgical History[2]     Social History     Socioeconomic History    Marital status:      Spouse name: Not on file    Number of children: Not on file    Years of education: Not on file    Highest education level: Not on file   Occupational History    Not on file   Tobacco Use    Smoking status: Never    Smokeless tobacco: Never   Substance and Sexual Activity    Alcohol use: Yes     Alcohol/week: 1.0 standard drink of alcohol     Types: 1 Standard drinks or equivalent per week    Drug use: Never    Sexual activity: Not  on file   Other Topics Concern    Not on file   Social History Narrative    Not on file     Social Drivers of Health     Financial Resource Strain: Low Risk  (5/22/2024)    Overall Financial Resource Strain (CARDIA)     Difficulty of Paying Living Expenses: Not hard at all   Food Insecurity: Not on file   Transportation Needs: No Transportation Needs (7/3/2024)    OASIS : Transportation     Lack of Transportation (Medical): No     Lack of Transportation (Non-Medical): No     Patient Unable or Declines to Respond: No   Physical Activity: Not on file   Stress: Not on file   Social Connections: Feeling Socially Integrated (7/3/2024)    OASIS : Social Isolation     Frequency of experiencing loneliness or isolation: Never   Intimate Partner Violence: Not on file   Housing Stability: Low Risk  (5/22/2024)    Housing Stability Vital Sign     Unable to Pay for Housing in the Last Year: No     Number of Places Lived in the Last Year: 1     Unstable Housing in the Last Year: No        Family History[3]     OBJECTIVE:    There were no vitals filed for this visit.     Vitals reviewed.   Constitutional:       Appearance: Normal and healthy appearance. Not in distress.   Pulmonary:      Effort: Pulmonary effort is normal.      Breath sounds: Normal breath sounds.   Cardiovascular:      Normal rate. Regular rhythm. Normal S1. Normal S2.       Murmurs: There is a grade 2/6 early systolic murmur at the URSB.      No gallop.  No click.   Pulses:     Intact distal pulses.   Edema:     Peripheral edema absent.   Skin:     General: Skin is warm and dry.   Neurological:      General: No focal deficit present.          Lab Review:   Lab Results   Component Value Date    CHOL 127 04/03/2025    TRIG 62 04/03/2025    HDL 48 04/03/2025       Lab Results   Component Value Date    LDLCALC 65 04/03/2025        No problem-specific Assessment & Plan notes found for this encounter.            [1]   Past Medical History:  Diagnosis Date     Abscess of left index finger 07/03/2024    Aortic valve stenosis     Closed fracture of distal end of humerus 03/10/2025    Elbow pain 03/10/2025    Infection     Multiple    Low testosterone     MRSA (methicillin resistant Staphylococcus aureus) septicemia (Multi) 07/03/2024   [2]   Past Surgical History:  Procedure Laterality Date    AORTIC VALVE REPLACEMENT      CERVICAL SPINE SURGERY      implant    ELBOW BURSA SURGERY Right     related to infection    HERNIA REPAIR      JOINT REPLACEMENT Right     knee    MR HEAD ANGIO WO IV CONTRAST  02/24/2023    MR HEAD ANGIO WO IV CONTRAST [facility]    MR HEAD ANGIO WO IV CONTRAST  09/05/2023    MR HEAD ANGIO WO IV CONTRAST [facility]    MR NECK ANGIO WO IV CONTRAST  12/20/2012    MR NECK ANGIO WO IV CONTRAST [facility]   [3]   Family History  Problem Relation Name Age of Onset    Autoimmune disease Mother      Hypertension Mother      PTSD Father      Benign prostatic hyperplasia Father      No Known Problems Sister      Other (htn) Brother      Other (hld) Brother      No Known Problems Daughter      No Known Problems Daughter      Other (watchman) Maternal Grandfather      Other (cath) Maternal Grandfather      Other (3x bypass) Maternal Grandfather      Heart disease Maternal Grandfather

## 2025-05-06 NOTE — ASSESSMENT & PLAN NOTE
His echocardiogram performed last year suggested already some stenosis of his prosthetic valve.  I am moving up his yearly echo due to his constitutional symptoms.  Also arranging for blood cultures, a CBC, and metabolic panel.  Will be in contact with him regarding the results.

## 2025-05-06 NOTE — PROGRESS NOTES
Subjective      Chief Complaint   Patient presents with    Fatigue        55 yo male with h/o bicuspid AoV and severe Aortic stenosis s/p AVR/Arch repair in 2020, cardiac catheterization at the time revealed normal coronaries. I sees me on a regular basis. He was hospitalized May 2024 with 6 different infections in 7 months (thigh, calf, left knee, left ring finger). He was admitted, cultures positive for GPC. 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 Inconclusive for veg, MART was without veg. He was on prolonged IV Abx via picc line, last was July 3.     He mentions over the last few weeks he has experienced a significant decline in functional capacity, lack of appetite. He mentions chills/rigors with this at night occasionally.     Fatigue  Associated symptoms include chills and fatigue. Pertinent negatives include no chest pain, coughing, diaphoresis, fever, myalgias or weakness.        Review of Systems   Constitutional: Positive for chills, fatigue and malaise/fatigue. Negative for diaphoresis, fever, weight gain and weight loss.   Eyes:  Negative for visual disturbance.   Cardiovascular:  Negative for chest pain, claudication, dyspnea on exertion, irregular heartbeat, leg swelling, near-syncope, orthopnea, palpitations, paroxysmal nocturnal dyspnea and syncope.   Respiratory:  Negative for cough, shortness of breath and wheezing.    Musculoskeletal:  Negative for muscle weakness and myalgias.   Neurological:  Negative for dizziness and weakness.   All other systems reviewed and are negative.       Medical History[1]     Surgical History[2]     Social History     Socioeconomic History    Marital status:      Spouse name: Not on file    Number of children: Not on file    Years of education: Not on file    Highest education level: Not on file   Occupational History    Not on file   Tobacco Use    Smoking status: Never    Smokeless tobacco: Never    Substance and Sexual Activity    Alcohol use: Yes     Alcohol/week: 1.0 standard drink of alcohol     Types: 1 Standard drinks or equivalent per week    Drug use: Never    Sexual activity: Not on file   Other Topics Concern    Not on file   Social History Narrative    Not on file     Social Drivers of Health     Financial Resource Strain: Low Risk  (5/22/2024)    Overall Financial Resource Strain (CARDIA)     Difficulty of Paying Living Expenses: Not hard at all   Food Insecurity: Not on file   Transportation Needs: No Transportation Needs (7/3/2024)    OASIS : Transportation     Lack of Transportation (Medical): No     Lack of Transportation (Non-Medical): No     Patient Unable or Declines to Respond: No   Physical Activity: Not on file   Stress: Not on file   Social Connections: Feeling Socially Integrated (7/3/2024)    OASIS : Social Isolation     Frequency of experiencing loneliness or isolation: Never   Intimate Partner Violence: Not on file   Housing Stability: Low Risk  (5/22/2024)    Housing Stability Vital Sign     Unable to Pay for Housing in the Last Year: No     Number of Places Lived in the Last Year: 1     Unstable Housing in the Last Year: No        Family History[3]     OBJECTIVE:    Vitals:    05/06/25 0936   BP: 110/70   Pulse: 78   SpO2: 98%        Vitals reviewed.   Constitutional:       Appearance: Normal and healthy appearance. Not in distress.   Pulmonary:      Effort: Pulmonary effort is normal.      Breath sounds: Normal breath sounds.   Cardiovascular:      Normal rate. Regular rhythm. Normal S1. Normal S2.       Murmurs: There is a grade 2/6 early systolic murmur at the URSB.      No gallop.  No click.   Pulses:     Intact distal pulses.   Edema:     Peripheral edema absent.   Skin:     General: Skin is warm and dry.   Neurological:      General: No focal deficit present.          Lab Review:   Lab Results   Component Value Date    CHOL 127 04/03/2025    TRIG 62 04/03/2025     HDL 48 04/03/2025       Lab Results   Component Value Date    LDLCALC 65 04/03/2025        S/P AVR (aortic valve replacement)  His echocardiogram performed last year suggested already some stenosis of his prosthetic valve.  I am moving up his yearly echo due to his constitutional symptoms.  Also arranging for blood cultures, a CBC, and metabolic panel.  Will be in contact with him regarding the results.            [1]   Past Medical History:  Diagnosis Date    Abscess of left index finger 07/03/2024    Aortic valve stenosis     Closed fracture of distal end of humerus 03/10/2025    Elbow pain 03/10/2025    Infection     Multiple    Low testosterone     MRSA (methicillin resistant Staphylococcus aureus) septicemia (Multi) 07/03/2024   [2]   Past Surgical History:  Procedure Laterality Date    AORTIC VALVE REPLACEMENT      CERVICAL SPINE SURGERY      implant    ELBOW BURSA SURGERY Right     related to infection    HERNIA REPAIR      JOINT REPLACEMENT Right     knee    MR HEAD ANGIO WO IV CONTRAST  02/24/2023    MR HEAD ANGIO WO IV CONTRAST [facility]    MR HEAD ANGIO WO IV CONTRAST  09/05/2023    MR HEAD ANGIO WO IV CONTRAST [facility]    MR NECK ANGIO WO IV CONTRAST  12/20/2012    MR NECK ANGIO WO IV CONTRAST [facility]   [3]   Family History  Problem Relation Name Age of Onset    Autoimmune disease Mother      Hypertension Mother      PTSD Father      Benign prostatic hyperplasia Father      No Known Problems Sister      Other (htn) Brother      Other (hld) Brother      No Known Problems Daughter      No Known Problems Daughter      Other (watchman) Maternal Grandfather      Other (cath) Maternal Grandfather      Other (3x bypass) Maternal Grandfather      Heart disease Maternal Grandfather

## 2025-05-08 LAB
ANION GAP SERPL CALCULATED.4IONS-SCNC: 9 MMOL/L (CALC) (ref 7–17)
BUN SERPL-MCNC: 16 MG/DL (ref 7–25)
BUN/CREAT SERPL: ABNORMAL (CALC) (ref 6–22)
CALCIUM SERPL-MCNC: 8.9 MG/DL (ref 8.6–10.3)
CHLORIDE SERPL-SCNC: 105 MMOL/L (ref 98–110)
CO2 SERPL-SCNC: 25 MMOL/L (ref 20–32)
CREAT SERPL-MCNC: 1.05 MG/DL (ref 0.7–1.3)
EGFRCR SERPLBLD CKD-EPI 2021: 85 ML/MIN/1.73M2
ERYTHROCYTE [DISTWIDTH] IN BLOOD BY AUTOMATED COUNT: 11.9 % (ref 11–15)
GLUCOSE SERPL-MCNC: 103 MG/DL (ref 65–99)
HCT VFR BLD AUTO: 44.7 % (ref 38.5–50)
HGB BLD-MCNC: 14.4 G/DL (ref 13.2–17.1)
MCH RBC QN AUTO: 29.2 PG (ref 27–33)
MCHC RBC AUTO-ENTMCNC: 32.2 G/DL (ref 32–36)
MCV RBC AUTO: 90.7 FL (ref 80–100)
PLATELET # BLD AUTO: 188 THOUSAND/UL (ref 140–400)
PMV BLD REES-ECKER: 10.6 FL (ref 7.5–12.5)
POTASSIUM SERPL-SCNC: 4.9 MMOL/L (ref 3.5–5.3)
RBC # BLD AUTO: 4.93 MILLION/UL (ref 4.2–5.8)
SODIUM SERPL-SCNC: 139 MMOL/L (ref 135–146)
WBC # BLD AUTO: 11.5 THOUSAND/UL (ref 3.8–10.8)

## 2025-05-09 ENCOUNTER — HOSPITAL ENCOUNTER (INPATIENT)
Facility: HOSPITAL | Age: 55
DRG: 270 | End: 2025-05-09
Attending: STUDENT IN AN ORGANIZED HEALTH CARE EDUCATION/TRAINING PROGRAM | Admitting: INTERNAL MEDICINE
Payer: COMMERCIAL

## 2025-05-09 ENCOUNTER — APPOINTMENT (OUTPATIENT)
Dept: CARDIOLOGY | Facility: HOSPITAL | Age: 55
DRG: 270 | End: 2025-05-09
Payer: COMMERCIAL

## 2025-05-09 ENCOUNTER — APPOINTMENT (OUTPATIENT)
Dept: RADIOLOGY | Facility: HOSPITAL | Age: 55
DRG: 270 | End: 2025-05-09
Payer: COMMERCIAL

## 2025-05-09 DIAGNOSIS — I73.9 CLAUDICATION: ICD-10-CM

## 2025-05-09 DIAGNOSIS — D73.89 LESION OF SPLEEN: ICD-10-CM

## 2025-05-09 DIAGNOSIS — I21.4 NSTEMI (NON-ST ELEVATED MYOCARDIAL INFARCTION) (MULTI): Primary | ICD-10-CM

## 2025-05-09 DIAGNOSIS — K55.069 OCCLUSION OF SUPERIOR MESENTERIC ARTERY (MULTI): ICD-10-CM

## 2025-05-09 DIAGNOSIS — K55.1 CHRONIC VASCULAR DISORDERS OF INTESTINE: ICD-10-CM

## 2025-05-09 PROBLEM — R93.89 ABNORMAL CAT SCAN: Status: ACTIVE | Noted: 2025-05-09

## 2025-05-09 LAB
ALBUMIN SERPL BCP-MCNC: 3.9 G/DL (ref 3.4–5)
ALP SERPL-CCNC: 53 U/L (ref 33–120)
ALT SERPL W P-5'-P-CCNC: 14 U/L (ref 10–52)
ANION GAP SERPL CALCULATED.3IONS-SCNC: 13 MMOL/L (ref 10–20)
APPEARANCE UR: CLEAR
AST SERPL W P-5'-P-CCNC: 16 U/L (ref 9–39)
BASOPHILS # BLD AUTO: 0.06 X10*3/UL (ref 0–0.1)
BASOPHILS NFR BLD AUTO: 0.6 %
BILIRUB SERPL-MCNC: 0.7 MG/DL (ref 0–1.2)
BILIRUB UR STRIP.AUTO-MCNC: NEGATIVE MG/DL
BUN SERPL-MCNC: 15 MG/DL (ref 6–23)
CALCIUM SERPL-MCNC: 8.8 MG/DL (ref 8.6–10.3)
CARDIAC TROPONIN I PNL SERPL HS: 104 NG/L (ref 0–20)
CARDIAC TROPONIN I PNL SERPL HS: 120 NG/L (ref 0–20)
CARDIAC TROPONIN I PNL SERPL HS: 87 NG/L (ref 0–20)
CHLORIDE SERPL-SCNC: 107 MMOL/L (ref 98–107)
CO2 SERPL-SCNC: 22 MMOL/L (ref 21–32)
COLOR UR: ABNORMAL
CREAT SERPL-MCNC: 1.05 MG/DL (ref 0.5–1.3)
EGFRCR SERPLBLD CKD-EPI 2021: 85 ML/MIN/1.73M*2
EOSINOPHIL # BLD AUTO: 0.08 X10*3/UL (ref 0–0.7)
EOSINOPHIL NFR BLD AUTO: 0.8 %
ERYTHROCYTE [DISTWIDTH] IN BLOOD BY AUTOMATED COUNT: 12.1 % (ref 11.5–14.5)
FLUAV RNA RESP QL NAA+PROBE: NOT DETECTED
FLUBV RNA RESP QL NAA+PROBE: NOT DETECTED
GLUCOSE SERPL-MCNC: 99 MG/DL (ref 74–99)
GLUCOSE UR STRIP.AUTO-MCNC: NORMAL MG/DL
HCT VFR BLD AUTO: 43.4 % (ref 41–52)
HGB BLD-MCNC: 14.5 G/DL (ref 13.5–17.5)
HOLD SPECIMEN: NORMAL
IMM GRANULOCYTES # BLD AUTO: 0.03 X10*3/UL (ref 0–0.7)
IMM GRANULOCYTES NFR BLD AUTO: 0.3 % (ref 0–0.9)
KETONES UR STRIP.AUTO-MCNC: ABNORMAL MG/DL
LACTATE SERPL-SCNC: 0.6 MMOL/L (ref 0.4–2)
LEUKOCYTE ESTERASE UR QL STRIP.AUTO: NEGATIVE
LIPASE SERPL-CCNC: 12 U/L (ref 9–82)
LYMPHOCYTES # BLD AUTO: 1.18 X10*3/UL (ref 1.2–4.8)
LYMPHOCYTES NFR BLD AUTO: 12.1 %
MCH RBC QN AUTO: 29.8 PG (ref 26–34)
MCHC RBC AUTO-ENTMCNC: 33.4 G/DL (ref 32–36)
MCV RBC AUTO: 89 FL (ref 80–100)
MONOCYTES # BLD AUTO: 0.78 X10*3/UL (ref 0.1–1)
MONOCYTES NFR BLD AUTO: 8 %
NEUTROPHILS # BLD AUTO: 7.66 X10*3/UL (ref 1.2–7.7)
NEUTROPHILS NFR BLD AUTO: 78.2 %
NITRITE UR QL STRIP.AUTO: NEGATIVE
NRBC BLD-RTO: 0 /100 WBCS (ref 0–0)
PH UR STRIP.AUTO: 5.5 [PH]
PLATELET # BLD AUTO: 180 X10*3/UL (ref 150–450)
POTASSIUM SERPL-SCNC: 4.1 MMOL/L (ref 3.5–5.3)
PROT SERPL-MCNC: 6.7 G/DL (ref 6.4–8.2)
PROT UR STRIP.AUTO-MCNC: NEGATIVE MG/DL
RBC # BLD AUTO: 4.86 X10*6/UL (ref 4.5–5.9)
RBC # UR STRIP.AUTO: NEGATIVE MG/DL
SARS-COV-2 RNA RESP QL NAA+PROBE: NOT DETECTED
SODIUM SERPL-SCNC: 138 MMOL/L (ref 136–145)
SP GR UR STRIP.AUTO: >1.05
TSH SERPL-ACNC: 1 MIU/L (ref 0.44–3.98)
UROBILINOGEN UR STRIP.AUTO-MCNC: NORMAL MG/DL
WBC # BLD AUTO: 9.8 X10*3/UL (ref 4.4–11.3)

## 2025-05-09 PROCEDURE — 81003 URINALYSIS AUTO W/O SCOPE: CPT | Performed by: STUDENT IN AN ORGANIZED HEALTH CARE EDUCATION/TRAINING PROGRAM

## 2025-05-09 PROCEDURE — 83605 ASSAY OF LACTIC ACID: CPT | Performed by: STUDENT IN AN ORGANIZED HEALTH CARE EDUCATION/TRAINING PROGRAM

## 2025-05-09 PROCEDURE — 2550000001 HC RX 255 CONTRASTS: Performed by: STUDENT IN AN ORGANIZED HEALTH CARE EDUCATION/TRAINING PROGRAM

## 2025-05-09 PROCEDURE — 93005 ELECTROCARDIOGRAM TRACING: CPT

## 2025-05-09 PROCEDURE — 85025 COMPLETE CBC W/AUTO DIFF WBC: CPT | Performed by: STUDENT IN AN ORGANIZED HEALTH CARE EDUCATION/TRAINING PROGRAM

## 2025-05-09 PROCEDURE — 84075 ASSAY ALKALINE PHOSPHATASE: CPT | Performed by: STUDENT IN AN ORGANIZED HEALTH CARE EDUCATION/TRAINING PROGRAM

## 2025-05-09 PROCEDURE — 96361 HYDRATE IV INFUSION ADD-ON: CPT

## 2025-05-09 PROCEDURE — 71046 X-RAY EXAM CHEST 2 VIEWS: CPT | Performed by: RADIOLOGY

## 2025-05-09 PROCEDURE — 76705 ECHO EXAM OF ABDOMEN: CPT | Performed by: RADIOLOGY

## 2025-05-09 PROCEDURE — 71046 X-RAY EXAM CHEST 2 VIEWS: CPT

## 2025-05-09 PROCEDURE — 2500000001 HC RX 250 WO HCPCS SELF ADMINISTERED DRUGS (ALT 637 FOR MEDICARE OP): Performed by: INTERNAL MEDICINE

## 2025-05-09 PROCEDURE — 84484 ASSAY OF TROPONIN QUANT: CPT | Performed by: STUDENT IN AN ORGANIZED HEALTH CARE EDUCATION/TRAINING PROGRAM

## 2025-05-09 PROCEDURE — 87636 SARSCOV2 & INF A&B AMP PRB: CPT | Performed by: STUDENT IN AN ORGANIZED HEALTH CARE EDUCATION/TRAINING PROGRAM

## 2025-05-09 PROCEDURE — 93306 TTE W/DOPPLER COMPLETE: CPT

## 2025-05-09 PROCEDURE — 74177 CT ABD & PELVIS W/CONTRAST: CPT

## 2025-05-09 PROCEDURE — 99223 1ST HOSP IP/OBS HIGH 75: CPT | Performed by: INTERNAL MEDICINE

## 2025-05-09 PROCEDURE — 84484 ASSAY OF TROPONIN QUANT: CPT | Performed by: INTERNAL MEDICINE

## 2025-05-09 PROCEDURE — 87040 BLOOD CULTURE FOR BACTERIA: CPT | Mod: WESLAB | Performed by: STUDENT IN AN ORGANIZED HEALTH CARE EDUCATION/TRAINING PROGRAM

## 2025-05-09 PROCEDURE — 99222 1ST HOSP IP/OBS MODERATE 55: CPT | Performed by: NURSE PRACTITIONER

## 2025-05-09 PROCEDURE — 84443 ASSAY THYROID STIM HORMONE: CPT | Performed by: INTERNAL MEDICINE

## 2025-05-09 PROCEDURE — 76705 ECHO EXAM OF ABDOMEN: CPT

## 2025-05-09 PROCEDURE — 2060000001 HC INTERMEDIATE ICU ROOM DAILY

## 2025-05-09 PROCEDURE — 83690 ASSAY OF LIPASE: CPT | Performed by: STUDENT IN AN ORGANIZED HEALTH CARE EDUCATION/TRAINING PROGRAM

## 2025-05-09 PROCEDURE — 36415 COLL VENOUS BLD VENIPUNCTURE: CPT | Performed by: STUDENT IN AN ORGANIZED HEALTH CARE EDUCATION/TRAINING PROGRAM

## 2025-05-09 PROCEDURE — 2500000004 HC RX 250 GENERAL PHARMACY W/ HCPCS (ALT 636 FOR OP/ED): Performed by: STUDENT IN AN ORGANIZED HEALTH CARE EDUCATION/TRAINING PROGRAM

## 2025-05-09 PROCEDURE — 96374 THER/PROPH/DIAG INJ IV PUSH: CPT

## 2025-05-09 PROCEDURE — 99291 CRITICAL CARE FIRST HOUR: CPT | Mod: 25 | Performed by: STUDENT IN AN ORGANIZED HEALTH CARE EDUCATION/TRAINING PROGRAM

## 2025-05-09 PROCEDURE — 93306 TTE W/DOPPLER COMPLETE: CPT | Performed by: INTERNAL MEDICINE

## 2025-05-09 PROCEDURE — 74177 CT ABD & PELVIS W/CONTRAST: CPT | Performed by: STUDENT IN AN ORGANIZED HEALTH CARE EDUCATION/TRAINING PROGRAM

## 2025-05-09 RX ORDER — TADALAFIL 5 MG/1
5 TABLET ORAL
COMMUNITY
End: 2025-05-16 | Stop reason: HOSPADM

## 2025-05-09 RX ORDER — PANTOPRAZOLE SODIUM 40 MG/1
40 TABLET, DELAYED RELEASE ORAL DAILY
Status: DISCONTINUED | OUTPATIENT
Start: 2025-05-09 | End: 2025-05-10

## 2025-05-09 RX ORDER — TAMOXIFEN CITRATE 10 MG/1
20 TABLET ORAL 2 TIMES WEEKLY
Status: DISCONTINUED | OUTPATIENT
Start: 2025-05-09 | End: 2025-05-09

## 2025-05-09 RX ORDER — TRAZODONE HYDROCHLORIDE 50 MG/1
50 TABLET ORAL NIGHTLY PRN
Status: DISCONTINUED | OUTPATIENT
Start: 2025-05-09 | End: 2025-05-16 | Stop reason: HOSPADM

## 2025-05-09 RX ORDER — ASPIRIN 325 MG
325 TABLET ORAL DAILY
Status: DISCONTINUED | OUTPATIENT
Start: 2025-05-09 | End: 2025-05-10

## 2025-05-09 RX ORDER — FLUOXETINE 20 MG/1
20 CAPSULE ORAL DAILY
Status: DISCONTINUED | OUTPATIENT
Start: 2025-05-10 | End: 2025-05-16 | Stop reason: HOSPADM

## 2025-05-09 RX ORDER — ANASTROZOLE 1 MG/1
0.5 TABLET ORAL 2 TIMES WEEKLY
Status: DISCONTINUED | OUTPATIENT
Start: 2025-05-09 | End: 2025-05-16 | Stop reason: HOSPADM

## 2025-05-09 RX ORDER — VANCOMYCIN 1.5 G/300ML
1500 INJECTION, SOLUTION INTRAVENOUS ONCE
Status: COMPLETED | OUTPATIENT
Start: 2025-05-09 | End: 2025-05-09

## 2025-05-09 RX ADMIN — ASPIRIN 325 MG ORAL TABLET 325 MG: 325 PILL ORAL at 17:58

## 2025-05-09 RX ADMIN — IOHEXOL 75 ML: 350 INJECTION, SOLUTION INTRAVENOUS at 09:29

## 2025-05-09 RX ADMIN — CEFEPIME 2 G: 2 INJECTION, POWDER, FOR SOLUTION INTRAVENOUS at 11:14

## 2025-05-09 RX ADMIN — SODIUM CHLORIDE, SODIUM LACTATE, POTASSIUM CHLORIDE, AND CALCIUM CHLORIDE 1000 ML: 600; 310; 30; 20 INJECTION, SOLUTION INTRAVENOUS at 09:17

## 2025-05-09 RX ADMIN — PANTOPRAZOLE SODIUM 40 MG: 40 TABLET, DELAYED RELEASE ORAL at 17:58

## 2025-05-09 RX ADMIN — VANCOMYCIN 1.5 G: 1.5 INJECTION, SOLUTION INTRAVENOUS at 11:54

## 2025-05-09 SDOH — ECONOMIC STABILITY: FOOD INSECURITY: HOW HARD IS IT FOR YOU TO PAY FOR THE VERY BASICS LIKE FOOD, HOUSING, MEDICAL CARE, AND HEATING?: NOT HARD AT ALL

## 2025-05-09 SDOH — ECONOMIC STABILITY: HOUSING INSECURITY

## 2025-05-09 SDOH — SOCIAL STABILITY: SOCIAL INSECURITY: ARE THERE ANY APPARENT SIGNS OF INJURIES/BEHAVIORS THAT COULD BE RELATED TO ABUSE/NEGLECT?: NO

## 2025-05-09 SDOH — HEALTH STABILITY: MENTAL HEALTH: HOW MANY DRINKS CONTAINING ALCOHOL DO YOU HAVE ON A TYPICAL DAY WHEN YOU ARE DRINKING?: 1 OR 2

## 2025-05-09 SDOH — SOCIAL STABILITY: SOCIAL INSECURITY
WITHIN THE LAST YEAR, HAVE YOU BEEN RAPED OR FORCED TO HAVE ANY KIND OF SEXUAL ACTIVITY BY YOUR PARTNER OR EX-PARTNER?: NO

## 2025-05-09 SDOH — SOCIAL STABILITY: SOCIAL NETWORK: HOW OFTEN DO YOU ATTEND CHURCH OR RELIGIOUS SERVICES?: NEVER

## 2025-05-09 SDOH — ECONOMIC STABILITY: INCOME INSECURITY: IN THE PAST 12 MONTHS HAS THE ELECTRIC, GAS, OIL, OR WATER COMPANY THREATENED TO SHUT OFF SERVICES IN YOUR HOME?: NO

## 2025-05-09 SDOH — ECONOMIC STABILITY: INCOME INSECURITY: IN THE LAST 12 MONTHS, WAS THERE A TIME WHEN YOU WERE NOT ABLE TO PAY THE MORTGAGE OR RENT ON TIME?: NO

## 2025-05-09 SDOH — SOCIAL STABILITY: SOCIAL NETWORK: HOW OFTEN DO YOU GET TOGETHER WITH FRIENDS OR RELATIVES?: TWICE A WEEK

## 2025-05-09 SDOH — SOCIAL STABILITY: SOCIAL INSECURITY: DO YOU FEEL ANYONE HAS EXPLOITED OR TAKEN ADVANTAGE OF YOU FINANCIALLY OR OF YOUR PERSONAL PROPERTY?: NO

## 2025-05-09 SDOH — ECONOMIC STABILITY: FOOD INSECURITY: WITHIN THE PAST 12 MONTHS, YOU WORRIED THAT YOUR FOOD WOULD RUN OUT BEFORE YOU GOT THE MONEY TO BUY MORE.: NEVER TRUE

## 2025-05-09 SDOH — SOCIAL STABILITY: SOCIAL NETWORK
DO YOU BELONG TO ANY CLUBS OR ORGANIZATIONS SUCH AS CHURCH GROUPS, UNIONS, FRATERNAL OR ATHLETIC GROUPS, OR SCHOOL GROUPS?: NO

## 2025-05-09 SDOH — ECONOMIC STABILITY: FOOD INSECURITY: WITHIN THE PAST 12 MONTHS, YOU WORRIED THAT YOUR FOOD WOULD RUN OUT BEFORE YOU GOT MONEY TO BUY MORE.: NEVER TRUE

## 2025-05-09 SDOH — HEALTH STABILITY: PHYSICAL HEALTH: ON AVERAGE, HOW MANY DAYS PER WEEK DO YOU ENGAGE IN MODERATE TO STRENUOUS EXERCISE (LIKE A BRISK WALK)?: 0 DAYS

## 2025-05-09 SDOH — HEALTH STABILITY: MENTAL HEALTH
DO YOU FEEL STRESS - TENSE, RESTLESS, NERVOUS, OR ANXIOUS, OR UNABLE TO SLEEP AT NIGHT BECAUSE YOUR MIND IS TROUBLED ALL THE TIME - THESE DAYS?: NOT AT ALL

## 2025-05-09 SDOH — HEALTH STABILITY: PHYSICAL HEALTH
HOW OFTEN DO YOU NEED TO HAVE SOMEONE HELP YOU WHEN YOU READ INSTRUCTIONS, PAMPHLETS, OR OTHER WRITTEN MATERIAL FROM YOUR DOCTOR OR PHARMACY?: NEVER

## 2025-05-09 SDOH — HEALTH STABILITY: MENTAL HEALTH: HOW OFTEN DO YOU HAVE SIX OR MORE DRINKS ON ONE OCCASION?: NEVER

## 2025-05-09 SDOH — SOCIAL STABILITY: SOCIAL INSECURITY: DO YOU FEEL UNSAFE GOING BACK TO THE PLACE WHERE YOU ARE LIVING?: NO

## 2025-05-09 SDOH — HEALTH STABILITY: PHYSICAL HEALTH: ON AVERAGE, HOW MANY MINUTES DO YOU ENGAGE IN EXERCISE AT THIS LEVEL?: 0 MIN

## 2025-05-09 SDOH — SOCIAL STABILITY: SOCIAL INSECURITY: WITHIN THE LAST YEAR, HAVE YOU BEEN HUMILIATED OR EMOTIONALLY ABUSED IN OTHER WAYS BY YOUR PARTNER OR EX-PARTNER?: NO

## 2025-05-09 SDOH — SOCIAL STABILITY: SOCIAL INSECURITY
WITHIN THE LAST YEAR, HAVE YOU BEEN KICKED, HIT, SLAPPED, OR OTHERWISE PHYSICALLY HURT BY YOUR PARTNER OR EX-PARTNER?: NO

## 2025-05-09 SDOH — ECONOMIC STABILITY: FOOD INSECURITY: WITHIN THE PAST 12 MONTHS, THE FOOD YOU BOUGHT JUST DIDN'T LAST AND YOU DIDN'T HAVE MONEY TO GET MORE.: NEVER TRUE

## 2025-05-09 SDOH — SOCIAL STABILITY: SOCIAL INSECURITY: ARE YOU OR HAVE YOU BEEN THREATENED OR ABUSED PHYSICALLY, EMOTIONALLY, OR SEXUALLY BY ANYONE?: NO

## 2025-05-09 SDOH — ECONOMIC STABILITY: TRANSPORTATION INSECURITY

## 2025-05-09 SDOH — ECONOMIC STABILITY: HOUSING INSECURITY: AT ANY TIME IN THE PAST 12 MONTHS, WERE YOU HOMELESS OR LIVING IN A SHELTER (INCLUDING NOW)?: NO

## 2025-05-09 SDOH — HEALTH STABILITY: MENTAL HEALTH: HOW OFTEN DO YOU HAVE A DRINK CONTAINING ALCOHOL?: 4 OR MORE TIMES A WEEK

## 2025-05-09 SDOH — ECONOMIC STABILITY: GENERAL

## 2025-05-09 SDOH — SOCIAL STABILITY: SOCIAL INSECURITY: WERE YOU ABLE TO COMPLETE ALL THE BEHAVIORAL HEALTH SCREENINGS?: YES

## 2025-05-09 SDOH — SOCIAL STABILITY: SOCIAL INSECURITY: HAVE YOU HAD ANY THOUGHTS OF HARMING ANYONE ELSE?: NO

## 2025-05-09 SDOH — SOCIAL STABILITY: SOCIAL INSECURITY: WITHIN THE LAST YEAR, HAVE YOU BEEN AFRAID OF YOUR PARTNER OR EX-PARTNER?: NO

## 2025-05-09 SDOH — ECONOMIC STABILITY: HOUSING INSECURITY: IN THE PAST 12 MONTHS, HOW MANY TIMES HAVE YOU MOVED WHERE YOU WERE LIVING?: 0

## 2025-05-09 SDOH — SOCIAL STABILITY: SOCIAL NETWORK: HOW OFTEN DO YOU ATTEND MEETINGS OF THE CLUBS OR ORGANIZATIONS YOU BELONG TO?: NEVER

## 2025-05-09 SDOH — SOCIAL STABILITY: SOCIAL INSECURITY: DOES ANYONE TRY TO KEEP YOU FROM HAVING/CONTACTING OTHER FRIENDS OR DOING THINGS OUTSIDE YOUR HOME?: NO

## 2025-05-09 SDOH — ECONOMIC STABILITY: FOOD INSECURITY

## 2025-05-09 SDOH — ECONOMIC STABILITY: TRANSPORTATION INSECURITY: IN THE PAST 12 MONTHS, HAS LACK OF TRANSPORTATION KEPT YOU FROM MEDICAL APPOINTMENTS OR FROM GETTING MEDICATIONS?: NO

## 2025-05-09 SDOH — SOCIAL STABILITY: SOCIAL INSECURITY: HAVE YOU HAD THOUGHTS OF HARMING ANYONE ELSE?: NO

## 2025-05-09 SDOH — SOCIAL STABILITY: SOCIAL INSECURITY: ARE YOU MARRIED, WIDOWED, DIVORCED, SEPARATED, NEVER MARRIED, OR LIVING WITH A PARTNER?: MARRIED

## 2025-05-09 SDOH — ECONOMIC STABILITY: HOUSING INSECURITY: IN THE LAST 12 MONTHS, WAS THERE A TIME WHEN YOU WERE NOT ABLE TO PAY THE MORTGAGE OR RENT ON TIME?: NO

## 2025-05-09 SDOH — ECONOMIC STABILITY: HOUSING INSECURITY: IN THE LAST 12 MONTHS, HOW MANY PLACES HAVE YOU LIVED?: 1

## 2025-05-09 SDOH — SOCIAL STABILITY: SOCIAL NETWORK
IN A TYPICAL WEEK, HOW MANY TIMES DO YOU TALK ON THE PHONE WITH FAMILY, FRIENDS, OR NEIGHBORS?: MORE THAN THREE TIMES A WEEK

## 2025-05-09 SDOH — ECONOMIC STABILITY: HOUSING INSECURITY: IN THE PAST 12 MONTHS HAS THE ELECTRIC, GAS, OIL, OR WATER COMPANY THREATENED TO SHUT OFF SERVICES IN YOUR HOME?: NO

## 2025-05-09 SDOH — SOCIAL STABILITY: SOCIAL INSECURITY: HAS ANYONE EVER THREATENED TO HURT YOUR FAMILY OR YOUR PETS?: NO

## 2025-05-09 SDOH — SOCIAL STABILITY: SOCIAL INSECURITY: ABUSE: ADULT

## 2025-05-09 ASSESSMENT — COGNITIVE AND FUNCTIONAL STATUS - GENERAL
MOBILITY SCORE: 24
MOBILITY SCORE: 24
PATIENT BASELINE BEDBOUND: NO
DAILY ACTIVITIY SCORE: 24
MOBILITY SCORE: 24
DAILY ACTIVITIY SCORE: 24
DAILY ACTIVITIY SCORE: 24

## 2025-05-09 ASSESSMENT — ACTIVITIES OF DAILY LIVING (ADL)
DRESSING YOURSELF: INDEPENDENT
JUDGMENT_ADEQUATE_SAFELY_COMPLETE_DAILY_ACTIVITIES: YES
LACK_OF_TRANSPORTATION: NO
BATHING: INDEPENDENT
TOILETING: INDEPENDENT
HEARING - RIGHT EAR: FUNCTIONAL
GROOMING: INDEPENDENT
PATIENT'S MEMORY ADEQUATE TO SAFELY COMPLETE DAILY ACTIVITIES?: YES
ADEQUATE_TO_COMPLETE_ADL: YES
LACK_OF_TRANSPORTATION: NO
LACK_OF_TRANSPORTATION: NO
WALKS IN HOME: INDEPENDENT
FEEDING YOURSELF: INDEPENDENT
HEARING - LEFT EAR: FUNCTIONAL

## 2025-05-09 ASSESSMENT — PAIN SCALES - GENERAL
PAINLEVEL_OUTOF10: 0 - NO PAIN
PAINLEVEL_OUTOF10: 0 - NO PAIN

## 2025-05-09 ASSESSMENT — LIFESTYLE VARIABLES
HOW OFTEN DO YOU HAVE A DRINK CONTAINING ALCOHOL: NEVER
AUDIT-C TOTAL SCORE: 0
PRESCIPTION_ABUSE_PAST_12_MONTHS: NO
SUBSTANCE_ABUSE_PAST_12_MONTHS: NO
AUDIT-C TOTAL SCORE: 4
SKIP TO QUESTIONS 9-10: 1
HOW OFTEN DO YOU HAVE 6 OR MORE DRINKS ON ONE OCCASION: NEVER
SKIP TO QUESTIONS 9-10: 1
HOW MANY STANDARD DRINKS CONTAINING ALCOHOL DO YOU HAVE ON A TYPICAL DAY: PATIENT DOES NOT DRINK
AUDIT-C TOTAL SCORE: 0

## 2025-05-09 ASSESSMENT — SOCIAL DETERMINANTS OF HEALTH (SDOH): IN THE PAST 12 MONTHS, HAS THE ELECTRIC, GAS, OIL, OR WATER COMPANY THREATENED TO SHUT OFF SERVICE IN YOUR HOME?: NO

## 2025-05-09 ASSESSMENT — PATIENT HEALTH QUESTIONNAIRE - PHQ9
SUM OF ALL RESPONSES TO PHQ9 QUESTIONS 1 & 2: 0
1. LITTLE INTEREST OR PLEASURE IN DOING THINGS: NOT AT ALL
2. FEELING DOWN, DEPRESSED OR HOPELESS: NOT AT ALL

## 2025-05-09 ASSESSMENT — ENCOUNTER SYMPTOMS
NAUSEA: 1
FEVER: 1
CHILLS: 1
DYSPNEA ON EXERTION: 0
ORTHOPNEA: 0
VOMITING: 1

## 2025-05-09 ASSESSMENT — PAIN - FUNCTIONAL ASSESSMENT: PAIN_FUNCTIONAL_ASSESSMENT: 0-10

## 2025-05-09 ASSESSMENT — PAIN SCALES - WONG BAKER: WONGBAKER_NUMERICALRESPONSE: NO HURT

## 2025-05-09 ASSESSMENT — PAIN DESCRIPTION - PROGRESSION: CLINICAL_PROGRESSION: NOT CHANGED

## 2025-05-09 NOTE — PROGRESS NOTES
Pharmacy Medication History Review    Cristhian Thonrton is a 54 y.o. male. Pharmacy reviewed the patient's [id number] medications and allergies for accuracy.    Medications ADDED:  Vitamin D3 oral  Calcium oral  Vitamin B-12 oral  Magnesium citrate oral  Tadalafil 5mg  Medications CHANGED:  Anastrazole oral - 0.25mg twice weekly  Methocarbamol 500mg -  - not taking   Medications REMOVED:   Tamoxifen 20mg      The list below reflects the updated PTA list. Comments regarding how patient may be taking medications differently can be found in the Admit Orders Activity  Prior to Admission Medications   Prescriptions Last Dose Informant   ANASTROZOLE ORAL 2025 Morning Self   Sig: Take 0.25 mg by mouth 2 times a week.   CALCIUM ORAL Past Week Self   Sig: Take 1 each by mouth once daily.   FLUoxetine (PROzac) 20 mg capsule 2025 Morning Self   Sig: TAKE 1 CAPSULE BY MOUTH EVERY DAY   MAGNESIUM CITRATE ORAL Past Week Self   Sig: Take 1 each by mouth once daily.   cholecalciferol, vitamin D3, (VITAMIN D3 ORAL) Past Week Self   Sig: Take 1 each by mouth once daily.   cyanocobalamin, vitamin B-12, (VITAMIN B-12 ORAL) Past Week Self   Sig: Take 1 each by mouth once daily.   meloxicam (Mobic) 15 mg tablet 2025 Morning Self   Sig: TAKE 1 TABLET BY MOUTH EVERY DAY   tadalafil (Cialis) 5 mg tablet 2025 Self   Sig: Take 1 tablet (5 mg) by mouth 1 (one) time per week.    testosterone cypionate (Depo-Testosterone) 100 mg/mL injection 2025 Self   Si.75 mL (75 mg) every 7 days. Receives injection every    traZODone (Desyrel) 50 mg tablet 2025 Evening Self   Sig: TAKE 1 TABLET (50 MG) BY MOUTH AS NEEDED AT BEDTIME FOR SLEEP.      Facility-Administered Medications: None        The list below reflects the updated allergy list. Please review each documented allergy for additional clarification and justification.  Allergies  Reviewed by Jennie Caruso CPhT on 2025        Severity  Reactions Comments    Acyclovir Not Specified Unknown     Opioids - Morphine Analogues Not Specified Unknown     Hydromorphone Low Rash             Pharmacy has been updated to [facility].    Sources used to complete the med history include dispense history, PTA medication list, patient interview. Patient is a good historian.    Below are additional concerns with the patient's PTA list.  none    Jennie Caruso, Luisana-Adv  Please reach out via [employer] Secure Chat for questions

## 2025-05-09 NOTE — CARE PLAN
Pt does not have a POA or Living Will  ADOD:  5 days    Pt lives at home with his wife in a 2 story home with a basement and 1 step to climb to enter the home  Pts bed and bath are on the 2nd floor. No issues navigating the stairs.   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  He does not use home o2,cpap, bipap. He does not use any assistive device for ambulation  He wears glasses, no hearing aids  He is independent with ADL's  He denies depression or anxiety  His PCP is Dr. Amberly Del Castillo from   He uses [facility] on [address] in [place]; no financial issues, able to pay for meds  Pt is here with a NSTEMI  No anticipated discharge needs at this time    DISCHARGE PLAN: HOME WITH WIFE

## 2025-05-09 NOTE — ED PROVIDER NOTES
HPI   Chief Complaint   Patient presents with    Chills       This is a 54-year-old male with a past medical history of aortic valve replacement in 2020 presenting to the ED for evaluation of fatigue, decreased oral intake, decreased appetite, epigastric abdominal pain for the past 1 to 2 weeks.  He also states that he has had a couple of nights where he woke up with shaking chills.  He denies having similar symptoms in the past, he states last time he felt this poorly was when he needed his aortic valve replaced.  He denies any chest pain, exertional symptoms, any dyspnea, cough or congestion, sore throat or upper respiratory symptoms, denies any diarrhea or dysuria, urinary frequency associated with his symptoms.  He denies any fevers at home despite taking his temperature multiple times over the past week.      History provided by:  Patient   used: No            Patient History   Medical History[1]  Surgical History[2]  Family History[3]  Social History[4]    Physical Exam   ED Triage Vitals [05/09/25 0748]   Temperature Heart Rate Respirations BP   36.3 °C (97.3 °F) 83 18 128/85      Pulse Ox Temp Source Heart Rate Source Patient Position   99 % Temporal -- Sitting      BP Location FiO2 (%)     Right arm --       Physical Exam  General: well developed, well nourished 54-year-old male who is awake and alert, in no apparent distress  Eyes: sclera clear bilaterally, PERRL, EOMI  HENT: normocephalic, atraumatic. Pharynx without erythema or exudates, uvula midline.  CV: regular rate and rhythm, no murmur, no gallops, or rubs.   Resp: clear to ascultation bilaterally, no wheezes, rales, or rhonchi  GI: abdomen soft, there is reproducible discomfort to palpation in the epigastric area without rigidity or guarding, no peritoneal signs, abdomen is nondistended, no masses palpated  MSK: strength +5/5 to upper and lower extremities bilaterally, no swelling of the extremities.  Psych: appropriate mood  and affect, cooperative with exam  Skin: warm, dry, without evidence of rash or abrasions    ED Course & MDM   ED Course as of 05/09/25 1147   Fri May 09, 2025   0800 EKG on my interpretation shows sinus rhythm with prescriptive block, rate of 77 bpm.  Normal axis.  QTc 440 ms, MD interval 234.  Left bundle branch block morphology.  There are T wave inversions in inferior leads, new compared to prior EKG from May 2024.  No new ST elevation.  There is J-point elevation in the precordial leads which appear similar compared to prior EKG.  No STEMI. [NT]   0915 I did review the chest x-ray image and do not see any infiltrates concerning for pneumonia, pneumothorax or other acute process in the chest.  [NT]   1059 CT abdomen pelvis w IV contrast  Indeterminate density lesion along the inferior aspect of the posteromedial spleen measuring 4 x 3.1 x 1.6 cm (AP x TV x CC) (series 3, image 37 and series 5, image 59) with a thin peripheral rim of enhancement. The fat plane between the lesion in the tail of the pancreas is obliterated. [NT]   1059 Concern for splenic abscess given this finding.  Blood cultures and lactate ordered, I reassessed the patient who still has some discomfort in epigastric region.  Troponin is also uptrending from .  Repeat EKG ordered to evaluate for evidence of evolving ischemia [NT]   1100 Empiric antibiotics vancomycin and cefepime ordered [NT]   1141 Repeat EKG shows sinus rhythm with first-degree block, rate of 69 bpm.  Normal axis.  QTc 443 ms, MD interval 246.  Left arm branch block morphology.  2 inversions inferiorly.  No evolving ischemic change.  No acute change compared to prior EKG.  Discussed findings with Dr. Quick cardiology.  He recommends continuing infectious evaluation, feels less likely that this is ischemia given his chronic EKG changes and otherwise clinical picture more suggestive of infectious etiology [NT]      ED Course User Index  [NT] Sterling Vargas DO          Diagnoses as of 05/09/25 1147   NSTEMI (non-ST elevated myocardial infarction) (Multi)   Lesion of spleen                 No data recorded     [place] Coma Scale Score: 15 (05/09/25 0755 : Mansoor Nino RN)                           Medical Decision Making  He is awake and alert, in no acute distress in the ED.  Vital signs are normal.  He is not tachycardic, hypotensive, he is afebrile, no obvious signs of sepsis on presentation.  He is complaining of some generalized epigastric discomfort and lack of appetite, abdominal CT ordered given discomfort on palpation in the epigastric area to screen for any acute intra-abdominal pathology causing his symptoms.  I did order troponins and EKG to assess for evidence of ACS.  He does have an abnormal EKG with left bundle branch block morphology and some J-point elevation in precordial leads which is similar compared to prior EKG, however he does have some T wave inversions in the inferior leads which are new today.  I do not feel this is a STEMI given that he is had epigastric discomfort for the past 1 to 2 weeks which has been persistent, he has no acute change in his symptoms today, no exertional symptoms, no dyspnea, and the J-point elevations are similar compared to prior EKG.    Lab work shows no leukocytosis, normal lactate, however he does have an elevated troponin level 87, uptrending to 104 on repeat testing.  X-ray was normal.  Repeat EKG shows no acute change otherwise.  Abdominal CT shows a rim-enhancing lesion adjacent to the spleen concerning for possible abscess.  Given the patient's chills and general malaise, I feel that this is more likely infectious rather than ischemic.  Spoke with cardiology Dr. Quick who agrees.  Patient admitted to the hospital to the stepdown unit given his uptrending troponin for further medical evaluation.  I did order blood cultures and started the patient on empiric broad-spectrum antibiotics prior to admission.    CT  abdomen pelvis w IV contrast   Final Result   1.  Indeterminate lesion within the left upper abdomen along the   inferior aspect of the posteromedial spleen is of indeterminate   origin, favored splenic. Differential considerations include splenic   infarct, subcapsular hematoma or abscess although there is no gross   evidence of inflammation within the adjacent soft tissues, benign   vascular lesion such as a hemangioma or less likely a malignant   lesion. A lesion arising from the pancreatic tail remains within the   differential. Further evaluation with abdominal ultrasound is   recommended.   2. No bowel obstruction, appendicitis or diverticulitis.   3. Additional chronic and/or ancillary findings detailed above.             MACRO:   Critical Finding:  New lesion in the spleen. Notification was   initiated on 5/9/2025 at 10:17 am by  Mukund Bhandari.  (**-YCF-**)   Instructions:  US Abdomen Limited Spleen. 6 hours.        Signed by: Mukund Bhandari 5/9/2025 10:19 AM   Dictation workstation:   [id number]      XR chest 2 views   Final Result   No active disease in the chest identified.        MACRO:   None        Signed by: Manny Santiago 5/9/2025 8:43 AM   Dictation workstation:   [id number]        Labs Reviewed   CBC WITH AUTO DIFFERENTIAL - Abnormal       Result Value    WBC 9.8      nRBC 0.0      RBC 4.86      Hemoglobin 14.5      Hematocrit 43.4      MCV 89      MCH 29.8      MCHC 33.4      RDW 12.1      Platelets 180      Neutrophils % 78.2      Immature Granulocytes %, Automated 0.3      Lymphocytes % 12.1      Monocytes % 8.0      Eosinophils % 0.8      Basophils % 0.6      Neutrophils Absolute 7.66      Immature Granulocytes Absolute, Automated 0.03      Lymphocytes Absolute 1.18 (*)     Monocytes Absolute 0.78      Eosinophils Absolute 0.08      Basophils Absolute 0.06     TROPONIN I, HIGH SENSITIVITY - Abnormal    Troponin I, High Sensitivity 87 (*)     Narrative:     Less than 99th percentile of normal  range cutoff-  Female and children under 18 years old <14 ng/L; Male <21 ng/L: Negative  Repeat testing should be performed if clinically indicated.     Female and children under 18 years old 14-50 ng/L; Male 21-50 ng/L:  Consistent with possible cardiac damage and possible increased clinical   risk. Serial measurements may help to assess extent of myocardial damage.     >50 ng/L: Consistent with cardiac damage, increased clinical risk and  myocardial infarction. Serial measurements may help assess extent of   myocardial damage.      NOTE: Children less than 1 year old may have higher baseline troponin   levels and results should be interpreted in conjunction with the overall   clinical context.     NOTE: Troponin I testing is performed using a different   testing methodology at [facility] than at other   [facility]. Direct result comparisons should only   be made within the same method.   TROPONIN I, HIGH SENSITIVITY - Abnormal    Troponin I, High Sensitivity 104 (*)     Narrative:     Less than 99th percentile of normal range cutoff-  Female and children under 18 years old <14 ng/L; Male <21 ng/L: Negative  Repeat testing should be performed if clinically indicated.     Female and children under 18 years old 14-50 ng/L; Male 21-50 ng/L:  Consistent with possible cardiac damage and possible increased clinical   risk. Serial measurements may help to assess extent of myocardial damage.     >50 ng/L: Consistent with cardiac damage, increased clinical risk and  myocardial infarction. Serial measurements may help assess extent of   myocardial damage.      NOTE: Children less than 1 year old may have higher baseline troponin   levels and results should be interpreted in conjunction with the overall   clinical context.     NOTE: Troponin I testing is performed using a different   testing methodology at [facility] than at other   [facility]. Direct result comparisons should only   be  made within the same method.   COMPREHENSIVE METABOLIC PANEL - Normal    Glucose 99      Sodium 138      Potassium 4.1      Chloride 107      Bicarbonate 22      Anion Gap 13      Urea Nitrogen 15      Creatinine 1.05      eGFR 85      Calcium 8.8      Albumin 3.9      Alkaline Phosphatase 53      Total Protein 6.7      AST 16      Bilirubin, Total 0.7      ALT 14     SARS-COV-2 AND INFLUENZA A/B PCR - Normal    Flu A Result Not Detected      Flu B Result Not Detected      Coronavirus 2019, PCR Not Detected      Narrative:     This assay is an FDA-cleared, in vitro diagnostic nucleic acid amplification test for the qualitative detection and differentiation of SARS CoV-2/ Influenza A/B from nasopharyngeal specimens collected from individuals with signs and symptoms of respiratory tract infections, and has been validated for use at [facility]. Negative results do not preclude COVID-19/ Influenza A/B infections and should not be used as the sole basis for diagnosis, treatment, or other management decisions. Testing for SARS CoV-2 is recommended only for patients who meet current clinical and/or epidemiological criteria defined by federal, state, or local public health directives.   LIPASE - Normal    Lipase 12      Narrative:     Venipuncture immediately after or during the administration of Metamizole may lead to falsely low results. Testing should be performed immediately prior to Metamizole dosing.   LACTATE - Normal    Lactate 0.6      Narrative:     Venipuncture immediately after or during the administration of Metamizole may lead to falsely low results. Testing should be performed immediately prior to Metamizole dosing.   BLOOD CULTURE   BLOOD CULTURE   URINALYSIS WITH REFLEX CULTURE AND MICROSCOPIC    Narrative:     The following orders were created for panel order Urinalysis with Reflex Culture and Microscopic.  Procedure                               Abnormality         Status                      ---------                               -----------         ------                     Urinalysis with Reflex C...[[id number]]                                                 Extra Urine Gray Tube[[id number]]                                                         Please view results for these tests on the individual orders.   URINALYSIS WITH REFLEX CULTURE AND MICROSCOPIC   EXTRA URINE GRAY TUBE         Procedure  Critical Care    Performed by: Sterling Vargas DO  Authorized by: Sterling Vargas DO    Critical care provider statement:     Critical care time (minutes):  33    Critical care time was exclusive of:  Separately billable procedures and treating other patients    Critical care was necessary to treat or prevent imminent or life-threatening deterioration of the following conditions: nstemi.    Critical care was time spent personally by me on the following activities:  Ordering and performing treatments and interventions, ordering and review of laboratory studies, ordering and review of radiographic studies, pulse oximetry, re-evaluation of patient's condition, discussions with consultants, development of treatment plan with patient or surrogate, obtaining history from patient or surrogate, examination of patient and review of old charts    Care discussed with: admitting provider           [1]   Past Medical History:  Diagnosis Date    Abscess of left index finger 07/03/2024    Aortic valve stenosis     Closed fracture of distal end of humerus 03/10/2025    Elbow pain 03/10/2025    Infection     Multiple    Low testosterone     MRSA (methicillin resistant Staphylococcus aureus) septicemia (Multi) 07/03/2024   [2]   Past Surgical History:  Procedure Laterality Date    AORTIC VALVE REPLACEMENT      CERVICAL SPINE SURGERY      implant    ELBOW BURSA SURGERY Right     related to infection    HERNIA REPAIR      JOINT REPLACEMENT Right     knee    MR HEAD ANGIO WO IV CONTRAST  02/24/2023      HEAD ANGIO WO IV CONTRAST [facility]    MR HEAD ANGIO WO IV CONTRAST  09/05/2023    MR HEAD ANGIO WO IV CONTRAST [facility]    MR NECK ANGIO WO IV CONTRAST  12/20/2012    MR NECK ANGIO WO IV CONTRAST [facility]   [3]   Family History  Problem Relation Name Age of Onset    Autoimmune disease Mother      Hypertension Mother      PTSD Father      Benign prostatic hyperplasia Father      No Known Problems Sister      Other (htn) Brother      Other (hld) Brother      No Known Problems Daughter      No Known Problems Daughter      Other (watchman) Maternal Grandfather      Other (cath) Maternal Grandfather      Other (3x bypass) Maternal Grandfather      Heart disease Maternal Grandfather     [4]   Social History  Tobacco Use    Smoking status: Never    Smokeless tobacco: Never   Substance Use Topics    Alcohol use: Yes     Alcohol/week: 1.0 standard drink of alcohol     Types: 1 Standard drinks or equivalent per week    Drug use: Never        Sterling Vargas DO  05/09/25 1149

## 2025-05-09 NOTE — NURSING NOTE
Pt arrived to SDU. VSS, pt up w/ standby assist. N/V subsided. Pt tolerated clears. Regular diet per cardiologist. US of ABD planned today and ECHO planned on Monday. Telemetry applied, continuing to monitor.    1830: pt alarming ST-V high on tele, 6 sec strip collected and placed in pt chart. Attempted EKG, machine seemed frozen and would not give a proper reading after leads being placed and pt info entered. Messaged MD and cardiologist, awaiting response. Pt is asymptomatic, pleasant w/ no complaints. Continuing to monitor. Call bell and belongings within reach.

## 2025-05-09 NOTE — ASSESSMENT & PLAN NOTE
IMPRESSION:  1.  Indeterminate lesion within the left upper abdomen along the  inferior aspect of the posteromedial spleen is of indeterminate  origin, favored splenic. Differential considerations include splenic  infarct, subcapsular hematoma or abscess although there is no gross  evidence of inflammation within the adjacent soft tissues, benign  vascular lesion such as a hemangioma or less likely a malignant  lesion. A lesion arising from the pancreatic tail remains within the  differential. Further evaluation with abdominal ultrasound is  recommended.  2. No bowel obstruction, appendicitis or diverticulitis.  3. Additional chronic and/or ancillary findings detailed above.    Patient then had a spleen ultrasound that the impression demonstrated:  IMPRESSION:  1. No focal abnormality within the visualized portions of the spleen  by ultrasound. Continued attention on follow up by CT.    Patient also had an echo-await results    VS noted  Labs noted-WBC 9.8  Antibiotics per IM/ID-on Cefepime and IV Vancomycin  Will d/w Dr Vazquez

## 2025-05-09 NOTE — ED TRIAGE NOTES
Chills, some N/V/Diarrhea, unable to eat or drink x 2 weeks. State that he was seen by the Cardiology office had blood work done. Feel more weak than normal. Some CP in this AM

## 2025-05-09 NOTE — ASSESSMENT & PLAN NOTE
Presentation not exactly consistent with acute coronary syndrome.  I reviewed the EKG.  It shows a right bundle branch block.  Will get a third set of cardiac troponins.  Ordering a full size aspirin daily.  Holding his Mobic which is one of his home meds.  Consulting cardiology and ordering an echocardiogram.    #2- fevers and chills and questionable lesion involving spleen that could be an abscess.  Blood cultures are pending.  Will get an ultrasound of his spleen.  Patient has a history of recurrent staph infections including bacteremia and is also had some heart valve replacement so we need to be extra vigilant about infectious processes with him.  I am consulting infectious disease and as mentioned above ordering an echocardiogram.    #3 anorexia and weight loss.-I am putting him on Protonix.  Ordering a regular diet and checking thyroid levels.    Will continue his estrogen blockers which are both ordered twice a week.

## 2025-05-09 NOTE — PROGRESS NOTES
05/09/25 1305   [facility] Disability Status   Are you deaf or do you have serious difficulty hearing? N   Are you blind or do you have serious difficulty seeing, even when wearing glasses? N   Because of a physical, mental, or emotional condition, do you have serious difficulty concentrating, remembering, or making decisions? (5 years old or older) N   Do you have serious difficulty walking or climbing stairs? N   Do you have serious difficulty dressing or bathing? N   Because of a physical, mental, or emotional condition, do you have serious difficulty doing errands alone such as visiting the doctor? N

## 2025-05-09 NOTE — Clinical Note
Vessel(s): left iliac artery, left CFA, left PFA, left SFA and left popliteal artery. Injected with hand injections. Multiple views taken.

## 2025-05-09 NOTE — CONSULTS
Inpatient consult to Cardiology  Consult performed by: AURORA Barrera-CNP  Consult ordered by: Jonathan Nur MD  Reason for consult: Elevated troponin        History Of Present Illness:    Cristhian Thornton is a 54 y.o. male presenting with abdominal pain, fatigue, lethargy.  Currently Dr. Baltazar.  Past medical history of bicuspid aortic valve with aortic valve replacement and aortic arch repair in 2020.  Patient presents the hospital after 1 to 2 weeks of worsening fatigue and chills which he states were similar to when he had valvular vegetations.  He reports no significant chest pain but does have some abdominal discomfort, nausea and vomiting.  EKG in emergency department sinus rhythm with first-degree block with LVH, in line with chronic pattern.  High-sensitivity troponin values of 87 and 104.  Chest x-ray that acute findings.  White blood cell count [place] 0.5 with hemoglobin 14.5.  Creatinine 1.05.  CT chest abdomen pelvis suggests splenic infarct. Patient received IV antibiotics in the emergency department as well as 1 L of LR.  He was admitted on telemetry for further testing treatment.       Last Recorded Vitals:  Vitals:    05/09/25 1200 05/09/25 1215 05/09/25 1230 05/09/25 1245   BP: 113/75  114/76    BP Location:       Patient Position:       Pulse: 71 69 69 73   Resp: 12 15 16 (!) 8   Temp:       TempSrc:       SpO2: 98% 99% 99% 99%   Weight:       Height:           Last Labs:  CBC - 5/9/2025:  8:17 AM  9.8 14.5 180    43.4      CMP - 5/9/2025:  8:17 AM  8.8 6.7 16 --- 0.7   _ 3.9 14 53      PTT - No results in last year.  _   _ _     Troponin I, High Sensitivity   Date/Time Value Ref Range Status   05/09/2025 09:59  (HH) 0 - 20 ng/L Final     Comment:     Previous result verified on 5/9/2025 0924 on specimen/case [id number] called with component [facility] for procedure Troponin I, High Sensitivity with value 87 ng/L.   05/09/2025 08:17 AM 87 (HH) 0 - 20 ng/L Final     Hemoglobin A1C    Date/Time Value Ref Range Status   10/28/2020 12:52 PM 5.0 4.0 - 6.0 % Final     Comment:     Hemoglobin A1C levels are related to mean blood glucose during the   preceding 2-3 months. The relationship table below may be used as a   general guide. Each 1% increase in HGB A1C is a reflection of an   increase in mean glucose of approximately 30 mg/dl.   Reference: Diabetes Care, volume 29, supplement 1 Jan. 2006                        HGB A1C ................. Approx. Mean Glucose   _______________________________________________   6%   ...............................  120 mg/dl   7%   ...............................  150 mg/dl   8%   ...............................  180 mg/dl   9%   ...............................  210 mg/dl   10%  ...............................  240 mg/dl  Performed at [address] [address]     06/29/2020 07:05 AM 4.9 4.0 - 6.0 % Final     Comment:     Hemoglobin A1C levels are related to mean blood glucose during the   preceding 2-3 months. The relationship table below may be used as a   general guide. Each 1% increase in HGB A1C is a reflection of an   increase in mean glucose of approximately 30 mg/dl.   Reference: Diabetes Care, volume 29, supplement 1 Jan. 2006                        HGB A1C ................. Approx. Mean Glucose   _______________________________________________   6%   ...............................  120 mg/dl   7%   ...............................  150 mg/dl   8%   ...............................  180 mg/dl   9%   ...............................  210 mg/dl   10%  ...............................  240 mg/dl  Performed at [address] [address]       LDL-CHOLESTEROL   Date/Time Value Ref Range Status   04/03/2025 07:31 AM 65 mg/dL (calc) Final     Comment:     Reference range: <100     Desirable range <100 mg/dL for primary prevention;    <70 mg/dL for patients with CHD or diabetic patients   with > or = 2 CHD risk factors.      LDL-C is now calculated using the Curry   calculation, which is a validated novel method providing   better accuracy than the Friedewald equation in the   estimation of LDL-C.   Chad RICHARDSON et al. BETSY. 2013;310(70): 4885-9895   (http://InHomeVest.Casero/faq/ULQ277)       LDL Calculated   Date/Time Value Ref Range Status   04/13/2024 08:08 AM 80 65 - 130 mg/dL Final   01/30/2023 10:18 AM 77 65 - 130 MG/DL Final   11/04/2020 04:02 AM 36 (L) 65 - 130 MG/DL Final   06/29/2020 07:05 AM 65 65 - 130 MG/DL Final      Results for orders placed or performed during the hospital encounter of 05/09/25 (from the past 24 hours)   ECG 12 lead   Result Value Ref Range    Ventricular Rate 77 BPM    Atrial Rate 77 BPM    VT Interval 234 ms    QRS Duration 134 ms    QT Interval 396 ms    QTC Calculation(Bazett) 448 ms    P Axis 65 degrees    R Axis 18 degrees    T Axis 257 degrees    QRS Count 13 beats    Q Onset 213 ms    P Onset 96 ms    P Offset 142 ms    T Offset 411 ms    QTC Fredericia 430 ms   CBC and Auto Differential   Result Value Ref Range    WBC 9.8 4.4 - 11.3 x10*3/uL    nRBC 0.0 0.0 - 0.0 /100 WBCs    RBC 4.86 4.50 - 5.90 x10*6/uL    Hemoglobin 14.5 13.5 - 17.5 g/dL    Hematocrit 43.4 41.0 - 52.0 %    MCV 89 80 - 100 fL    MCH 29.8 26.0 - 34.0 pg    MCHC 33.4 32.0 - 36.0 g/dL    RDW 12.1 11.5 - 14.5 %    Platelets 180 150 - 450 x10*3/uL    Neutrophils % 78.2 40.0 - 80.0 %    Immature Granulocytes %, Automated 0.3 0.0 - 0.9 %    Lymphocytes % 12.1 13.0 - 44.0 %    Monocytes % 8.0 2.0 - 10.0 %    Eosinophils % 0.8 0.0 - 6.0 %    Basophils % 0.6 0.0 - 2.0 %    Neutrophils Absolute 7.66 1.20 - 7.70 x10*3/uL    Immature Granulocytes Absolute, Automated 0.03 0.00 - 0.70 x10*3/uL    Lymphocytes Absolute 1.18 (L) 1.20 - 4.80 x10*3/uL    Monocytes Absolute 0.78 0.10 - 1.00 x10*3/uL    Eosinophils Absolute 0.08 0.00 - 0.70 x10*3/uL    Basophils Absolute 0.06 0.00 - 0.10 x10*3/uL   Comprehensive metabolic  panel   Result Value Ref Range    Glucose 99 74 - 99 mg/dL    Sodium 138 136 - 145 mmol/L    Potassium 4.1 3.5 - 5.3 mmol/L    Chloride 107 98 - 107 mmol/L    Bicarbonate 22 21 - 32 mmol/L    Anion Gap 13 10 - 20 mmol/L    Urea Nitrogen 15 6 - 23 mg/dL    Creatinine 1.05 0.50 - 1.30 mg/dL    eGFR 85 >60 mL/min/1.73m*2    Calcium 8.8 8.6 - 10.3 mg/dL    Albumin 3.9 3.4 - 5.0 g/dL    Alkaline Phosphatase 53 33 - 120 U/L    Total Protein 6.7 6.4 - 8.2 g/dL    AST 16 9 - 39 U/L    Bilirubin, Total 0.7 0.0 - 1.2 mg/dL    ALT 14 10 - 52 U/L   Sars-CoV-2 and Influenza A/B PCR   Result Value Ref Range    Flu A Result Not Detected Not Detected    Flu B Result Not Detected Not Detected    Coronavirus 2019, PCR Not Detected Not Detected   Lipase   Result Value Ref Range    Lipase 12 9 - 82 U/L   Troponin I, High Sensitivity   Result Value Ref Range    Troponin I, High Sensitivity 87 (HH) 0 - 20 ng/L   Troponin I, High Sensitivity   Result Value Ref Range    Troponin I, High Sensitivity 104 (HH) 0 - 20 ng/L   TSH with reflex to Free T4 if abnormal   Result Value Ref Range    Thyroid Stimulating Hormone 1.00 0.44 - 3.98 mIU/L   ECG 12 lead   Result Value Ref Range    Ventricular Rate 69 BPM    Atrial Rate 69 BPM    MS Interval 246 ms    QRS Duration 130 ms    QT Interval 414 ms    QTC Calculation(Bazett) 443 ms    P Axis 62 degrees    R Axis -5 degrees    T Axis 268 degrees    QRS Count 11 beats    Q Onset 215 ms    P Onset 92 ms    P Offset 140 ms    T Offset 422 ms    QTC Fredericia 434 ms   Lactate   Result Value Ref Range    Lactate 0.6 0.4 - 2.0 mmol/L   Urinalysis with Reflex Culture and Microscopic   Result Value Ref Range    Color, Urine Light-Yellow Light-Yellow, Yellow, Dark-Yellow    Appearance, Urine Clear Clear    Specific Gravity, Urine >1.050 (N) 1.005 - 1.035    pH, Urine 5.5 5.0, 5.5, 6.0, 6.5, 7.0, 7.5, 8.0    Protein, Urine NEGATIVE NEGATIVE, 10 (TRACE), 20 (TRACE) mg/dL    Glucose, Urine Normal Normal mg/dL     Blood, Urine NEGATIVE NEGATIVE mg/dL    Ketones, Urine 10 (1+) (A) NEGATIVE mg/dL    Bilirubin, Urine NEGATIVE NEGATIVE mg/dL    Urobilinogen, Urine Normal Normal mg/dL    Nitrite, Urine NEGATIVE NEGATIVE    Leukocyte Esterase, Urine NEGATIVE NEGATIVE     Last I/O:  No intake/output data recorded.    Past Cardiology Tests (Last 3 Years):  EKG:  ECG 12 lead 05/09/2025 (Preliminary): Sinus with first-degree block and LVH      Echo: Transesophageal Echo (MART) 05/23/2024  Transesophageal Echo (MART)  Result Date: 5/23/2024           [place] [address]            Phone [phone] TRANSTHORACIC ECHOCARDIOGRAM REPORT  Patient Name:     SARMAD Dykes Physician:   Laurie Serra DO Study Date:       5/23/2024            Ordering Provider:   Laurie SERRA MRN/PID:          [id number]             Fellow: Accession#:       [id number]         Nurse: Date of           1970 / 53      Sonographer:         Azra Swift Birth/Age:        years Gender:           M                    Additional Staff: Height:           177.80 cm            Admit Date: Weight:           91.17 kg             Admission Status:    Inpatient - Routine BSA / BMI:        2.09 m2 / 28.84      Department Location: [facility]                   kg/m2 Blood Pressure: 124 /81 mmHg Study Type:    TRANSESOPHAGEAL ECHO (MART) Diagnosis/ICD: Bacteremia-R78.81 Indication:    Endocarditis CPT Codes:     MART Complete-17330 Patient History: Pertinent History: AAA BAV, Htn, AS, Bicuspid Valve, Pericarditis, S/P AVR                    Bovine 2020. Study Detail: The following Echo studies were performed: 2D and color flow.  PHYSICIAN INTERPRETATION: MART Details: The MART probe used was 17195. Technically adequate omniplane transesophageal echocardiogram performed. MART Medication: The patient was sedated using moderate sedation.  MART Procedure: The probe was passed without difficulty. The following complications were encountered during the procedure: Patient tolerated the procedure well without any apparent complications. Left Ventricle: Left ventricular systolic function is normal. There are no regional wall motion abnormalities. The left ventricular cavity size is normal. Left ventricular diastolic filling was not assessed. Left Atrium: The left atrium is normal in size. Right Ventricle: The right ventricle is normal in size. There is normal right ventricular global systolic function. Right Atrium: The right atrium is normal in size. Aortic Valve: There is a prosthetic aortic valve present. There is evidence of moderate aortic valve stenosis. There is no evidence of aortic valve regurgitation. No evidence of prosthetic aortic valve endocarditis TTE from 5/23/24 confirmed moderate aortic valve stenosis. Mitral Valve: The mitral valve is normal in structure. There is no evidence of mitral valve regurgitation. Tricuspid Valve: The tricuspid valve is structurally normal. There is trace tricuspid regurgitation. Pulmonic Valve: The pulmonic valve is structurally normal. There is no indication of pulmonic valve regurgitation. Pericardium: There is no pericardial effusion noted. Aorta: The aortic root is normal.  CONCLUSIONS:  1. Left ventricular systolic function is normal.  2. Moderate aortic valve stenosis.  3. No evidence of prosthetic aortic valve endocarditis         TTE from 5/23/24 confirmed moderate aortic valve stenosis. QUANTITATIVE DATA SUMMARY:  [id number] Gustavo Quick DO Electronically signed on 5/23/2024 at 3:07:39 PM  ** Final **     Transthoracic Echo (TTE) Complete  Result Date: 5/23/2024           [address] [address]            Phone [phone] TRANSTHORACIC ECHOCARDIOGRAM REPORT  Patient Name:     SARMAD SHETTY      Reading Physician:  [id number] Agustin Craig                                                             MD Study Date:       5/23/2024            Ordering Provider:  [id number] CONOR LEANDRA CELESTIN MRN/PID:          [id number]             Fellow: Accession#:       [id number]         Nurse: Date of           1970 / 53      Sonographer:        Azra Swift RDCS Birth/Age:        years Gender:           M                    Additional Staff: Height:           177.80 cm            Admit Date: Weight:           91.17 kg             Admission Status:   Inpatient - Routine BSA / BMI:        2.09 m2 / 28.84      Department          [facility]                   kg/m2                Location: Blood Pressure: 124 /81 mmHg Study Type:    TRANSTHORACIC ECHO (TTE) COMPLETE Diagnosis/ICD: Endocarditis, valve unspecified-I38 Indication:    endocarditis CPT Codes:     Echo Complete w Full Doppler-38923 Patient History: Pertinent         AAA, BAV, HTN, nonrheumatic AS, pericarditis, S/P AVR Bovine History:          2020. Study Detail: The following Echo studies were performed: 2D, M-Mode, Doppler and               color flow.  PHYSICIAN INTERPRETATION: Left Ventricle: Left ventricular systolic function is normal, with an estimated ejection fraction of 60-65%. There are no regional wall motion abnormalities. The left ventricular cavity size is normal. Spectral Doppler shows a normal pattern of left ventricular diastolic filling. Left Atrium: The left atrium is normal in size. Right Ventricle: The right ventricle is normal in size. There is normal right ventricular global systolic function. Right Atrium: The right atrium is normal in size. Aortic Valve: The aortic valve is trileaflet. There is diffuse moderate aortic valve thickening. There is evidence of moderate to severe aortic valve stenosis. There is no evidence of aortic valve regurgitation. The peak instantaneous gradient of the aortic valve is 55.1 mmHg. The mean gradient of the aortic valve is 34.1 mmHg. Mitral Valve: The mitral valve is normal in  structure. There is trace to mild mitral valve regurgitation. Tricuspid Valve: The tricuspid valve is structurally normal. There is trace to mild tricuspid regurgitation. The Doppler estimated RVSP is within normal limits at 25.6 mmHg. Pulmonic Valve: The pulmonic valve is not well visualized. There is no indication of pulmonic valve regurgitation. Pericardium: There is no pericardial effusion noted. Aorta: The aortic root is normal.  CONCLUSIONS:  1. Left ventricular systolic function is normal with a 60-65% estimated ejection fraction.  2. Trace to mild mitral valve regurgitation.  3. RVSP within normal limits.  4. Trace to mild tricuspid regurgitation.  5. Moderate to severe aortic valve stenosis. QUANTITATIVE DATA SUMMARY: 2D MEASUREMENTS:                          Normal Ranges: LAs:           3.55 cm   (2.7-4.0cm) IVSd:          1.29 cm   (0.6-1.1cm) LVPWd:         1.09 cm   (0.6-1.1cm) LVIDd:         4.49 cm   (3.9-5.9cm) LVIDs:         3.16 cm LV Mass Index: 93.3 g/m2 LV % FS        29.6 % LA VOLUME:                               Normal Ranges: LA Vol A4C:        71.2 ml    (22+/-6mL/m2) LA Vol A2C:        72.1 ml LA Vol BP:         76.8 ml LA Vol Index A4C:  34.0 ml/m2 LA Vol Index A2C:  34.5 ml/m2 LA Vol Index BP:   36.7 ml/m2 LA Area A4C:       22.6 cm2 LA Area A2C:       21.2 cm2 LA Major Axis A4C: 6.1 cm LA Major Axis A2C: 5.3 cm LA Volume Index:   36.5 ml/m2 LA Vol A4C:        71.0 ml LA Vol A2C:        72.0 ml RA VOLUME BY A/L METHOD:                               Normal Ranges: RA Vol A4C:        59.0 ml    (8.3-19.5ml) RA Vol Index A4C:  28.2 ml/m2 RA Area A4C:       20.4 cm2 RA Major Axis A4C: 6.0 cm AORTA MEASUREMENTS:                      Normal Ranges: Ao Sinus, d: 2.40 cm (2.1-3.5cm) Asc Ao, d:   3.00 cm (2.1-3.4cm) LV SYSTOLIC FUNCTION BY 2D PLANIMETRY (MOD):                     Normal Ranges: EF-A4C View: 51.4 % (>=55%) EF-A2C View: 61.9 % EF-Biplane:  56.6 % LV DIASTOLIC FUNCTION:              "           Normal Ranges: MV Peak E:    0.98 m/s (0.7-1.2 m/s) MV Peak A:    1.01 m/s (0.42-0.7 m/s) E/A Ratio:    0.97     (1.0-2.2) MV e'         0.12 m/s (>8.0) MV lateral e' 0.16 m/s MV medial e'  0.09 m/s E/e' Ratio:   7.83     (<8.0) MITRAL VALVE:                 Normal Ranges: MV DT: 193 msec (150-240msec) AORTIC VALVE:                                    Normal Ranges: AoV Vmax:                3.71 m/s  (<=1.7m/s) AoV Peak P.1 mmHg (<20mmHg) AoV Mean P.1 mmHg (1.7-11.5mmHg) LVOT Max Jose Enrique:            1.36 m/s  (<=1.1m/s) AoV VTI:                 77.26 cm  (18-25cm) LVOT VTI:                28.26 cm LVOT Diameter:           1.86 cm   (1.8-2.4cm) AoV Area, VTI:           0.99 cm2  (2.5-5.5cm2) AoV Area,Vmax:           0.99 cm2  (2.5-4.5cm2) AoV Dimensionless Index: 0.37  RIGHT VENTRICLE: RV Basal 5.01 cm RV Mid   3.50 cm RV Major 9.0 cm RV s'    0.12 m/s TRICUSPID VALVE/RVSP:                             Normal Ranges: Peak TR Velocity: 2.38 m/s RV Syst Pressure: 25.6 mmHg (< 30mmHg) IVC Diam:         1.25 cm AORTA: Asc Ao Diam 3.03 cm  [id number] Agustin Craig MD Electronically signed on 2024 at 9:42:06 AM  ** Final **     Ejection Fractions:  No results found for: \"EF\"  Cath: 2020: No significant coronary disease    Past Medical History:  He has a past medical history of Abscess of left index finger (2024), Aortic valve stenosis, Closed fracture of distal end of humerus (03/10/2025), Elbow pain (03/10/2025), Infection, Low testosterone, and MRSA (methicillin resistant Staphylococcus aureus) septicemia (Multi) (2024).    Past Surgical History:  He has a past surgical history that includes MR angio neck wo IV contrast (2012); MR angio head wo IV contrast (2023); MR angio head wo IV contrast (2023); Joint replacement (Right); Aortic valve replacement; Elbow bursa surgery (Right); Cervical spine surgery; and Hernia repair.      Social History:  He " reports that he has never smoked. He has never used smokeless tobacco. He reports current alcohol use of about 1.0 standard drink of alcohol per week. He reports that he does not use drugs.    Family History:  Family History[1]     Allergies:  Acyclovir, Opioids - morphine analogues, and Hydromorphone    Inpatient Medications:  Scheduled Medications[2]  PRN Medications[3]  Continuous Medications[4]  Outpatient Medications:  Current Outpatient Medications   Medication Instructions    ANASTROZOLE ORAL 0.25 mg, 2 times weekly    CALCIUM ORAL 1 each, Daily    cholecalciferol, vitamin D3, (VITAMIN D3 ORAL) 1 each, Daily    cyanocobalamin, vitamin B-12, (VITAMIN B-12 ORAL) 1 each, Daily    FLUoxetine (PROZAC) 20 mg, oral, Daily    MAGNESIUM CITRATE ORAL 1 each, Daily    meloxicam (MOBIC) 15 mg, oral, Daily    methocarbamol (ROBAXIN) 500 mg, oral, 3 times daily PRN    tadalafil (CIALIS) 5 mg, oral, Once Weekly, MONDAYS    testosterone cypionate (DEPO-TESTOSTERONE) 75 mg, Every 7 days    traZODone (DESYREL) 50 mg, oral, Nightly PRN   Review of Systems   Constitutional: Positive for chills, fever and malaise/fatigue.   Cardiovascular:  Negative for chest pain, dyspnea on exertion and orthopnea.   Gastrointestinal:  Positive for nausea and vomiting.   All other systems reviewed and are negative.    Physical Exam:  General: alert, oriented x 3, very pleasant.  HEENT: normal cephalic, atraumatic, no scleral icterus, no rhinorrhea, epistaxsis, no tonsilar enlargement.  Neck: No JVD, bruit or thrill, masses or tenderness   Heart: S1/S2, Rate 70, Rhythm  regular, no s3 or s4, 3 out of 6  harsh aortic systolic murmur, thrill, or heaves at PMI.   Lungs: Clear, equal expansion and excursion, no wheezes, crackles, rhales or rhonci.  Room air.  No conversational dyspnea.  No tachypnea.  No pain with deep inspiration.  Abdomen: bowel sounds x 4, soft, non-tender to light and deep palpation, No masses, guarding, or CVA tenderness    Genitourinary: deferred   Extremities: No significant upper or lower extremity SCOTT appreciated.       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  Patient presents with 1 to 2 weeks of progressive fatigue, chills, fever.  He reports these are symptoms similar to when he had vegetations on his aortic valve replacement approximate 1 year ago.  He has previously had a thorough cardiac workup.  He had a cardiac catheterization in 2020 which revealed normal coronary arteries.  At that point he was found to have severe aortic valve stenosis with a bileaflet aortic valve.  He did undergo replacement with this with a bioprosthetic valve.  This hospitalization at this point there is no definitiv diagnosis.  CT abdomen pelvis reveals a questionable splenic infarct.  Surgery has been consulted for this and if further ultrasound will be completed.  From my perspective I doubt the patient is having an NSTEMI.  Given his abnormal anatomy believe that this patient would be more prone to troponin leaks.  However, given the fact the patient did have vegetation on his valve last year this is within the differential.  Will check an echocardiogram to reassess valve as well as overall EF to ensure no loss of function.  Otherwise blood pressure stable.  EKG nonischemic and chronic pattern of LVH with block.  Euvolemic on examination.  No chest pain.  Await results of echocardiogram.  If no significant findings will be considered stable from the cardiac perspective and would pursue infectious etiology.       Code Status:  Full Code    I spent 60 minutes in the professional and overall care of this patient.        AURORA Barrera-CNP         [1]   Family History  Problem Relation Name Age of Onset    Autoimmune disease Mother       Hypertension Mother      PTSD Father      Benign prostatic hyperplasia Father      No Known Problems Sister      Other (htn) Brother      Other (hld) Brother      No Known Problems Daughter      No Known Problems Daughter      Other (watchman) Maternal Grandfather      Other (cath) Maternal Grandfather      Other (3x bypass) Maternal Grandfather      Heart disease Maternal Grandfather     [2]   Scheduled medications   Medication Dose Route Frequency    aspirin  324 mg oral Daily    pantoprazole  40 mg oral Daily   [3]   PRN medications   Medication   [4]   Continuous Medications   Medication Dose Last Rate

## 2025-05-09 NOTE — PROGRESS NOTES
05/09/25 1302   Physical Activity   On average, how many days per week do you engage in moderate to strenuous exercise (like a brisk walk)? 0 days   On average, how many minutes do you engage in exercise at this level? 0 min   Financial Resource Strain   How hard is it for you to pay for the very basics like food, housing, medical care, and heating? Not hard   Housing Stability   In the last 12 months, was there a time when you were not able to pay the mortgage or rent on time? N   In the past 12 months, how many times have you moved where you were living? 0   At any time in the past 12 months, were you homeless or living in a shelter (including now)? N   Transportation Needs   In the past 12 months, has lack of transportation kept you from medical appointments or from getting medications? no   In the past 12 months, has lack of transportation kept you from meetings, work, or from getting things needed for daily living? No   Food Insecurity   Within the past 12 months, you worried that your food would run out before you got the money to buy more. Never true   Within the past 12 months, the food you bought just didn't last and you didn't have money to get more. Never true   Stress   Do you feel stress - tense, restless, nervous, or anxious, or unable to sleep at night because your mind is troubled all the time - these days? Not at all   Social Connections   In a typical week, how many times do you talk on the phone with family, friends, or neighbors? More than 3   How often do you get together with friends or relatives? Twice   How often do you attend Mormon or Sabianist services? Never   Do you belong to any clubs or organizations such as Mormon groups, unions, fraternal or athletic groups, or school groups? No   How often do you attend meetings of the clubs or organizations you belong to? Never   Are you , , , , never , or living with a partner?     Intimate Partner Violence   Within the last year, have you been afraid of your partner or ex-partner? No   Within the last year, have you been humiliated or emotionally abused in other ways by your partner or ex-partner? No   Within the last year, have you been kicked, hit, slapped, or otherwise physically hurt by your partner or ex-partner? No   Within the last year, have you been raped or forced to have any kind of sexual activity by your partner or ex-partner? No   Alcohol Use   Q1: How often do you have a drink containing alcohol? 4 or more ti  (drinks 1 vodka with gingerale daily)   Q2: How many drinks containing alcohol do you have on a typical day when you are drinking? 1 or 2   Q3: How often do you have six or more drinks on one occasion? Never   Utilities   In the past 12 months has the electric, gas, oil, or water company threatened to shut off services in your home? No   Health Literacy   How often do you need to have someone help you when you read instructions, pamphlets, or other written material from your doctor or pharmacy? Never   Follow-Ups   We make community resources available to all of our patients to assist with everyday needs. We may be able to connect you with those resources. Would you be interested? N

## 2025-05-09 NOTE — CARE PLAN
The patient's goals for the shift include  eat and rest    The clinical goals for the shift include ensure no cardiac issues    Over the shift, the patient did not make progress toward the following goals. Barriers to progression include awaiting results from ECHO, pt abnormal tele alarms. Recommendations to address these barriers include investigate w/ cardiologist and care team.

## 2025-05-09 NOTE — CARE PLAN
The patient's goals for the shift include decrease fatigue/weakness     The clinical goals for the shift include US of spleen and ECHO     Over the shift, the patient did not make progress toward the following goals. Barriers to progression include none. Recommendations to address these barriers include none.

## 2025-05-09 NOTE — PROGRESS NOTES
05/09/25 1304   Discharge Planning   Living Arrangements Spouse/significant other   Support Systems Spouse/significant other   Type of Residence Private residence   Number of Stairs to Enter Residence 1   Number of Stairs Within Residence 24   Do you have animals or pets at home? Yes   Type of Animals or Pets 2 cats   Who is requesting discharge planning? Provider   Home or Post Acute Services None   Expected Discharge Disposition Home   Does the patient need discharge transport arranged? No   Financial Resource Strain   How hard is it for you to pay for the very basics like food, housing, medical care, and heating? Not hard   Housing Stability   In the last 12 months, was there a time when you were not able to pay the mortgage or rent on time? N   In the past 12 months, how many times have you moved where you were living? 0   At any time in the past 12 months, were you homeless or living in a shelter (including now)? N   Transportation Needs   In the past 12 months, has lack of transportation kept you from medical appointments or from getting medications? no   In the past 12 months, has lack of transportation kept you from meetings, work, or from getting things needed for daily living? No   Patient Choice   Patient / Family choosing to utilize agency / facility established prior to hospitalization No   Stroke Family Assessment   Stroke Family Assessment Needed No

## 2025-05-09 NOTE — CONSULTS
Inpatient consult to Colorectal Surgery  Consult performed by: Ivy Christensen, AURORA-CNP  Consult ordered by: Jonathan Nur MD      Reason For Consult  Questionable splenic abscess     [facility]     History Of Present Illness  Cristhian Thornton is a 54 y.o. male on day 0 of admission presenting with NSTEMI (non-ST elevated myocardial infarction) (Multi).    Per the H&P that patient presented with multiple symptoms over 2 weeks.  Fevers but the patient denies having fevers.  Patient says that his wife checked his temperature and he did not have any.  Chills especially at night for 2 weeks.  Appetite loss stating that he has not really been able to eat much and weight loss stating he has lost about 16 pounds over the past 2 weeks.  Also fatigue.  And mid epigastric discomfort but not pain.  And generalized weakness.  He was admitted with non-STEMI and fevers and chills and questionable lesion involving the spleen that could be an abscess, anorexia and weight loss.  We were asked to see the patient as above.    Patient states that the epigastric discomfort has been going on for at least 2 weeks maybe a little bit longer.  He says that it is epigastric discomfort and not pain.  Denies any left upper quadrant pain.  The epigastric discomfort about 2 weeks ago was gradual, is constant but waxes and wanes, nothing makes better or worse, he did not have before 2 weeks ago, no radiation, describes as dull, achy, and crampy, upon ER presentation is a 2-3 and now is a 2-3.  Denies any history of A-fib.  Denies ever being diagnosed with heart disease or having a MI before.  Denies any IV drug use.  Denies any abdominal trauma.  Denies having any current wounds.    Denies eating anything out of the ordinary, any sick contacts or any recent travels.  Denies taking a lot of NSAIDs when asked but then I noticed he was taking Mobic which the patient states that he has takes for arthritis daily for the past 5 to 6  "years.    Said last had an EGD about 10 years ago for the indications of \"stomach issues\" because they went to [facility] and he thought he had a parasite and it was positive for something that they don't recall what.  Says last colonoscopy was done about 10 years ago for the indications of \"whatever parasite\" from when they went to [facility] and he does not recall if it showed anything.  Passing gas.  Last bowel movement was this morning and was loose without blood x 2 times today.  Said that he had a burger on Wednesday night that was well done and he started to have diarrhea about 30 minutes after that and went multiple times which is resolving.  Denies being in any recent antibiotics.  Denies ever having C. difficile before.    Denies taking anticoagulants or prescription antiplatelet medications.  Please see his below past medical and past surgical history.  Please also see his labs and imaging that are noted.      Past Medical History  Abscess of left index finger 7/2024 and then also prior to last May had abscesses of his right index finger, right calf, right thigh, buttock, left ring finger, and left knee stating that he had 6 staph infections within 7 months  Aortic valve stenosis  Closed fracture of distal end of humerus  Low testosterone  MRSA septicemia 7/2024  Arthritis-on Mobic for for the past 5 to 6 years      Surgical History  Right knee surgeries x 8-9 prior to his right total knee replacement 7/2019  Aortic valve replacement 11/2020  Right elbow bursa surgery about 15 years ago which the patient states that this was his first staph infection  Cervical spine surgery 3/2024  Left inguinal hernia with mesh 20+ years ago     Social History  From home with his wife  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  3) HEENT: Negative.  4) Cardiac: No angina or leg edema.  5) Pulmonary: No asthma, wheezing, sob.  6) GI: As per HPI.   7) Hematologic: No known personal or family history of bleeding diathesis.  8) Endocrine: No diabetes or thyroid disorders.  9) : No dysuria, hematuria, or frequency.  10) Musculoskeletal: No acute muscle/bone/joint pain/stiffness/swelling.  11) Neuro: No history of seizures or strokes.  12) Psych: No anxiety or depression        Physical Exam  Came to see patient at 1451 but he is in wheelchair being transported to [facility]-so will see him when he gets back  1607-still not back yet  1612-just got back and seen  1) VS-noted as documented.  2) General-laying in bed in no acute distress. Not septic appearing. Pleasant and cooperative. Looks fine and comfortable.   3) Neuro-Awake, alert, oriented to person, place, and time. Speech is normal. Affect is normal. Follows commands appropriately.  4) HEENT-Head normocephalic and externally atraumatic. Conjunctiva pink. Sclera anicteric. MMM.  5) Heart-RRR.   6) Lungs-Clear. Speaks in complete sentences and does not have any accessory muscle use.  7) Extremities-No edema. The patient's extremities are warm and normal color.  8) Skin-Warm and dry. No diaphoresis or jaundice.  9) Psych-Normal mood. Appropriate affect.   10) Abdomen-Soft. Positive bowel sounds. Non distended. Tenderness:  Left lower 0  Left upper 0  Epigastric 1-2  Periumbilical 0  Suprapubic 0  Right lower 0  Right upper 0  No guarding/rebound  No obvious hernias  No abdominal pain when I shake his abdomen a little bit    Wife at bedside    Vital signs in last 24 hours:  Temp:  [36.3 °C (97.3 °F)] 36.3 °C (97.3 °F)  Heart Rate:  [62-83] 73  Resp:  [8-19] 8  BP: (108-128)/(71-94) 114/76  Heart Rate:  [62-83]   Temp:  [36.3 °C (97.3 °F)]   Resp:   "[8-19]   BP: (108-128)/(71-94)   Height:  [177.8 cm (5' 10\")]   Weight:  [81.6 kg (180 lb)]   SpO2:  [94 %-100 %]      Intake/Output last 3 Shifts:  No intake/output data recorded.    Scheduled medications  Scheduled Medications[2]  Continuous medications  Continuous Medications[3]  PRN medications  PRN Medications[4]    Relevant Results  Results from last 7 days   Lab Units 05/09/25  0817 05/07/25  0739   WBC AUTO x10*3/uL 9.8  --    [facility] WBC AUTO [place]/uL  --  11.5*   HEMOGLOBIN g/dL 14.5  --    [facility] HEMOGLOBIN g/dL  --  14.4   HEMATOCRIT % 43.4  --    [facility] HEMATOCRIT %  --  44.7   PLATELETS AUTO x10*3/uL 180  --    [facility] PLATELETS AUTO [place]ousand/uL  --  188      Results from last 7 days   Lab Units 05/09/25  0817 05/07/25  0739   [facility] SODIUM mmol/L  --  139   SODIUM mmol/L 138  --    [facility] POTASSIUM mmol/L  --  4.9   POTASSIUM mmol/L 4.1  --    [facility] CHLORIDE mmol/L  --  105   CHLORIDE mmol/L 107  --    [facility] CO2 mmol/L  --  25   CO2 mmol/L 22  --    BUN mg/dL 15  --    [facility] BUN mg/dL  --  16   CREATININE mg/dL 1.05  --    [facility] CREATININE mg/dL  --  1.05   [facility] GLUCOSE mg/dL  --  103*   GLUCOSE mg/dL 99  --    [facility] CALCIUM mg/dL  --  8.9   CALCIUM mg/dL 8.8  --        No results found for the last 7 days.    Imaging  CT abdomen pelvis w IV contrast  Result Date: 5/9/2025  1.  Indeterminate lesion within the left upper abdomen along the inferior aspect of the posteromedial spleen is of indeterminate origin, favored splenic. Differential considerations include splenic infarct, subcapsular hematoma or abscess although there is no gross evidence of inflammation within the adjacent soft tissues, benign vascular lesion such as a hemangioma or less likely a malignant lesion. A lesion arising from the pancreatic tail remains within the differential. Further evaluation with abdominal ultrasound is recommended. 2. No bowel obstruction, appendicitis or diverticulitis. 3. Additional chronic and/or ancillary " findings detailed above.     MACRO: Critical Finding:  New lesion in the spleen. Notification was initiated on 5/9/2025 at 10:17 am by  Mukund Bhandari.  (**-YCF-**) Instructions:  US Abdomen Limited Spleen. 6 hours.   Signed by: Mukund Bhandari 5/9/2025 10:19 AM Dictation workstation:   [id number]    XR chest 2 views  Result Date: 5/9/2025  No active disease in the chest identified.   MACRO: None   Signed by: Manny Santiago 5/9/2025 8:43 AM Dictation workstation:   [id number]      Cardiology, Vascular, and Other Imaging  ECG 12 lead  Result Date: 5/9/2025  Sinus rhythm with 1st degree AV block Nonspecific intraventricular block Minimal voltage criteria for LVH, may be normal variant ( [place] product ) T wave abnormality, consider inferior ischemia Abnormal ECG When compared with ECG of 09-MAY-2025 07:59, (unconfirmed) No significant change was found    ECG 12 lead  Result Date: 5/9/2025  Sinus rhythm with 1st degree AV block Nonspecific intraventricular block Minimal voltage criteria for LVH, may be normal variant ( [place] product ) T wave abnormality, consider inferior ischemia Abnormal ECG When compared with ECG of 22-MAY-2024 21:17, Significant changes have occurred           Assessment/Plan   Assessment & Plan  NSTEMI (non-ST elevated myocardial infarction) (Multi)    Chills    History of multiple recurrent staph infections including bacteremia    Abnormal CAT scan  IMPRESSION:  1.  Indeterminate lesion within the left upper abdomen along the  inferior aspect of the posteromedial spleen is of indeterminate  origin, favored splenic. Differential considerations include splenic  infarct, subcapsular hematoma or abscess although there is no gross  evidence of inflammation within the adjacent soft tissues, benign  vascular lesion such as a hemangioma or less likely a malignant  lesion. A lesion arising from the pancreatic tail remains within the  differential. Further evaluation with abdominal ultrasound  is  recommended.  2. No bowel obstruction, appendicitis or diverticulitis.  3. Additional chronic and/or ancillary findings detailed above.    Patient then had a spleen ultrasound that the impression demonstrated:  IMPRESSION:  1. No focal abnormality within the visualized portions of the spleen  by ultrasound. Continued attention on follow up by CT.    Patient also had an echo-await results    VS noted  Labs noted-WBC 9.8  Antibiotics per IM/ID-on Cefepime and IV Vancomycin  Will d/w Dr George Christensen, APRN-CNP                [1]   Allergies  Allergen Reactions    Acyclovir Unknown    Opioids - Morphine Analogues Unknown    Hydromorphone Rash   [2] aspirin, 325 mg, oral, Daily  pantoprazole, 40 mg, oral, Daily  [3]    [4]

## 2025-05-09 NOTE — Clinical Note
Closure device placed in the right femoral artery. Site closed by Perclose. Deployed By: Pat Kunz MD

## 2025-05-09 NOTE — Clinical Note
Vessel(s): SMA. Balloon inserted. Complex Repair Preamble Text (Leave Blank If You Do Not Want): Extensive wide undermining was performed.

## 2025-05-09 NOTE — H&P
History Of Present Illness  Cristhian Thornton is a 54 y.o. male presenting with a variety of symptoms for 2 weeks.  He has a sense of fevers and chills especially at night.  He has loss of appetite and weight loss.  He has vague intermittent midepigastric discomfort and generalized weakness.  His ER evaluation revealed some sort of lesion adjacent to his spleen which could be an abscess.  And some positive cardiac enzymes.  It is noteworthy that he has a history of recurrent staph infections including 1 episode of staph bacteremia.  He also has low testosterone and takes both testosterone supplements and 2 different estrogen blockers..     Past Medical History  He has a past medical history of Abscess of left index finger (07/03/2024), Aortic valve stenosis, Closed fracture of distal end of humerus (03/10/2025), Elbow pain (03/10/2025), Infection, Low testosterone, and MRSA (methicillin resistant Staphylococcus aureus) septicemia (Multi) (07/03/2024).    Surgical History  He has a past surgical history that includes MR angio neck wo IV contrast (12/20/2012); MR angio head wo IV contrast (02/24/2023); MR angio head wo IV contrast (09/05/2023); Joint replacement (Right); Aortic valve replacement; Elbow bursa surgery (Right); Cervical spine surgery; and Hernia repair.     Social History  He reports that he has never smoked. He has never used smokeless tobacco. He reports current alcohol use of about 1.0 standard drink of alcohol per week. He reports that he does not use drugs.    Family History  Family History[1] Negative for coronary disease.     Allergies  Acyclovir, Opioids - morphine analogues, and Hydromorphone    Review of Systems see HPI for pertinent positives and negatives otherwise 10 point review of systems negative.     Physical Exam alert oriented no distress  Head atraumatic normocephalic  Pupils equal round reactive light extraocular motion intact  Mouth normal-appearing tongue and oropharynx  Neck supple  without thyromegaly  Lymph nodes no cervical or axillary lymphadenopathy  Heart regular rate and rhythm with loud systolic murmur at apex and right sternal border  Lungs clear to auscultation normal to percussion  Abdomen soft nontender nondistended  Extremities no significant edema  Neuro cranial nerves intact with normal tone and strength in arms and legs  Musculoskeletal normal passive range of motion shoulders elbows hips and knees  Skin no rashes or ulcerations.     Last Recorded Vitals  /76   Pulse 73   Temp 36.3 °C (97.3 °F) (Temporal)   Resp (!) 8   Wt 81.6 kg (180 lb)   SpO2 99%     Relevant Results  CT of the abdomen showed some sort of cavitary looking lesion adjacent to his spleen that could be an abscess but the radiologist also said it could be a variety of other things.  The recommending an ultrasound.  Assessment/Plan   Assessment & Plan  NSTEMI (non-ST elevated myocardial infarction) (Multi)  Presentation not exactly consistent with acute coronary syndrome.  I reviewed the EKG.  It shows a right bundle branch block.  Will get a third set of cardiac troponins.  Ordering a full size aspirin daily.  Holding his Mobic which is one of his home meds.  Consulting cardiology and ordering an echocardiogram.    #2- fevers and chills and questionable lesion involving spleen that could be an abscess.  Blood cultures are pending.  Will get an ultrasound of his spleen.  Patient has a history of recurrent staph infections including bacteremia and is also had some heart valve replacement so we need to be extra vigilant about infectious processes with him.  I am consulting infectious disease and as mentioned above ordering an echocardiogram.    #3 anorexia and weight loss.-I am putting him on Protonix.  Ordering a regular diet and checking thyroid levels.    Will continue his estrogen blockers which are both ordered twice a week.         Jonathan Nur MD         [1]   Family History  Problem Relation Name  Age of Onset    Autoimmune disease Mother      Hypertension Mother      PTSD Father      Benign prostatic hyperplasia Father      No Known Problems Sister      Other (htn) Brother      Other (hld) Brother      No Known Problems Daughter      No Known Problems Daughter      Other (watchman) Maternal Grandfather      Other (cath) Maternal Grandfather      Other (3x bypass) Maternal Grandfather      Heart disease Maternal Grandfather

## 2025-05-10 ENCOUNTER — APPOINTMENT (OUTPATIENT)
Dept: RADIOLOGY | Facility: HOSPITAL | Age: 55
DRG: 270 | End: 2025-05-10
Payer: COMMERCIAL

## 2025-05-10 LAB
ALBUMIN SERPL BCP-MCNC: 3.6 G/DL (ref 3.4–5)
ALP SERPL-CCNC: 49 U/L (ref 33–120)
ALT SERPL W P-5'-P-CCNC: 15 U/L (ref 10–52)
ANION GAP SERPL CALCULATED.3IONS-SCNC: 10 MMOL/L (ref 10–20)
AORTIC VALVE MEAN GRADIENT: 45 MMHG
AORTIC VALVE PEAK VELOCITY: 4.38 M/S
AST SERPL W P-5'-P-CCNC: 15 U/L (ref 9–39)
AV PEAK GRADIENT: 77 MMHG
AVA (PEAK VEL): 0.8 CM2
AVA (VTI): 0.78 CM2
BILIRUB SERPL-MCNC: 0.6 MG/DL (ref 0–1.2)
BUN SERPL-MCNC: 11 MG/DL (ref 6–23)
CALCIUM SERPL-MCNC: 8.7 MG/DL (ref 8.6–10.3)
CHLORIDE SERPL-SCNC: 105 MMOL/L (ref 98–107)
CO2 SERPL-SCNC: 28 MMOL/L (ref 21–32)
CREAT SERPL-MCNC: 1.07 MG/DL (ref 0.5–1.3)
CRP SERPL-MCNC: 4.2 MG/DL
EGFRCR SERPLBLD CKD-EPI 2021: 83 ML/MIN/1.73M*2
EJECTION FRACTION APICAL 4 CHAMBER: 47.4
EJECTION FRACTION: 53 %
ERYTHROCYTE [DISTWIDTH] IN BLOOD BY AUTOMATED COUNT: 12 % (ref 11.5–14.5)
ERYTHROCYTE [SEDIMENTATION RATE] IN BLOOD BY WESTERGREN METHOD: 4 MM/H (ref 0–20)
GLUCOSE SERPL-MCNC: 90 MG/DL (ref 74–99)
HCT VFR BLD AUTO: 41.3 % (ref 41–52)
HGB BLD-MCNC: 14.5 G/DL (ref 13.5–17.5)
LEFT VENTRICLE INTERNAL DIMENSION DIASTOLE: 5.21 CM (ref 3.5–6)
LEFT VENTRICULAR OUTFLOW TRACT DIAMETER: 2 CM
LV EJECTION FRACTION BIPLANE: 47 %
MCH RBC QN AUTO: 30.3 PG (ref 26–34)
MCHC RBC AUTO-ENTMCNC: 35.1 G/DL (ref 32–36)
MCV RBC AUTO: 86 FL (ref 80–100)
MITRAL VALVE E/A RATIO: 0.98
MITRAL VALVE E/E' RATIO: 18.53
NRBC BLD-RTO: 0 /100 WBCS (ref 0–0)
PLATELET # BLD AUTO: 205 X10*3/UL (ref 150–450)
POTASSIUM SERPL-SCNC: 4.4 MMOL/L (ref 3.5–5.3)
PROT SERPL-MCNC: 6.1 G/DL (ref 6.4–8.2)
RBC # BLD AUTO: 4.78 X10*6/UL (ref 4.5–5.9)
RIGHT VENTRICLE FREE WALL PEAK S': 9.48 CM/S
RIGHT VENTRICLE PEAK SYSTOLIC PRESSURE: 35.4 MMHG
SODIUM SERPL-SCNC: 139 MMOL/L (ref 136–145)
TRICUSPID ANNULAR PLANE SYSTOLIC EXCURSION: 1.9 CM
WBC # BLD AUTO: 10.4 X10*3/UL (ref 4.4–11.3)

## 2025-05-10 PROCEDURE — 99232 SBSQ HOSP IP/OBS MODERATE 35: CPT | Performed by: STUDENT IN AN ORGANIZED HEALTH CARE EDUCATION/TRAINING PROGRAM

## 2025-05-10 PROCEDURE — 74160 CT ABDOMEN W/CONTRAST: CPT | Performed by: RADIOLOGY

## 2025-05-10 PROCEDURE — 99232 SBSQ HOSP IP/OBS MODERATE 35: CPT | Performed by: INTERNAL MEDICINE

## 2025-05-10 PROCEDURE — 2500000001 HC RX 250 WO HCPCS SELF ADMINISTERED DRUGS (ALT 637 FOR MEDICARE OP): Performed by: INTERNAL MEDICINE

## 2025-05-10 PROCEDURE — 2500000004 HC RX 250 GENERAL PHARMACY W/ HCPCS (ALT 636 FOR OP/ED): Mod: JZ | Performed by: INTERNAL MEDICINE

## 2025-05-10 PROCEDURE — 85652 RBC SED RATE AUTOMATED: CPT | Performed by: INTERNAL MEDICINE

## 2025-05-10 PROCEDURE — 99232 SBSQ HOSP IP/OBS MODERATE 35: CPT | Performed by: NURSE PRACTITIONER

## 2025-05-10 PROCEDURE — 86140 C-REACTIVE PROTEIN: CPT | Performed by: INTERNAL MEDICINE

## 2025-05-10 PROCEDURE — 36415 COLL VENOUS BLD VENIPUNCTURE: CPT | Performed by: INTERNAL MEDICINE

## 2025-05-10 PROCEDURE — 74177 CT ABD & PELVIS W/CONTRAST: CPT

## 2025-05-10 PROCEDURE — 2060000001 HC INTERMEDIATE ICU ROOM DAILY

## 2025-05-10 PROCEDURE — 85027 COMPLETE CBC AUTOMATED: CPT | Performed by: INTERNAL MEDICINE

## 2025-05-10 PROCEDURE — 2550000001 HC RX 255 CONTRASTS: Performed by: INTERNAL MEDICINE

## 2025-05-10 PROCEDURE — 80053 COMPREHEN METABOLIC PANEL: CPT | Performed by: INTERNAL MEDICINE

## 2025-05-10 RX ORDER — ACETAMINOPHEN 325 MG/1
650 TABLET ORAL EVERY 4 HOURS PRN
Status: DISCONTINUED | OUTPATIENT
Start: 2025-05-10 | End: 2025-05-16 | Stop reason: HOSPADM

## 2025-05-10 RX ORDER — CEFTRIAXONE 2 G/50ML
2 INJECTION, SOLUTION INTRAVENOUS EVERY 24 HOURS
Status: DISCONTINUED | OUTPATIENT
Start: 2025-05-10 | End: 2025-05-12

## 2025-05-10 RX ORDER — NAPROXEN SODIUM 220 MG/1
81 TABLET, FILM COATED ORAL DAILY
Status: DISCONTINUED | OUTPATIENT
Start: 2025-05-11 | End: 2025-05-16

## 2025-05-10 RX ORDER — ACETAMINOPHEN 160 MG/5ML
650 SOLUTION ORAL EVERY 4 HOURS PRN
Status: DISCONTINUED | OUTPATIENT
Start: 2025-05-10 | End: 2025-05-16 | Stop reason: HOSPADM

## 2025-05-10 RX ORDER — VANCOMYCIN 1 G/200ML
1 INJECTION, SOLUTION INTRAVENOUS EVERY 12 HOURS
Status: DISCONTINUED | OUTPATIENT
Start: 2025-05-10 | End: 2025-05-11

## 2025-05-10 RX ORDER — PANTOPRAZOLE SODIUM 40 MG/1
40 TABLET, DELAYED RELEASE ORAL 2 TIMES DAILY
Status: DISCONTINUED | OUTPATIENT
Start: 2025-05-10 | End: 2025-05-16 | Stop reason: HOSPADM

## 2025-05-10 RX ORDER — VANCOMYCIN HYDROCHLORIDE 1 G/20ML
INJECTION, POWDER, LYOPHILIZED, FOR SOLUTION INTRAVENOUS DAILY PRN
Status: DISCONTINUED | OUTPATIENT
Start: 2025-05-10 | End: 2025-05-12

## 2025-05-10 RX ADMIN — ASPIRIN 325 MG ORAL TABLET 325 MG: 325 PILL ORAL at 08:52

## 2025-05-10 RX ADMIN — PANTOPRAZOLE SODIUM 40 MG: 40 TABLET, DELAYED RELEASE ORAL at 08:52

## 2025-05-10 RX ADMIN — CEFTRIAXONE SODIUM 2 G: 2 INJECTION, SOLUTION INTRAVENOUS at 09:20

## 2025-05-10 RX ADMIN — TRAZODONE HYDROCHLORIDE 50 MG: 50 TABLET ORAL at 20:32

## 2025-05-10 RX ADMIN — IOHEXOL 75 ML: 350 INJECTION, SOLUTION INTRAVENOUS at 12:25

## 2025-05-10 RX ADMIN — FLUOXETINE HYDROCHLORIDE 20 MG: 20 CAPSULE ORAL at 08:52

## 2025-05-10 RX ADMIN — PANTOPRAZOLE SODIUM 40 MG: 40 TABLET, DELAYED RELEASE ORAL at 20:32

## 2025-05-10 RX ADMIN — VANCOMYCIN 1 G: 1 INJECTION, SOLUTION INTRAVENOUS at 23:46

## 2025-05-10 RX ADMIN — VANCOMYCIN 1 G: 1 INJECTION, SOLUTION INTRAVENOUS at 12:20

## 2025-05-10 ASSESSMENT — COGNITIVE AND FUNCTIONAL STATUS - GENERAL
DAILY ACTIVITIY SCORE: 24
DAILY ACTIVITIY SCORE: 24
MOBILITY SCORE: 24
MOBILITY SCORE: 24

## 2025-05-10 ASSESSMENT — PAIN SCALES - GENERAL
PAINLEVEL_OUTOF10: 0 - NO PAIN

## 2025-05-10 ASSESSMENT — PAIN - FUNCTIONAL ASSESSMENT
PAIN_FUNCTIONAL_ASSESSMENT: 0-10
PAIN_FUNCTIONAL_ASSESSMENT: 0-10

## 2025-05-10 NOTE — PROGRESS NOTES
"Cristhian Thornton is a 54 y.o. male on day 1 of admission presenting with NSTEMI (non-ST elevated myocardial infarction) (Multi).    Subjective   Feeling okay today.  Still has some epigastric pain but was able to tolerate some breakfast.  No nausea.       Objective     Physical Exam  Vitals and nursing note reviewed.   Constitutional:       Appearance: Normal appearance.   HENT:      Head: Normocephalic and atraumatic.      Mouth/Throat:      Mouth: Mucous membranes are moist.      Pharynx: Oropharynx is clear.   Eyes:      Extraocular Movements: Extraocular movements intact.      Pupils: Pupils are equal, round, and reactive to light.   Cardiovascular:      Rate and Rhythm: Normal rate and regular rhythm.      Pulses: Normal pulses.   Pulmonary:      Effort: Pulmonary effort is normal.      Breath sounds: Normal breath sounds.   Abdominal:      General: There is no distension.      Palpations: Abdomen is soft.      Tenderness: There is abdominal tenderness (mild epigastric).   Musculoskeletal:      Cervical back: Normal range of motion and neck supple.   Skin:     General: Skin is warm and dry.   Neurological:      General: No focal deficit present.      Mental Status: He is alert and oriented to person, place, and time.   Psychiatric:         Mood and Affect: Mood normal.         Behavior: Behavior normal.         Last Recorded Vitals  Blood pressure 107/71, pulse 64, temperature 36.5 °C (97.7 °F), temperature source Temporal, resp. rate 18, height 1.778 m (5' 10\"), weight 84.9 kg (187 lb 2.7 oz), SpO2 99%.  Intake/Output last 3 Shifts:  I/O last 3 completed shifts:  In: 540 (6.4 mL/kg) [P.O.:240; IV Piggyback:300]  Out: 0 (0 mL/kg)   Weight: 84.9 kg     Relevant Results  Results for orders placed or performed during the hospital encounter of 05/09/25 (from the past 24 hours)   Troponin I, High Sensitivity   Result Value Ref Range    Troponin I, High Sensitivity 120 (HH) 0 - 20 ng/L   Transthoracic Echo Complete "   Result Value Ref Range    AV pk minh 4.38 m/s    LVOT diam 2.00 cm    AV mn grad 45 mmHg    MV E/A ratio 0.98     Tricuspid annular plane systolic excursion 1.9 cm    LV Biplane EF 47 %    MV avg E/e' ratio 18.53     LV EF 53 %    RV free wall pk S' 9.48 cm/s    RVSP 35.4 mmHg    LVIDd 5.21 cm    AV pk grad 77 mmHg    Aortic Valve Area by Continuity of VTI 0.78 cm2    Aortic Valve Area by Continuity of Peak Velocity 0.80 cm2    LV A4C EF 47.4    CBC   Result Value Ref Range    WBC 10.4 4.4 - 11.3 x10*3/uL    nRBC 0.0 0.0 - 0.0 /100 WBCs    RBC 4.78 4.50 - 5.90 x10*6/uL    Hemoglobin 14.5 13.5 - 17.5 g/dL    Hematocrit 41.3 41.0 - 52.0 %    MCV 86 80 - 100 fL    MCH 30.3 26.0 - 34.0 pg    MCHC 35.1 32.0 - 36.0 g/dL    RDW 12.0 11.5 - 14.5 %    Platelets 205 150 - 450 x10*3/uL   Comprehensive Metabolic Panel   Result Value Ref Range    Glucose 90 74 - 99 mg/dL    Sodium 139 136 - 145 mmol/L    Potassium 4.4 3.5 - 5.3 mmol/L    Chloride 105 98 - 107 mmol/L    Bicarbonate 28 21 - 32 mmol/L    Anion Gap 10 10 - 20 mmol/L    Urea Nitrogen 11 6 - 23 mg/dL    Creatinine 1.07 0.50 - 1.30 mg/dL    eGFR 83 >60 mL/min/1.73m*2    Calcium 8.7 8.6 - 10.3 mg/dL    Albumin 3.6 3.4 - 5.0 g/dL    Alkaline Phosphatase 49 33 - 120 U/L    Total Protein 6.1 (L) 6.4 - 8.2 g/dL    AST 15 9 - 39 U/L    Bilirubin, Total 0.6 0.0 - 1.2 mg/dL    ALT 15 10 - 52 U/L   Sedimentation rate, automated   Result Value Ref Range    Sedimentation Rate 4 0 - 20 mm/h   C-reactive protein   Result Value Ref Range    C-Reactive Protein 4.20 (H) <1.00 mg/dL     Transthoracic Echo Complete  Result Date: 5/10/2025           [place] [id number]            Phone [phone] TRANSTHORACIC ECHOCARDIOGRAM REPORT Patient Name:       SARMAD Dykes Physician:    [id number] M Quick DO Study Date:         5/9/2025              Ordering Provider:     [id number] CONOR HOGAN MRN/PID:            [id number]              Fellow: Accession#:         [id number]          Nurse: Date of Birth/Age:  1970 / 54 years Sonographer:          Azra Pineda RDCS Gender Assigned at  M                     Additional Staff: Birth: Height:             177.80 cm             Admit Date: Weight:             81.65 kg              Admission Status: BSA / BMI:          2.00 m2 / 25.83 kg/m2 Department Location:  [facility] Blood Pressure: 114 /76 mmHg Study Type:    TRANSTHORACIC ECHO (TTE) COMPLETE Diagnosis/ICD: Non ST elevation (NSTEMI) myocardial infarction-I21.4 CPT Codes:     Echo Complete w Full [id number] Patient History: Pertinent History: AVR. Study Detail: The following Echo studies were performed: 2D, M-Mode, Doppler and               color flow.  PHYSICIAN INTERPRETATION: Left Ventricle: Left ventricular ejection fraction is low normal, by visual estimate at 50-55%. There is mild concentric left ventricular hypertrophy. There are no regional wall motion abnormalities. The left ventricular cavity size is normal. There is mild increased septal and mildly increased posterior left ventricular wall thickness. Abnormal (paradoxical) septal motion consistent with post-operative status. Spectral Doppler shows a Grade I (impaired relaxation pattern) of left ventricular diastolic filling with normal left atrial filling pressure. Left Atrium: The left atrial size is normal. Right Ventricle: The right ventricle is normal in size. There is normal right ventricular global systolic function. Right Atrium: The right atrial size is normal. Aortic Valve: There is no visualized aortic valve vegetation. There is a prosthetic aortic valve present. There is evidence of moderate to severe aortic valve stenosis. The aortic valve dimensionless index is 0.25.  There is no evidence of aortic valve regurgitation. The peak instantaneous gradient of the aortic valve is 77 mmHg. The mean gradient of the aortic valve is 45 mmHg. Mitral Valve: The mitral valve is normal in structure. There was no mitral valve vegetation visualized. The peak instantaneous gradient of the mitral valve is 5 mmHg. There is mild to moderate mitral valve regurgitation which is centrally directed. Tricuspid Valve: The tricuspid valve is structurally normal. There is mild tricuspid regurgitation. The Doppler estimated RVSP is mildly elevated right ventricular systolic pressure at 35.4 mmHg. Pulmonic Valve: The pulmonic valve is structurally normal. There is no indication of pulmonic valve regurgitation. Pericardium: No pericardial effusion noted. Aorta: The aortic root is normal. In comparison to the previous echocardiogram(s):  CONCLUSIONS:  1. Left ventricular ejection fraction is low normal, by visual estimate at 50-55%.  2. Spectral Doppler shows a Grade I (impaired relaxation pattern) of left ventricular diastolic filling with normal left atrial filling pressure.  3. Abnormal septal motion consistent with post-operative status.  4. There is normal right ventricular global systolic function.  5. No mitral valve vegetation visualized.  6. Mild to moderate mitral valve regurgitation.  7. Mildly elevated right ventricular systolic pressure.  8. Moderate to severe aortic valve stenosis.  9. No aortic valve vegetation visualized. QUANTITATIVE DATA SUMMARY:  2D MEASUREMENTS:             Normal Ranges: LAs:             3.36 cm     (2.7-4.0cm) IVSd:            1.14 cm     (0.6-1.1cm) LVPWd:           1.07 cm     (0.6-1.1cm) LVIDd:           5.21 cm     (3.9-5.9cm) LVIDs:           3.24 cm LV Mass Index:   112.0 g/m2 LVEDV Index:     96.71 ml/m2 LV % FS          37.9 %  LEFT ATRIUM:                 Normal Ranges: LA Area A4C:      18.0 cm2 LA Area A2C:      21.0 cm2 LA Vol A4C:       45.6 ml LA Vol A2C:        72.5 ml LA Vol Index BSA: 29.6 ml/m2  RIGHT ATRIUM:          Normal Ranges: RA Area A4C:  17.0 cm2  M-MODE MEASUREMENTS:         Normal Ranges: LAs:                 3.41 cm (2.7-4.0cm)  LV SYSTOLIC FUNCTION:                      Normal Ranges: EF-A4C View:    47 % (>=55%) EF-A2C View:    47 % EF-Biplane:     47 % EF-Visual:      53 % LV EF Reported: 53 %  LV DIASTOLIC FUNCTION:             Normal Ranges: MV Peak E:             0.86 m/s    (0.7-1.2 m/s) MV Peak A:             0.87 m/s    (0.42-0.7 m/s) E/A Ratio:             0.98        (1.0-2.2) MV e'                  0.049 m/s   (>8.0) MV lateral e'          0.05 m/s MV medial e'           0.05 m/s E/e' Ratio:            17.59       (<8.0) PulmV Sys Jose Enrique:         50.20 cm/s PulmV Bucio Jose Enrique:        44.18 cm/s PulmV S/D Jose Enrique:         1.14 PulmV A Revs Jose Enrique:      15.24 cm/s PulmV A Revs Dur:      112.27 msec  MITRAL VALVE:          Normal Ranges: MV Vmax:      1.07 m/s (<=1.3m/s) MV peak P.6 mmHg (<5mmHg) MV mean P.4 mmHg (<48mmHg) MV VTI:       26.23 cm (10-13cm) MV DT:        164 msec (150-240msec)  AORTIC VALVE:                      Normal Ranges: AoV Vmax:                4.38 m/s  (<=1.7m/s) AoV Peak P.7 mmHg (<20mmHg) AoV Mean P.1 mmHg (1.7-11.5mmHg) LVOT Max Jose Enrique:            1.12 m/s  (<=1.1m/s) AoV VTI:                 93.12 cm  (18-25cm) LVOT VTI:                23.06 cm LVOT Diameter:           2.00 cm   (1.8-2.4cm) AoV Area, VTI:           0.78 cm2  (2.5-5.5cm2) AoV Area,Vmax:           0.80 cm2  (2.5-4.5cm2) AoV Dimensionless Index: 0.25  RIGHT VENTRICLE: RV Basal 3.95 cm RV Major 9.4 cm TAPSE:   18.7 mm RV s'    0.09 m/s  TRICUSPID VALVE/RVSP:          Normal Ranges: Peak TR Velocity:     2.84 m/s RV Syst Pressure:     35 mmHg  (< 30mmHg) IVC Diam:             1.10 cm  PULMONIC VALVE:          Normal Ranges: PV Max Jose Enrique:     1.1 m/s  (0.6-0.9m/s) PV Max P.9 mmHg PV Mean P.6 mmHg PV VTI:          20.36 cm  PULMONARY VEINS: PulmV A Revs Dur: 112.27 msec PulmV A Revs Jose Enrique: 15.24 cm/s PulmV Bucio Jose Enrique:   44.18 cm/s PulmV S/D Jose Enrique:    1.14 PulmV Sys Jose Enrique:    50.20 cm/s  AORTA: Asc Ao Diam 3.10 cm  [id number] Gustavo Quick DO Electronically signed on 5/10/2025 at 10:11:48 AM  ** Final **     US abdomen limited spleen  Result Date: 5/9/2025  Interpreted By:  Montserrat Carlos, STUDY: US ABDOMEN LIMITED SPLEEN; 3:26 pm   INDICATION: Signs/Symptoms:splenic abscess.   COMPARISON: None.   ACCESSION NUMBER(S): [id number]   ORDERING CLINICIAN: CONOR HOGAN   TECHNIQUE: Limited grayscale and color Doppler images obtained of the spleen.   FINDINGS: Limited characterization of the spleen demonstrates normal echogenicity. Spleen measures 10.3 x 3.9 by 4.9 cm for a total volume of 104.5 mL. No focal abnormality within the visualized portions of the spleen.       1. No focal abnormality within the visualized portions of the spleen by ultrasound. Continued attention on follow up by CT.   MACRO: None.   Signed by: Montserrat Carlos 5/9/2025 3:28 PM Dictation workstation:   [id number]    ECG 12 lead  Result Date: 5/9/2025  Sinus rhythm with 1st degree AV block Nonspecific intraventricular block Minimal voltage criteria for LVH, may be normal variant ( Marion product ) T wave abnormality, consider inferior ischemia Abnormal ECG When compared with ECG of 09-MAY-2025 07:59, (unconfirmed) No significant change was found    CT abdomen pelvis w IV contrast  Result Date: 5/9/2025  Interpreted By:  Mukund Bhandari, STUDY: CT ABDOMEN PELVIS W IV CONTRAST;  5/9/2025 9:28 am   INDICATION: Signs/Symptoms:Upper abdominal pain, decreased oral intake for the past week, feels he is unable to eat or drink.  Intermittent shaking chills at night.   COMPARISON: None.   ACCESSION NUMBER(S): [id number]   ORDERING CLINICIAN: KM SUMMERS   TECHNIQUE: CT of the abdomen and pelvis was performed.  Standard contiguous axial images were obtained at 3 mm slice  thickness through the abdomen and pelvis. Coronal and sagittal reconstructions at 3 mm slice thickness were performed.   75 ML of Omnipaque 350were administered intravenously without immediate complication.   FINDINGS: LOWER CHEST: No consolidation or effusion. Retained epicardial pacing lead is partially imaged.   ABDOMEN:   LIVER: Subcentimeter low-attenuation lesion within the right hepatic lobe near the dome too small to characterize by CT statistically likely a benign cyst.   GALLBLADDER/BILE DUCTS: No calcified cholelithiasis. No intra or extrahepatic biliary dilatation.   SPLEEN: Indeterminate density lesion along the inferior aspect of the posteromedial spleen measuring 4 x 3.1 x 1.6 cm (AP x TV x CC) (series 3, image 37 and series 5, image 59) with a thin peripheral rim of enhancement. The fat plane between the lesion in the tail of the pancreas is obliterated.   PANCREAS: Above-described indeterminate density lesion abuts the tail of the pancreas with obliteration of the fat plane. Pancreatic parenchyma is otherwise unremarkable.   ADRENAL GLANDS: Unremarkable.   KIDNEYS AND URETERS: Symmetric renal parenchymal enhancement. No hydroureteronephrosis or urolithiasis which is identified.   BOWEL: No bowel obstruction.Normal appendix. Distal colonic diverticulosis without diverticulitis.   PELVIS:   BLADDER: Unremarkable.   REPRODUCTIVE ORGANS: No pelvic mass seen.   PERITONEUM/RETROPERITONEUM/LYMPH NODES: No ascites or free air, no fluid collection.  No enlarged mesenteric lymph nodes.   VESSELS: Atherosclerotic calcifications about the aortoiliac axis. No aneurysm.   BONES AND ABDOMINAL WALL: No acute osseous abnormality. Multilevel spondylosis throughout the imaged spine. Surgical changes of mesh left inguinal hernia repair.       1.  Indeterminate lesion within the left upper abdomen along the inferior aspect of the posteromedial spleen is of indeterminate origin, favored splenic. Differential  considerations include splenic infarct, subcapsular hematoma or abscess although there is no gross evidence of inflammation within the adjacent soft tissues, benign vascular lesion such as a hemangioma or less likely a malignant lesion. A lesion arising from the pancreatic tail remains within the differential. Further evaluation with abdominal ultrasound is recommended. 2. No bowel obstruction, appendicitis or diverticulitis. 3. Additional chronic and/or ancillary findings detailed above.     MACRO: Critical Finding:  New lesion in the spleen. Notification was initiated on 5/9/2025 at 10:17 am by  Mukund Bhandari.  (**-YCF-**) Instructions:  US Abdomen Limited Spleen. 6 hours.   Signed by: Mukund Bhandari 5/9/2025 10:19 AM Dictation workstation:   [id number]    XR chest 2 views  Result Date: 5/9/2025  Interpreted By:  Manny Santiago, STUDY: XR CHEST 2 VIEWS;  5/9/2025 8:38 am   INDICATION: Signs/Symptoms:chills, chest discomfort.   COMPARISON: 12/01/2020   ACCESSION NUMBER(S): [id number]   ORDERING CLINICIAN: KM SUMMERS   FINDINGS: Post sternotomy. The lungs are clear without apparent pleural effusion. Cardiomediastinal silhouette unchanged. No pulmonary vascular congestion. Thoracic degenerative changes.       No active disease in the chest identified.   MACRO: None   Signed by: Manny Santiago 5/9/2025 8:43 AM Dictation workstation:   [id number]    ECG 12 lead  Result Date: 5/9/2025  Sinus rhythm with 1st degree AV block Nonspecific intraventricular block Minimal voltage criteria for LVH, may be normal variant ( Guy product ) T wave abnormality, consider inferior ischemia Abnormal ECG When compared with ECG of 22-MAY-2024 21:17, Significant changes have occurred          Assessment & Plan  NSTEMI (non-ST elevated myocardial infarction) (Multi)    Chills    History of multiple recurrent staph infections including bacteremia    Abnormal CAT scan      Initial concern for splenic abscess on CT scan.  Subsequent  ultrasound showed a normal spleen.  There is a fluid filled lesion that is either distal pancreatic or possibly even adrenal.  I have ordered a CT scan pancreatic protocol to further evaluate.  This is likely a more chronic finding and not the cause of his acute pain.  No diet or activity restrictions from a surgical standpoint.  He does not have any gallstones seen on imaging.  May benefit from outpatient GI follow-up for an EGD.    Tegan Vazquez MD

## 2025-05-10 NOTE — NURSING NOTE
Assumed care of patient. Bedside Report done. Assessed and charted, No complaint made. Will monitor.

## 2025-05-10 NOTE — CONSULTS
Vancomycin Dosing by Pharmacy- INITIAL    Cristhian Thornton is a 54 y.o. year old male who Pharmacy has been consulted for vancomycin dosing for endocarditis/endovascular infection. Based on the patient's indication and renal status this patient will be dosed based on a goal AUC of 500-600.     Renal function is currently stable.    Visit Vitals  /75 (BP Location: Left arm, Patient Position: Lying)   Pulse 71   Temp 36.2 °C (97.2 °F) (Temporal)   Resp 17        Lab Results   Component Value Date    CREATININE 1.07 05/10/2025    CREATININE 1.05 2025    CREATININE 1.05 2025    CREATININE 0.99 2025    CREATININE 1.00 2024    CREATININE 1.10 2024        Patient weight is as follows:   Vitals:    05/10/25 0441   Weight: 84.9 kg (187 lb 2.7 oz)       Cultures:  No results found for the encounter in last 14 days.        I/O last 3 completed shifts:  In: 540 (6.4 mL/kg) [P.O.:240; IV Piggyback:300]  Out: 0 (0 mL/kg)   Weight: 84.9 kg   I/O during current shift:  No intake/output data recorded.    Temp (24hrs), Av.9 °C (98.5 °F), Min:36.2 °C (97.2 °F), Max:37.4 °C (99.3 °F)         Assessment/Plan     Patient has already been given a loading dose of 1500 mg. In ED @12:00  Will initiate vancomycin maintenance, 1000 mg every 12 hours.    This dosing regimen is predicted by [employer] to result in the following pharmacokinetic parameters:  Loading dose: N/A  Regimen: 1000 mg IV every 12 hours.  Start time: 08:11 on 05/10/2025  Exposure target: AUC24 (range)500-600 mg/L.hr   HHC67-96: 450 mg/L.hr  AUC24,ss: 519 mg/L.hr  Probability of AUC24 > 400: 77 %  Ctrough,ss: 16.8 mg/L  Probability of Ctrough,ss > 20: 34 %      Follow-up level will be ordered on  at 5:00 unless clinically indicated sooner.  Will continue to monitor renal function daily while on vancomycin and order serum creatinine at least every 48 hours if not already ordered.  Follow for continued vancomycin needs, clinical  response, and signs/symptoms of toxicity.       Gordon Caballero, PharmD

## 2025-05-10 NOTE — PROGRESS NOTES
Sarmad Thornton is a 54 y.o. male on day 1 of admission presenting with NSTEMI (non-ST elevated myocardial infarction) (Multi).      Subjective   Admitted yesterday with about a week of nausea anorexia weight loss and a sense of fevers and chills.  Since he has been here there is been no fevers.  His only complaint right now is poor appetite and nausea.  He has not vomited.  He is not having diarrhea.  Not having any abdominal pain right now.  He had some elevated cardiac enzymes.  Cardiology saw him and signed off.  Has a history of recurrent staph infections including at least one staph bacteremia.  Right now his blood cultures are negative.  Echocardiogram unremarkable.  Seen by infectious disease.  They have him on ceftriaxone for now.  I put his Mobic on hold and put him on Protonix.       Objective   Alert oriented nondistressed  Heart regular rate and rhythm  Lungs clear to auscultation normal to percussion  Abdomen soft nontender nondistended  Extremities no edema.  Last Recorded Vitals  /71 (BP Location: Left arm, Patient Position: Lying)   Pulse 64   Temp 36.5 °C (97.7 °F) (Temporal)   Resp 18   Wt 84.9 kg (187 lb 2.7 oz)   SpO2 99%   Intake/Output last 3 Shifts:    Intake/Output Summary (Last 24 hours) at 5/10/2025 1423  Last data filed at 5/10/2025 1241  Gross per 24 hour   Intake 1030 ml   Output 0 ml   Net 1030 ml       Admission Weight  Weight: 81.6 kg (180 lb) (05/09/25 0748)    Daily Weight  05/10/25 : 84.9 kg (187 lb 2.7 oz)    Image Results  Transthoracic Echo Complete             [address] [address]             Phone [phone]    TRANSTHORACIC ECHOCARDIOGRAM REPORT    Patient Name:       SARMAD THORNTON       Reading Physician:    [id number] Gustavo Quick DO  Study Date:         5/9/2025              Ordering Provider:    [id number] LUTZ                                                                   SAMIRA  MRN/PID:            [id number]              Fellow:  Accession#:         [id number]          Nurse:  Date of Birth/Age:  1970 / 54 years Sonographer:          Azra Pineda RDCS  Gender Assigned at  M                     Additional Staff:  Birth:  Height:             177.80 cm             Admit Date:  Weight:             81.65 kg              Admission Status:  BSA / BMI:          2.00 m2 / 25.83 kg/m2 Department Location[facility]  [facility]  Blood Pressure: 114 /76 mmHg    Study Type:    TRANSTHORACIC ECHO (TTE) COMPLETE  Diagnosis/ICD: Non ST elevation (NSTEMI) myocardial infarction-I21.4  CPT Codes:     Echo Complete w Full Doppler-11437    Patient History:  Pertinent History: AVR.    Study Detail: The following Echo studies were performed: 2D, M-Mode, Doppler and                color flow.       PHYSICIAN INTERPRETATION:  Left Ventricle: Left ventricular ejection fraction is low normal, by visual estimate at 50-55%. There is mild concentric left ventricular hypertrophy. There are no regional wall motion abnormalities. The left ventricular cavity size is normal. There is mild increased septal and mildly increased posterior left ventricular wall thickness. Abnormal (paradoxical) septal motion consistent with post-operative status. Spectral Doppler shows a Grade I (impaired relaxation pattern) of left ventricular diastolic filling with normal left atrial filling pressure.  Left Atrium: The left atrial size is normal.  Right Ventricle: The right ventricle is normal in size. There is normal right ventricular global systolic function.  Right Atrium: The right atrial size is normal.  Aortic Valve: There is no visualized aortic valve vegetation. There is a prosthetic aortic valve present. There is evidence of moderate to severe aortic valve stenosis.  The aortic valve dimensionless index is 0.25. There is no evidence of  aortic valve regurgitation. The peak instantaneous gradient of the aortic valve is 77 mmHg. The mean gradient of the aortic valve is 45 mmHg.  Mitral Valve: The mitral valve is normal in structure. There was no mitral valve vegetation visualized. The peak instantaneous gradient of the mitral valve is 5 mmHg. There is mild to moderate mitral valve regurgitation which is centrally directed.  Tricuspid Valve: The tricuspid valve is structurally normal. There is mild tricuspid regurgitation. The Doppler estimated RVSP is mildly elevated right ventricular systolic pressure at 35.4 mmHg.  Pulmonic Valve: The pulmonic valve is structurally normal. There is no indication of pulmonic valve regurgitation.  Pericardium: No pericardial effusion noted.  Aorta: The aortic root is normal.  In comparison to the previous echocardiogram(s):       CONCLUSIONS:   1. Left ventricular ejection fraction is low normal, by visual estimate at 50-55%.   2. Spectral Doppler shows a Grade I (impaired relaxation pattern) of left ventricular diastolic filling with normal left atrial filling pressure.   3. Abnormal septal motion consistent with post-operative status.   4. There is normal right ventricular global systolic function.   5. No mitral valve vegetation visualized.   6. Mild to moderate mitral valve regurgitation.   7. Mildly elevated right ventricular systolic pressure.   8. Moderate to severe aortic valve stenosis.   9. No aortic valve vegetation visualized.    QUANTITATIVE DATA SUMMARY:     2D MEASUREMENTS:             Normal Ranges:  LAs:             3.36 cm     (2.7-4.0cm)  IVSd:            1.14 cm     (0.6-1.1cm)  LVPWd:           1.07 cm     (0.6-1.1cm)  LVIDd:           5.21 cm     (3.9-5.9cm)  LVIDs:           3.24 cm  LV Mass Index:   112.0 g/m2  LVEDV Index:     96.71 ml/m2  LV % FS          37.9 %       LEFT ATRIUM:                 Normal Ranges:  LA Area A4C:      18.0 cm2  LA Area A2C:      21.0 cm2  LA Vol A4C:       45.6  ml  LA Vol A2C:       72.5 ml  LA Vol Index BSA: 29.6 ml/m2       RIGHT ATRIUM:          Normal Ranges:  RA Area A4C:  17.0 cm2       M-MODE MEASUREMENTS:         Normal Ranges:  LAs:                 3.41 cm (2.7-4.0cm)       LV SYSTOLIC FUNCTION:                       Normal Ranges:  EF-A4C View:    47 % (>=55%)  EF-A2C View:    47 %  EF-Biplane:     47 %  EF-Visual:      53 %  LV EF Reported: 53 %       LV DIASTOLIC FUNCTION:             Normal Ranges:  MV Peak E:             0.86 m/s    (0.7-1.2 m/s)  MV Peak A:             0.87 m/s    (0.42-0.7 m/s)  E/A Ratio:             0.98        (1.0-2.2)  MV e'                  0.049 m/s   (>8.0)  MV lateral e'          0.05 m/s  MV medial e'           0.05 m/s  E/e' Ratio:            17.59       (<8.0)  PulmV Sys Jose Enrique:         50.20 cm/s  PulmV Bucio Jose Enrique:        44.18 cm/s  PulmV S/D Jose Enrique:         1.14  PulmV A Revs Jose Enrique:      15.24 cm/s  PulmV A Revs Dur:      112.27 msec       MITRAL VALVE:          Normal Ranges:  MV Vmax:      1.07 m/s (<=1.3m/s)  MV peak P.6 mmHg (<5mmHg)  MV mean P.4 mmHg (<48mmHg)  MV VTI:       26.23 cm (10-13cm)  MV DT:        164 msec (150-240msec)       AORTIC VALVE:                      Normal Ranges:  AoV Vmax:                4.38 m/s  (<=1.7m/s)  AoV Peak P.7 mmHg (<20mmHg)  AoV Mean P.1 mmHg (1.7-11.5mmHg)  LVOT Max Jose Enrique:            1.12 m/s  (<=1.1m/s)  AoV VTI:                 93.12 cm  (18-25cm)  LVOT VTI:                23.06 cm  LVOT Diameter:           2.00 cm   (1.8-2.4cm)  AoV Area, VTI:           0.78 cm2  (2.5-5.5cm2)  AoV Area,Vmax:           0.80 cm2  (2.5-4.5cm2)  AoV Dimensionless Index: 0.25       RIGHT VENTRICLE:  RV Basal 3.95 cm  RV Major 9.4 cm  TAPSE:   18.7 mm  RV s'    0.09 m/s       TRICUSPID VALVE/RVSP:          Normal Ranges:  Peak TR Velocity:     2.84 m/s  RV Syst Pressure:     35 mmHg  (< 30mmHg)  IVC Diam:             1.10 cm       PULMONIC VALVE:          Normal  Ranges:  PV Max Jose Enrique:     1.1 m/s  (0.6-0.9m/s)  PV Max P.9 mmHg  PV Mean P.6 mmHg  PV VTI:         20.36 cm       PULMONARY VEINS:  PulmV A Revs Dur: 112.27 msec  PulmV A Revs Jose Enrique: 15.24 cm/s  PulmV Bucio Jose Enrique:   44.18 cm/s  PulmV S/D Jose Enrique:    1.14  PulmV Sys Jose Enrique:    50.20 cm/s       AORTA:  Asc Ao Diam 3.10 cm       [id number] Gustavo Quick   Electronically signed on 5/10/2025 at 10:11:48 AM       ** Final **            Assessment & Plan  NSTEMI (non-ST elevated myocardial infarction) (Multi)  Cardiology unimpressed and signed off.    #2- fevers and chills and questionable lesion involving spleen .  Ultrasound reveals no lesion of the spleen whatsoever.  Cultures negative.  Not having any fevers.  Is on ceftriaxone pending blood cultures which thus far negative    #3 anorexia and weight loss.  Right now this is his only symptom.  CAT scan of the abdomen was unremarkable except for the spleen lesion which was not revealed on ultrasound.  He takes NSAIDs long-term.  I am holding his Mobic and putting him on Protonix twice a day.  Consulting GI.  If he is losing weight and has loss of appetite for more than a week he probably will need an EGD at some point in time    Abnormal CAT scan               Jonathan Nur MD

## 2025-05-10 NOTE — CONSULTS
INFECTIOUS DISEASES CONSULT NOTE    Referring Physician: Jonathan Nur  Reason For Consult: Splenic lesion  Date of Consult: 5/10/24 at 0650    History Of Present Illness  Patient is a 54-year-old gentleman who presents to [facility] emergency room on 5/9 with 2-week history of tactile fever and intermittent chills at night, decreased appetite, and weight loss.  On 5/7 the patient reports eating a hamburger.  Later that evening he developed vomiting and diarrhea which lasted through the night into 5/8.  On arrival to the ED temperature was 36.2 with a white blood cell count of 10.4.  Urinalysis was benign.  Blood cultures x 2 sets were obtained.  A CT of the abdomen and pelvis revealed an indeterminant lesion in the posterior medial spleen measuring 4 x 3 x 1.6 cm.  This had thin rim enhancement with no surrounding inflammatory changes.  Splenic ultrasound was performed with no clear abnormalities.  Of note the patient does have history of aortic valve replacement.  He was hospitalized in May 2024 with MRSA septicemia due to finger infection.  MART at that time was negative for vegetation.  Patient ultimately was discharged home on vancomycin x 6 weeks ending on 7/2/2024 under the guidance of my partner Dr. Lopez.  Patient was given dose of vancomycin and cefepime in the ER and admitted to the floor.  No further antibiotics have been ordered.  Patient denies any recent dental procedures.     Past Medical History  He has a past medical history of Abscess of left index finger (07/03/2024), Aortic valve stenosis, Closed fracture of distal end of humerus (03/10/2025), Elbow pain (03/10/2025), Infection, Low testosterone, and MRSA (methicillin resistant Staphylococcus aureus) septicemia (Multi) (07/03/2024).    Surgical History  He has a past surgical history that includes MR angio neck wo IV contrast (12/20/2012); MR angio head wo IV contrast (02/24/2023); MR angio head wo IV contrast (09/05/2023); Joint replacement  (Right); Aortic valve replacement; Elbow bursa surgery (Right); Cervical spine surgery; and Hernia repair.     Social History  Denies smoking.  Admits to social alcohol.  No drug use.    Family History  No family exposure to known communicable illnesses  No family history of tuberculosis    Allergies  Acyclovir, Opioids - morphine analogues, and Hydromorphone     Medications  Cefepime x 1 dose  Vancomycin x 1 dose  See Epic for remaining medications.    Review of Systems  Detailed review of systems completed.  No significant additional positives beyond what is mentioned above    Physical Exam  Vital signs:  Visit Vitals  /75 (BP Location: Left arm, Patient Position: Lying)   Pulse 63   Temp 36.2 °C (97.2 °F) (Temporal)   Resp 17      General: Middle-age gentleman resting comfortably in no distress.  HEENT:  No scleral icterus or conjunctival suffusion, throat clear, neck supple, no cervical LAD  Lungs:  Clear to auscultation bilaterally  Heart:  RRR, S1, S2 normal, no extra sounds or murmurs.  Abdomen:  Normoactive BS, soft, NT/ND. No palpable organs or masses.  No peritoneal signs.  Extremities:  No erythema/rash.  No peripheral stigmata of endocarditis noted.    Relevant Lab Results  Results from last 72 hours   Lab Units 05/10/25  0440   WBC AUTO x10*3/uL 10.4   HEMOGLOBIN g/dL 14.5   HEMATOCRIT % 41.3   PLATELETS AUTO x10*3/uL 205     Results from last 72 hours   Lab Units 05/10/25  0440   CREATININE mg/dL 1.07   EGFR mL/min/1.73m*2 83     Results from last 72 hours   Lab Units 05/10/25  0440   AST U/L 15   ALT U/L 15   ALK PHOS U/L 49   BILIRUBIN TOTAL mg/dL 0.6     Cultures  COVID, flu PCR (5/9): Negative  Urinalysis (5/9): Negative  Blood culture (5/9): X 2 sets/pending    Imaging  CT A/P (5/9): indeterminant lesion in the posterior medial spleen measuring 4 x 3 x 1.6 cm.  This had thin rim enhancement with no surrounding inflammatory changes.  Differential includes splenic infarct, hematoma, less  likely abscess as no inflammatory changes surrounding lesion are noted.    Splenic ultrasound (5/9): No abnormalities    Impression:  1.  2 weeks of intermittent tactile fever, chills, night sweats  -- Need to rule out bloodstream infection and particularly prosthetic valve endocarditis.  2.  Transient nausea vomiting and diarrhea  -- This has resolved.  I suspect this may have been a foodborne issue as he had had a hamburger hours prior to onset.  3.  Splenic lesion of undetermined etiology  -- Seems less likely that this is an abscess as he has no surrounding inflammatory changes around the splenic changes.    Plan:  1.  Will start ceftriaxone 2 g IV every 24 hours.  Monitor for adverse antibiotic events such as diarrhea/rash.  2.  Will resume vancomycin with pharmacy dosing.  Monitor creatinine and vancomycin levels for toxicity.  3.  Await pending blood cultures.  4.  Will check ESR and CRP.  5.  Will follow.      Mukund Che MD  [facility]  [phone]

## 2025-05-10 NOTE — PROGRESS NOTES
"Cristhian Thornton is a 54 y.o. male on day 2 of admission presenting with NSTEMI (non-ST elevated myocardial infarction) (Multi).    Subjective   Alert and oriented x 3.  Denies complaints of chest pain or pressure palpitations or feeling of rapid heart rate.  States his nausea and vomiting has improved but he remains with some epigastric discomfort.        Objective     Physical Exam  Vitals and nursing note reviewed.   Constitutional:       General: He is not in acute distress.     Appearance: He is not ill-appearing or toxic-appearing.   HENT:      Head: Normocephalic and atraumatic.      Nose: Nose normal.      Mouth/Throat:      Mouth: Mucous membranes are moist.      Pharynx: Oropharynx is clear.   Cardiovascular:      Rate and Rhythm: Normal rate and regular rhythm.      Pulses: Normal pulses.      Heart sounds: Murmur heard.      No friction rub. No gallop.   Pulmonary:      Effort: Pulmonary effort is normal.      Breath sounds: Normal breath sounds.   Abdominal:      General: Bowel sounds are normal.      Palpations: Abdomen is soft.   Musculoskeletal:         General: Normal range of motion.      Cervical back: Normal range of motion.      Right lower leg: No edema.      Left lower leg: No edema.   Skin:     General: Skin is warm and dry.      Capillary Refill: Capillary refill takes less than 2 seconds.   Neurological:      Mental Status: He is alert and oriented to person, place, and time. Mental status is at baseline.   Psychiatric:         Mood and Affect: Mood normal.         Behavior: Behavior normal.         Thought Content: Thought content normal.         Judgment: Judgment normal.         Last Recorded Vitals  Blood pressure 103/75, pulse 71, temperature 36.2 °C (97.2 °F), temperature source Temporal, resp. rate 17, height 1.778 m (5' 10\"), weight 84.9 kg (187 lb 2.7 oz), SpO2 97%.  Intake/Output last 3 Shifts:  I/O last 3 completed shifts:  In: 540 (6.4 mL/kg) [P.O.:240; IV Piggyback:300]  Out: 0 " (0 mL/kg)   Weight: 84.9 kg     Relevant Results  Results for orders placed or performed during the hospital encounter of 05/09/25 (from the past 24 hours)   Troponin I, High Sensitivity   Result Value Ref Range    Troponin I, High Sensitivity 104 (HH) 0 - 20 ng/L   TSH with reflex to Free T4 if abnormal   Result Value Ref Range    Thyroid Stimulating Hormone 1.00 0.44 - 3.98 mIU/L   ECG 12 lead   Result Value Ref Range    Ventricular Rate 69 BPM    Atrial Rate 69 BPM    TX Interval 246 ms    QRS Duration 130 ms    QT Interval 414 ms    QTC Calculation(Bazett) 443 ms    P Axis 62 degrees    R Axis -5 degrees    T Axis 268 degrees    QRS Count 11 beats    Q Onset 215 ms    P Onset 92 ms    P Offset 140 ms    T Offset 422 ms    QTC Fredericia 434 ms   Lactate   Result Value Ref Range    Lactate 0.6 0.4 - 2.0 mmol/L   Blood Culture    Specimen: Peripheral Venipuncture; Blood culture   Result Value Ref Range    Blood Culture Loaded on Instrument - Culture in progress    Blood Culture    Specimen: Peripheral Venipuncture; Blood culture   Result Value Ref Range    Blood Culture Loaded on Instrument - Culture in progress    Urinalysis with Reflex Culture and Microscopic   Result Value Ref Range    Color, Urine Light-Yellow Light-Yellow, Yellow, Dark-Yellow    Appearance, Urine Clear Clear    Specific Gravity, Urine >1.050 (N) 1.005 - 1.035    pH, Urine 5.5 5.0, 5.5, 6.0, 6.5, 7.0, 7.5, 8.0    Protein, Urine NEGATIVE NEGATIVE, 10 (TRACE), 20 (TRACE) mg/dL    Glucose, Urine Normal Normal mg/dL    Blood, Urine NEGATIVE NEGATIVE mg/dL    Ketones, Urine 10 (1+) (A) NEGATIVE mg/dL    Bilirubin, Urine NEGATIVE NEGATIVE mg/dL    Urobilinogen, Urine Normal Normal mg/dL    Nitrite, Urine NEGATIVE NEGATIVE    Leukocyte Esterase, Urine NEGATIVE NEGATIVE   Extra Urine Gray Tube   Result Value Ref Range    Extra Tube Hold for add-ons.    Troponin I, High Sensitivity   Result Value Ref Range    Troponin I, High Sensitivity 120 (HH) 0 -  20 ng/L   Transthoracic Echo Complete   Result Value Ref Range    BSA 2.01 m2   CBC   Result Value Ref Range    WBC 10.4 4.4 - 11.3 x10*3/uL    nRBC 0.0 0.0 - 0.0 /100 WBCs    RBC 4.78 4.50 - 5.90 x10*6/uL    Hemoglobin 14.5 13.5 - 17.5 g/dL    Hematocrit 41.3 41.0 - 52.0 %    MCV 86 80 - 100 fL    MCH 30.3 26.0 - 34.0 pg    MCHC 35.1 32.0 - 36.0 g/dL    RDW 12.0 11.5 - 14.5 %    Platelets 205 150 - 450 x10*3/uL   Comprehensive Metabolic Panel   Result Value Ref Range    Glucose 90 74 - 99 mg/dL    Sodium 139 136 - 145 mmol/L    Potassium 4.4 3.5 - 5.3 mmol/L    Chloride 105 98 - 107 mmol/L    Bicarbonate 28 21 - 32 mmol/L    Anion Gap 10 10 - 20 mmol/L    Urea Nitrogen 11 6 - 23 mg/dL    Creatinine 1.07 0.50 - 1.30 mg/dL    eGFR 83 >60 mL/min/1.73m*2    Calcium 8.7 8.6 - 10.3 mg/dL    Albumin 3.6 3.4 - 5.0 g/dL    Alkaline Phosphatase 49 33 - 120 U/L    Total Protein 6.1 (L) 6.4 - 8.2 g/dL    AST 15 9 - 39 U/L    Bilirubin, Total 0.6 0.0 - 1.2 mg/dL    ALT 15 10 - 52 U/L         Assessment & Plan  NSTEMI (non-ST elevated myocardial infarction) (Multi)    Abnormal CAT scan    Elevated troponin  Suspected splenic infarct  History of bileaflet aortic valve  Aortic valve murmur  History of aortic valve replacement  History of aortic valve vegetation     Overall impression:     5/9: As above.  Patient presents with 1 to 2 weeks of progressive fatigue, chills, fever.  He reports these are symptoms similar to when he had vegetations on his aortic valve replacement approximate 1 year ago.  He has previously had a thorough cardiac workup.  He had a cardiac catheterization in 2020 which revealed normal coronary arteries.  At that point he was found to have severe aortic valve stenosis with a bileaflet aortic valve.  He did undergo replacement with this with a bioprosthetic valve.  This hospitalization at this point there is no definitiv diagnosis.  CT abdomen pelvis reveals a questionable splenic infarct.  Surgery has been  consulted for this and if further ultrasound will be completed.  From my perspective I doubt the patient is having an NSTEMI.  Given his abnormal anatomy believe that this patient would be more prone to troponin leaks.  However, given the fact the patient did have vegetation on his valve last year this is within the differential.  Will check an echocardiogram to reassess valve as well as overall EF to ensure no loss of function.  Otherwise blood pressure stable.  EKG nonischemic and chronic pattern of LVH with block.  Euvolemic on examination.  No chest pain.  Await results of echocardiogram.  If no significant findings will be considered stable from the cardiac perspective and would pursue infectious etiology.     5/10: Stable overnight.  Denies complaints of chest pain or pressure palpitations or feeling of rapid heart rate.  States no further vomiting.  Reports nausea has improved.  States a mild continued epigastric discomfort.  Underwent echocardiogram yesterday with initial interpretation by Dr. Quick as no significant findings.  No significant vegetation.  Remains with mildly increasing pressures to aortic valve, not in a critical state.  His blood pressure has been stable with most recent of 103/75.  Current murmur pulse ox 97.  Current pulse 75.  On telemetry monitor remained in a sinus rhythm with 1 episode of nonsustained SVT.  He reports no fever or chills overnight.  Overall the patient appears to be stable at this point from a cardiac perspective.  Will continue to look for other sources of infection/focus on abdominal etiology.  Infectious disease is following and the patient has been placed on IV antibiotics and blood cultures have been drawn.  Do not believe that his mildly elevated troponins are indicative of acute coronary syndrome.  Will sign off.  Please feel free to reach out with further or new concerns.      I spent 35 minutes in the professional and overall care of this  patient.      Diogenes Beth, APRN-CNP

## 2025-05-10 NOTE — ASSESSMENT & PLAN NOTE
Cardiology unimpressed and signed off.    #2- fevers and chills and questionable lesion involving spleen .  Ultrasound reveals no lesion of the spleen whatsoever.  Cultures negative.  Not having any fevers.  Is on ceftriaxone pending blood cultures which thus far negative    #3 anorexia and weight loss.  Right now this is his only symptom.  CAT scan of the abdomen was unremarkable except for the spleen lesion which was not revealed on ultrasound.  He takes NSAIDs long-term.  I am holding his Mobic and putting him on Protonix twice a day.  Consulting GI.  If he is losing weight and has loss of appetite for more than a week he probably will need an EGD at some point in time

## 2025-05-10 NOTE — NURSING NOTE
No changes in assessment. Ambulate in his room, Not in pain or distress. Will continue to monitor.

## 2025-05-10 NOTE — CARE PLAN
The patient's goals for the shift include      The clinical goals for the shift include stable vs      Problem: ACS/CP/NSTEMI/STEMI  Goal: Chest pain managed (free from pain or at acceptable level)  Outcome: Progressing  Goal: Lab values return to normal range  Outcome: Progressing  Goal: Promote self management  Outcome: Progressing  Goal: Serial ECG will return to baseline  Outcome: Progressing  Goal: Verbalize understanding of procedures/devices  Outcome: Progressing  Goal: Wean vasopressors/achieve hemodynamic stability  Outcome: Progressing     Problem: Arrythmia/Dysrhythmia  Goal: Lab values return to normal range  Outcome: Progressing  Goal: No evidence of post procedure complications  Outcome: Progressing  Goal: Promote self management  Outcome: Progressing  Goal: Serial ECG will return to baseline  Outcome: Progressing  Goal: Verbalize understanding of procedures/devices  Outcome: Progressing  Goal: Vital signs return to baseline  Outcome: Progressing  Goal: Care and maintenance of device (specify)  Outcome: Progressing     Problem: Cardiac catheterization  Goal: Free from dysrhythmias  Outcome: Progressing  Goal: Free from pain  Outcome: Progressing  Goal: No evidence of post procedure complications  Outcome: Progressing  Goal: Promote self management  Outcome: Progressing  Goal: Verbalize understanding of procedure  Outcome: Progressing  Goal: Care and maintenance of device (specify)  Outcome: Progressing     Problem: Hypertensive Emergency/Crisis  Goal: Blood pressure gradually reduced to goal range  Outcome: Progressing  Goal: Free from signs of organ damage  Outcome: Progressing  Goal: Lab values return to normal range  Outcome: Progressing  Goal: Promote self management  Outcome: Progressing

## 2025-05-10 NOTE — CARE PLAN
Problem: ACS/CP/NSTEMI/STEMI  Goal: Chest pain managed (free from pain or at acceptable level)  Outcome: Progressing  Goal: Lab values return to normal range  Outcome: Progressing  Goal: Promote self management  Outcome: Progressing  Goal: Serial ECG will return to baseline  Outcome: Progressing  Goal: Verbalize understanding of procedures/devices  Outcome: Progressing  Goal: Wean vasopressors/achieve hemodynamic stability  Outcome: Progressing     Problem: Arrythmia/Dysrhythmia  Goal: Lab values return to normal range  Outcome: Progressing  Goal: No evidence of post procedure complications  Outcome: Progressing  Goal: Promote self management  Outcome: Progressing  Goal: Serial ECG will return to baseline  Outcome: Progressing  Goal: Verbalize understanding of procedures/devices  Outcome: Progressing  Goal: Vital signs return to baseline  Outcome: Progressing  Goal: Care and maintenance of device (specify)  Outcome: Progressing     Problem: Cardiac catheterization  Goal: Free from dysrhythmias  Outcome: Progressing  Goal: Free from pain  Outcome: Progressing  Goal: No evidence of post procedure complications  Outcome: Progressing  Goal: Promote self management  Outcome: Progressing  Goal: Verbalize understanding of procedure  Outcome: Progressing  Goal: Care and maintenance of device (specify)  Outcome: Progressing     Problem: Hypertensive Emergency/Crisis  Goal: Blood pressure gradually reduced to goal range  Outcome: Progressing  Goal: Free from signs of organ damage  Outcome: Progressing  Goal: Lab values return to normal range  Outcome: Progressing  Goal: Promote self management  Outcome: Progressing   The patient's goals for the shift include      The clinical goals for the shift include ensure no cardiac issues    Over the shift, the patient did not make progress toward the following goals. Barriers to progression include . Recommendations to address these barriers include .

## 2025-05-11 LAB
ANION GAP SERPL CALCULATED.3IONS-SCNC: 10 MMOL/L (ref 10–20)
ATRIAL RATE: 69 BPM
ATRIAL RATE: 77 BPM
BACTERIA BLD CULT: NORMAL
BACTERIA BLD CULT: NORMAL
BUN SERPL-MCNC: 14 MG/DL (ref 6–23)
CALCIUM SERPL-MCNC: 8.5 MG/DL (ref 8.6–10.3)
CHLORIDE SERPL-SCNC: 107 MMOL/L (ref 98–107)
CO2 SERPL-SCNC: 27 MMOL/L (ref 21–32)
CREAT SERPL-MCNC: 0.98 MG/DL (ref 0.5–1.3)
EGFRCR SERPLBLD CKD-EPI 2021: >90 ML/MIN/1.73M*2
GLUCOSE SERPL-MCNC: 99 MG/DL (ref 74–99)
P AXIS: 62 DEGREES
P AXIS: 65 DEGREES
P OFFSET: 140 MS
P OFFSET: 142 MS
P ONSET: 92 MS
P ONSET: 96 MS
POTASSIUM SERPL-SCNC: 4 MMOL/L (ref 3.5–5.3)
PR INTERVAL: 234 MS
PR INTERVAL: 246 MS
Q ONSET: 213 MS
Q ONSET: 215 MS
QRS COUNT: 11 BEATS
QRS COUNT: 13 BEATS
QRS DURATION: 130 MS
QRS DURATION: 134 MS
QT INTERVAL: 396 MS
QT INTERVAL: 414 MS
QTC CALCULATION(BAZETT): 443 MS
QTC CALCULATION(BAZETT): 448 MS
QTC FREDERICIA: 430 MS
QTC FREDERICIA: 434 MS
R AXIS: -5 DEGREES
R AXIS: 18 DEGREES
SODIUM SERPL-SCNC: 140 MMOL/L (ref 136–145)
T AXIS: 257 DEGREES
T AXIS: 268 DEGREES
T OFFSET: 411 MS
T OFFSET: 422 MS
VANCOMYCIN SERPL-MCNC: 13.1 UG/ML (ref 5–20)
VENTRICULAR RATE: 69 BPM
VENTRICULAR RATE: 77 BPM

## 2025-05-11 PROCEDURE — 87506 IADNA-DNA/RNA PROBE TQ 6-11: CPT | Mod: WESLAB | Performed by: NURSE PRACTITIONER

## 2025-05-11 PROCEDURE — 2060000001 HC INTERMEDIATE ICU ROOM DAILY

## 2025-05-11 PROCEDURE — 82374 ASSAY BLOOD CARBON DIOXIDE: CPT | Performed by: INTERNAL MEDICINE

## 2025-05-11 PROCEDURE — 2500000001 HC RX 250 WO HCPCS SELF ADMINISTERED DRUGS (ALT 637 FOR MEDICARE OP): Performed by: INTERNAL MEDICINE

## 2025-05-11 PROCEDURE — 2500000005 HC RX 250 GENERAL PHARMACY W/O HCPCS: Performed by: INTERNAL MEDICINE

## 2025-05-11 PROCEDURE — 2500000004 HC RX 250 GENERAL PHARMACY W/ HCPCS (ALT 636 FOR OP/ED): Mod: JZ | Performed by: INTERNAL MEDICINE

## 2025-05-11 PROCEDURE — 36415 COLL VENOUS BLD VENIPUNCTURE: CPT | Performed by: INTERNAL MEDICINE

## 2025-05-11 PROCEDURE — 80202 ASSAY OF VANCOMYCIN: CPT | Performed by: INTERNAL MEDICINE

## 2025-05-11 PROCEDURE — 99233 SBSQ HOSP IP/OBS HIGH 50: CPT | Performed by: INTERNAL MEDICINE

## 2025-05-11 RX ADMIN — PANTOPRAZOLE SODIUM 40 MG: 40 TABLET, DELAYED RELEASE ORAL at 20:24

## 2025-05-11 RX ADMIN — ASPIRIN 81 MG: 81 TABLET, CHEWABLE ORAL at 09:15

## 2025-05-11 RX ADMIN — PANTOPRAZOLE SODIUM 40 MG: 40 TABLET, DELAYED RELEASE ORAL at 09:15

## 2025-05-11 RX ADMIN — VANCOMYCIN HYDROCHLORIDE 1250 MG: 5 INJECTION, POWDER, LYOPHILIZED, FOR SOLUTION INTRAVENOUS at 23:47

## 2025-05-11 RX ADMIN — ACETAMINOPHEN 650 MG: 325 TABLET ORAL at 00:11

## 2025-05-11 RX ADMIN — VANCOMYCIN HYDROCHLORIDE 1250 MG: 5 INJECTION, POWDER, LYOPHILIZED, FOR SOLUTION INTRAVENOUS at 14:30

## 2025-05-11 RX ADMIN — FLUOXETINE HYDROCHLORIDE 20 MG: 20 CAPSULE ORAL at 09:15

## 2025-05-11 RX ADMIN — CEFTRIAXONE SODIUM 2 G: 2 INJECTION, SOLUTION INTRAVENOUS at 09:15

## 2025-05-11 ASSESSMENT — ENCOUNTER SYMPTOMS
DIARRHEA: 1
WHEEZING: 0
ABDOMINAL PAIN: 1
LIGHT-HEADEDNESS: 0
TROUBLE SWALLOWING: 0
CONFUSION: 0
DIZZINESS: 0
COUGH: 0
ABDOMINAL DISTENTION: 0
SPEECH DIFFICULTY: 0
CONSTIPATION: 0
HEADACHES: 0
ARTHRALGIAS: 0
BLOOD IN STOOL: 0
CHILLS: 1
COLOR CHANGE: 0
NAUSEA: 1
SLEEP DISTURBANCE: 0
SHORTNESS OF BREATH: 0
FEVER: 1
DIFFICULTY URINATING: 0
JOINT SWELLING: 0
UNEXPECTED WEIGHT CHANGE: 1
VOMITING: 0

## 2025-05-11 ASSESSMENT — COGNITIVE AND FUNCTIONAL STATUS - GENERAL
DAILY ACTIVITIY SCORE: 24
MOBILITY SCORE: 24
DAILY ACTIVITIY SCORE: 24
MOBILITY SCORE: 24

## 2025-05-11 ASSESSMENT — PAIN - FUNCTIONAL ASSESSMENT
PAIN_FUNCTIONAL_ASSESSMENT: 0-10
PAIN_FUNCTIONAL_ASSESSMENT: 0-10

## 2025-05-11 ASSESSMENT — PAIN SCALES - GENERAL
PAINLEVEL_OUTOF10: 0 - NO PAIN
PAINLEVEL_OUTOF10: 0 - NO PAIN

## 2025-05-11 ASSESSMENT — PAIN SCALES - WONG BAKER: WONGBAKER_NUMERICALRESPONSE: NO HURT

## 2025-05-11 NOTE — PROGRESS NOTES
Vancomycin Dosing by Pharmacy- FOLLOW UP    Cristhian Thornton is a 54 y.o. year old male who Pharmacy has been consulted for vancomycin dosing for endocarditis/endovascular infection. Based on the patient's indication and renal status this patient is being dosed based on a goal AUC of 500-600.     Renal function is currently stable.    Current vancomycin dose: 1000 mg given every 12 hours    Estimated vancomycin AUC on current dose: 431 mg/L.hr     Visit Vitals  /78 (BP Location: Left arm, Patient Position: Lying)   Pulse 63   Temp 36.7 °C (98.1 °F) (Temporal)   Resp 16        Lab Results   Component Value Date    CREATININE 0.98 2025    CREATININE 1.07 05/10/2025    CREATININE 1.05 2025    CREATININE 1.05 2025    CREATININE 0.99 2025    CREATININE 1.00 2024        Patient weight is as follows:   Vitals:    25 0437   Weight: 85 kg (187 lb 6.3 oz)       Cultures:  No results found for the encounter in last 14 days.       I/O last 3 completed shifts:  In: 730 (8.6 mL/kg) [P.O.:480; IV Piggyback:250]  Out: 0 (0 mL/kg)   Weight: 85 kg   I/O during current shift:  No intake/output data recorded.    Temp (24hrs), Av °C (98.6 °F), Min:36.5 °C (97.7 °F), Max:37.5 °C (99.5 °F)      Assessment/Plan    Below goal AUC. Orders placed for new vancomcyin regimen of 1250 mg every 12 hours to begin at 12:00.     This dosing regimen is predicted by [employer] to result in the following pharmacokinetic parameters:  Loading dose: N/A  Regimen: 1250 mg IV every 12 hours.  Start time: 11:46 on 2025  Exposure target: AUC24 (range)500-600 mg/L.hr   NVW97-21: 487 mg/L.hr  AUC24,ss: 536 mg/L.hr  Probability of AUC24 > 400: 91 %  Ctrough,ss: 17.4 mg/L  Probability of Ctrough,ss > 20: 35 %      The next level will be obtained on  at 5:00. May be obtained sooner if clinically indicated.   Will continue to monitor renal function daily while on vancomycin and order serum creatinine at least  every 48 hours if not already ordered.  Follow for continued vancomycin needs, clinical response, and signs/symptoms of toxicity.       Godron Caballero, PharmD

## 2025-05-11 NOTE — PROGRESS NOTES
Seeing patient for intermittent fevers/chills.  Patient was afebrile yesterday and overnight.  He does report episode of shaking chills followed by sweats last night.  No documented fever during that time.  All cultures remain sterile.    Ceftriaxone D2  Vanco D2    37.4   36.5  Lungs: Clear to auscultation bilaterally  Cardio: RRR, S1-S2 normal  Abdomen: NABS, soft, nontender nondistended.    WBC: 10.4  Creatinine: 0.98  CRP: 4.2  ESR: 4    Blood culture (5/9): X 2 sets/NGTD    TTE (5/9): No evidence for vegetation.    Impression:  1.  Two weeks of intermittent tactile fever/chills/night sweats  --No clear localizing signs of infection.  --Urinalysis benign.  Blood cultures x 2 sets currently with no growth to date.  TTE without evidence of vegetation.    Plan:  1.  Continue ceftriaxone.  Monitor for adverse antibiotic event such as diarrhea/rash.  2.  Continue vancomycin.  Monitor creatinine and vancomycin levels for toxicity.  3.  Await pending blood cultures.  4.  Continue to monitor temperature curve.  Following.    Mukund Che MD  [facility]  [phone]

## 2025-05-11 NOTE — CARE PLAN
The patient's goals for the shift include      The clinical goals for the shift include stable vs

## 2025-05-11 NOTE — CONSULTS
Consults    Reason For Consult  Epigastric Pain    History Of Present Illness  Cristhian Thornton is a 54 y.o. male presenting with epigastric pain, diarrhea, chills. CT a/p showing questionable splenic abscess. Evaluated per surgery. They did not think this was etiology based on US imaging. Patient reports epigastric discomfort with chills/night sweats, diarrhea, NV over the past 3 weeks. Lost over 15 lbs. WBC normal. Lipase normal. LFTs normal. Denies EGD/colonoscopy in the past 10 years. He does have hx staph infections but current blood cultures are negative. Pending ID eval      Past Medical History  He has a past medical history of Abscess of left index finger (07/03/2024), Aortic valve stenosis, Closed fracture of distal end of humerus (03/10/2025), Elbow pain (03/10/2025), Infection, Low testosterone, and MRSA (methicillin resistant Staphylococcus aureus) septicemia (Multi) (07/03/2024).    Surgical History  He has a past surgical history that includes MR angio neck wo IV contrast (12/20/2012); MR angio head wo IV contrast (02/24/2023); MR angio head wo IV contrast (09/05/2023); Joint replacement (Right); Aortic valve replacement; Elbow bursa surgery (Right); Cervical spine surgery; and Hernia repair.     Social History  He reports that he has never smoked. He has never used smokeless tobacco. He reports current alcohol use of about 1.0 standard drink of alcohol per week. He reports that he does not use drugs.    Family History  Family History[1]     Allergies  Acyclovir, Opioids - morphine analogues, and Hydromorphone    Review of Systems   Constitutional:  Positive for chills, fever and unexpected weight change.   HENT:  Negative for congestion and trouble swallowing.    Respiratory:  Negative for cough, shortness of breath and wheezing.    Cardiovascular:  Negative for chest pain.   Gastrointestinal:  Positive for abdominal pain, diarrhea and nausea. Negative for abdominal distention, blood in stool,  "constipation and vomiting.   Genitourinary:  Negative for difficulty urinating.   Musculoskeletal:  Negative for arthralgias and joint swelling.   Skin:  Negative for color change.   Neurological:  Negative for dizziness, speech difficulty, light-headedness and headaches.   Psychiatric/Behavioral:  Negative for confusion and sleep disturbance.         Physical Exam  Vitals reviewed.   Constitutional:       General: He is awake.      Appearance: Normal appearance.   HENT:      Head: Normocephalic and atraumatic.      Mouth/Throat:      Mouth: Mucous membranes are moist.   Cardiovascular:      Rate and Rhythm: Normal rate and regular rhythm.   Pulmonary:      Effort: Pulmonary effort is normal.      Breath sounds: Normal breath sounds.   Abdominal:      General: There is no distension.      Palpations: Abdomen is soft.      Tenderness: There is abdominal tenderness. There is no guarding.   Musculoskeletal:      Cervical back: Normal range of motion and neck supple.   Skin:     General: Skin is warm and dry.   Neurological:      General: No focal deficit present.      Mental Status: He is alert and oriented to person, place, and time. Mental status is at baseline.   Psychiatric:         Attention and Perception: Attention and perception normal.         Mood and Affect: Mood normal.         Behavior: Behavior normal.          Last Recorded Vitals  Blood pressure 109/78, pulse 67, temperature 36.7 °C (98.1 °F), temperature source Temporal, resp. rate 15, height 1.778 m (5' 10\"), weight 85 kg (187 lb 6.3 oz), SpO2 98%.    Relevant Results  Results for orders placed or performed during the hospital encounter of 05/09/25 (from the past 24 hours)   Vancomycin   Result Value Ref Range    Vancomycin 13.1 5.0 - 20.0 ug/mL   Basic Metabolic Panel   Result Value Ref Range    Glucose 99 74 - 99 mg/dL    Sodium 140 136 - 145 mmol/L    Potassium 4.0 3.5 - 5.3 mmol/L    Chloride 107 98 - 107 mmol/L    Bicarbonate 27 21 - 32 mmol/L    " Anion Gap 10 10 - 20 mmol/L    Urea Nitrogen 14 6 - 23 mg/dL    Creatinine 0.98 0.50 - 1.30 mg/dL    eGFR >90 >60 mL/min/1.73m*2    Calcium 8.5 (L) 8.6 - 10.3 mg/dL     ECG 12 lead  Result Date: 5/11/2025  Sinus rhythm with 1st degree AV block Nonspecific intraventricular block Minimal voltage criteria for LVH, may be normal variant ( Guy product ) T wave abnormality, consider inferior ischemia Abnormal ECG Confirmed by Amrit Jha ([id number]) on 5/11/2025 1:19:33 AM    ECG 12 lead  Result Date: 5/11/2025  Sinus rhythm with 1st degree AV block Nonspecific intraventricular block Minimal voltage criteria for LVH, may be normal variant ( Guy product ) T wave abnormality, consider inferior ischemia Abnormal ECG Confirmed by Amrit Jha ([id number]) on 5/11/2025 1:17:29 AM    Transthoracic Echo Complete  Result Date: 5/10/2025           [place] [address]            Phone [phone] TRANSTHORACIC ECHOCARDIOGRAM REPORT Patient Name:       SARMAD Dykes Physician:    [id number] Gustavo Quick DO Study Date:         5/9/2025              Ordering Provider:    [id number] CONOR HOGAN MRN/PID:            [id number]              Fellow: Accession#:         [id number]          Nurse: Date of Birth/Age:  1970 / 54 years Sonographer:          Azra Pineda RDCS Gender Assigned at  M                     Additional Staff: Birth: Height:             177.80 cm             Admit Date: Weight:             81.65 kg              Admission Status: BSA / BMI:          2.00 m2 / 25.83 kg/m2 Department Location:  [facility] Blood Pressure: 114 /76 mmHg Study Type:    TRANSTHORACIC ECHO (TTE) COMPLETE Diagnosis/ICD: Non ST elevation (NSTEMI) myocardial infarction-I21.4 CPT Codes:     Echo  Complete w Full Doppler-[id number] Patient History: Pertinent History: AVR. Study Detail: The following Echo studies were performed: 2D, M-Mode, Doppler and               color flow.  PHYSICIAN INTERPRETATION: Left Ventricle: Left ventricular ejection fraction is low normal, by visual estimate at 50-55%. There is mild concentric left ventricular hypertrophy. There are no regional wall motion abnormalities. The left ventricular cavity size is normal. There is mild increased septal and mildly increased posterior left ventricular wall thickness. Abnormal (paradoxical) septal motion consistent with post-operative status. Spectral Doppler shows a Grade I (impaired relaxation pattern) of left ventricular diastolic filling with normal left atrial filling pressure. Left Atrium: The left atrial size is normal. Right Ventricle: The right ventricle is normal in size. There is normal right ventricular global systolic function. Right Atrium: The right atrial size is normal. Aortic Valve: There is no visualized aortic valve vegetation. There is a prosthetic aortic valve present. There is evidence of moderate to severe aortic valve stenosis. The aortic valve dimensionless index is 0.25. There is no evidence of aortic valve regurgitation. The peak instantaneous gradient of the aortic valve is 77 mmHg. The mean gradient of the aortic valve is 45 mmHg. Mitral Valve: The mitral valve is normal in structure. There was no mitral valve vegetation visualized. The peak instantaneous gradient of the mitral valve is 5 mmHg. There is mild to moderate mitral valve regurgitation which is centrally directed. Tricuspid Valve: The tricuspid valve is structurally normal. There is mild tricuspid regurgitation. The Doppler estimated RVSP is mildly elevated right ventricular systolic pressure at 35.4 mmHg. Pulmonic Valve: The pulmonic valve is structurally normal. There is no indication of pulmonic valve regurgitation. Pericardium: No pericardial effusion  noted. Aorta: The aortic root is normal. In comparison to the previous echocardiogram(s):  CONCLUSIONS:  1. Left ventricular ejection fraction is low normal, by visual estimate at 50-55%.  2. Spectral Doppler shows a Grade I (impaired relaxation pattern) of left ventricular diastolic filling with normal left atrial filling pressure.  3. Abnormal septal motion consistent with post-operative status.  4. There is normal right ventricular global systolic function.  5. No mitral valve vegetation visualized.  6. Mild to moderate mitral valve regurgitation.  7. Mildly elevated right ventricular systolic pressure.  8. Moderate to severe aortic valve stenosis.  9. No aortic valve vegetation visualized. QUANTITATIVE DATA SUMMARY:  2D MEASUREMENTS:             Normal Ranges: LAs:             3.36 cm     (2.7-4.0cm) IVSd:            1.14 cm     (0.6-1.1cm) LVPWd:           1.07 cm     (0.6-1.1cm) LVIDd:           5.21 cm     (3.9-5.9cm) LVIDs:           3.24 cm LV Mass Index:   112.0 g/m2 LVEDV Index:     96.71 ml/m2 LV % FS          37.9 %  LEFT ATRIUM:                 Normal Ranges: LA Area A4C:      18.0 cm2 LA Area A2C:      21.0 cm2 LA Vol A4C:       45.6 ml LA Vol A2C:       72.5 ml LA Vol Index BSA: 29.6 ml/m2  RIGHT ATRIUM:          Normal Ranges: RA Area A4C:  17.0 cm2  M-MODE MEASUREMENTS:         Normal Ranges: LAs:                 3.41 cm (2.7-4.0cm)  LV SYSTOLIC FUNCTION:                      Normal Ranges: EF-A4C View:    47 % (>=55%) EF-A2C View:    47 % EF-Biplane:     47 % EF-Visual:      53 % LV EF Reported: 53 %  LV DIASTOLIC FUNCTION:             Normal Ranges: MV Peak E:             0.86 m/s    (0.7-1.2 m/s) MV Peak A:             0.87 m/s    (0.42-0.7 m/s) E/A Ratio:             0.98        (1.0-2.2) MV e'                  0.049 m/s   (>8.0) MV lateral e'          0.05 m/s MV medial e'           0.05 m/s E/e' Ratio:            17.59       (<8.0) PulmV Sys Jose Enrique:         50.20 cm/s PulmV Bucio Jose Enrique:         44.18 cm/s PulmV S/D Jose Enrique:         1.14 PulmV A Revs Jose Enrique:      15.24 cm/s PulmV A Revs Dur:      112.27 msec  MITRAL VALVE:          Normal Ranges: MV Vmax:      1.07 m/s (<=1.3m/s) MV peak P.6 mmHg (<5mmHg) MV mean P.4 mmHg (<48mmHg) MV VTI:       26.23 cm (10-13cm) MV DT:        164 msec (150-240msec)  AORTIC VALVE:                      Normal Ranges: AoV Vmax:                4.38 m/s  (<=1.7m/s) AoV Peak P.7 mmHg (<20mmHg) AoV Mean P.1 mmHg (1.7-11.5mmHg) LVOT Max Jose Enrique:            1.12 m/s  (<=1.1m/s) AoV VTI:                 93.12 cm  (18-25cm) LVOT VTI:                23.06 cm LVOT Diameter:           2.00 cm   (1.8-2.4cm) AoV Area, VTI:           0.78 cm2  (2.5-5.5cm2) AoV Area,Vmax:           0.80 cm2  (2.5-4.5cm2) AoV Dimensionless Index: 0.25  RIGHT VENTRICLE: RV Basal 3.95 cm RV Major 9.4 cm TAPSE:   18.7 mm RV s'    0.09 m/s  TRICUSPID VALVE/RVSP:          Normal Ranges: Peak TR Velocity:     2.84 m/s RV Syst Pressure:     35 mmHg  (< 30mmHg) IVC Diam:             1.10 cm  PULMONIC VALVE:          Normal Ranges: PV Max Jose Enrique:     1.1 m/s  (0.6-0.9m/s) PV Max P.9 mmHg PV Mean P.6 mmHg PV VTI:         20.36 cm  PULMONARY VEINS: PulmV A Revs Dur: 112.27 msec PulmV A Revs Jose Enrique: 15.24 cm/s PulmV Bucio Jose Enrique:   44.18 cm/s PulmV S/D Jose Enrique:    1.14 PulmV Sys Jose Enrique:    50.20 cm/s  AORTA: Asc Ao Diam 3.10 cm  [id number] Gustavo Quick DO Electronically signed on 5/10/2025 at 10:11:48 AM  ** Final **     US abdomen limited spleen  Result Date: 2025  Interpreted By:  Montserrat Carlos, STUDY: US ABDOMEN LIMITED SPLEEN; 3:26 pm   INDICATION: Signs/Symptoms:splenic abscess.   COMPARISON: None.   ACCESSION NUMBER(S): [id number]   ORDERING CLINICIAN: CONOR HOGAN   TECHNIQUE: Limited grayscale and color Doppler images obtained of the spleen.   FINDINGS: Limited characterization of the spleen demonstrates normal echogenicity. Spleen measures 10.3 x 3.9 by 4.9 cm for a total  volume of 104.5 mL. No focal abnormality within the visualized portions of the spleen.       1. No focal abnormality within the visualized portions of the spleen by ultrasound. Continued attention on follow up by CT.   MACRO: None.   Signed by: Montserrat Carlos 5/9/2025 3:28 PM Dictation workstation:   [id number]    CT abdomen pelvis w IV contrast  Result Date: 5/9/2025  Interpreted By:  Mukund Bhandari, STUDY: CT ABDOMEN PELVIS W IV CONTRAST;  5/9/2025 9:28 am   INDICATION: Signs/Symptoms:Upper abdominal pain, decreased oral intake for the past week, feels he is unable to eat or drink.  Intermittent shaking chills at night.   COMPARISON: None.   ACCESSION NUMBER(S): [id number]   ORDERING CLINICIAN: KM SUMMERS   TECHNIQUE: CT of the abdomen and pelvis was performed.  Standard contiguous axial images were obtained at 3 mm slice thickness through the abdomen and pelvis. Coronal and sagittal reconstructions at 3 mm slice thickness were performed.   75 ML of Omnipaque 350were administered intravenously without immediate complication.   FINDINGS: LOWER CHEST: No consolidation or effusion. Retained epicardial pacing lead is partially imaged.   ABDOMEN:   LIVER: Subcentimeter low-attenuation lesion within the right hepatic lobe near the dome too small to characterize by CT statistically likely a benign cyst.   GALLBLADDER/BILE DUCTS: No calcified cholelithiasis. No intra or extrahepatic biliary dilatation.   SPLEEN: Indeterminate density lesion along the inferior aspect of the posteromedial spleen measuring 4 x 3.1 x 1.6 cm (AP x TV x CC) (series 3, image 37 and series 5, image 59) with a thin peripheral rim of enhancement. The fat plane between the lesion in the tail of the pancreas is obliterated.   PANCREAS: Above-described indeterminate density lesion abuts the tail of the pancreas with obliteration of the fat plane. Pancreatic parenchyma is otherwise unremarkable.   ADRENAL GLANDS: Unremarkable.   KIDNEYS AND  URETERS: Symmetric renal parenchymal enhancement. No hydroureteronephrosis or urolithiasis which is identified.   BOWEL: No bowel obstruction.Normal appendix. Distal colonic diverticulosis without diverticulitis.   PELVIS:   BLADDER: Unremarkable.   REPRODUCTIVE ORGANS: No pelvic mass seen.   PERITONEUM/RETROPERITONEUM/LYMPH NODES: No ascites or free air, no fluid collection.  No enlarged mesenteric lymph nodes.   VESSELS: Atherosclerotic calcifications about the aortoiliac axis. No aneurysm.   BONES AND ABDOMINAL WALL: No acute osseous abnormality. Multilevel spondylosis throughout the imaged spine. Surgical changes of mesh left inguinal hernia repair.       1.  Indeterminate lesion within the left upper abdomen along the inferior aspect of the posteromedial spleen is of indeterminate origin, favored splenic. Differential considerations include splenic infarct, subcapsular hematoma or abscess although there is no gross evidence of inflammation within the adjacent soft tissues, benign vascular lesion such as a hemangioma or less likely a malignant lesion. A lesion arising from the pancreatic tail remains within the differential. Further evaluation with abdominal ultrasound is recommended. 2. No bowel obstruction, appendicitis or diverticulitis. 3. Additional chronic and/or ancillary findings detailed above.     MACRO: Critical Finding:  New lesion in the spleen. Notification was initiated on 5/9/2025 at 10:17 am by  Mukund Bhandari.  (**-YCF-**) Instructions:  US Abdomen Limited Spleen. 6 hours.   Signed by: Mukund Bhandari 5/9/2025 10:19 AM Dictation workstation:   [id number]    XR chest 2 views  Result Date: 5/9/2025  Interpreted By:  Manny Santiago, STUDY: XR CHEST 2 VIEWS;  5/9/2025 8:38 am   INDICATION: Signs/Symptoms:chills, chest discomfort.   COMPARISON: 12/01/2020   ACCESSION NUMBER(S): [id number]   ORDERING CLINICIAN: KM SUMMRES   FINDINGS: Post sternotomy. The lungs are clear without apparent pleural  effusion. Cardiomediastinal silhouette unchanged. No pulmonary vascular congestion. Thoracic degenerative changes.       No active disease in the chest identified.   MACRO: None   Signed by: Manny Santiago 5/9/2025 8:43 AM Dictation workstation:   [id number]         Assessment/Plan     Epigastric Pain, Abnormal CT, Chills   Patient with NVD, weight loss, chills, epigastric pain x 3 weeks. Initial CT with concern for splenic abscess. Surgery completed US, they did not think this was true etiology. Reviewed with Dr Cherry. She is recommending MRCP for further assessment. He does have a hx of staph infections. Denies prior pancreatitis. LFTs and lipase normal but need to r/o pancreatic tail lesion     Send stool studies   H Pylori stool  Appreciate ID input. Pending additional work up   Based on imaging, he may require EGD/colonoscopy     I spent 30 minutes in the professional and overall care of this patient.               [1]   Family History  Problem Relation Name Age of Onset    Autoimmune disease Mother      Hypertension Mother      PTSD Father      Benign prostatic hyperplasia Father      No Known Problems Sister      Other (htn) Brother      Other (hld) Brother      No Known Problems Daughter      No Known Problems Daughter      Other (watchman) Maternal Grandfather      Other (cath) Maternal Grandfather      Other (3x bypass) Maternal Grandfather      Heart disease Maternal Grandfather

## 2025-05-11 NOTE — CARE PLAN
Problem: ACS/CP/NSTEMI/STEMI  Goal: Chest pain managed (free from pain or at acceptable level)  Outcome: Progressing  Goal: Lab values return to normal range  Outcome: Progressing  Goal: Promote self management  Outcome: Progressing  Goal: Serial ECG will return to baseline  Outcome: Progressing  Goal: Verbalize understanding of procedures/devices  Outcome: Progressing  Goal: Wean vasopressors/achieve hemodynamic stability  Outcome: Progressing     Problem: Arrythmia/Dysrhythmia  Goal: Lab values return to normal range  Outcome: Progressing  Goal: No evidence of post procedure complications  Outcome: Progressing  Goal: Promote self management  Outcome: Progressing  Goal: Serial ECG will return to baseline  Outcome: Progressing  Goal: Verbalize understanding of procedures/devices  Outcome: Progressing  Goal: Vital signs return to baseline  Outcome: Progressing  Goal: Care and maintenance of device (specify)  Outcome: Progressing     Problem: Cardiac catheterization  Goal: Free from dysrhythmias  Outcome: Progressing  Goal: Free from pain  Outcome: Progressing  Goal: No evidence of post procedure complications  Outcome: Progressing  Goal: Promote self management  Outcome: Progressing  Goal: Verbalize understanding of procedure  Outcome: Progressing  Goal: Care and maintenance of device (specify)  Outcome: Progressing     Problem: Hypertensive Emergency/Crisis  Goal: Blood pressure gradually reduced to goal range  Outcome: Progressing  Goal: Free from signs of organ damage  Outcome: Progressing  Goal: Lab values return to normal range  Outcome: Progressing  Goal: Promote self management  Outcome: Progressing     Problem: ACS/CP/NSTEMI/STEMI  Goal: Chest pain managed (free from pain or at acceptable level)  Outcome: Progressing  Goal: Lab values return to normal range  Outcome: Progressing  Goal: Promote self management  Outcome: Progressing  Goal: Serial ECG will return to baseline  Outcome: Progressing  Goal:  Verbalize understanding of procedures/devices  Outcome: Progressing  Goal: Wean vasopressors/achieve hemodynamic stability  Outcome: Progressing     Problem: Arrythmia/Dysrhythmia  Goal: Lab values return to normal range  Outcome: Progressing  Goal: No evidence of post procedure complications  Outcome: Progressing  Goal: Promote self management  Outcome: Progressing  Goal: Serial ECG will return to baseline  Outcome: Progressing  Goal: Verbalize understanding of procedures/devices  Outcome: Progressing  Goal: Vital signs return to baseline  Outcome: Progressing  Goal: Care and maintenance of device (specify)  Outcome: Progressing     Problem: Cardiac catheterization  Goal: Free from dysrhythmias  Outcome: Progressing  Goal: Free from pain  Outcome: Progressing  Goal: No evidence of post procedure complications  Outcome: Progressing  Goal: Promote self management  Outcome: Progressing  Goal: Verbalize understanding of procedure  Outcome: Progressing  Goal: Care and maintenance of device (specify)  Outcome: Progressing     Problem: Hypertensive Emergency/Crisis  Goal: Blood pressure gradually reduced to goal range  Outcome: Progressing  Goal: Free from signs of organ damage  Outcome: Progressing  Goal: Lab values return to normal range  Outcome: Progressing  Goal: Promote self management  Outcome: Progressing   The patient's goals for the shift include      The clinical goals for the shift include stable vs

## 2025-05-11 NOTE — ASSESSMENT & PLAN NOTE
Cardiology unimpressed and signed off.    #2- fevers and chills and questionable lesion involving spleen .  Ultrasound reveals no lesion of the spleen whatsoever.  Cultures negative.  Not having any fevers.  Is on ceftriaxone pending blood cultures which thus far negative    #3 anorexia and weight loss.  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  He is not septic.  He does not have an acute abdomen.  MRCP was ordered by GI.  Both GI and surgery consulting.  I conveyed this new finding to both GI and surgery and surgery recommends vascular surgery consultation

## 2025-05-11 NOTE — PROGRESS NOTES
Cristhian Thornton is a 54 y.o. male on day 2 of admission presenting with NSTEMI (non-ST elevated myocardial infarction) (Multi).      Subjective   Here with subjective sense of fevers chills midepigastric discomfort anorexia and weight loss.  had loose stool yesterday but none since midnight..  Has had low-grade fevers.  Has been on antibiotics pending blood cultures given his history of recurrent staph infections with bacteremia.  Being seen by GI surgery and infectious disease.       Objective   Alert oriented nondistressed  Heart regular rate and rhythm  Lungs clear to auscultation  Abdomen soft nontender nondistended with no rebound or guarding  Extremities no edema.  Last Recorded Vitals  BP 95/59 (BP Location: Left arm, Patient Position: Lying)   Pulse 69   Temp 36.1 °C (97 °F) (Temporal)   Resp 15   Wt 85 kg (187 lb 6.3 oz)   SpO2 98%   Intake/Output last 3 Shifts:    Intake/Output Summary (Last 24 hours) at 5/11/2025 1335  Last data filed at 5/11/2025 0915  Gross per 24 hour   Intake 50 ml   Output --   Net 50 ml       Admission Weight  Weight: 81.6 kg (180 lb) (05/09/25 0748)    Daily Weight  05/11/25 : 85 kg (187 lb 6.3 oz)    Image Results  CT pancreas pre OP evaluation w IV contrast  Narrative: Interpreted By:  Juan Carlos Driver,   STUDY:  CT PANCREAS PRE OP EVALUATION WITH CONTRAST;  5/10/2025 12:40 pm      INDICATION:  55 y/o   M with  Signs/Symptoms:pancreas protocol. eval for distal  pancreas mas.      LIMITATIONS:  None.      ACCESSION NUMBER(S):  [id number]      ORDERING CLINICIAN:  OK WANG      TECHNIQUE:  After the administration of IV iodonated contrast, spiral axial  images were obtained from the xiphoid down through the symphysis  pubis. Sagittal and coronal reconstruction images were generated.  Multiphase exam. Pancreas protocol.      COMPARISON:  05/09/2025      FINDINGS:  Lower Chest: Streaky left basilar atelectasis. Abandoned epicardial  pacing leads.      Liver: The liver is  unremarkable without focal lesion.      Gallbladder and Biliary: Unremarkable.      Pancreas: No abnormality identified in the pancreas.      Spleen: There is a 3.7 x 1.6 cm splenic lesion along the inferior  medial margin adjacent to the pancreatic tail and kidney with  internal attenuation of 28 on the early arterial, late arterial, and  late portal venous phases, suggestive of a liquified splenic infarct  or possibly evolving hematoma. No solid enhancing components are  identified. There is rim enhancement noted. Please note that the  lesion is incompletely characterized without noncontrast phase, which  is not part of the pancreas protocol.      Adrenals: No abnormality identified in either adrenal gland.      Urinary: Subcentimeter right renal hypodensity, too small to  characterize. No hydronephrosis.      Gastrointestinal/Peritoneum: No small or large bowel obstruction in  the visualized abdomen. In the abdomen, there is no extraluminal air.  No significant free fluid. Evidence of prior left inguinal hernia  mesh repair. No evidence of acute appendicitis. Sigmoid colonic  diverticulosis. Couple of mildly thickened small bowel loops in left  upper quadrant, which may be due to peristalsis however can not  exclude early ischemic change. No pneumatosis .  Vascular: Abdominal aorta is normal in caliber. The SMA is occluded  approximately 5.5 cm distal to the origin, occlusion measuring 1.7 cm  in length, with enlargement of the SMA and adjacent stranding. A few  of the branches that are supplied by the occluded portion of the SMA  are unopacified indicating that they are occluded as well. The SMA  branches distal to the region of occlusion are opacified presumably  by collaterals.      Lymphatics: No enlarged lymph nodes by size criteria.      MSK/Body Wall: No aggressive bony lesion identified. Degenerative  changes in the spine.      Impression: SMA occlusion approximately 5.5 distal to the origin, 1.7 cm  in  length, with occlusion of some of the branches. Opacification of the  SMA distal to the region of occlusion presumably through collaterals.      Couple of mildly thickened small bowel loops in left upper quadrant,  favored to be due to peristalsis rather than early ischemic change in  a patient with benign abdominal exam and normal lactate. No  pneumatosis.      Nonenhancing structure along the inferior aspect of the spleen which  is favored to represent a liquified infarct or chronic hematoma given  the lack of internal enhancement.      Juan Carlos Driver discussed the significance and urgency of this  critical finding by telephone with  Dr. Nur On 5/11/2025 at 1:07 pm.  (**-RCF-**) Findings:  See findings.          Signed by: Juan Carlos Driver 5/11/2025 1:13 PM  Dictation workstation:   [id number]  ECG 12 lead  Sinus rhythm with 1st degree AV block  Nonspecific intraventricular block  Minimal voltage criteria for LVH, may be normal variant ( Guy product )  T wave abnormality, consider inferior ischemia  Abnormal ECG  Confirmed by Amrit Jha ([id number]) on 5/11/2025 1:19:33 AM  ECG 12 lead  Sinus rhythm with 1st degree AV block  Nonspecific intraventricular block  Minimal voltage criteria for LVH, may be normal variant ( Guy product )  T wave abnormality, consider inferior ischemia  Abnormal ECG  Confirmed by Amrit Jha ([id number]) on 5/11/2025 1:17:29 AM         I actually got a call from radiology regarding the CT pancreas that was done yesterday and ordered by Dr. Payne.  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              Assessment & Plan  NSTEMI (non-ST elevated myocardial infarction) (Multi)  Cardiology unimpressed and signed off.    #2- fevers and chills and questionable lesion involving spleen .  Ultrasound reveals no lesion of the spleen whatsoever.  Cultures negative.  Not  having any fevers.  Is on ceftriaxone pending blood cultures which thus far negative    #3 anorexia and weight loss.  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  He is not septic.  He does not have an acute abdomen.  MRCP was ordered by GI.  Both GI and surgery consulting.  I conveyed this new finding to both GI and surgery and surgery recommends vascular surgery consultation  Abnormal CAT scan      Will keep him in the hospital on his antibiotics on IV Protonix with plans for MRCP possible EGD and vascular surgery consultation         Jonathan Nur MD

## 2025-05-12 ENCOUNTER — APPOINTMENT (OUTPATIENT)
Dept: RADIOLOGY | Facility: HOSPITAL | Age: 55
DRG: 270 | End: 2025-05-12
Payer: COMMERCIAL

## 2025-05-12 VITALS
WEIGHT: 187.39 LBS | HEART RATE: 71 BPM | HEIGHT: 70 IN | TEMPERATURE: 97.3 F | OXYGEN SATURATION: 98 % | RESPIRATION RATE: 24 BRPM | BODY MASS INDEX: 26.83 KG/M2 | DIASTOLIC BLOOD PRESSURE: 76 MMHG | SYSTOLIC BLOOD PRESSURE: 105 MMHG

## 2025-05-12 LAB
ALBUMIN SERPL BCP-MCNC: 3.8 G/DL (ref 3.4–5)
ALP SERPL-CCNC: 50 U/L (ref 33–120)
ALT SERPL W P-5'-P-CCNC: 18 U/L (ref 10–52)
ANION GAP SERPL CALCULATED.3IONS-SCNC: 10 MMOL/L (ref 10–20)
AST SERPL W P-5'-P-CCNC: 18 U/L (ref 9–39)
BILIRUB SERPL-MCNC: 0.7 MG/DL (ref 0–1.2)
BUN SERPL-MCNC: 12 MG/DL (ref 6–23)
C COLI+JEJ+UPSA DNA STL QL NAA+PROBE: NOT DETECTED
CALCIUM SERPL-MCNC: 8.9 MG/DL (ref 8.6–10.3)
CHLORIDE SERPL-SCNC: 104 MMOL/L (ref 98–107)
CO2 SERPL-SCNC: 29 MMOL/L (ref 21–32)
CREAT SERPL-MCNC: 1.05 MG/DL (ref 0.5–1.3)
EC STX1 GENE STL QL NAA+PROBE: NOT DETECTED
EC STX2 GENE STL QL NAA+PROBE: NOT DETECTED
EGFRCR SERPLBLD CKD-EPI 2021: 85 ML/MIN/1.73M*2
ERYTHROCYTE [DISTWIDTH] IN BLOOD BY AUTOMATED COUNT: 11.9 % (ref 11.5–14.5)
GLUCOSE SERPL-MCNC: 91 MG/DL (ref 74–99)
HCT VFR BLD AUTO: 42.3 % (ref 41–52)
HGB BLD-MCNC: 14.2 G/DL (ref 13.5–17.5)
MCH RBC QN AUTO: 29.6 PG (ref 26–34)
MCHC RBC AUTO-ENTMCNC: 33.6 G/DL (ref 32–36)
MCV RBC AUTO: 88 FL (ref 80–100)
NOROVIRUS GI + GII RNA STL NAA+PROBE: NOT DETECTED
NRBC BLD-RTO: 0 /100 WBCS (ref 0–0)
PLATELET # BLD AUTO: 198 X10*3/UL (ref 150–450)
POTASSIUM SERPL-SCNC: 3.9 MMOL/L (ref 3.5–5.3)
PROT SERPL-MCNC: 6.6 G/DL (ref 6.4–8.2)
RBC # BLD AUTO: 4.8 X10*6/UL (ref 4.5–5.9)
RV RNA STL NAA+PROBE: NOT DETECTED
SALMONELLA DNA STL QL NAA+PROBE: NOT DETECTED
SHIGELLA DNA SPEC QL NAA+PROBE: NOT DETECTED
SODIUM SERPL-SCNC: 139 MMOL/L (ref 136–145)
V CHOLERAE DNA STL QL NAA+PROBE: NOT DETECTED
VANCOMYCIN SERPL-MCNC: 14.5 UG/ML (ref 5–20)
WBC # BLD AUTO: 11.1 X10*3/UL (ref 4.4–11.3)
Y ENTEROCOL DNA STL QL NAA+PROBE: NOT DETECTED

## 2025-05-12 PROCEDURE — 80053 COMPREHEN METABOLIC PANEL: CPT | Performed by: INTERNAL MEDICINE

## 2025-05-12 PROCEDURE — 99223 1ST HOSP IP/OBS HIGH 75: CPT | Performed by: NURSE PRACTITIONER

## 2025-05-12 PROCEDURE — 71275 CT ANGIOGRAPHY CHEST: CPT

## 2025-05-12 PROCEDURE — 74174 CTA ABD&PLVS W/CONTRAST: CPT | Performed by: RADIOLOGY

## 2025-05-12 PROCEDURE — 99232 SBSQ HOSP IP/OBS MODERATE 35: CPT | Performed by: FAMILY MEDICINE

## 2025-05-12 PROCEDURE — 85027 COMPLETE CBC AUTOMATED: CPT | Performed by: INTERNAL MEDICINE

## 2025-05-12 PROCEDURE — 2500000001 HC RX 250 WO HCPCS SELF ADMINISTERED DRUGS (ALT 637 FOR MEDICARE OP): Performed by: INTERNAL MEDICINE

## 2025-05-12 PROCEDURE — 2060000001 HC INTERMEDIATE ICU ROOM DAILY

## 2025-05-12 PROCEDURE — 80202 ASSAY OF VANCOMYCIN: CPT | Performed by: INTERNAL MEDICINE

## 2025-05-12 PROCEDURE — 36415 COLL VENOUS BLD VENIPUNCTURE: CPT | Performed by: INTERNAL MEDICINE

## 2025-05-12 PROCEDURE — 2550000001 HC RX 255 CONTRASTS: Performed by: FAMILY MEDICINE

## 2025-05-12 PROCEDURE — 71275 CT ANGIOGRAPHY CHEST: CPT | Performed by: RADIOLOGY

## 2025-05-12 PROCEDURE — 2500000004 HC RX 250 GENERAL PHARMACY W/ HCPCS (ALT 636 FOR OP/ED): Mod: JZ | Performed by: INTERNAL MEDICINE

## 2025-05-12 RX ADMIN — IOHEXOL 110 ML: 350 INJECTION, SOLUTION INTRAVENOUS at 16:24

## 2025-05-12 RX ADMIN — PANTOPRAZOLE SODIUM 40 MG: 40 TABLET, DELAYED RELEASE ORAL at 09:47

## 2025-05-12 RX ADMIN — ASPIRIN 81 MG: 81 TABLET, CHEWABLE ORAL at 09:47

## 2025-05-12 RX ADMIN — FLUOXETINE HYDROCHLORIDE 20 MG: 20 CAPSULE ORAL at 09:47

## 2025-05-12 RX ADMIN — CEFTRIAXONE SODIUM 2 G: 2 INJECTION, SOLUTION INTRAVENOUS at 09:47

## 2025-05-12 RX ADMIN — PANTOPRAZOLE SODIUM 40 MG: 40 TABLET, DELAYED RELEASE ORAL at 20:11

## 2025-05-12 ASSESSMENT — COGNITIVE AND FUNCTIONAL STATUS - GENERAL
MOBILITY SCORE: 24
DAILY ACTIVITIY SCORE: 24
DAILY ACTIVITIY SCORE: 24
MOBILITY SCORE: 24

## 2025-05-12 ASSESSMENT — PAIN - FUNCTIONAL ASSESSMENT
PAIN_FUNCTIONAL_ASSESSMENT: 0-10

## 2025-05-12 ASSESSMENT — PAIN SCALES - WONG BAKER: WONGBAKER_NUMERICALRESPONSE: NO HURT

## 2025-05-12 ASSESSMENT — PAIN SCALES - GENERAL
PAINLEVEL_OUTOF10: 0 - NO PAIN

## 2025-05-12 NOTE — PROGRESS NOTES
Vancomycin Dosing by Pharmacy- FOLLOW UP    Cristhian Thornton is a 54 y.o. year old male who Pharmacy has been consulted for vancomycin dosing for endocarditis/endovascular infection. Based on the patient's indication and renal status this patient is being dosed based on a goal AUC of 500-600.     Renal function is currently stable.    Current vancomycin dose: 1250 mg given every 12 hours    Estimated vancomycin AUC on current dose: 510 mg/L.hr     Visit Vitals  /79   Pulse 66   Temp 36.1 °C (97 °F) (Temporal)   Resp 18        Lab Results   Component Value Date    CREATININE 1.05 2025    CREATININE 0.98 2025    CREATININE 1.07 05/10/2025    CREATININE 1.05 2025    CREATININE 1.05 2025    CREATININE 0.99 2025        Patient weight is as follows:   Vitals:    25 0509   Weight: 85.7 kg (188 lb 15 oz)       Cultures:  No results found for the encounter in last 14 days.       I/O last 3 completed shifts:  In: 300 (3.5 mL/kg) [IV Piggyback:300]  Out: - (0 mL/kg)   Weight: 85.7 kg   I/O during current shift:  No intake/output data recorded.    Temp (24hrs), Av.6 °C (97.8 °F), Min:36.1 °C (97 °F), Max:37.8 °C (100 °F)      Assessment/Plan    Within goal AUC range. Continue current vancomycin regimen.    This dosing regimen is predicted by [employer] to result in the following pharmacokinetic parameters:    Regimen: 1250 mg IV every 12 hours.  Exposure target: AUC24 (range)500-600 mg/L.hr   [id number]: 491 mg/L.hr  AUC24,ss: 510 mg/L.hr  Probability of AUC24 > 400: 85 %  Ctrough,ss: 17.4 mg/L  Probability of Ctrough,ss > 20: 35 %    The next level will be obtained on  at 05:00. May be obtained sooner if clinically indicated.   Will continue to monitor renal function daily while on vancomycin and order serum creatinine at least every 48 hours if not already ordered.  Follow for continued vancomycin needs, clinical response, and signs/symptoms of toxicity.       Mana Monterroso,  PharmD

## 2025-05-12 NOTE — CONSULTS
"Nutrition Initial Assessment:   Nutrition Assessment    Reason for Assessment: Admission nursing screening    Patient is a 54 y.o. male presenting with feelings of fevers and chills, loss of appetite, weight loss, intermittent midepigastric discomfort and generalized weakness. CT finding of occluded superior mesenteric artery. Awaiting MRCP.      Nutrition History:  Food and Nutrient History: Attempted to call pt - line busy. Suspect poor PO intakes secondary to H&P in which pt reported poor PO intakes and weight loss. Noted weight loss over the past year, but not clinically significant. Per RN, plans for MRCP today.       Anthropometrics:  Height: 177.8 cm (5' 10\")   Weight: 85.7 kg (188 lb 15 oz)   BMI (Calculated): 27.11  IBW/kg (Dietitian Calculated): 75.45 kg  Percent of IBW: 114 %                      Weight History:   Wt Readings from Last 10 Encounters:   05/12/25 85.7 kg (188 lb 15 oz)   05/06/25 87.1 kg (192 lb)   04/02/25 88 kg (194 lb)   03/10/25 91.4 kg (201 lb 9.6 oz)   02/07/25 88.5 kg (195 lb)   07/18/24 89.8 kg (198 lb)   07/15/24 89.6 kg (197 lb 9.6 oz)   05/26/24 88 kg (194 lb)   05/21/24 91.2 kg (201 lb)   03/11/24 91.2 kg (201 lb)     Weight Change %:  Weight History / % Weight Change: Noted a 2.8 kg LOSS (3.2% change) in 3 months, 5.5 kg LOSS (6% change) in 1 year  Significant Weight Loss: No    Nutrition Focused Physical Exam Findings:  Defer: remote assessment  Subcutaneous Fat Loss:      Muscle Wasting:     Edema:     Physical Findings:       Nutrition Significant Labs:  BMP Trend:   Results from last 7 days   Lab Units 05/12/25  0414 05/11/25  0512 05/10/25  0440 05/09/25  0817   GLUCOSE mg/dL 91 99 90 99   CALCIUM mg/dL 8.9 8.5* 8.7 8.8   SODIUM mmol/L 139 140 139 138   POTASSIUM mmol/L 3.9 4.0 4.4 4.1   CO2 mmol/L 29 27 28 22   CHLORIDE mmol/L 104 107 105 107   BUN mg/dL 12 14 11 15   CREATININE mg/dL 1.05 0.98 1.07 1.05        Nutrition Specific Medications:  Noted protonix    I/O: "   Last BM Date: 05/10/25;      Dietary Orders (From admission, onward)       Start     Ordered    05/13/25 0001  NPO Diet Except: Sips with meds; Effective midnight  Diet effective midnight        Question:  Except:  Answer:  Sips with meds    05/12/25 0937    05/12/25 0938  Adult diet Regular  Diet effective now        Question:  Diet type  Answer:  Regular    05/12/25 0937    05/09/25 1555  May Participate in Room Service  ( ROOM SERVICE MAY PARTICIPATE)  Once        Question:  .  Answer:  Yes    05/09/25 1554                     Estimated Needs:   Total Energy Estimated Needs in 24 hours (kCal): 2263 kCal  Method for Estimating Needs: IBW  Total Protein Estimated Needs in 24 Hours (g): 75 g  Method for Estimating 24 Hour Protein Needs: IBW  Total Fluid Estimated Needs in 24 Hours (mL): 2263 mL  Method for Estimating 24 Hour Fluid Needs: IBW  Patient on Order Fluid Restriction: No        Nutrition Diagnosis        Nutrition Diagnosis  Patient has Nutrition Diagnosis: Yes  Diagnosis Status (1): New  Nutrition Diagnosis 1: Predicted inadequate energy intake  Related to (1): epigastric pain  As Evidenced by (1): pt reported poor PO intakes per H&P       Nutrition Interventions/Recommendations   Nutrition prescription for oral nutrition    Nutrition Recommendations:  Individualized Nutrition Prescription Provided for : Provide regular diet as tolerated. Provide Ensure HP TID.    Nutrition Interventions/Goals:   Meals and Snacks: General healthful diet  Goal: Continue current diet as ordered  Medical Food Supplement: Commercial beverage medical food supplement therapy  Goal: Initiate orders for Ensure HP TID  Coordination of Care with Providers: Nursing  Goal: Spoke with RNTonja      Education Documentation  No documentation found.            Nutrition Monitoring and Evaluation   Food/Nutrient Related History Monitoring  Monitoring and Evaluation Plan: Intake / amount of food  Intake / Amount of food: Consumes  at least 50% or more of meals/snacks/supplements    Anthropometric Measurements  Monitoring and Evaluation Plan: Body weight  Body Weight: Body weight - Maintain stable weight              Goal Status: New goal(s) identified    Time Spent (min): 45 minutes         05/12/25 at 10:08 AM - ROBIN SESAY RDN, OJ

## 2025-05-12 NOTE — PROGRESS NOTES
05/12/25 1206   Discharge Planning   Who is requesting discharge planning? Provider   Home or Post Acute Services None   Expected Discharge Disposition Home     Pt not med clear for discharge. Pt will not have any skilled needs at time of discharge.     DISCHARGE PLAN TO RETURN HOME NO SKILLED NEEDS.

## 2025-05-12 NOTE — PROGRESS NOTES
Cristhian Thornton is a 54 y.o. male on day 3 of admission presenting with NSTEMI (non-ST elevated myocardial infarction) (Multi).      Subjective   Here with subjective sense of fevers chills midepigastric discomfort anorexia and weight loss.  Has had low-grade fevers.  Has been on antibiotics pending blood cultures given his history of recurrent staph infections with bacteremia.  Being seen by GI surgery and infectious disease.     Objective   Alert oriented nondistressed  Heart regular rate and rhythm  Lungs clear to auscultation  Abdomen soft nontender nondistended with no rebound or guarding  Extremities no edema.  Last Recorded Vitals  /78 (BP Location: Left arm, Patient Position: Lying)   Pulse 67   Temp 36.4 °C (97.5 °F) (Temporal)   Resp 19   Wt 85.7 kg (188 lb 15 oz)   SpO2 96%   Intake/Output last 3 Shifts:    Intake/Output Summary (Last 24 hours) at 5/12/2025 0842  Last data filed at 5/11/2025 1430  Gross per 24 hour   Intake 300 ml   Output --   Net 300 ml       Admission Weight  Weight: 81.6 kg (180 lb) (05/09/25 0748)    Daily Weight  05/12/25 : 85.7 kg (188 lb 15 oz)    Image Results  CT pancreas pre OP evaluation w IV contrast  Narrative: Interpreted By:  Juan Carlos Driver,   STUDY:  CT PANCREAS PRE OP EVALUATION WITH CONTRAST;  5/10/2025 12:40 pm      INDICATION:  53 y/o   M with  Signs/Symptoms:pancreas protocol. eval for distal  pancreas mas.      LIMITATIONS:  None.      ACCESSION NUMBER(S):  [id number]      ORDERING CLINICIAN:  OK AWNG      TECHNIQUE:  After the administration of IV iodonated contrast, spiral axial  images were obtained from the xiphoid down through the symphysis  pubis. Sagittal and coronal reconstruction images were generated.  Multiphase exam. Pancreas protocol.      COMPARISON:  05/09/2025      FINDINGS:  Lower Chest: Streaky left basilar atelectasis. Abandoned epicardial  pacing leads.      Liver: The liver is unremarkable without focal lesion.       Gallbladder and Biliary: Unremarkable.      Pancreas: No abnormality identified in the pancreas.      Spleen: There is a 3.7 x 1.6 cm splenic lesion along the inferior  medial margin adjacent to the pancreatic tail and kidney with  internal attenuation of 28 on the early arterial, late arterial, and  late portal venous phases, suggestive of a liquified splenic infarct  or possibly evolving hematoma. No solid enhancing components are  identified. There is rim enhancement noted. Please note that the  lesion is incompletely characterized without noncontrast phase, which  is not part of the pancreas protocol.      Adrenals: No abnormality identified in either adrenal gland.      Urinary: Subcentimeter right renal hypodensity, too small to  characterize. No hydronephrosis.      Gastrointestinal/Peritoneum: No small or large bowel obstruction in  the visualized abdomen. In the abdomen, there is no extraluminal air.  No significant free fluid. Evidence of prior left inguinal hernia  mesh repair. No evidence of acute appendicitis. Sigmoid colonic  diverticulosis. Couple of mildly thickened small bowel loops in left  upper quadrant, which may be due to peristalsis however can not  exclude early ischemic change. No pneumatosis .  Vascular: Abdominal aorta is normal in caliber. The SMA is occluded  approximately 5.5 cm distal to the origin, occlusion measuring 1.7 cm  in length, with enlargement of the SMA and adjacent stranding. A few  of the branches that are supplied by the occluded portion of the SMA  are unopacified indicating that they are occluded as well. The SMA  branches distal to the region of occlusion are opacified presumably  by collaterals.      Lymphatics: No enlarged lymph nodes by size criteria.      MSK/Body Wall: No aggressive bony lesion identified. Degenerative  changes in the spine.      Impression: SMA occlusion approximately 5.5 distal to the origin, 1.7 cm in  length, with occlusion of some of the  branches. Opacification of the  SMA distal to the region of occlusion presumably through collaterals.      Couple of mildly thickened small bowel loops in left upper quadrant,  favored to be due to peristalsis rather than early ischemic change in  a patient with benign abdominal exam and normal lactate. No  pneumatosis.      Nonenhancing structure along the inferior aspect of the spleen which  is favored to represent a liquified infarct or chronic hematoma given  the lack of internal enhancement.      Juan Carlos Driver discussed the significance and urgency of this  critical finding by telephone with  Dr. Nur On 5/11/2025 at 1:07 pm.  (**-RCF-**) Findings:  See findings.          Signed by: Juan Carlos Driver 5/11/2025 1:13 PM  Dictation workstation:   [id number]  ECG 12 lead  Sinus rhythm with 1st degree AV block  Nonspecific intraventricular block  Minimal voltage criteria for LVH, may be normal variant ( Guy product )  T wave abnormality, consider inferior ischemia  Abnormal ECG  Confirmed by Amrit Jha ([id number]) on 5/11/2025 1:19:33 AM  ECG 12 lead  Sinus rhythm with 1st degree AV block  Nonspecific intraventricular block  Minimal voltage criteria for LVH, may be normal variant ( Guy product )  T wave abnormality, consider inferior ischemia  Abnormal ECG  Confirmed by Amrit Jha ([id number]) on 5/11/2025 1:17:29 AM         I actually got a call from radiology regarding the CT pancreas that was done yesterday and ordered by Dr. Payne.  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              Assessment & Plan  NSTEMI (non-ST elevated myocardial infarction) (Multi)  Cardiology unimpressed and signed off.    #2- fevers and chills and questionable lesion involving spleen .  Ultrasound reveals no lesion of the spleen whatsoever.  Cultures negative.  Not having any fevers.  Is on ceftriaxone  pending blood cultures which thus far negative    #3 anorexia and weight loss.  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  He is not septic.  He does not have an acute abdomen.  MRCP was ordered by GI.  Both GI and surgery consulting. surgery recommends vascular surgery consultation.  MRI of the abdomen was ordered  Abnormal CAT scan      Yocasta Huggins MD

## 2025-05-12 NOTE — PROGRESS NOTES
Cristhian Thornton is a 54 y.o. male on day 3 of admission presenting with NSTEMI (non-ST elevated myocardial infarction) (Multi).    Subjective   Feels okay.  Taking p.o. okay.  No nausea or vomiting.  No abdominal pain.  Passing gas.  Had 1 bowel movement today that was soft without blood.  No chills.     Objective     Vital signs in last 24 hours:  Temp:  [35.8 °C (96.4 °F)-37.8 °C (100 °F)] 35.8 °C (96.4 °F)  Heart Rate:  [63-78] 63  Resp:  [14-24] 18  BP: (105-111)/(72-79) 111/77  Heart Rate:  [63-78]   Temp:  [35.8 °C (96.4 °F)-37.8 °C (100 °F)]   Resp:  [14-24]   BP: (105-111)/(72-79)   Weight:  [85.7 kg (188 lb 15 oz)]   SpO2:  [95 %-98 %]      Intake/Output last 3 Shifts:  I/O last 3 completed shifts:  In: 300 (3.5 mL/kg) [IV Piggyback:300]  Out: - (0 mL/kg)   Weight: 85.7 kg     Physical Exam  No acute distress  Nonseptic appearing  Looks fine and comfortable  Alert and oriented  Sinus rhythm on the monitor rate of 65  Blood pressure on the monitor 111/77 with a MAP of 88  Abdomen soft, positive bowel sounds, nondistended, nontender except for only epigastric patient rates a 1 with no guarding or rebound or abdominal pain when I shake his abdomen a little bit    Scheduled medications  Scheduled Medications[1]  Continuous medications  Continuous Medications[2]  PRN medications  PRN Medications[3]    Relevant Results  Results from last 7 days   Lab Units 05/12/25  0414 05/10/25  0440 05/09/25  0817   WBC AUTO x10*3/uL 11.1 10.4 9.8   HEMOGLOBIN g/dL 14.2 14.5 14.5   HEMATOCRIT % 42.3 41.3 43.4   PLATELETS AUTO x10*3/uL 198 205 180      Results from last 7 days   Lab Units 05/12/25  0414 05/11/25  0512 05/10/25  0440   SODIUM mmol/L 139 140 139   POTASSIUM mmol/L 3.9 4.0 4.4   CHLORIDE mmol/L 104 107 105   CO2 mmol/L 29 27 28   BUN mg/dL 12 14 11   CREATININE mg/dL 1.05 0.98 1.07   GLUCOSE mg/dL 91 99 90   CALCIUM mg/dL 8.9 8.5* 8.7       No results found for the last 7 days.    Imaging  No results  found.    Cardiology, Vascular, and Other Imaging  No other imaging results found for the past 2 days       Assessment/Plan   Assessment & Plan  NSTEMI (non-ST elevated myocardial infarction) (Multi)    Chills    History of multiple recurrent staph infections including bacteremia    Abnormal CAT scan    Initial concern for splenic abscess on CT scan.  Subsequent ultrasound showed a normal spleen.  There is a fluid-filled lesion that is either distal pancreatic or possibly even adrenal.  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 Opacification of the  SMA distal to the region of occlusion presumably through collaterals.  Couple of mildly thickened small bowel loops in left upper quadrant,  favored to be due to peristalsis rather than early ischemic change in  a patient with benign abdominal exam and normal lactate. No  pneumatosis.  Nonenhancing structure along the inferior aspect of the spleen which  is favored to represent a liquified infarct or chronic hematoma given  the lack of internal enhancement.    5/12 vascular consult noted and they ordered additional imaging.    5/12 GI note noted-awaiting MRCP.      Ivy Christensen, APRN-CNP               [1] anastrozole, 0.5 mg, oral, Once per day on Monday Thursday  aspirin, 81 mg, oral, Daily  FLUoxetine, 20 mg, oral, Daily  pantoprazole, 40 mg, oral, BID  perflutren lipid microspheres, 0.5-10 mL of dilution, intravenous, Once in imaging  perflutren protein A microsphere, 0.5 mL, intravenous, Once in imaging  sulfur hexafluoride microsphr, 2 mL, intravenous, Once in imaging  [2]    [3] PRN medications: acetaminophen **OR** acetaminophen, traZODone

## 2025-05-12 NOTE — PROGRESS NOTES
"Cristhian Thornton is a 54 y.o. male on day 3 of admission presenting with NSTEMI (non-ST elevated myocardial infarction) (Multi).    Subjective   Patient reports he is feeling better today.  Denies any current nausea vomiting.  No reported diarrhea in over 24 hours.        Objective     Physical Exam  HENT:      Head: Normocephalic.      Right Ear: Tympanic membrane normal.      Nose: Nose normal.      Mouth/Throat:      Mouth: Mucous membranes are moist.   Eyes:      Pupils: Pupils are equal, round, and reactive to light.   Cardiovascular:      Rate and Rhythm: Normal rate.      Pulses: Normal pulses.   Pulmonary:      Breath sounds: Normal breath sounds.   Abdominal:      Palpations: Abdomen is soft.   Musculoskeletal:         General: Normal range of motion.      Cervical back: Normal range of motion.   Skin:     General: Skin is warm.   Neurological:      General: No focal deficit present.      Mental Status: He is alert.   Psychiatric:         Mood and Affect: Mood normal.         Last Recorded Vitals  Blood pressure 111/78, pulse 67, temperature 36.4 °C (97.5 °F), temperature source Temporal, resp. rate 19, height 1.778 m (5' 10\"), weight 85.7 kg (188 lb 15 oz), SpO2 96%.  Intake/Output last 3 Shifts:  I/O last 3 completed shifts:  In: 300 (3.5 mL/kg) [IV Piggyback:300]  Out: - (0 mL/kg)   Weight: 85.7 kg     Relevant Results      Scheduled medications  Scheduled Medications[1]  Continuous medications  Continuous Medications[2]  PRN medications  PRN Medications[3]  Results for orders placed or performed during the hospital encounter of 05/09/25 (from the past 24 hours)   Vancomycin   Result Value Ref Range    Vancomycin 14.5 5.0 - 20.0 ug/mL   CBC   Result Value Ref Range    WBC 11.1 4.4 - 11.3 x10*3/uL    nRBC 0.0 0.0 - 0.0 /100 WBCs    RBC 4.80 4.50 - 5.90 x10*6/uL    Hemoglobin 14.2 13.5 - 17.5 g/dL    Hematocrit 42.3 41.0 - 52.0 %    MCV 88 80 - 100 fL    MCH 29.6 26.0 - 34.0 pg    MCHC 33.6 32.0 - 36.0 g/dL "    RDW 11.9 11.5 - 14.5 %    Platelets 198 150 - 450 x10*3/uL   Comprehensive Metabolic Panel   Result Value Ref Range    Glucose 91 74 - 99 mg/dL    Sodium 139 136 - 145 mmol/L    Potassium 3.9 3.5 - 5.3 mmol/L    Chloride 104 98 - 107 mmol/L    Bicarbonate 29 21 - 32 mmol/L    Anion Gap 10 10 - 20 mmol/L    Urea Nitrogen 12 6 - 23 mg/dL    Creatinine 1.05 0.50 - 1.30 mg/dL    eGFR 85 >60 mL/min/1.73m*2    Calcium 8.9 8.6 - 10.3 mg/dL    Albumin 3.8 3.4 - 5.0 g/dL    Alkaline Phosphatase 50 33 - 120 U/L    Total Protein 6.6 6.4 - 8.2 g/dL    AST 18 9 - 39 U/L    Bilirubin, Total 0.7 0.0 - 1.2 mg/dL    ALT 18 10 - 52 U/L                            Assessment & Plan  NSTEMI (non-ST elevated myocardial infarction) (Multi)    Abnormal CAT scan    Epigastric Pain, Abnormal CT, Chills   Patient with NVD, weight loss, chills, epigastric pain x 3 weeks. Initial CT with concern for splenic abscess. Surgery completed US, they did not think this was true etiology. Reviewed with Dr Cherry. She is recommending MRCP for further assessment. He does have a hx of staph infections. Denies prior pancreatitis. LFTs and lipase normal but need to r/o pancreatic tail lesion      Send stool studies   H Pylori stool  Appreciate ID input. Pending additional work up   Based on imaging, he may require EGD/colonoscopy    5/12  Patient currently does not have any nausea and vomiting.  He reports his epigastric pain is intermittent and is more dull in nature that worsens after eating.  No reported fevers/chills overnight. Blood cultures negative. 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 Vascular surgery has been consulted. Awaiting MRCP    Audelia Carter, APRN-CNP           [1] anastrozole, 0.5 mg, oral, Once per day on Monday Thursday  aspirin, 81 mg, oral, Daily  cefTRIAXone, 2 g, intravenous, q24h  FLUoxetine, 20 mg, oral, Daily  pantoprazole, 40 mg, oral, BID  perflutren lipid microspheres, 0.5-10 mL of dilution, intravenous,  Once in imaging  perflutren protein A microsphere, 0.5 mL, intravenous, Once in imaging  sulfur hexafluoride microsphr, 2 mL, intravenous, Once in imaging  vancomycin (Vancocin), 1,250 mg, intravenous, q12h    [2]    [3] PRN medications: acetaminophen **OR** acetaminophen, traZODone, vancomycin

## 2025-05-12 NOTE — CARE PLAN
The patient's goals for the shift include  rest    The clinical goals for the shift include remain free from falls and get some rest

## 2025-05-12 NOTE — PROGRESS NOTES
Seeing patient for intermittent tactile fevers/chills.  Patient has been afebrile since admission.  SMA thrombosis identified yesterday and workup underway.  Admission cultures remain sterile.  Pt states he is having no diarrhea.  Is having formed stools.     Ceftriaxone D3  Vanco D3     36.4   36.1  Lungs: Clear to auscultation bilaterally  Cardio: RRR, S1-S2 normal  Abdomen: NABS, soft, nontender nondistended.     WBC: 11.1  Creatinine: 1.05  CRP: 4.2  ESR: 4     Blood culture (5/9): X 2 sets/NGTD     TTE (5/9): No evidence for vegetation.     Impression:  1.  Two weeks of intermittent tactile fever/chills/epigastric pain  --No clear localizing signs of infection.  --Urinalysis benign.  Blood cultures x 2 sets currently with no growth at 48hrs.  TTE without evidence of vegetation. He has been afebrile since admission.  2.  SMA thrombosis  -- suspect this is the cause of his admission symptoms     Plan:  1.  Will stop ceftriaxone and vancomycin.  Observe off antibiotics.  Have low clinical suspicion for ongoing infectious process at this time.  2.  Evaluation of SMA thrombosis per primary service and vascular surgery.  3.  Low clinical suspicion for C. difficile.  Patient is currently having no active diarrhea and reports his stools are formed.  Would favor removal from C. difficile isolation and cancellation of C. difficile PCR.  4.  ID will sign off at this time.  Call if further issues arise.     Mukund Che MD  [facility]  [phone]

## 2025-05-12 NOTE — PROGRESS NOTES
Vancomycin Dosing by Pharmacy- Cessation of Therapy    Consult to pharmacy for vancomycin dosing has been discontinued by the prescriber, pharmacy will sign off at this time.    Please call pharmacy if there are further questions or re-enter a consult if vancomycin is resumed.     Nella Bryant, SuyapaD

## 2025-05-12 NOTE — CONSULTS
Reason for Consult   Superior mesenteric artery occlusion, epigastric pain    History Of Present Illness    This is a 54-year-old male who presented to the emergency department on 5/9/2025 for further evaluation of mid epigastric pain, generalized weakness, fever, chills, loss of appetite and weight loss.  Patient was worked up by GI for questionable splenic abscess which was ruled out by acute care surgery, now undergoing workup for pancreatic lesion, awaiting MRCP which may be performed today.  LFTs and lipase were normal.  He is also having some diarrhea and stool studies have been sent.  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 Opacification of the  SMA distal to the region of occlusion presumably through collaterals.  Patient tells me he developed epigastric pain and fullness back in January which has gotten progressively worse.  He has had 15 pound weight loss in 1 and half weeks.  He endorses postprandial pain.  States he eats 5-6 bites of food and feels full.  Denies bloating.  He only has pain after eating.  Non-smoker.  Denies food fear.  Has been having soft bowel movements.    Of note, patient underwent replacement of aortic valve with bioprosthetic in 2020.  He has been seen by cardiology this admission, EKG nonischemic, chronic pattern of LVH with block.  No significant findings.  Echocardiogram reviewed by cardiology mildly increasing pressures to aortic valve, not critical state.  Their service did not believe that his mildly elevated troponins were indicative of acute coronary syndrome.     Past Medical History  Medical History[1]      Surgical History  Surgical History[2]       Social History  Social History     Socioeconomic History    Marital status:      Spouse name: Not on file    Number of children: Not on file    Years of  education: Not on file    Highest education level: Not on file   Occupational History    Not on file   Tobacco Use    Smoking status: Never    Smokeless tobacco: Never   Substance and Sexual Activity    Alcohol use: Yes     Alcohol/week: 1.0 standard drink of alcohol     Types: 1 Standard drinks or equivalent per week    Drug use: Never    Sexual activity: Not on file   Other Topics Concern    Not on file   Social History Narrative    Not on file     Social Drivers of Health     Financial Resource Strain: Low Risk  (5/9/2025)    Overall Financial Resource Strain (CARDIA)     Difficulty of Paying Living Expenses: Not hard at all   Food Insecurity: No Food Insecurity (5/9/2025)    Hunger Vital Sign     Worried About Running Out of Food in the Last Year: Never true     Ran Out of Food in the Last Year: Never true   Transportation Needs: No Transportation Needs (5/9/2025)    PRAPARE - Transportation     Lack of Transportation (Medical): No     Lack of Transportation (Non-Medical): No   Physical Activity: Inactive (5/9/2025)    Exercise Vital Sign     Days of Exercise per Week: 0 days     Minutes of Exercise per Session: 0 min   Stress: No Stress Concern Present (5/9/2025)    [employer] - Occupational Stress Questionnaire     Feeling of Stress : Not at all   Social Connections: Moderately Isolated (5/9/2025)    Social Connection and Isolation Panel [NHANES]     Frequency of Communication with Friends and Family: More than three times a week     Frequency of Social Gatherings with Friends and Family: Twice a week     Attends Sikh Services: Never     Active Member of Clubs or Organizations: No     Attends Club or Organization Meetings: Never     Marital Status:    Intimate Partner Violence: Not At Risk (5/9/2025)    Humiliation, Afraid, Rape, and Kick questionnaire     Fear of Current or Ex-Partner: No     Emotionally Abused: No     Physically Abused: No     Sexually Abused: No    Housing Stability: Low Risk  (5/9/2025)    Housing Stability Vital Sign     Unable to Pay for Housing in the Last Year: No     Number of Times Moved in the Last Year: 0     Homeless in the Last Year: No         Family History  Family History[3]       Allergies  RX Allergies[4]      Relevant Results  Results for orders placed or performed during the hospital encounter of 05/09/25 (from the past 24 hours)   Vancomycin   Result Value Ref Range    Vancomycin 14.5 5.0 - 20.0 ug/mL   CBC   Result Value Ref Range    WBC 11.1 4.4 - 11.3 x10*3/uL    nRBC 0.0 0.0 - 0.0 /100 WBCs    RBC 4.80 4.50 - 5.90 x10*6/uL    Hemoglobin 14.2 13.5 - 17.5 g/dL    Hematocrit 42.3 41.0 - 52.0 %    MCV 88 80 - 100 fL    MCH 29.6 26.0 - 34.0 pg    MCHC 33.6 32.0 - 36.0 g/dL    RDW 11.9 11.5 - 14.5 %    Platelets 198 150 - 450 x10*3/uL   Comprehensive Metabolic Panel   Result Value Ref Range    Glucose 91 74 - 99 mg/dL    Sodium 139 136 - 145 mmol/L    Potassium 3.9 3.5 - 5.3 mmol/L    Chloride 104 98 - 107 mmol/L    Bicarbonate 29 21 - 32 mmol/L    Anion Gap 10 10 - 20 mmol/L    Urea Nitrogen 12 6 - 23 mg/dL    Creatinine 1.05 0.50 - 1.30 mg/dL    eGFR 85 >60 mL/min/1.73m*2    Calcium 8.9 8.6 - 10.3 mg/dL    Albumin 3.8 3.4 - 5.0 g/dL    Alkaline Phosphatase 50 33 - 120 U/L    Total Protein 6.6 6.4 - 8.2 g/dL    AST 18 9 - 39 U/L    Bilirubin, Total 0.7 0.0 - 1.2 mg/dL    ALT 18 10 - 52 U/L     Imaging  CT pancreas pre OP evaluation w IV contrast  Result Date: 5/11/2025  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 Opacification of the SMA distal to the region of occlusion presumably through collaterals.   Couple of mildly thickened small bowel loops in left upper quadrant, favored to be due to peristalsis rather than early ischemic change in a patient with benign abdominal exam and normal lactate. No pneumatosis.   Nonenhancing structure along the inferior aspect of the  spleen which is favored to represent a liquified infarct or chronic hematoma given the lack of internal enhancement.   Juan Carlso Driver discussed the significance and urgency of this critical finding by telephone with  Dr. Nur On 5/11/2025 at 1:07 pm. (**-RCF-**) Findings:  See findings.     Signed by: Juan Carlos Driver 5/11/2025 1:13 PM Dictation workstation:   [id number]      Cardiology, Vascular, and Other Imaging  No other imaging results found for the past 2 days        Physical exam  Constitutional:  Alert and oriented to person, place, date/time in no acute distress.  HEENT:  Atraumatic, normocephalic. PERRL. EOMI.  Nares patent.  Mucous membranes moist.    Neck:  Trachea midline.  Respiratory:  Clear to auscultation.  Cardiac:  Regular rate and rhythm.  No murmurs.  Cardiovascular:  No edema of the extremities.   Abdominal:  Soft, epigastric tenderness with palpation, no guarding, nondistended, bowel sounds present.  Musculoskeletal:  Moves extremities freely.  Dermatological: Clean and dry   Neurological: Alert and oriented to person, place, date/time  Psych:  Calm, cooperative    Assessment and Plan  SMA occlusion  Epigastric pain  Pancreatic lesion    54-year-old male developed epigastric pain and fullness back in January which has gotten progressively worse.  He has had 15 pound weight loss in 1 and half weeks.  He endorses postprandial pain.  States he eats 5-6 bites of food and feels full.  Denies bloating.  He only has pain after eating.  Non-smoker.  Denies food fear.  Has been having soft bowel movements.  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 Opacification of the SMA distal to the region of occlusion presumably through collaterals.    Patient discussed in detail with Dr. Kunz on 5/12/2025 also reviewed imaging  - CTA chest abdomen pelvis ordered, will give further  recommendations tomorrow based off results of CTA and mesenteric duplex.  - Mesenteric duplex to be obtained on 5/13/2025 by vascular lab  - GI following, awaiting MRCP  - Stool studies have been obtained         [1]   Past Medical History:  Diagnosis Date    Abscess of left index finger 07/03/2024    Aortic valve stenosis     Closed fracture of distal end of humerus 03/10/2025    Elbow pain 03/10/2025    Infection     Multiple    Low testosterone     MRSA (methicillin resistant Staphylococcus aureus) septicemia (Multi) 07/03/2024   [2]   Past Surgical History:  Procedure Laterality Date    AORTIC VALVE REPLACEMENT      CERVICAL SPINE SURGERY      implant    ELBOW BURSA SURGERY Right     related to infection    HERNIA REPAIR      JOINT REPLACEMENT Right     knee    MR HEAD ANGIO WO IV CONTRAST  02/24/2023    MR HEAD ANGIO WO IV CONTRAST [facility]    MR HEAD ANGIO WO IV CONTRAST  09/05/2023    MR HEAD ANGIO WO IV CONTRAST [facility]    MR NECK ANGIO WO IV CONTRAST  12/20/2012    MR NECK ANGIO WO IV CONTRAST [facility]   [3]   Family History  Problem Relation Name Age of Onset    Autoimmune disease Mother      Hypertension Mother      PTSD Father      Benign prostatic hyperplasia Father      No Known Problems Sister      Other (htn) Brother      Other (hld) Brother      No Known Problems Daughter      No Known Problems Daughter      Other (watchman) Maternal Grandfather      Other (cath) Maternal Grandfather      Other (3x bypass) Maternal Grandfather      Heart disease Maternal Grandfather     [4]   Allergies  Allergen Reactions    Acyclovir Unknown    Opioids - Morphine Analogues Unknown    Hydromorphone Rash

## 2025-05-13 ENCOUNTER — APPOINTMENT (OUTPATIENT)
Dept: RADIOLOGY | Facility: HOSPITAL | Age: 55
DRG: 270 | End: 2025-05-13
Payer: COMMERCIAL

## 2025-05-13 ENCOUNTER — APPOINTMENT (OUTPATIENT)
Dept: CARDIOLOGY | Facility: HOSPITAL | Age: 55
DRG: 270 | End: 2025-05-13
Payer: COMMERCIAL

## 2025-05-13 PROBLEM — K55.069 OCCLUSION OF SUPERIOR MESENTERIC ARTERY (MULTI): Status: ACTIVE | Noted: 2025-05-09

## 2025-05-13 LAB
APTT PPP: 26 SECONDS (ref 22–32.5)
APTT PPP: 27.7 SECONDS (ref 22–32.5)
BACTERIA BLD CULT: NORMAL
BACTERIA BLD CULT: NORMAL
ERYTHROCYTE [DISTWIDTH] IN BLOOD BY AUTOMATED COUNT: 11.9 % (ref 11.5–14.5)
HCT VFR BLD AUTO: 42 % (ref 41–52)
HGB BLD-MCNC: 14.5 G/DL (ref 13.5–17.5)
MCH RBC QN AUTO: 29 PG (ref 26–34)
MCHC RBC AUTO-ENTMCNC: 34.5 G/DL (ref 32–36)
MCV RBC AUTO: 84 FL (ref 80–100)
NRBC BLD-RTO: 0 /100 WBCS (ref 0–0)
PLATELET # BLD AUTO: 218 X10*3/UL (ref 150–450)
PMV BLD AUTO: 9.8 FL (ref 7.5–11.5)
RBC # BLD AUTO: 5 X10*6/UL (ref 4.5–5.9)
WBC # BLD AUTO: 10.1 X10*3/UL (ref 4.4–11.3)

## 2025-05-13 PROCEDURE — 85730 THROMBOPLASTIN TIME PARTIAL: CPT | Performed by: NURSE PRACTITIONER

## 2025-05-13 PROCEDURE — A9575 INJ GADOTERATE MEGLUMI 0.1ML: HCPCS | Mod: JZ | Performed by: INTERNAL MEDICINE

## 2025-05-13 PROCEDURE — 2550000001 HC RX 255 CONTRASTS: Mod: JZ | Performed by: INTERNAL MEDICINE

## 2025-05-13 PROCEDURE — 2500000004 HC RX 250 GENERAL PHARMACY W/ HCPCS (ALT 636 FOR OP/ED): Mod: JZ | Performed by: NURSE PRACTITIONER

## 2025-05-13 PROCEDURE — 2060000001 HC INTERMEDIATE ICU ROOM DAILY

## 2025-05-13 PROCEDURE — 85027 COMPLETE CBC AUTOMATED: CPT | Performed by: NURSE PRACTITIONER

## 2025-05-13 PROCEDURE — 93975 VASCULAR STUDY: CPT | Performed by: SURGERY

## 2025-05-13 PROCEDURE — 99233 SBSQ HOSP IP/OBS HIGH 50: CPT | Performed by: INTERNAL MEDICINE

## 2025-05-13 PROCEDURE — 2500000001 HC RX 250 WO HCPCS SELF ADMINISTERED DRUGS (ALT 637 FOR MEDICARE OP): Performed by: INTERNAL MEDICINE

## 2025-05-13 PROCEDURE — 76376 3D RENDER W/INTRP POSTPROCES: CPT | Performed by: RADIOLOGY

## 2025-05-13 PROCEDURE — 93923 UPR/LXTR ART STDY 3+ LVLS: CPT

## 2025-05-13 PROCEDURE — 36415 COLL VENOUS BLD VENIPUNCTURE: CPT | Performed by: NURSE PRACTITIONER

## 2025-05-13 PROCEDURE — 74183 MRI ABD W/O CNTR FLWD CNTR: CPT | Performed by: RADIOLOGY

## 2025-05-13 PROCEDURE — 93975 VASCULAR STUDY: CPT

## 2025-05-13 PROCEDURE — 93923 UPR/LXTR ART STDY 3+ LVLS: CPT | Performed by: SURGERY

## 2025-05-13 PROCEDURE — 74183 MRI ABD W/O CNTR FLWD CNTR: CPT

## 2025-05-13 RX ORDER — GADOTERATE MEGLUMINE 376.9 MG/ML
20 INJECTION INTRAVENOUS
Status: COMPLETED | OUTPATIENT
Start: 2025-05-13 | End: 2025-05-13

## 2025-05-13 RX ORDER — HEPARIN SODIUM 5000 [USP'U]/ML
2000-4000 INJECTION, SOLUTION INTRAVENOUS; SUBCUTANEOUS AS NEEDED
Status: DISCONTINUED | OUTPATIENT
Start: 2025-05-13 | End: 2025-05-16

## 2025-05-13 RX ORDER — HEPARIN SODIUM 5000 [USP'U]/ML
4000 INJECTION, SOLUTION INTRAVENOUS; SUBCUTANEOUS ONCE
Status: COMPLETED | OUTPATIENT
Start: 2025-05-13 | End: 2025-05-13

## 2025-05-13 RX ORDER — HEPARIN SODIUM 10000 [USP'U]/100ML
0-4000 INJECTION, SOLUTION INTRAVENOUS CONTINUOUS
Status: DISCONTINUED | OUTPATIENT
Start: 2025-05-13 | End: 2025-05-16

## 2025-05-13 RX ADMIN — FLUOXETINE HYDROCHLORIDE 20 MG: 20 CAPSULE ORAL at 08:31

## 2025-05-13 RX ADMIN — GADOTERATE MEGLUMINE 20 ML: 376.9 INJECTION INTRAVENOUS at 10:15

## 2025-05-13 RX ADMIN — HEPARIN SODIUM 1000 UNITS/HR: 10000 INJECTION, SOLUTION INTRAVENOUS at 11:43

## 2025-05-13 RX ADMIN — PANTOPRAZOLE SODIUM 40 MG: 40 TABLET, DELAYED RELEASE ORAL at 08:31

## 2025-05-13 RX ADMIN — HEPARIN SODIUM 4000 UNITS: 5000 INJECTION, SOLUTION INTRAVENOUS; SUBCUTANEOUS at 11:43

## 2025-05-13 RX ADMIN — ASPIRIN 81 MG: 81 TABLET, CHEWABLE ORAL at 08:31

## 2025-05-13 RX ADMIN — PANTOPRAZOLE SODIUM 40 MG: 40 TABLET, DELAYED RELEASE ORAL at 21:27

## 2025-05-13 ASSESSMENT — COGNITIVE AND FUNCTIONAL STATUS - GENERAL
MOBILITY SCORE: 24
DAILY ACTIVITIY SCORE: 24
DAILY ACTIVITIY SCORE: 24
MOBILITY SCORE: 24

## 2025-05-13 ASSESSMENT — PAIN SCALES - GENERAL
PAINLEVEL_OUTOF10: 0 - NO PAIN
PAINLEVEL_OUTOF10: 0 - NO PAIN

## 2025-05-13 ASSESSMENT — PAIN - FUNCTIONAL ASSESSMENT: PAIN_FUNCTIONAL_ASSESSMENT: 0-10

## 2025-05-13 NOTE — CARE PLAN
The patient's goals for the shift include      The clinical goals for the shift include remain pain free    Over the shift, the patient did not make progress toward the following goals. Barriers to progression include abd pain this admit. Recommendations to address these barriers include prn pain meds.

## 2025-05-13 NOTE — ASSESSMENT & PLAN NOTE
NSTEMI has been ruled out and cardiology no longer following.     2. Fever of unknown origin: no identifiable source of infection.  Blood culture is negative to date ( day 4).  Continue to monitor.  He is being monitored off abx.     3. anorexia and weight loss.  TSH and T4 are normal. Suspected cause of weight loss is possibly chronic mesenteric ischemia.   Vascular team has been consulted.  Appreciate GI and gen surgery recommendation.   Pain control  PPI  Adjust meds as needed

## 2025-05-13 NOTE — PROGRESS NOTES
"Cristhian Thornton is a 54 y.o. male on day 4 of admission presenting with NSTEMI (non-ST elevated myocardial infarction) (Multi).    Subjective   Per nursing patient has not complained of any abdominal pain, nausea, vomiting.  Diarrhea has resolved       Objective     Physical Exam  HENT:      Head: Normocephalic.      Right Ear: Tympanic membrane normal.      Mouth/Throat:      Mouth: Mucous membranes are moist.   Eyes:      Pupils: Pupils are equal, round, and reactive to light.   Cardiovascular:      Rate and Rhythm: Normal rate.   Abdominal:      Palpations: Abdomen is soft.   Musculoskeletal:         General: Normal range of motion.      Cervical back: Normal range of motion.   Skin:     General: Skin is warm.   Neurological:      General: No focal deficit present.      Mental Status: He is alert.   Psychiatric:         Mood and Affect: Mood normal.         Last Recorded Vitals  Blood pressure 111/84, pulse 69, temperature 36.5 °C (97.7 °F), temperature source Temporal, resp. rate 17, height 1.778 m (5' 10\"), weight 85.7 kg (188 lb 15 oz), SpO2 97%.  Intake/Output last 3 Shifts:  I/O last 3 completed shifts:  In: 600 (7 mL/kg) [P.O.:600]  Out: - (0 mL/kg)   Weight: 85.7 kg     Relevant Results      Scheduled medications  Scheduled Medications[1]  Continuous medications  Continuous Medications[2]  PRN medications  PRN Medications[3]  No results found for this or any previous visit (from the past 24 hours).                         Assessment & Plan  NSTEMI (non-ST elevated myocardial infarction) (Multi)    Abnormal CAT scan    Occlusion of superior mesenteric artery (Multi)    Epigastric Pain, Abnormal CT, Chills   Patient with NVD, weight loss, chills, epigastric pain x 3 weeks. Initial CT with concern for splenic abscess. Surgery completed US, they did not think this was true etiology. Reviewed with Dr Cherry. She is recommending MRCP for further assessment. He does have a hx of staph infections. Denies prior " pancreatitis. LFTs and lipase normal but need to r/o pancreatic tail lesion      Send stool studies   H Pylori stool  Appreciate ID input. Pending additional work up   Based on imaging, he may require EGD/colonoscopy     5/12  Patient currently does not have any nausea and vomiting.  He reports his epigastric pain is intermittent and is more dull in nature that worsens after eating.  No reported fevers/chills overnight. Blood cultures negative. 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 Vascular surgery has been consulted. Awaiting MRCP    5/13  Patient seen and evaluated by vascular, CTA and mesenteric duplex ordered which are pending.  Patient will be going for MRCP today.  Further recs to follow pending result.     Audelia Carter, APRN-CNP           [1] anastrozole, 0.5 mg, oral, Once per day on Monday Thursday  aspirin, 81 mg, oral, Daily  FLUoxetine, 20 mg, oral, Daily  pantoprazole, 40 mg, oral, BID  perflutren lipid microspheres, 0.5-10 mL of dilution, intravenous, Once in imaging  perflutren protein A microsphere, 0.5 mL, intravenous, Once in imaging  sulfur hexafluoride microsphr, 2 mL, intravenous, Once in imaging    [2]    [3] PRN medications: acetaminophen **OR** acetaminophen, traZODone

## 2025-05-13 NOTE — PROGRESS NOTES
Cristhian Thornton is a 54 y.o. male on day 4 of admission presenting with NSTEMI (non-ST elevated myocardial infarction) (Multi).      Subjective   Mr. Thornton was seen and evaluated. He denied fever and chills. Noted that he was able to tolerate dinner last night, but experienced epigastric pain afterwards. He denied fever and chills. No chest zhanna.  He is aware of plans to obtain MRCP today and treatment plan is per report of the pending images.     Objective     Last Recorded Vitals  /84 (BP Location: Left arm, Patient Position: Lying)   Pulse 69   Temp 36.5 °C (97.7 °F) (Temporal)   Resp 17   Wt 85.7 kg (188 lb 15 oz)   SpO2 97%   Intake/Output last 3 Shifts:    Intake/Output Summary (Last 24 hours) at 5/13/2025 0832  Last data filed at 5/13/2025 0157  Gross per 24 hour   Intake 600 ml   Output --   Net 600 ml       Admission Weight  Weight: 81.6 kg (180 lb) (05/09/25 0748)    Daily Weight  05/13/25 : 85.7 kg (188 lb 15 oz)    Image Results  CT pancreas pre OP evaluation w IV contrast  Narrative: Interpreted By:  Juan Carlos Driver,   STUDY:  CT PANCREAS PRE OP EVALUATION WITH CONTRAST;  5/10/2025 12:40 pm      INDICATION:  55 y/o   M with  Signs/Symptoms:pancreas protocol. eval for distal  pancreas mas.      LIMITATIONS:  None.      ACCESSION NUMBER(S):  [id number]      ORDERING CLINICIAN:  OK WANG      TECHNIQUE:  After the administration of IV iodonated contrast, spiral axial  images were obtained from the xiphoid down through the symphysis  pubis. Sagittal and coronal reconstruction images were generated.  Multiphase exam. Pancreas protocol.      COMPARISON:  05/09/2025      FINDINGS:  Lower Chest: Streaky left basilar atelectasis. Abandoned epicardial  pacing leads.      Liver: The liver is unremarkable without focal lesion.      Gallbladder and Biliary: Unremarkable.      Pancreas: No abnormality identified in the pancreas.      Spleen: There is a 3.7 x 1.6 cm splenic lesion along the  inferior  medial margin adjacent to the pancreatic tail and kidney with  internal attenuation of 28 on the early arterial, late arterial, and  late portal venous phases, suggestive of a liquified splenic infarct  or possibly evolving hematoma. No solid enhancing components are  identified. There is rim enhancement noted. Please note that the  lesion is incompletely characterized without noncontrast phase, which  is not part of the pancreas protocol.      Adrenals: No abnormality identified in either adrenal gland.      Urinary: Subcentimeter right renal hypodensity, too small to  characterize. No hydronephrosis.      Gastrointestinal/Peritoneum: No small or large bowel obstruction in  the visualized abdomen. In the abdomen, there is no extraluminal air.  No significant free fluid. Evidence of prior left inguinal hernia  mesh repair. No evidence of acute appendicitis. Sigmoid colonic  diverticulosis. Couple of mildly thickened small bowel loops in left  upper quadrant, which may be due to peristalsis however can not  exclude early ischemic change. No pneumatosis .  Vascular: Abdominal aorta is normal in caliber. The SMA is occluded  approximately 5.5 cm distal to the origin, occlusion measuring 1.7 cm  in length, with enlargement of the SMA and adjacent stranding. A few  of the branches that are supplied by the occluded portion of the SMA  are unopacified indicating that they are occluded as well. The SMA  branches distal to the region of occlusion are opacified presumably  by collaterals.      Lymphatics: No enlarged lymph nodes by size criteria.      MSK/Body Wall: No aggressive bony lesion identified. Degenerative  changes in the spine.      Impression: SMA occlusion approximately 5.5 distal to the origin, 1.7 cm in  length, with occlusion of some of the branches. Opacification of the  SMA distal to the region of occlusion presumably through collaterals.      Couple of mildly thickened small bowel loops in left  upper quadrant,  favored to be due to peristalsis rather than early ischemic change in  a patient with benign abdominal exam and normal lactate. No  pneumatosis.      Nonenhancing structure along the inferior aspect of the spleen which  is favored to represent a liquified infarct or chronic hematoma given  the lack of internal enhancement.      Juan Carlos Driver discussed the significance and urgency of this  critical finding by telephone with  Dr. Nur On 5/11/2025 at 1:07 pm.  (**-RCF-**) Findings:  See findings.          Signed by: Juan Carlos Driver 5/11/2025 1:13 PM  Dictation workstation:   [id number]  ECG 12 lead  Sinus rhythm with 1st degree AV block  Nonspecific intraventricular block  Minimal voltage criteria for LVH, may be normal variant ( Guy product )  T wave abnormality, consider inferior ischemia  Abnormal ECG  Confirmed by Amrit Jha ([id number]) on 5/11/2025 1:19:33 AM  ECG 12 lead  Sinus rhythm with 1st degree AV block  Nonspecific intraventricular block  Minimal voltage criteria for LVH, may be normal variant ( Guy product )  T wave abnormality, consider inferior ischemia  Abnormal ECG  Confirmed by Amrit Jha ([id number]) on 5/11/2025 1:17:29 AM      Physical Exam  Constitutional:       Appearance: Normal appearance.   HENT:      Head: Normocephalic and atraumatic.      Nose: Nose normal.      Mouth/Throat:      Mouth: Mucous membranes are moist.   Eyes:      Extraocular Movements: Extraocular movements intact.      Pupils: Pupils are equal, round, and reactive to light.   Cardiovascular:      Rate and Rhythm: Normal rate and regular rhythm.      Pulses: Normal pulses.      Heart sounds: Normal heart sounds.   Pulmonary:      Effort: Pulmonary effort is normal.      Breath sounds: Normal breath sounds.   Abdominal:      General: Bowel sounds are normal.      Palpations: Abdomen is soft.   Musculoskeletal:      Cervical back: Normal range of motion and neck supple.   Skin:     General: Skin is  warm.   Neurological:      Mental Status: He is alert and oriented to person, place, and time.   Psychiatric:         Mood and Affect: Mood normal.         Behavior: Behavior normal.         Relevant Results               Assessment & Plan  NSTEMI (non-ST elevated myocardial infarction) (Multi)  NSTEMI has been ruled out and cardiology no longer following.     2. Fever of unknown origin: no identifiable source of infection.  Blood culture is negative to date ( day 4).  Continue to monitor.  He is being monitored off abx.     3. anorexia and weight loss.  TSH and T4 are normal. Suspected cause of weight loss is possibly chronic mesenteric ischemia.   Vascular team has been consulted.  Appreciate GI and gen surgery recommendation.   Pain control  PPI  Adjust meds as needed  Abnormal CAT scan  Additional work up with MRCP as ordered  Maintain NPO and advance diet as tolerated  Occlusion of superior mesenteric artery (Multi)  Continue to monitor      Depression/Anxiety:  continue home med (Prozac).     Gonzales Mcmillan MD

## 2025-05-13 NOTE — H&P (VIEW-ONLY)
VASCULAR SURGERY CONSULT NOTE    Assessment/Plan   Cristhian Thornton is 54 y.o. male with history of CAD and aortic valve replacement who was admitted to [facility] on 5/9/25 with postprandial pain and NSTEMI. His workup also showed a splenic abscess vs infarct. Vascular surgery was consulted after CT abdomen/pelvis with contrast revealed occlusion of the SMA. He also has short distance claudication of the RLE.     Echo shows no concern for valvular vegetation.     Plan:  Bilateral PVR given claudication and asymmetric pulse exam  Plan for mesenteric angiogram possible thrombectomy on 5/15    D/w attending, Dr. Joaquina Ballard MD  Vascular Surgery Fellow  05/13/25  1:01 PM      Subjective   HPI:  Cristhian Thornton is 54 y.o. male with history of CAD and CABG who was admitted to [facility] on 5/9/25 with postprandial pain and NSTEMI. His workup also showed a splenic abscess vs infarct. Vascular surgery was consulted after CT abdomen/pelvis with contrast revealed occlusion of the SMA.     His symptoms became noticeable every day about 3 weeks ago but he thinks he had the symptoms more intermittently over the last several months. He now has postprandial pain shortly after every meal causing him to avoid food - he has lost about 15 pounds.    He also says he has short distance claudication affecting his right leg - he can walk about 30 yards before having symptoms. He has never seen a vascular surgeon of had a lower extremity angiogram in the past.     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, he works as a contractor    ROS: 12 system negative except HPI    Objective    Vitals:  Heart Rate:  [60-95]   Temp:  [35.8 °C (96.5 °F)-37.1 °C (98.8 °F)]   Resp:  [14-19]   BP: ()/(70-84)   Weight:  [85.7 kg (188 lb 15 oz)]   SpO2:  [96 %-97 %]     Exam:  Constitutional: No acute distress, resting comfortably  Neuro:  AOx3, grossly intact  Head/neck: atraumatic  CV: no tachycardia  Pulm: non-labored on room air  GI: soft, non-tender, non-distended  Skin: warm and dry  Musculoskeletal: moving all extremities  Extremities:    Right: palpable femoral, popliteal and PT (unable to palpate DP pulse)   Left: palpable femoral, popliteal, DP/PT pulses    Labs:  Results from last 7 days   Lab Units 05/13/25  1107 05/12/25  0414 05/10/25  0440   WBC AUTO x10*3/uL 10.1 11.1 10.4   HEMOGLOBIN g/dL 14.5 14.2 14.5   PLATELETS AUTO x10*3/uL 218 198 205      Results from last 7 days   Lab Units 05/12/25  0414 05/11/25  0512 05/10/25  0440   SODIUM mmol/L 139 140 139   POTASSIUM mmol/L 3.9 4.0 4.4   CHLORIDE mmol/L 104 107 105   CO2 mmol/L 29 27 28   BUN mg/dL 12 14 11   CREATININE mg/dL 1.05 0.98 1.07   GLUCOSE mg/dL 91 99 90      Results from last 7 days   Lab Units 05/13/25  1107   APTT seconds 26.0           Imaging:  Reviewed independently by vascular team:  5/12/25 - CTA abdomen/pelvis independently reviewed and shows occlusion of the SMA         [1]   No current facility-administered medications on file prior to encounter.     Current Outpatient Medications on File Prior to Encounter   Medication Sig Dispense Refill    ANASTROZOLE ORAL Take 0.25 mg by mouth 2 times a week.      CALCIUM ORAL Take 1 each by mouth once daily.      cholecalciferol, vitamin D3, (VITAMIN D3 ORAL) Take 1 each by mouth once daily.      cyanocobalamin, vitamin B-12, (VITAMIN B-12 ORAL) Take 1 each by mouth once daily.      FLUoxetine (PROzac) 20 mg capsule TAKE 1 CAPSULE BY MOUTH EVERY DAY 90 capsule 3    MAGNESIUM CITRATE ORAL Take 1 each by mouth once daily.      meloxicam (Mobic) 15 mg tablet TAKE 1 TABLET BY MOUTH  EVERY DAY 30 tablet 11    traZODone (Desyrel) 50 mg tablet TAKE 1 TABLET (50 MG) BY MOUTH AS NEEDED AT BEDTIME FOR SLEEP. 90 tablet 3    methocarbamol (Robaxin) 500 mg tablet Take 1 tablet (500 mg) by mouth 3 times a day as needed for muscle spasms for up to 5 days. (Patient not taking: Reported on 5/6/2025) 15 tablet 0    tadalafil (Cialis) 5 mg tablet Take 1 tablet (5 mg) by mouth 1 (one) time per week. MONDAYS      testosterone cypionate (Depo-Testosterone) 100 mg/mL injection 0.75 mL (75 mg) every 7 days. Receives injection every Sunday      [DISCONTINUED] busPIRone (Buspar) 5 mg tablet Take 1 tablet (5 mg) by mouth 2 times a day. (Patient not taking: Reported on 5/6/2025)      [DISCONTINUED] tamoxifen (Nolvadex) 20 mg tablet Take 1 tablet (20 mg total) by mouth 2 times a week.     [2]   Allergies  Allergen Reactions    Acyclovir Unknown    Opioids - Morphine Analogues Unknown    Hydromorphone Rash

## 2025-05-13 NOTE — CONSULTS
VASCULAR SURGERY CONSULT NOTE    Assessment/Plan   Cristhian Thornton is 54 y.o. male with history of CAD and aortic valve replacement who was admitted to [facility] on 5/9/25 with postprandial pain and NSTEMI. His workup also showed a splenic abscess vs infarct. Vascular surgery was consulted after CT abdomen/pelvis with contrast revealed occlusion of the SMA. He also has short distance claudication of the RLE.     Echo shows no concern for valvular vegetation.     Plan:  Bilateral PVR given claudication and asymmetric pulse exam  Plan for mesenteric angiogram possible thrombectomy on 5/15    D/w attending, Dr. Joaquina Ballard MD  Vascular Surgery Fellow  05/13/25  1:01 PM      Subjective   HPI:  Cristhian Thornton is 54 y.o. male with history of CAD and CABG who was admitted to [facility] on 5/9/25 with postprandial pain and NSTEMI. His workup also showed a splenic abscess vs infarct. Vascular surgery was consulted after CT abdomen/pelvis with contrast revealed occlusion of the SMA.     His symptoms became noticeable every day about 3 weeks ago but he thinks he had the symptoms more intermittently over the last several months. He now has postprandial pain shortly after every meal causing him to avoid food - he has lost about 15 pounds.    He also says he has short distance claudication affecting his right leg - he can walk about 30 yards before having symptoms. He has never seen a vascular surgeon of had a lower extremity angiogram in the past.     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, he works as a contractor    ROS: 12 system negative except HPI    Objective    Vitals:  Heart Rate:  [60-95]   Temp:  [35.8 °C (96.5 °F)-37.1 °C (98.8 °F)]   Resp:  [14-19]   BP: ()/(70-84)   Weight:  [85.7 kg (188 lb 15 oz)]   SpO2:  [96 %-97 %]     Exam:  Constitutional: No acute distress, resting comfortably  Neuro:  AOx3, grossly intact  Head/neck: atraumatic  CV: no tachycardia  Pulm: non-labored on room air  GI: soft, non-tender, non-distended  Skin: warm and dry  Musculoskeletal: moving all extremities  Extremities:    Right: palpable femoral, popliteal and PT (unable to palpate DP pulse)   Left: palpable femoral, popliteal, DP/PT pulses    Labs:  Results from last 7 days   Lab Units 05/13/25  1107 05/12/25  0414 05/10/25  0440   WBC AUTO x10*3/uL 10.1 11.1 10.4   HEMOGLOBIN g/dL 14.5 14.2 14.5   PLATELETS AUTO x10*3/uL 218 198 205      Results from last 7 days   Lab Units 05/12/25  0414 05/11/25  0512 05/10/25  0440   SODIUM mmol/L 139 140 139   POTASSIUM mmol/L 3.9 4.0 4.4   CHLORIDE mmol/L 104 107 105   CO2 mmol/L 29 27 28   BUN mg/dL 12 14 11   CREATININE mg/dL 1.05 0.98 1.07   GLUCOSE mg/dL 91 99 90      Results from last 7 days   Lab Units 05/13/25  1107   APTT seconds 26.0           Imaging:  Reviewed independently by vascular team:  5/12/25 - CTA abdomen/pelvis independently reviewed and shows occlusion of the SMA         [1]   No current facility-administered medications on file prior to encounter.     Current Outpatient Medications on File Prior to Encounter   Medication Sig Dispense Refill    ANASTROZOLE ORAL Take 0.25 mg by mouth 2 times a week.      CALCIUM ORAL Take 1 each by mouth once daily.      cholecalciferol, vitamin D3, (VITAMIN D3 ORAL) Take 1 each by mouth once daily.      cyanocobalamin, vitamin B-12, (VITAMIN B-12 ORAL) Take 1 each by mouth once daily.      FLUoxetine (PROzac) 20 mg capsule TAKE 1 CAPSULE BY MOUTH EVERY DAY 90 capsule 3    MAGNESIUM CITRATE ORAL Take 1 each by mouth once daily.      meloxicam (Mobic) 15 mg tablet TAKE 1 TABLET BY MOUTH  EVERY DAY 30 tablet 11    traZODone (Desyrel) 50 mg tablet TAKE 1 TABLET (50 MG) BY MOUTH AS NEEDED AT BEDTIME FOR SLEEP. 90 tablet 3    methocarbamol (Robaxin) 500 mg tablet Take 1 tablet (500 mg) by mouth 3 times a day as needed for muscle spasms for up to 5 days. (Patient not taking: Reported on 5/6/2025) 15 tablet 0    tadalafil (Cialis) 5 mg tablet Take 1 tablet (5 mg) by mouth 1 (one) time per week. MONDAYS      testosterone cypionate (Depo-Testosterone) 100 mg/mL injection 0.75 mL (75 mg) every 7 days. Receives injection every Sunday      [DISCONTINUED] busPIRone (Buspar) 5 mg tablet Take 1 tablet (5 mg) by mouth 2 times a day. (Patient not taking: Reported on 5/6/2025)      [DISCONTINUED] tamoxifen (Nolvadex) 20 mg tablet Take 1 tablet (20 mg total) by mouth 2 times a week.     [2]   Allergies  Allergen Reactions    Acyclovir Unknown    Opioids - Morphine Analogues Unknown    Hydromorphone Rash

## 2025-05-14 LAB
APTT PPP: 36.7 SECONDS (ref 22–32.5)
APTT PPP: 40.7 SECONDS (ref 22–32.5)
APTT PPP: 41.9 SECONDS (ref 22–32.5)

## 2025-05-14 PROCEDURE — 85730 THROMBOPLASTIN TIME PARTIAL: CPT | Performed by: NURSE PRACTITIONER

## 2025-05-14 PROCEDURE — 36415 COLL VENOUS BLD VENIPUNCTURE: CPT | Performed by: NURSE PRACTITIONER

## 2025-05-14 PROCEDURE — 99233 SBSQ HOSP IP/OBS HIGH 50: CPT | Performed by: INTERNAL MEDICINE

## 2025-05-14 PROCEDURE — 2500000004 HC RX 250 GENERAL PHARMACY W/ HCPCS (ALT 636 FOR OP/ED): Mod: JZ | Performed by: NURSE PRACTITIONER

## 2025-05-14 PROCEDURE — 2500000001 HC RX 250 WO HCPCS SELF ADMINISTERED DRUGS (ALT 637 FOR MEDICARE OP): Performed by: INTERNAL MEDICINE

## 2025-05-14 PROCEDURE — 2060000001 HC INTERMEDIATE ICU ROOM DAILY

## 2025-05-14 RX ADMIN — HEPARIN SODIUM 1500 UNITS/HR: 10000 INJECTION, SOLUTION INTRAVENOUS at 05:49

## 2025-05-14 RX ADMIN — ASPIRIN 81 MG: 81 TABLET, CHEWABLE ORAL at 08:39

## 2025-05-14 RX ADMIN — FLUOXETINE HYDROCHLORIDE 20 MG: 20 CAPSULE ORAL at 08:39

## 2025-05-14 RX ADMIN — HEPARIN SODIUM 1500 UNITS/HR: 10000 INJECTION, SOLUTION INTRAVENOUS at 01:06

## 2025-05-14 RX ADMIN — HEPARIN SODIUM 1500 UNITS/HR: 10000 INJECTION, SOLUTION INTRAVENOUS at 22:35

## 2025-05-14 RX ADMIN — PANTOPRAZOLE SODIUM 40 MG: 40 TABLET, DELAYED RELEASE ORAL at 08:39

## 2025-05-14 RX ADMIN — PANTOPRAZOLE SODIUM 40 MG: 40 TABLET, DELAYED RELEASE ORAL at 22:35

## 2025-05-14 ASSESSMENT — COGNITIVE AND FUNCTIONAL STATUS - GENERAL
DAILY ACTIVITIY SCORE: 24
DAILY ACTIVITIY SCORE: 24
MOBILITY SCORE: 24
MOBILITY SCORE: 24

## 2025-05-14 ASSESSMENT — PAIN SCALES - GENERAL: PAINLEVEL_OUTOF10: 0 - NO PAIN

## 2025-05-14 ASSESSMENT — PAIN - FUNCTIONAL ASSESSMENT: PAIN_FUNCTIONAL_ASSESSMENT: 0-10

## 2025-05-14 NOTE — ASSESSMENT & PLAN NOTE
Positive splenic infarct. Also splenic infarct confirmed on MRCP.   Pain control if needed.   On IV heparin drip.  Unsure of course of splenic infarct

## 2025-05-14 NOTE — PROGRESS NOTES
VASCULAR SURGERY PROGRESS NOTE  Subjective   No events overnight  He at dinner last night and had some pain but more mild than prior episodes     Objective   Vitals:    05/14/25 1119   BP: 102/74   Pulse:    Resp: 18   Temp: 36.8 °C (98.2 °F)   SpO2: 97%      Exam:  Constitutional: No acute distress, resting comfortably  Neuro:  AOx3, grossly intact  ENMT: moist mucous membranes  CV: no tachycardia  Pulm: non-labored on room air  GI: soft, non-tender, non-distended  Skin: warm and dry  Musculoskeletal: moving all extremities  Extremities:    Right palpable femoral, popliteal and PT (unable to palpate DP pulse)   Left palpable femoral, popliteal and DP/PT pulses    Relevant Results  Medications:  Scheduled Meds:Scheduled Medications[1]  Continuous Infusions:Continuous Medications[2]  PRN Meds:.PRN Medications[3]    Labs:  Results from last 7 days   Lab Units 05/13/25  1107 05/12/25  0414 05/10/25  0440   WBC AUTO x10*3/uL 10.1 11.1 10.4   HEMOGLOBIN g/dL 14.5 14.2 14.5   PLATELETS AUTO x10*3/uL 218 198 205      Results from last 7 days   Lab Units 05/12/25  0414 05/11/25  0512 05/10/25  0440   SODIUM mmol/L 139 140 139   POTASSIUM mmol/L 3.9 4.0 4.4   CHLORIDE mmol/L 104 107 105   CO2 mmol/L 29 27 28   BUN mg/dL 12 14 11   CREATININE mg/dL 1.05 0.98 1.07   GLUCOSE mg/dL 91 99 90      Results from last 7 days   Lab Units 05/14/25  0634   APTT seconds 41.9*     Assessment/Plan   Cristhian Thornton is 54 y.o. male with history of with history of CAD and aortic valve replacement who was admitted to [facility] on 5/9/25 with postprandial pain and NSTEMI. His workup also showed a splenic infarct. Vascular surgery was consulted after CT abdomen/pelvis with contrast revealed occlusion of the SMA. He also has short distance claudication of the RLE.     Plan:   Plan for mesenteric angiogram possible thrombectomy as well as RLE angiogram on 5/15  Echo and CTA chest reviewed with no embolic source, will ask primary  team/cardiology to weigh in further      Discussed with Dr. Joaquina Ballard MD  Vascular Surgery Fellow  05/14/25  11:39 AM           [1] anastrozole, 0.5 mg, oral, Once per day on Monday Thursday  aspirin, 81 mg, oral, Daily  FLUoxetine, 20 mg, oral, Daily  pantoprazole, 40 mg, oral, BID  perflutren lipid microspheres, 0.5-10 mL of dilution, intravenous, Once in imaging  perflutren protein A microsphere, 0.5 mL, intravenous, Once in imaging  sulfur hexafluoride microsphr, 2 mL, intravenous, Once in imaging  [2] heparin, 0-4,000 Units/hr, Last Rate: 1,500 Units/hr (05/14/25 0549)  [3] PRN medications: acetaminophen **OR** acetaminophen, heparin (porcine), traZODone

## 2025-05-14 NOTE — ASSESSMENT & PLAN NOTE
NSTEMI has been ruled out and cardiology no longer following.     2. Fever of unknown origin: no identifiable source of infection.  Blood culture is negative to date ( day 4).  Continue to monitor.  He is being monitored off abx.     3. anorexia and weight loss.  TSH and T4 are normal. Suspected cause of weight loss is possibly chronic mesenteric ischemia.   Diet as toleratd  GI recommends out patient follow up for possible EGD in the future

## 2025-05-14 NOTE — PROGRESS NOTES
05/14/25 1315   Discharge Planning   Expected Discharge Disposition Home     Pt not med clear for discharge.  Per vascular note, plan is for mesenteric angiogram and RLE angiogram tomorrow.     Pt will not have any skilled needs at time of discharge. Will continue to follow.      DISCHARGE PLAN TO RETURN HOME NO SKILLED NEEDS.

## 2025-05-14 NOTE — PROGRESS NOTES
Cristhian Thornton is a 54 y.o. male on day 5 of admission presenting with NSTEMI (non-ST elevated myocardial infarction) (Multi).      Subjective   Mr. Thornton was seen and evaluated. He denied fever and chills. He denied nausea or vomiting. He is aware of planned vascular intervention.     Objective     Last Recorded Vitals  /74 (BP Location: Left arm, Patient Position: Lying)   Pulse 82   Temp 36.8 °C (98.2 °F) (Temporal)   Resp 18   Wt 87.9 kg (193 lb 12.6 oz)   SpO2 97%   Intake/Output last 3 Shifts:    Intake/Output Summary (Last 24 hours) at 5/14/2025 1135  Last data filed at 5/14/2025 0905  Gross per 24 hour   Intake 632.93 ml   Output --   Net 632.93 ml       Admission Weight  Weight: 81.6 kg (180 lb) (05/09/25 0748)    Daily Weight  05/14/25 : 87.9 kg (193 lb 12.6 oz)    Image Results  MRCP pancreas w and wo IV contrast  Narrative: Interpreted By:  Manny Santiago,   STUDY:  MRCP PANCREAS W AND WO IV CONTRAST;  5/13/2025 10:18 am      INDICATION:  Signs/Symptoms:?pancreatic lesion, fevers, chills, weight loss.          COMPARISON:  CT pancreas 05/10/2025, CT abdomen 05/09/2025      ACCESSION NUMBER(S):  [id number]      ORDERING CLINICIAN:  CONOR HOGAN      TECHNIQUE:  MRI PANCREAS; Multiplanar magnetic resonance images of the abdomen  were obtained including the following sequences; T2-weighted SSFSE  with and without fat saturation, T1-weighted GRE in/opposed phase,  DWI, fat saturated 3D-T1w GRE pre and dynamically post contrast.  Radial thick slab T2w RARE MRCP and coronally reconstructed navigator  gated high resolution 3-D T2w RESTORE MRCP with MIP reconstruction  were also performed for MRCP. 20 ML of Dotarem was administered  intravenously without immediate complication.      FINDINGS:  LIVER:  No signal changes of steatosis. A few punctate cysts are present.      BILE DUCTS:  No dilatation. Common bile duct measures 3 mm. No definite filling  defects identified.      GALLBLADDER:  No stone or  wall thickening.      PANCREAS:  Grossly unremarkable. No main ductal dilatation. No pancreas divisum.  No mass.      SPLEEN:  Small nonenhancing infarct near the hilum has retracted from  05/09/2025, from 37 x 16 mm to 25 x 12 mm as measured in the coronal  plane. This appears mildly T1 hyperintense and mildly T2 hyperintense.      ADRENAL GLANDS:  Unremarkable.      KIDNEYS:  Unremarkable without hydronephrosis.      LYMPH NODES:  No lymphadenopathy.      ABDOMINAL VESSELS:  Abdominal aorta is patent. There is unchanged short segment occlusion  of the superior mesenteric artery with distal reconstitution via  collaterals. The remaining major visceral arterial branches are  patent. Major portal venous branches are patent. IVC and visualized  major branches are patent.      BOWEL:  No dilated bowel.      PERITONEUM/RETROPERITONEUM:  No visualized free fluid.      BONES AND LOWER THORAX:  Mild thoracolumbar degenerative changes.  Grossly clear lung bases.          Impression: 1.  Splenic infarct has slightly retracted in size compared to 4 days  prior.  2. Unremarkable pancreas without mass.  3. Unchanged short segment occlusion of the superior mesenteric  artery with distal reconstitution.          MACRO:  None      Signed by: Manny Santiago 5/14/2025 8:28 AM  Dictation workstation:   [id number]  CT angio chest abdomen pelvis  Narrative: Interpreted By:  Nitish Milton and Jiang Sirui   STUDY:  CT ANGIO CHEST ABDOMEN PELVIS;  5/12/2025 4:43 pm      INDICATION:  Signs/Symptoms:Epigastric pain, SMA occlusion.      COMPARISON:  CT 05/10/2025      ACCESSION NUMBER(S):  [id number]      ORDERING CLINICIAN:  ADÁN BAIRES      TECHNIQUE:  Axial non-contrast images of the chest abdomen, and pelvis.      Axial CT images of the chest, abdomen and pelvis were obtained after  the intravenous administration of 110 mL Omnipaque 350 using  angiographic technique with coronal and sagittal reformatted images.  MIP images and 3D  reconstructions were created on an independent  workstation and reviewed.      FINDINGS:  VASCULATURE:      PULMONARY ARTERIES:  No acute pulmonary embolism.      THORACIC AORTA: Non-contrast images show no evidence of acute  intramural hematoma. No thoracic aortic aneurysm or dissection. No  significant thoracic aortic atherosclerosis.      ABDOMINAL AORTA: No abdominal aortic aneurysm or dissection. No  significant abdominal aortic atherosclerosis.      ABDOMINAL AND PELVIC ARTERIES: The celiac artery and its branching  vessels are patent. There is again occlusion of the superior  mesenteric artery approximately 5.0 cm distal to its origin occlusion  measuring 1.8 cm. A few of the branches supplied by the SMA are also  occluded with distal reconstitution of flow from likely collaterals.  The SCOTT is patent. Single right renal artery is patent. 2 left renal  arteries are also patent.      The bilateral common, internal, and external iliac arteries are  normal in caliber without abnormality. The bilateral common femorals  and partially imaged upstream superficial and profunda femoris  arteries are normal in caliber without abnormality.      CT CHEST:      MEDIASTINUM AND LYMPH NODES:  No enlarged intrathoracic or axillary  lymph nodes. No pneumomediastinum.      HEART: Normal size.  No coronary artery calcifications. No  significant pericardial effusion. Aortic valvular repair is noted.      LUNG, PLEURA, LARGE AIRWAYS:  No consolidation, pulmonary edema,  pleural effusion or pneumothorax. Scattered calcified granulomas are  noted.      OSSEOUS STRUCTURES: No acute osseous abnormality. Prior median  sternotomy with intact sternal cerclage wires.      CHEST WALL SOFT TISSUES: No discernible abnormality. The visualized  thyroid gland is unremarkable.          CT ABDOMEN/PELVIS:      ABDOMINAL WALL: Postsurgical changes of a prior left inguinal hernia  repair.      LIVER: The liver is normal in size and attenuation.  There is a  subcentimeter hypoattenuating lesion of the hepatic dome measured 0.7  cm favored to represent a hepatic cyst (series 15, image 61).      BILE DUCTS: No significant intrahepatic or extrahepatic dilatation.      GALLBLADDER: No significant abnormality.      PANCREAS: No significant abnormality.      SPLEEN: The spleen is nonenlarged. There is again a 3.5 x 2.2 cm rim  enhancing lesion along the inferior medial margin of the spleen.      ADRENALS: No significant abnormality.      KIDNEYS, URETERS, BLADDER: No significant abnormality.      REPRODUCTIVE ORGANS: No significant abnormality.      VESSELS: (See above). No additional significant abnormality.      RETROPERITONEUM/LYMPH NODES: No enlarged lymph nodes.      BOWEL/MESENTERY/PERITONEUM: No inflammatory bowel wall thickening or  dilatation. Colonic diverticulosis without evidence of acute  diverticulitis. Normal appendix.      No significant ascites, free air, or fluid collection.          OSSEOUS STRUCTURES: No acute osseous abnormality.      Impression: 1. Occlusion of the superior mesenteric artery distal to its origin  with distal reconstitution of flow similar to the prior CT dated  05/10/2025. No definite evidence of bowel wall ischemia.  2. No thoracic or abdominal aortic aneurysm.  3. Rim enhancing lesion along the inferior medial margin of the  spleen likely representing a hematoma versus prior infarct, better  evaluated on the MRI dated 05/13/2025.  4. Additional chronic findings as described above.      I personally reviewed the image(s) / study and I agree with the  findings as stated by Jono Mixon MD. This study was interpreted at  [facility], [place], [place].      MACRO:  None.      Signed by: Nitish Milton 5/14/2025 6:33 AM  Dictation workstation:   [id number]      Physical Exam  Constitutional:       Appearance: Normal appearance.   HENT:      Head: Normocephalic and atraumatic.      Nose: Nose normal.       Mouth/Throat:      Mouth: Mucous membranes are moist.   Eyes:      Extraocular Movements: Extraocular movements intact.      Pupils: Pupils are equal, round, and reactive to light.   Cardiovascular:      Rate and Rhythm: Normal rate and regular rhythm.      Pulses: Normal pulses.      Heart sounds: Normal heart sounds.   Pulmonary:      Effort: Pulmonary effort is normal.      Breath sounds: Normal breath sounds.   Abdominal:      General: Bowel sounds are normal.      Palpations: Abdomen is soft.   Musculoskeletal:      Cervical back: Neck supple.   Skin:     General: Skin is warm.   Neurological:      Mental Status: He is alert and oriented to person, place, and time.   Psychiatric:         Mood and Affect: Mood normal.         Behavior: Behavior normal.         Relevant Results             Assessment & Plan  NSTEMI (non-ST elevated myocardial infarction) (Multi)  NSTEMI has been ruled out and cardiology no longer following.     2. Fever of unknown origin: no identifiable source of infection.  Blood culture is negative to date ( day 4).  Continue to monitor.  He is being monitored off abx.     3. anorexia and weight loss.  TSH and T4 are normal. Suspected cause of weight loss is possibly chronic mesenteric ischemia.   Diet as toleratd  GI recommends out patient follow up for possible EGD in the future  Abnormal CAT scan  Positive splenic infarct. Also splenic infarct confirmed on MRCP.   Pain control if needed.   On IV heparin drip.  Unsure of course of splenic infarct  Occlusion of superior mesenteric artery (Multi)  Mesenteric angiogram in am  Continue heparin drip    Splenic Infarct:  Will need anticoagulation at discharge.  Unsure of source.  May need referral to out patient hematology follow up.   Depression/Anxiety:  continue home med (Prozac).     Peripheral Artery disease:  going for vascular intervention on 5/15/25      Gonzales Mcmillan MD

## 2025-05-14 NOTE — PROGRESS NOTES
"Cristhian Thornton is a 54 y.o. male on day 5 of admission presenting with NSTEMI (non-ST elevated myocardial infarction) (Multi).    Subjective   Denies current abdominal pain, nausea, vomiting     Objective     Physical Exam  HENT:      Head: Normocephalic.      Right Ear: Tympanic membrane normal.      Mouth/Throat:      Mouth: Mucous membranes are moist.   Eyes:      Pupils: Pupils are equal, round, and reactive to light.   Cardiovascular:      Rate and Rhythm: Normal rate.   Abdominal:      Palpations: Abdomen is soft.   Musculoskeletal:         General: Normal range of motion.      Cervical back: Normal range of motion.   Skin:     General: Skin is warm.   Neurological:      General: No focal deficit present.      Mental Status: He is alert.   Psychiatric:         Mood and Affect: Mood normal.         Last Recorded Vitals  Blood pressure 125/83, pulse 82, temperature 36 °C (96.8 °F), temperature source Temporal, resp. rate 18, height 1.778 m (5' 10\"), weight 87.9 kg (193 lb 12.6 oz), SpO2 98%.  Intake/Output last 3 Shifts:  I/O last 3 completed shifts:  In: 992.9 (11.3 mL/kg) [P.O.:780; I.V.:212.9 (2.4 mL/kg)]  Out: - (0 mL/kg)   Weight: 87.9 kg     Relevant Results      Scheduled medications  Scheduled Medications[1]  Continuous medications  Continuous Medications[2]  PRN medications  PRN Medications[3]  Results for orders placed or performed during the hospital encounter of 05/09/25 (from the past 24 hours)   aPTT   Result Value Ref Range    aPTT 26.0 22.0 - 32.5 seconds   CBC   Result Value Ref Range    WBC 10.1 4.4 - 11.3 x10*3/uL    nRBC 0.0 0.0 - 0.0 /100 WBCs    RBC 5.00 4.50 - 5.90 x10*6/uL    Hemoglobin 14.5 13.5 - 17.5 g/dL    Hematocrit 42.0 41.0 - 52.0 %    MCV 84 80 - 100 fL    MCH 29.0 26.0 - 34.0 pg    MCHC 34.5 32.0 - 36.0 g/dL    RDW 11.9 11.5 - 14.5 %    Platelets 218 150 - 450 x10*3/uL    MPV 9.8 7.5 - 11.5 fL   Vascular US PVR without exercise   Result Value Ref Range    BSA 2.06 m2   aPTT "   Result Value Ref Range    aPTT 27.7 22.0 - 32.5 seconds   aPTT   Result Value Ref Range    aPTT 36.7 (H) 22.0 - 32.5 seconds   aPTT   Result Value Ref Range    aPTT 41.9 (H) 22.0 - 32.5 seconds                            Assessment & Plan  NSTEMI (non-ST elevated myocardial infarction) (Multi)    Abnormal CAT scan    Occlusion of superior mesenteric artery (Multi)    Epigastric Pain, Abnormal CT, Chills   Patient with NVD, weight loss, chills, epigastric pain x 3 weeks. Initial CT with concern for splenic abscess. Surgery completed US, they did not think this was true etiology. Reviewed with Dr Cherry. She is recommending MRCP for further assessment. He does have a hx of staph infections. Denies prior pancreatitis. LFTs and lipase normal but need to r/o pancreatic tail lesion      Send stool studies   H Pylori stool  Appreciate ID input. Pending additional work up   Based on imaging, he may require EGD/colonoscopy     5/12  Patient currently does not have any nausea and vomiting.  He reports his epigastric pain is intermittent and is more dull in nature that worsens after eating.  No reported fevers/chills overnight. Blood cultures negative. 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 Vascular surgery has been consulted. Awaiting MRCP    5/13  Patient seen and evaluated by vascular, CTA and mesenteric duplex ordered which are pending.  Patient will be going for MRCP today.  Further recs to follow pending result.     5/14  MRCP with splenic infarct. Unchanged superior mesenteric occulusion. Pending vascular intervention tomorrow. There is no pancreatic mass or lesion. More likely vascular issue than true GI etiology. GI will sign off at this time. Recommend outpatient EGD/colonoscopy     Jaida Foote, APRN-CNP           [1] anastrozole, 0.5 mg, oral, Once per day on Monday Thursday  aspirin, 81 mg, oral, Daily  FLUoxetine, 20 mg, oral, Daily  pantoprazole, 40 mg, oral, BID  perflutren lipid  microspheres, 0.5-10 mL of dilution, intravenous, Once in imaging  perflutren protein A microsphere, 0.5 mL, intravenous, Once in imaging  sulfur hexafluoride microsphr, 2 mL, intravenous, Once in imaging     [2] heparin, 0-4,000 Units/hr, Last Rate: 1,500 Units/hr (05/14/25 0549)     [3] PRN medications: acetaminophen **OR** acetaminophen, heparin (porcine), traZODone

## 2025-05-14 NOTE — ASSESSMENT & PLAN NOTE
Mesenteric angiogram in am  Continue heparin drip    Splenic Infarct:  Will need anticoagulation at discharge.  Unsure of source.  May need referral to out patient hematology follow up.   Depression/Anxiety:  continue home med (Prozac).     Peripheral Artery disease:  going for vascular intervention on 5/15/25

## 2025-05-14 NOTE — CARE PLAN
The patient's goals for the shift include      The clinical goals for the shift include maintain theraputic aPtt    Over the shift, the patient did not make progress toward the following goals. Barriers to progression include aPtt is not therapeutic. Recommendations to address these barriers include obtain aPtt and adjust heparin per protocol.

## 2025-05-15 LAB
ALBUMIN SERPL BCP-MCNC: 3.5 G/DL (ref 3.4–5)
ALP SERPL-CCNC: 44 U/L (ref 33–120)
ALT SERPL W P-5'-P-CCNC: 23 U/L (ref 10–52)
ANION GAP SERPL CALCULATED.3IONS-SCNC: 9 MMOL/L (ref 10–20)
APTT PPP: 54.1 SECONDS (ref 22–32.5)
APTT PPP: 56.3 SECONDS (ref 22–32.5)
AST SERPL W P-5'-P-CCNC: 21 U/L (ref 9–39)
BILIRUB SERPL-MCNC: 0.6 MG/DL (ref 0–1.2)
BUN SERPL-MCNC: 16 MG/DL (ref 6–23)
CALCIUM SERPL-MCNC: 8.6 MG/DL (ref 8.6–10.3)
CHLORIDE SERPL-SCNC: 105 MMOL/L (ref 98–107)
CO2 SERPL-SCNC: 28 MMOL/L (ref 21–32)
CREAT SERPL-MCNC: 0.96 MG/DL (ref 0.5–1.3)
EGFRCR SERPLBLD CKD-EPI 2021: >90 ML/MIN/1.73M*2
ERYTHROCYTE [DISTWIDTH] IN BLOOD BY AUTOMATED COUNT: 12 % (ref 11.5–14.5)
GLUCOSE SERPL-MCNC: 106 MG/DL (ref 74–99)
HCT VFR BLD AUTO: 39.1 % (ref 41–52)
HGB BLD-MCNC: 13.3 G/DL (ref 13.5–17.5)
LACTATE SERPL-SCNC: 1 MMOL/L (ref 0.4–2)
MCH RBC QN AUTO: 29 PG (ref 26–34)
MCHC RBC AUTO-ENTMCNC: 34 G/DL (ref 32–36)
MCV RBC AUTO: 85 FL (ref 80–100)
NRBC BLD-RTO: 0 /100 WBCS (ref 0–0)
PLATELET # BLD AUTO: 213 X10*3/UL (ref 150–450)
POTASSIUM SERPL-SCNC: 4.4 MMOL/L (ref 3.5–5.3)
PROT SERPL-MCNC: 6.1 G/DL (ref 6.4–8.2)
RBC # BLD AUTO: 4.58 X10*6/UL (ref 4.5–5.9)
SODIUM SERPL-SCNC: 138 MMOL/L (ref 136–145)
WBC # BLD AUTO: 9.8 X10*3/UL (ref 4.4–11.3)

## 2025-05-15 PROCEDURE — 99153 MOD SED SAME PHYS/QHP EA: CPT | Performed by: SURGERY

## 2025-05-15 PROCEDURE — C1887 CATHETER, GUIDING: HCPCS | Performed by: SURGERY

## 2025-05-15 PROCEDURE — 36246 INS CATH ABD/L-EXT ART 2ND: CPT | Mod: RT | Performed by: SURGERY

## 2025-05-15 PROCEDURE — 37246 TRLUML BALO ANGIOP 1ST ART: CPT | Mod: RT | Performed by: SURGERY

## 2025-05-15 PROCEDURE — 04753ZZ DILATION OF SUPERIOR MESENTERIC ARTERY, PERCUTANEOUS APPROACH: ICD-10-PCS | Performed by: SURGERY

## 2025-05-15 PROCEDURE — 85027 COMPLETE CBC AUTOMATED: CPT | Performed by: NURSE PRACTITIONER

## 2025-05-15 PROCEDURE — C1725 CATH, TRANSLUMIN NON-LASER: HCPCS | Performed by: SURGERY

## 2025-05-15 PROCEDURE — 36245 INS CATH ABD/L-EXT ART 1ST: CPT | Performed by: SURGERY

## 2025-05-15 PROCEDURE — C1894 INTRO/SHEATH, NON-LASER: HCPCS | Performed by: SURGERY

## 2025-05-15 PROCEDURE — 37184 PRIM ART M-THRMBC 1ST VSL: CPT | Performed by: SURGERY

## 2025-05-15 PROCEDURE — 37246 TRLUML BALO ANGIOP 1ST ART: CPT | Performed by: SURGERY

## 2025-05-15 PROCEDURE — 2500000001 HC RX 250 WO HCPCS SELF ADMINISTERED DRUGS (ALT 637 FOR MEDICARE OP): Performed by: INTERNAL MEDICINE

## 2025-05-15 PROCEDURE — 99232 SBSQ HOSP IP/OBS MODERATE 35: CPT | Performed by: NURSE PRACTITIONER

## 2025-05-15 PROCEDURE — 36245 INS CATH ABD/L-EXT ART 1ST: CPT | Mod: RT | Performed by: SURGERY

## 2025-05-15 PROCEDURE — 75625 CONTRAST EXAM ABDOMINL AORTA: CPT | Performed by: SURGERY

## 2025-05-15 PROCEDURE — 75710 ARTERY X-RAYS ARM/LEG: CPT | Performed by: SURGERY

## 2025-05-15 PROCEDURE — 36415 COLL VENOUS BLD VENIPUNCTURE: CPT | Performed by: NURSE PRACTITIONER

## 2025-05-15 PROCEDURE — 76937 US GUIDE VASCULAR ACCESS: CPT | Performed by: SURGERY

## 2025-05-15 PROCEDURE — 2500000004 HC RX 250 GENERAL PHARMACY W/ HCPCS (ALT 636 FOR OP/ED): Mod: JZ | Performed by: SURGERY

## 2025-05-15 PROCEDURE — C1893 INTRO/SHEATH, FIXED,NON-PEEL: HCPCS | Performed by: SURGERY

## 2025-05-15 PROCEDURE — 2720000007 HC OR 272 NO HCPCS: Performed by: SURGERY

## 2025-05-15 PROCEDURE — 99152 MOD SED SAME PHYS/QHP 5/>YRS: CPT | Performed by: SURGERY

## 2025-05-15 PROCEDURE — 84075 ASSAY ALKALINE PHOSPHATASE: CPT | Performed by: STUDENT IN AN ORGANIZED HEALTH CARE EDUCATION/TRAINING PROGRAM

## 2025-05-15 PROCEDURE — 2550000001 HC RX 255 CONTRASTS: Performed by: SURGERY

## 2025-05-15 PROCEDURE — B41F1ZZ FLUOROSCOPY OF RIGHT LOWER EXTREMITY ARTERIES USING LOW OSMOLAR CONTRAST: ICD-10-PCS | Performed by: SURGERY

## 2025-05-15 PROCEDURE — 2780000003 HC OR 278 NO HCPCS: Performed by: SURGERY

## 2025-05-15 PROCEDURE — 37184 PRIM ART M-THRMBC 1ST VSL: CPT | Mod: RT | Performed by: SURGERY

## 2025-05-15 PROCEDURE — 85730 THROMBOPLASTIN TIME PARTIAL: CPT | Performed by: NURSE PRACTITIONER

## 2025-05-15 PROCEDURE — C1757 CATH, THROMBECTOMY/EMBOLECT: HCPCS | Performed by: SURGERY

## 2025-05-15 PROCEDURE — C1769 GUIDE WIRE: HCPCS | Performed by: SURGERY

## 2025-05-15 PROCEDURE — 2500000004 HC RX 250 GENERAL PHARMACY W/ HCPCS (ALT 636 FOR OP/ED): Mod: JZ | Performed by: NURSE PRACTITIONER

## 2025-05-15 PROCEDURE — 83605 ASSAY OF LACTIC ACID: CPT | Performed by: STUDENT IN AN ORGANIZED HEALTH CARE EDUCATION/TRAINING PROGRAM

## 2025-05-15 PROCEDURE — 04C53ZZ EXTIRPATION OF MATTER FROM SUPERIOR MESENTERIC ARTERY, PERCUTANEOUS APPROACH: ICD-10-PCS | Performed by: SURGERY

## 2025-05-15 PROCEDURE — C1760 CLOSURE DEV, VASC: HCPCS | Performed by: SURGERY

## 2025-05-15 PROCEDURE — 2060000001 HC INTERMEDIATE ICU ROOM DAILY

## 2025-05-15 PROCEDURE — 36140 INTRO NDL ICATH UPR/LXTR ART: CPT | Mod: RT | Performed by: SURGERY

## 2025-05-15 PROCEDURE — 36415 COLL VENOUS BLD VENIPUNCTURE: CPT | Performed by: STUDENT IN AN ORGANIZED HEALTH CARE EDUCATION/TRAINING PROGRAM

## 2025-05-15 RX ORDER — FENTANYL CITRATE 50 UG/ML
INJECTION, SOLUTION INTRAMUSCULAR; INTRAVENOUS AS NEEDED
Status: DISCONTINUED | OUTPATIENT
Start: 2025-05-15 | End: 2025-05-15 | Stop reason: HOSPADM

## 2025-05-15 RX ORDER — IODIXANOL 320 MG/ML
INJECTION, SOLUTION INTRAVASCULAR AS NEEDED
Status: DISCONTINUED | OUTPATIENT
Start: 2025-05-15 | End: 2025-05-15 | Stop reason: HOSPADM

## 2025-05-15 RX ORDER — LIDOCAINE HYDROCHLORIDE 10 MG/ML
INJECTION, SOLUTION EPIDURAL; INFILTRATION; INTRACAUDAL; PERINEURAL AS NEEDED
Status: DISCONTINUED | OUTPATIENT
Start: 2025-05-15 | End: 2025-05-15 | Stop reason: HOSPADM

## 2025-05-15 RX ORDER — MIDAZOLAM HYDROCHLORIDE 1 MG/ML
INJECTION, SOLUTION INTRAMUSCULAR; INTRAVENOUS AS NEEDED
Status: DISCONTINUED | OUTPATIENT
Start: 2025-05-15 | End: 2025-05-15 | Stop reason: HOSPADM

## 2025-05-15 RX ORDER — HEPARIN SODIUM 1000 [USP'U]/ML
INJECTION, SOLUTION INTRAVENOUS; SUBCUTANEOUS AS NEEDED
Status: DISCONTINUED | OUTPATIENT
Start: 2025-05-15 | End: 2025-05-15 | Stop reason: HOSPADM

## 2025-05-15 RX ORDER — NITROGLYCERIN 40 MG/100ML
INJECTION INTRAVENOUS AS NEEDED
Status: DISCONTINUED | OUTPATIENT
Start: 2025-05-15 | End: 2025-05-15 | Stop reason: HOSPADM

## 2025-05-15 RX ADMIN — PANTOPRAZOLE SODIUM 40 MG: 40 TABLET, DELAYED RELEASE ORAL at 08:20

## 2025-05-15 RX ADMIN — FLUOXETINE HYDROCHLORIDE 20 MG: 20 CAPSULE ORAL at 08:20

## 2025-05-15 RX ADMIN — PANTOPRAZOLE SODIUM 40 MG: 40 TABLET, DELAYED RELEASE ORAL at 20:35

## 2025-05-15 RX ADMIN — HEPARIN SODIUM 1100 UNITS/HR: 10000 INJECTION, SOLUTION INTRAVENOUS at 19:15

## 2025-05-15 RX ADMIN — ASPIRIN 81 MG: 81 TABLET, CHEWABLE ORAL at 08:20

## 2025-05-15 ASSESSMENT — COGNITIVE AND FUNCTIONAL STATUS - GENERAL
MOBILITY SCORE: 24
DAILY ACTIVITIY SCORE: 24
MOBILITY SCORE: 24
DAILY ACTIVITIY SCORE: 24

## 2025-05-15 ASSESSMENT — PAIN SCALES - GENERAL
PAINLEVEL_OUTOF10: 0 - NO PAIN
PAINLEVEL_OUTOF10: 0 - NO PAIN

## 2025-05-15 NOTE — INTERVAL H&P NOTE
H&P reviewed. The patient was examined and there are no changes to the H&P.    Patient with mesenteric ischemia with intestinal angina beginning about two weeks ago. Has bioprosthetic aortic valve and abnormal wall motion on TTE. In February began suddenly claudicating and having bouts of freezing cold toes. Says he gets palpitations and irregular heart beats. I recommend a MART, lifelong anticoagulation.     Procedure today will be an attempt at endovascular thrombectomy. Possible embolization and bowel infarction discussed. Patient agrees to proceed.

## 2025-05-15 NOTE — OP NOTE
Aortogram, SMA thrombectomy Operative Note     Date: 5/15/2025  OR Location: [facility] Cardiac Cath Lab    Name: Cristhian Thornton, : 1970, Age: 54 y.o., MRN: [id number], Sex: male    Diagnosis  Pre-op Diagnosis      * Occlusion of superior mesenteric artery (Multi) [K55.069] Post-op Diagnosis     * Occlusion of superior mesenteric artery (Multi) [K55.069]     Procedures  Aortogram, SMA thrombectomy  15078 - CHG AORTOGRAPHY ABDOMINAL SERIALOGRAPHY RS&I      Surgeons      * Pat Kunz - Primary    Resident/Fellow/Other Assistant:  Surgeons and Role:  * No surgeons found with a matching role *    Staff:   Circulator: Mabel  Scrub Person: April  Scrub Person: Cristhian    Anesthesia Staff: No anesthesia staff entered.    Procedure Summary  Anesthesia: Anesthesia type not filed in the log.  ASA: ASA status not filed in the log.  Estimated Blood Loss: 5mL  Intra-op Medications:   Administrations occurring from 1000 to 1205 on 05/15/25:   Medication Name Total Dose   fentaNYL PF (Sublimaze) injection 100 mcg   midazolam (Versed) injection 2 mg   lidocaine PF (Xylocaine) 10 mg/mL (1 %) injection 4 mL   heparin 1,000 unit/mL injection 15,000 Units   nitroglycerin in 5 % dextrose 10 mL syringe 850 mcg   alteplase (Cathflo Activase) injection 2 mg   iodixanol (VISIPaque) 320 mg iodine/mL injection 140 mL         Intraprocedure I/O Totals       None           Specimen: No specimens collected              Drains and/or Catheters: * None in log *    Tourniquet Times:         Implants:     Findings: Thromboembolism to superior mesenteric artery.  Multiple passes of penumbra thrombectomy catheter achieved vasospasm noted beyond versus embolization.  Only partial patency.  Catheter thrombectomy via plane glide catheter removed material and the spasm was ballooned.  The stenosis was ballooned with a 5 mm then 7 mm balloon.  Intra-arterial heparin was also infused.  There was now excellent flow through the previously occluded segment  of artery to which I did reestablish a channel.  There was a 50% remaining stenosis from the mural thrombus.    Indications: Cristhian Thornton is an 54 y.o. male who is having surgery for mesenteric ischemia associated with thromboembolism to SMA.  Patient had mesenteric ischemia symptoms of postprandial pain and 15 pound weight loss over a 2-week period.  Pain was sudden in onset.  In February he began having claudication of his right leg.  CT scan also revealed a splenic infarction.  He has a bioprosthetic aortic valve and clinically describes palpitations at home.  This may have been due to bouts of supraventricular tachycardia at home.  Case discussed with cardiology.  The risk and rationale discussed with patient and he is agreeable to proceeding.  In particular this would be a prelude to surgery if we cannot cross the lesion.  The possibility of embolization and bowel infarction and need for bowel resection was also discussed.      The patient was seen in the preoperative area. The risks, benefits, complications, treatment options, non-operative alternatives, expected recovery and outcomes were discussed with the patient. The possibilities of reaction to medication, pulmonary aspiration, injury to surrounding structures, bleeding, recurrent infection, the need for additional procedures, failure to diagnose a condition, and creating a complication requiring transfusion or operation were discussed with the patient. The patient concurred with the proposed plan, giving informed consent.  The site of surgery was properly noted/marked if necessary per policy. The patient has been actively warmed in preoperative area. Preoperative antibiotics are not indicated. Venous thrombosis prophylaxis are not indicated.    Procedure Details: Supine position, right common femoral artery access under ultrasound guidance with micropuncture kit.  5 German sheath placed a Sos catheter used to hook the superior mesenteric artery and  arteriogram performed.  It showed the occlusion of the superior mesenteric artery and to make segment with large collateral vessels above and below reconstituting the distal superior mesenteric artery.  A soft Glidewire was used to pass the lesion.  There was some difficulty initially but the  wire did drive through the  occlusion into the distal ileocolic branch.  I placed a glide catheter and through this a Curry wire was placed.  A 7 Stateless sheath was then placed into the superior mesenteric artery.  The Curry wire was then retracted through the glide catheter.  A CAT 7 penumbra thrombectomy catheter was used and multiple passes ultimately using a full canister and a half.  This resulted in recanalization of the thrombosis.  Ballooning this thrombus after infusing intra-arterial tPA of 2 mg.  Ballooning was performed to 5 mm.  This reestablished a good channel but there appeared to be occlusion of the distal SMA which may have been from embolism versus thrombosis.  I performed several passes using the glide catheter on suction and retrieved some thrombotic material.  The channel was reestablished.  There appeared to be some thrombus at the takeoff of a ileal branch.  The CAT 7 was used to suction this area.  I then ballooned this narrowed area to 3 mm with reestablishment of good flow into the distal ileocolic system.  The stenosis created by the residual thrombus was still somewhat flow-limiting and therefore I ballooned this to 7 mm holding for several minutes.  There was now significantly improved flow through this area.  I chose not to stent this because this was an area of multiple jejunal branching and important collaterals came from this area.  I will let anticoagulation and the bodies natural thrombolytic system remodel lists.  The patient denied any pain.  The sheath was retracted.  Right leg arteriogram was performed and it showed occlusion of the popliteal artery behind the prosthetic.  This is quite  difficult to visualize well.  There was tibial reconstitution and posterior tibial dominance.  I chose not to pursue this at this time as.  Given significant contrast and the patient was only claudicating but likely this is from thromboembolism as well.  Will discuss the case with cardiology.  The puncture was closed with a prostyle suture and heparin was not reversed.  The patient was given 10 units of heparin and started the case and this was maintained without protamine reversal.      Evidence of Infection: No   Complications:  None; patient tolerated the procedure well.    Disposition: PACU - hemodynamically stable.  Condition: stable                 Additional Details:     Attending Attestation: I was present and scrubbed for the entire procedure.    Pat Kunz  Phone Number: [phone]

## 2025-05-15 NOTE — PROGRESS NOTES
Cristhian Thornton is a 54 y.o. male on day 6 of admission presenting with NSTEMI (non-ST elevated myocardial infarction) (Multi).      Subjective   Just came from angiogram lying in his back no hematoma around the site of right groin.  Denies any chest pain or shortness of breath in room air.  Said that he they found some blockage.       Objective     Last Recorded Vitals  /77   Pulse 61   Temp 36 °C (96.8 °F) (Temporal)   Resp 13   Wt 87.2 kg (192 lb 3.9 oz)   SpO2 98%   Intake/Output last 3 Shifts:    Intake/Output Summary (Last 24 hours) at 5/15/2025 1320  Last data filed at 5/15/2025 1151  Gross per 24 hour   Intake --   Output 250 ml   Net -250 ml       Admission Weight  Weight: 81.6 kg (180 lb) (05/09/25 0748)    Daily Weight  05/15/25 : 87.2 kg (192 lb 3.9 oz)    Image Results  Invasive vascular procedure  This study is a surgery completed in an invasive cardiovascular space.   Please see OpNote on Notes tab for findings.  Vascular US [place]VR [place] [address]             Phone [phone]       Vascular Lab Report     VASC US PVR WITHOUT EXERCISE    Patient Name:      CRISTHIAN THORNTON       Reading Physician:  [id number] Sylvia Haley MD, RPVI  Study Date:        5/13/2025             Ordering Provider:  [id number] BOBBY MCQUEEN  MRN/PID:           [id number]              Fellow:  Accession#:        [id number]          Technologist:       Rusty Arriaga RVT  Date of Birth/Age: 1970 / 54 years Technologist 2:     Aline Montelongo                                                               RVT  Gender:            M                     Encounter#:         [id number]  Admission Status:  Inpatient             Location Performed: [place]                                                                [facility]       Diagnosis/ICD: Peripheral vascular disease, unspecified-I73.9  CPT Codes:     57575 Peripheral artery PVR (multi segmental pressure       Pertinent History: Claudication.       CONCLUSIONS:  Right Lower PVR: There is evidence of mild disease at the femoral popliteal level. Decreased digital perfusion noted. Multiphasic flow is noted in the right common femoral artery, right popliteal artery, right dorsalis pedis artery and right posterior tibial artery. The right lower extremity PVR tracings were mildly dampened. The toe waveforms were significantly dampened.  Left Lower PVR: No evidence of arterial occlusive disease in the left lower extremity at rest. Normal digital perfusion noted. Multiphasic flow is noted in the left common femoral artery, left popliteal artery, left posterior tibial artery and left dorsalis pedis artery.     Imaging & Doppler Findings:     RIGHT Lower PVR                Pressures Ratios  Right High Thigh               165 mmHg  1.59  Right Low Thigh                156 mmHg  1.50  Right Calf                     76 mmHg   0.73  Right Posterior Tibial (Ankle) 76 mmHg   0.73  Right Dorsalis Pedis (Ankle)   78 mmHg   0.75  Right Digit (Great Toe)        41 mmHg   0.39          LEFT Lower PVR                Pressures Ratios  Left High Thigh               161 mmHg  1.55  Left Low Thigh                155 mmHg  1.49  Left Calf                     136 mmHg  1.31  Left Posterior Tibial (Ankle) 119 mmHg  1.14  Left Dorsalis Pedis (Ankle)   133 mmHg  1.28  Left Digit (Great Toe)        83 mmHg   0.80                             Right     Left  Brachial Pressure 102 mmHg 104 mmHg          [id number] Sylvia Haley MD, RPVI  Electronically signed by [id number] Sylvia Haley MD, RPAYAAN on 5/15/2025 at 12:16:50 AM       ** Final **      Physical Exam  Vitals and nursing note reviewed.   HENT:      Head: Normocephalic and atraumatic.      Nose: No congestion or rhinorrhea.   Eyes:      Extraocular Movements:  Extraocular movements intact.      Pupils: Pupils are equal, round, and reactive to light.   Cardiovascular:      Rate and Rhythm: Normal rate and regular rhythm.   Pulmonary:      Effort: Pulmonary effort is normal.   Abdominal:      General: Bowel sounds are normal.      Palpations: Abdomen is soft.   Musculoskeletal:         General: Normal range of motion.      Right lower leg: No edema.      Left lower leg: No edema.   Skin:     General: Skin is warm.   Neurological:      General: No focal deficit present.      Mental Status: He is oriented to person, place, and time.   Psychiatric:         Mood and Affect: Mood normal.         I reviewed relevant Results labs meds and imaging    Medications Ordered Prior to Encounter[1]  Results for orders placed or performed during the hospital encounter of 05/09/25 (from the past 24 hours)   aPTT   Result Value Ref Range    aPTT 56.3 (H) 22.0 - 32.5 seconds   CBC   Result Value Ref Range    WBC 9.8 4.4 - 11.3 x10*3/uL    nRBC 0.0 0.0 - 0.0 /100 WBCs    RBC 4.58 4.50 - 5.90 x10*6/uL    Hemoglobin 13.3 (L) 13.5 - 17.5 g/dL    Hematocrit 39.1 (L) 41.0 - 52.0 %    MCV 85 80 - 100 fL    MCH 29.0 26.0 - 34.0 pg    MCHC 34.0 32.0 - 36.0 g/dL    RDW 12.0 11.5 - 14.5 %    Platelets 213 150 - 450 x10*3/uL   Lactate   Result Value Ref Range    Lactate 1.0 0.4 - 2.0 mmol/L   Comprehensive metabolic panel   Result Value Ref Range    Glucose 106 (H) 74 - 99 mg/dL    Sodium 138 136 - 145 mmol/L    Potassium 4.4 3.5 - 5.3 mmol/L    Chloride 105 98 - 107 mmol/L    Bicarbonate 28 21 - 32 mmol/L    Anion Gap 9 (L) 10 - 20 mmol/L    Urea Nitrogen 16 6 - 23 mg/dL    Creatinine 0.96 0.50 - 1.30 mg/dL    eGFR >90 >60 mL/min/1.73m*2    Calcium 8.6 8.6 - 10.3 mg/dL    Albumin 3.5 3.4 - 5.0 g/dL    Alkaline Phosphatase 44 33 - 120 U/L    Total Protein 6.1 (L) 6.4 - 8.2 g/dL    AST 21 9 - 39 U/L    Bilirubin, Total 0.6 0.0 - 1.2 mg/dL    ALT 23 10 - 52 U/L   aPTT   Result Value Ref Range    aPTT 54.1  (H) 22.0 - 32.5 seconds     Invasive vascular procedure  Result Date: 5/15/2025  This study is a surgery completed in an invasive cardiovascular space. Please see OpNote on Notes tab for findings.                            Assessment & Plan  NSTEMI (non-ST elevated myocardial infarction) (Multi)  NSTEMI has been ruled out and cardiology no longer following.     2. Fever of unknown origin: no identifiable source of infection.  Blood culture is negative to date ( day 4).  Continue to monitor.  He is being monitored off abx.     3. anorexia and weight loss.  TSH and T4 are normal. Suspected cause of weight loss is possibly chronic mesenteric ischemia.   Diet as toleratd  GI recommends out patient follow up for possible EGD in the future  Abnormal CAT scan  Positive splenic infarct. Also splenic infarct confirmed on MRCP.   Pain control if needed.   On IV heparin drip.  Unsure of course of splenic infarct  Occlusion of superior mesenteric artery (Multi)  Mesenteric angiogram in am  Continue heparin drip    Splenic Infarct:  Will need anticoagulation at discharge.  Unsure of source.  May need referral to out patient hematology follow up.   Depression/Anxiety:  continue home med (Prozac).     Peripheral Artery disease:  going for vascular intervention on 5/15/25             AURORA Martinez-CNP           [1]   No current facility-administered medications on file prior to encounter.     Current Outpatient Medications on File Prior to Encounter   Medication Sig Dispense Refill    ANASTROZOLE ORAL Take 0.25 mg by mouth 2 times a week.      CALCIUM ORAL Take 1 each by mouth once daily.      cholecalciferol, vitamin D3, (VITAMIN D3 ORAL) Take 1 each by mouth once daily.      cyanocobalamin, vitamin B-12, (VITAMIN B-12 ORAL) Take 1 each by mouth once daily.      FLUoxetine (PROzac) 20 mg capsule TAKE 1 CAPSULE BY MOUTH EVERY DAY 90 capsule 3    MAGNESIUM CITRATE ORAL Take 1 each by mouth once daily.      meloxicam (Mobic)  15 mg tablet TAKE 1 TABLET BY MOUTH EVERY DAY 30 tablet 11    traZODone (Desyrel) 50 mg tablet TAKE 1 TABLET (50 MG) BY MOUTH AS NEEDED AT BEDTIME FOR SLEEP. 90 tablet 3    methocarbamol (Robaxin) 500 mg tablet Take 1 tablet (500 mg) by mouth 3 times a day as needed for muscle spasms for up to 5 days. (Patient not taking: Reported on 5/6/2025) 15 tablet 0    tadalafil (Cialis) 5 mg tablet Take 1 tablet (5 mg) by mouth 1 (one) time per week. MONDAYS      testosterone cypionate (Depo-Testosterone) 100 mg/mL injection 0.75 mL (75 mg) every 7 days. Receives injection every Sunday      [DISCONTINUED] busPIRone (Buspar) 5 mg tablet Take 1 tablet (5 mg) by mouth 2 times a day. (Patient not taking: Reported on 5/6/2025)      [DISCONTINUED] tamoxifen (Nolvadex) 20 mg tablet Take 1 tablet (20 mg total) by mouth 2 times a week.

## 2025-05-15 NOTE — BRIEF OP NOTE
Date: 2025 - 5/15/2025  OR Location: [facility] Cardiac Cath Lab    Name: Cristhian Thornton, : 1970, Age: 54 y.o., MRN: [id number], Sex: male    Diagnosis  Pre-op Diagnosis      * Occlusion of superior mesenteric artery (Multi) [K55.069] Post-op Diagnosis     * Occlusion of superior mesenteric artery (Multi) [K55.069]     Procedures  Aortogram, SMA thrombectomy  00079 - CHG AORTOGRAPHY ABDOMINAL SERIALOGRAPHY RS&I      Surgeons      * Pat Kunz - Primary    Resident/Fellow/Other Assistant:  Surgeons and Role:  * No surgeons found with a matching role *    Staff:   Circulator: Mabel  Scrub Person: April  Scrub Person: Cristhian    Anesthesia Staff: No anesthesia staff entered.    Procedure Summary  Anesthesia: Anesthesia type not filed in the log.  ASA: ASA status not filed in the log.  Estimated Blood Loss: 210 mL  Intra-op Medications: * Intraprocedure medication information is unavailable because the case start and end events have not been set *      Intraprocedure I/O Totals       None           Specimen: No specimens collected     Findings: R CFA access, 7 Amharic sheath, proglide closure    Percutaneous aspiration thrombectomy of SMA thrombus with Penumbra lightning 7; local TPA and Nitroglycerin administered. Balloon angioplasty of SMA distally with 3 mm x 40 mm balloon, and proximally with 5 mm x 40 mm followed by 7 mm x 40 mm balloon    Flat until 2pm  Heparin not reversed; resume at previous rate   Obtain Lactate and CMP at 2pm and repeat labs tomorrow morning at 5pm    RLE angiogram showed occlusion of popliteal artery at P2 segment    Needs MART for workup of thromboembolism to multiple vascular beds     Okay for diet as tolerated     Complications:  None; patient tolerated the procedure well.     Disposition: PACU - hemodynamically stable.  Condition: stable  Specimens Collected: No specimens collected'

## 2025-05-15 NOTE — CARE PLAN
The patient's goals for the shift include      The clinical goals for the shift include maintain theraputic aPtt    Problem: ACS/CP/NSTEMI/STEMI  Goal: Chest pain managed (free from pain or at acceptable level)  Outcome: Progressing  Goal: Lab values return to normal range  Outcome: Progressing  Goal: Promote self management  Outcome: Progressing  Goal: Serial ECG will return to baseline  Outcome: Progressing  Goal: Verbalize understanding of procedures/devices  Outcome: Progressing  Goal: Wean vasopressors/achieve hemodynamic stability  Outcome: Progressing     Problem: Arrythmia/Dysrhythmia  Goal: Lab values return to normal range  Outcome: Progressing  Goal: No evidence of post procedure complications  Outcome: Progressing  Goal: Promote self management  Outcome: Progressing  Goal: Serial ECG will return to baseline  Outcome: Progressing  Goal: Verbalize understanding of procedures/devices  Outcome: Progressing  Goal: Vital signs return to baseline  Outcome: Progressing  Goal: Care and maintenance of device (specify)  Outcome: Progressing     Problem: Cardiac catheterization  Goal: Free from dysrhythmias  Outcome: Progressing  Goal: Free from pain  Outcome: Progressing  Goal: No evidence of post procedure complications  Outcome: Progressing  Goal: Promote self management  Outcome: Progressing  Goal: Verbalize understanding of procedure  Outcome: Progressing  Goal: Care and maintenance of device (specify)  Outcome: Progressing     Problem: Hypertensive Emergency/Crisis  Goal: Blood pressure gradually reduced to goal range  Outcome: Progressing  Goal: Free from signs of organ damage  Outcome: Progressing  Goal: Lab values return to normal range  Outcome: Progressing  Goal: Promote self management  Outcome: Progressing     Problem: Pain - Adult  Goal: Verbalizes/displays adequate comfort level or baseline comfort level  Outcome: Progressing     Problem: Safety - Adult  Goal: Free from fall injury  Outcome:  Progressing     Problem: Discharge Planning  Goal: Discharge to home or other facility with appropriate resources  Outcome: Progressing     Problem: Chronic Conditions and Co-morbidities  Goal: Patient's chronic conditions and co-morbidity symptoms are monitored and maintained or improved  Outcome: Progressing     Problem: Nutrition  Goal: Nutrient intake appropriate for maintaining nutritional needs  Outcome: Progressing

## 2025-05-15 NOTE — PROGRESS NOTES
"Nutrition Follow up Note    Nutrition Assessment      Occlusion of superior mesenteric artery.  Off floor for angiogram.     Nutrition History:  Energy Intake: Fair 50-75 %     Anthropometrics:  Ht: 177.8 cm (5' 10\"), Wt: 87.2 kg (192 lb 3.9 oz), BMI: 27.58  IBW/kg (Dietitian Calculated): 75.45 kg  Percent of IBW: 114 %     Weight Change:  Daily Weight  05/15/25 : 87.2 kg (192 lb 3.9 oz)  05/06/25 : 87.1 kg (192 lb)  04/02/25 : 88 kg (194 lb)  03/10/25 : 91.4 kg (201 lb 9.6 oz)  02/07/25 : 88.5 kg (195 lb)  07/18/24 : 89.8 kg (198 lb)  07/15/24 : 89.6 kg (197 lb 9.6 oz)  05/26/24 : 88 kg (194 lb)  05/21/24 : 91.2 kg (201 lb)  03/11/24 : 91.2 kg (201 lb)     Nutrition Focused Physical Exam Findings: defer: off floor     Nutrition Significant Labs:  Lab Results   Component Value Date    WBC 9.8 05/15/2025    HGB 13.3 (L) 05/15/2025    HCT 39.1 (L) 05/15/2025     05/15/2025    CHOL 127 04/03/2025    TRIG 62 04/03/2025    HDL 48 04/03/2025    ALT 18 05/12/2025    AST 18 05/12/2025     05/12/2025    K 3.9 05/12/2025     05/12/2025    CREATININE 1.05 05/12/2025    BUN 12 05/12/2025    CO2 29 05/12/2025    TSH 1.00 05/09/2025    PSA 0.89 04/03/2025    INR 3.8 (H) 03/08/2021    HGBA1C 5.0 10/28/2020     Nutrition Specific Medications:  Scheduled Medications[1]  Continuous Medications[2]    Dietary Orders (From admission, onward)       Start     Ordered    05/15/25 1157  Adult diet Cardiac; 70 gm fat; 2 - 3 grams Sodium  Diet effective now        Question Answer Comment   Diet type Cardiac    Fat restriction: 70 gm fat    Sodium restriction: 2 - 3 grams Sodium        05/15/25 1158    05/12/25 1010  Oral nutritional supplements  Until discontinued        Question Answer Comment   Deliver with All meals    Select supplement: Ensure High Protein        05/12/25 1009    05/09/25 1555  May Participate in Room Service  ( ROOM SERVICE MAY PARTICIPATE)  Once        Question:  .  Answer:  Yes    05/09/25 8820 "                    Estimated Needs:   Estimated Energy Needs  Total Energy Estimated Needs in 24 hours (kCal): 2263 kCal  Energy Estimated Needs per kg Body Weight in 24 hours (kCal/kg): 30 kCal/kg  Method for Estimating Needs: IBW    Estimated Protein Needs  Total Protein Estimated Needs in 24 Hours (g): 75 g  Protein Estimated Needs per kg Body Weight in 24 Hours (g/kg): 1 g/kg  Method for Estimating 24 Hour Protein Needs: IBW    Estimated Fluid Needs  Total Fluid Estimated Needs in 24 Hours (mL): 2263 mL  Total Fluid Estimated Needs in 24 hours (mL/kg): 30 mL/kg  Method for Estimating 24 Hour Fluid Needs: IBW  Patient on Order Fluid Restriction: No      Nutrition Diagnosis   Nutrition Diagnosis:     Nutrition Diagnosis  Patient has Nutrition Diagnosis: Yes  Diagnosis Status (1): Active  Nutrition Diagnosis 1: Predicted inadequate energy intake  Related to (1): epigastric pain  As Evidenced by (1): pt reported poor PO intakes per H&P     Nutrition Interventions/Recommendations   Nutrition Interventions and Recommendations:  Nutrition Prescription: Nutrition prescription for oral nutrition    Nutrition Recommendations:  Individualized Nutrition Prescription Provided for : Provide regular diet as tolerated. Provide Ensure HP TID.    Nutrition Interventions/Goals:   Food and/or Nutrient Delivery Interventions  Interventions: Medical food supplement, Meals and snacks  Meals and Snacks: General healthful diet  Goal: Continue current diet as ordered  Medical Food Supplement: Commercial beverage medical food supplement therapy  Goal: Ensure HP TID    Education Documentation  No documentation found.         Nutrition Monitoring and Evaluation   Monitoring/Evaluation:   Food/Nutrient Related History Monitoring  Monitoring and Evaluation Plan: Estimated Energy Intake  Estimated Energy Intake: Energy intake greater or equal to 75% of estimated energy needs    Anthropometric Measurements  Monitoring and Evaluation Plan: Body  weight  Body Weight: Body weight - Maintain stable weight    Goal Status: Some progress toward goal(s)    Follow Up  Time Spent (min): 30 minutes  Last Date of Nutrition Visit: 05/15/25  Nutrition Follow-Up Needed?: 5-7 days  Follow up Comment: 5/20/25          [1] anastrozole, 0.5 mg, oral, Once per day on Monday Thursday  aspirin, 81 mg, oral, Daily  FLUoxetine, 20 mg, oral, Daily  pantoprazole, 40 mg, oral, BID  perflutren lipid microspheres, 0.5-10 mL of dilution, intravenous, Once in imaging  perflutren protein A microsphere, 0.5 mL, intravenous, Once in imaging  sulfur hexafluoride microsphr, 2 mL, intravenous, Once in imaging    [2] heparin, 0-4,000 Units/hr, Last Rate: 1,300 Units/hr (05/15/25 7644)

## 2025-05-16 ENCOUNTER — TELEPHONE (OUTPATIENT)
Dept: CARDIOLOGY | Facility: CLINIC | Age: 55
End: 2025-05-16

## 2025-05-16 ENCOUNTER — PHARMACY VISIT (OUTPATIENT)
Dept: PHARMACY | Facility: CLINIC | Age: 55
End: 2025-05-16
Payer: COMMERCIAL

## 2025-05-16 VITALS
SYSTOLIC BLOOD PRESSURE: 108 MMHG | BODY MASS INDEX: 27.43 KG/M2 | HEART RATE: 63 BPM | TEMPERATURE: 97.3 F | WEIGHT: 191.58 LBS | HEIGHT: 70 IN | OXYGEN SATURATION: 97 % | DIASTOLIC BLOOD PRESSURE: 75 MMHG | RESPIRATION RATE: 16 BRPM

## 2025-05-16 DIAGNOSIS — I73.9 PAD (PERIPHERAL ARTERY DISEASE): ICD-10-CM

## 2025-05-16 DIAGNOSIS — K55.069 OCCLUSION OF SUPERIOR MESENTERIC ARTERY (MULTI): Primary | ICD-10-CM

## 2025-05-16 PROBLEM — I21.4 NSTEMI (NON-ST ELEVATED MYOCARDIAL INFARCTION) (MULTI): Status: RESOLVED | Noted: 2025-05-09 | Resolved: 2025-05-16

## 2025-05-16 PROBLEM — R93.89 ABNORMAL CAT SCAN: Status: RESOLVED | Noted: 2025-05-09 | Resolved: 2025-05-16

## 2025-05-16 LAB
ALBUMIN SERPL BCP-MCNC: 3.8 G/DL (ref 3.4–5)
ALP SERPL-CCNC: 48 U/L (ref 33–120)
ALT SERPL W P-5'-P-CCNC: 29 U/L (ref 10–52)
ANION GAP SERPL CALCULATED.3IONS-SCNC: 9 MMOL/L (ref 10–20)
APTT PPP: 34.9 SECONDS (ref 22–32.5)
APTT PPP: 46.1 SECONDS (ref 22–32.5)
AST SERPL W P-5'-P-CCNC: 35 U/L (ref 9–39)
BILIRUB SERPL-MCNC: 0.6 MG/DL (ref 0–1.2)
BUN SERPL-MCNC: 17 MG/DL (ref 6–23)
CALCIUM SERPL-MCNC: 9.1 MG/DL (ref 8.6–10.3)
CHLORIDE SERPL-SCNC: 103 MMOL/L (ref 98–107)
CO2 SERPL-SCNC: 29 MMOL/L (ref 21–32)
CREAT SERPL-MCNC: 0.98 MG/DL (ref 0.5–1.3)
EGFRCR SERPLBLD CKD-EPI 2021: >90 ML/MIN/1.73M*2
GLUCOSE SERPL-MCNC: 121 MG/DL (ref 74–99)
LACTATE SERPL-SCNC: 1.3 MMOL/L (ref 0.4–2)
POTASSIUM SERPL-SCNC: 3.7 MMOL/L (ref 3.5–5.3)
PROT SERPL-MCNC: 6.8 G/DL (ref 6.4–8.2)
SODIUM SERPL-SCNC: 137 MMOL/L (ref 136–145)

## 2025-05-16 PROCEDURE — 99239 HOSP IP/OBS DSCHRG MGMT >30: CPT | Performed by: NURSE PRACTITIONER

## 2025-05-16 PROCEDURE — 2500000004 HC RX 250 GENERAL PHARMACY W/ HCPCS (ALT 636 FOR OP/ED): Mod: JZ | Performed by: NURSE PRACTITIONER

## 2025-05-16 PROCEDURE — 36415 COLL VENOUS BLD VENIPUNCTURE: CPT | Performed by: NURSE PRACTITIONER

## 2025-05-16 PROCEDURE — 80053 COMPREHEN METABOLIC PANEL: CPT | Performed by: STUDENT IN AN ORGANIZED HEALTH CARE EDUCATION/TRAINING PROGRAM

## 2025-05-16 PROCEDURE — 83605 ASSAY OF LACTIC ACID: CPT | Performed by: STUDENT IN AN ORGANIZED HEALTH CARE EDUCATION/TRAINING PROGRAM

## 2025-05-16 PROCEDURE — 2500000001 HC RX 250 WO HCPCS SELF ADMINISTERED DRUGS (ALT 637 FOR MEDICARE OP): Performed by: NURSE PRACTITIONER

## 2025-05-16 PROCEDURE — RXMED WILLOW AMBULATORY MEDICATION CHARGE

## 2025-05-16 PROCEDURE — 85730 THROMBOPLASTIN TIME PARTIAL: CPT | Performed by: NURSE PRACTITIONER

## 2025-05-16 PROCEDURE — 99232 SBSQ HOSP IP/OBS MODERATE 35: CPT | Performed by: NURSE PRACTITIONER

## 2025-05-16 PROCEDURE — 2500000001 HC RX 250 WO HCPCS SELF ADMINISTERED DRUGS (ALT 637 FOR MEDICARE OP): Performed by: INTERNAL MEDICINE

## 2025-05-16 RX ORDER — CLOPIDOGREL BISULFATE 75 MG/1
75 TABLET ORAL DAILY
Qty: 30 TABLET | Refills: 3 | Status: SHIPPED | OUTPATIENT
Start: 2025-05-16

## 2025-05-16 RX ORDER — CLOPIDOGREL BISULFATE 75 MG/1
75 TABLET ORAL DAILY
Status: DISCONTINUED | OUTPATIENT
Start: 2025-05-16 | End: 2025-05-16 | Stop reason: HOSPADM

## 2025-05-16 RX ADMIN — CLOPIDOGREL 75 MG: 75 TABLET ORAL at 10:05

## 2025-05-16 RX ADMIN — ASPIRIN 81 MG: 81 TABLET, CHEWABLE ORAL at 08:38

## 2025-05-16 RX ADMIN — HEPARIN SODIUM 2000 UNITS: 5000 INJECTION, SOLUTION INTRAVENOUS; SUBCUTANEOUS at 02:12

## 2025-05-16 RX ADMIN — HEPARIN SODIUM 1300 UNITS/HR: 10000 INJECTION, SOLUTION INTRAVENOUS at 02:12

## 2025-05-16 RX ADMIN — APIXABAN 5 MG: 5 TABLET, FILM COATED ORAL at 10:05

## 2025-05-16 RX ADMIN — PANTOPRAZOLE SODIUM 40 MG: 40 TABLET, DELAYED RELEASE ORAL at 08:38

## 2025-05-16 RX ADMIN — FLUOXETINE HYDROCHLORIDE 20 MG: 20 CAPSULE ORAL at 08:38

## 2025-05-16 ASSESSMENT — COGNITIVE AND FUNCTIONAL STATUS - GENERAL
DAILY ACTIVITIY SCORE: 24
MOBILITY SCORE: 24

## 2025-05-16 ASSESSMENT — PAIN SCALES - GENERAL: PAINLEVEL_OUTOF10: 0 - NO PAIN

## 2025-05-16 ASSESSMENT — PAIN - FUNCTIONAL ASSESSMENT: PAIN_FUNCTIONAL_ASSESSMENT: 0-10

## 2025-05-16 NOTE — HOSPITAL COURSE
54 y.o. male with history of CAD and aortic valve replacement who was admitted to [facility] on 5/9/25 with postprandial pain and NSTEMI.   Initially patient was evaluated evaluated by GI team due to abdominal pain , nausea vomiting.  LFTs and lipase were normal.  MRCP with splenic infarct so referred to vascular surgeon.  GI team recommendation outpatient EGD and colonoscopy  CT abdomen/pelvis with contrast revealed occlusion of the SMA. He also has short distance claudication of the RL . Patient's status post aortogram, SMA thrombectomy and right lower extremity angiogram by Dr. Kunz on 5/15/2025.  From  Dr. 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  SMA thrombectomy performed, 50% remaining stenosis for mural thrombus present  Per vascular team , patient will discharge with Eliquis 5 mg twice a day, Plavix.  Patient has to follow-up with Dr. Kunz in 2 weeks.  He has to do CTA outpatient prior to his appointment.  Echo shows no concern for valvular vegetation.  Patient has to follow-up with cardiology for MART  Evaluated by infection disease initially treated with ceftriaxone and Vanco and he was off antibiotic since 5/12 .  No fever or sign of infection since then.

## 2025-05-16 NOTE — DISCHARGE SUMMARY
Discharge Diagnosis  NSTEMI (non-ST elevated myocardial infarction) (Multi)           Issues Requiring Follow-Up    Discharge Meds     Medication List      START taking these medications     apixaban 5 mg tablet; Commonly known as: Eliquis; Take 1 tablet (5 mg)   by mouth 2 times a day.   clopidogrel 75 mg tablet; Commonly known as: Plavix; Take 1 tablet (75   mg) by mouth once daily.     CONTINUE taking these medications     ANASTROZOLE ORAL   FLUoxetine 20 mg capsule; Commonly known as: PROzac; TAKE 1 CAPSULE BY   MOUTH EVERY DAY   traZODone 50 mg tablet; Commonly known as: Desyrel; TAKE 1 TABLET (50   MG) BY MOUTH AS NEEDED AT BEDTIME FOR SLEEP.   VITAMIN D3 ORAL     STOP taking these medications     CALCIUM ORAL   MAGNESIUM CITRATE ORAL   meloxicam 15 mg tablet; Commonly known as: Mobic   methocarbamol 500 mg tablet; Commonly known as: Robaxin   tadalafil 5 mg tablet; Commonly known as: Cialis   testosterone cypionate 100 mg/mL injection; Commonly known as:   Depo-Testosterone   VITAMIN B-12 ORAL       All meds labs and imaging were reviewed with.  Pending Labs       Order Current Status    Extra Tubes In process    Extra Tubes In process    Extra Tubes In process    Lavender Top In process    Lavender Top In process    Lavender Top In process    PST Top In process    PST Top In process          Results for orders placed or performed during the hospital encounter of 05/09/25 (from the past 24 hours)   CBC   Result Value Ref Range    WBC 9.8 4.4 - 11.3 x10*3/uL    nRBC 0.0 0.0 - 0.0 /100 WBCs    RBC 4.58 4.50 - 5.90 x10*6/uL    Hemoglobin 13.3 (L) 13.5 - 17.5 g/dL    Hematocrit 39.1 (L) 41.0 - 52.0 %    MCV 85 80 - 100 fL    MCH 29.0 26.0 - 34.0 pg    MCHC 34.0 32.0 - 36.0 g/dL    RDW 12.0 11.5 - 14.5 %    Platelets 213 150 - 450 x10*3/uL   Lactate   Result Value Ref Range    Lactate 1.0 0.4 - 2.0 mmol/L   Comprehensive metabolic panel   Result Value Ref Range    Glucose 106 (H) 74 - 99 mg/dL    Sodium 138 136 -  145 mmol/L    Potassium 4.4 3.5 - 5.3 mmol/L    Chloride 105 98 - 107 mmol/L    Bicarbonate 28 21 - 32 mmol/L    Anion Gap 9 (L) 10 - 20 mmol/L    Urea Nitrogen 16 6 - 23 mg/dL    Creatinine 0.96 0.50 - 1.30 mg/dL    eGFR >90 >60 mL/min/1.73m*2    Calcium 8.6 8.6 - 10.3 mg/dL    Albumin 3.5 3.4 - 5.0 g/dL    Alkaline Phosphatase 44 33 - 120 U/L    Total Protein 6.1 (L) 6.4 - 8.2 g/dL    AST 21 9 - 39 U/L    Bilirubin, Total 0.6 0.0 - 1.2 mg/dL    ALT 23 10 - 52 U/L   aPTT   Result Value Ref Range    aPTT 54.1 (H) 22.0 - 32.5 seconds   Lactate   Result Value Ref Range    Lactate 1.3 0.4 - 2.0 mmol/L   Comprehensive Metabolic Panel   Result Value Ref Range    Glucose 121 (H) 74 - 99 mg/dL    Sodium 137 136 - 145 mmol/L    Potassium 3.7 3.5 - 5.3 mmol/L    Chloride 103 98 - 107 mmol/L    Bicarbonate 29 21 - 32 mmol/L    Anion Gap 9 (L) 10 - 20 mmol/L    Urea Nitrogen 17 6 - 23 mg/dL    Creatinine 0.98 0.50 - 1.30 mg/dL    eGFR >90 >60 mL/min/1.73m*2    Calcium 9.1 8.6 - 10.3 mg/dL    Albumin 3.8 3.4 - 5.0 g/dL    Alkaline Phosphatase 48 33 - 120 U/L    Total Protein 6.8 6.4 - 8.2 g/dL    AST 35 9 - 39 U/L    Bilirubin, Total 0.6 0.0 - 1.2 mg/dL    ALT 29 10 - 52 U/L   aPTT   Result Value Ref Range    aPTT 34.9 (H) 22.0 - 32.5 seconds   aPTT   Result Value Ref Range    aPTT 46.1 (H) 22.0 - 32.5 seconds     Invasive vascular procedure  Result Date: 5/15/2025  This study is a surgery completed in an invasive cardiovascular space. Please see OpNote on Notes tab for findings.        Hospital Course  HPI from admission:  Cristhian Thornton is a 54 y.o. male presenting with a variety of symptoms for 2 weeks.  He has a sense of fevers and chills especially at night.  He has loss of appetite and weight loss.  He has vague intermittent midepigastric discomfort and generalized weakness.  His ER evaluation revealed some sort of lesion adjacent to his spleen which could be an abscess.  And some positive cardiac enzymes.  It is  noteworthy that he has a history of recurrent staph infections including 1 episode of staph bacteremia.  He also has low testosterone and takes both testosterone supplements and 2 different estrogen blockers..     Brief Hospital course:  54 y.o. male with history of CAD and aortic valve replacement who was admitted to [facility] on 5/9/25 with postprandial pain and NSTEMI.   Initially patient was evaluated evaluated by GI team due to abdominal pain , nausea vomiting.  LFTs and lipase were normal.  MRCP with splenic infarct so referred to vascular surgeon.  GI team recommendation outpatient EGD and colonoscopy  CT abdomen/pelvis with contrast revealed occlusion of the SMA. He also has short distance claudication of the RL . Patient's status post aortogram, SMA thrombectomy and right lower extremity angiogram by Dr. Kunz on 5/15/2025.  From  Dr. 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  SMA thrombectomy performed, 50% remaining stenosis for mural thrombus present  Per vascular team , patient will discharge with Eliquis 5 mg twice a day, Plavix.  Patient has to follow-up with Dr. Kunz in 2 weeks.  He has to do CTA outpatient prior to his appointment.  Echo shows no concern for valvular vegetation.  Patient has to follow-up with cardiology for MART  Evaluated by infection disease initially treated with ceftriaxone and Vanco and he was off antibiotic since 5/12 .  No fever or sign of infection since then.  Patient is hemodynamically stable all meds, labs and imaging were reviewed prior to discharging patient. All patient's consults signed off.  Patient will be discharged home with no need.  Pertinent Physical Exam At Time of Discharge  Physical Exam  General: alert, no diaphoresis   HENT: mucous membranes moist, external ears normal, no rhinorrhea   Eyes: no icterus or injection, no discharge   Lungs: CTA BL   Heart: RRR, no murmurs, no LE edema BL   GI: abdomen  soft, nontender, nondistended, BS present   MSK: no joint effusion or deformity   Skin: no rashes, erythema, or ecchymosis   Neuro: grossly normal cognition, motor strength, sensation    Outpatient Follow-Up  Future Appointments   Date Time Provider Department Center   6/19/2025  2:00 PM Fco Baltazar DO [id number] [facility]     Total time spent 45 minutes    Ayah Prater, AURORA-CNP

## 2025-05-16 NOTE — PROGRESS NOTES
Cristhian Thornton is a 54 y.o. male on day 7 of admission presenting with NSTEMI (non-ST elevated myocardial infarction) (Multi).      Subjective  Patient no acute distress on bedside exam  Status post aortogram/SMA thrombectomy/right leg angiogram  His postprandial pain has resolved.  Tolerated dinner and breakfast this morning  He is eager for discharge  Discussed discontinuing aspirin.  Starting Plavix and Eliquis  Discussed follow-up in 2 weeks with Dr. Kunz with CTA with runoff  No nausea, vomiting fever or chills    Objective        Last Recorded Vitals      5/15/2025    11:34 PM 5/16/2025    12:00 AM 5/16/2025     3:00 AM 5/16/2025     3:42 AM 5/16/2025     4:00 AM 5/16/2025     7:50 AM 5/16/2025     8:00 AM   Vitals   Systolic    99  108    Diastolic    80  75    BP Location    Left arm  Right arm    Heart Rate 60 67  58 61 61 63   Temp    36.7 °C (98.1 °F)  36.3 °C (97.3 °F)    Resp 16 18  13 16 13 16   Weight (lb)   191.58       BMI   27.49 kg/m2       BSA (m2)   2.07 m2           Imaging  Imaging  No results found.    Cardiology, Vascular, and Other Imaging  No other imaging results found for the past 2 days        Relevant Results  Results for orders placed or performed during the hospital encounter of 05/09/25 (from the past 24 hours)   CBC   Result Value Ref Range    WBC 9.8 4.4 - 11.3 x10*3/uL    nRBC 0.0 0.0 - 0.0 /100 WBCs    RBC 4.58 4.50 - 5.90 x10*6/uL    Hemoglobin 13.3 (L) 13.5 - 17.5 g/dL    Hematocrit 39.1 (L) 41.0 - 52.0 %    MCV 85 80 - 100 fL    MCH 29.0 26.0 - 34.0 pg    MCHC 34.0 32.0 - 36.0 g/dL    RDW 12.0 11.5 - 14.5 %    Platelets 213 150 - 450 x10*3/uL   Lactate   Result Value Ref Range    Lactate 1.0 0.4 - 2.0 mmol/L   Comprehensive metabolic panel   Result Value Ref Range    Glucose 106 (H) 74 - 99 mg/dL    Sodium 138 136 - 145 mmol/L    Potassium 4.4 3.5 - 5.3 mmol/L    Chloride 105 98 - 107 mmol/L    Bicarbonate 28 21 - 32 mmol/L    Anion Gap 9 (L) 10 - 20 mmol/L    Urea Nitrogen  16 6 - 23 mg/dL    Creatinine 0.96 0.50 - 1.30 mg/dL    eGFR >90 >60 mL/min/1.73m*2    Calcium 8.6 8.6 - 10.3 mg/dL    Albumin 3.5 3.4 - 5.0 g/dL    Alkaline Phosphatase 44 33 - 120 U/L    Total Protein 6.1 (L) 6.4 - 8.2 g/dL    AST 21 9 - 39 U/L    Bilirubin, Total 0.6 0.0 - 1.2 mg/dL    ALT 23 10 - 52 U/L   aPTT   Result Value Ref Range    aPTT 54.1 (H) 22.0 - 32.5 seconds   Lactate   Result Value Ref Range    Lactate 1.3 0.4 - 2.0 mmol/L   Comprehensive Metabolic Panel   Result Value Ref Range    Glucose 121 (H) 74 - 99 mg/dL    Sodium 137 136 - 145 mmol/L    Potassium 3.7 3.5 - 5.3 mmol/L    Chloride 103 98 - 107 mmol/L    Bicarbonate 29 21 - 32 mmol/L    Anion Gap 9 (L) 10 - 20 mmol/L    Urea Nitrogen 17 6 - 23 mg/dL    Creatinine 0.98 0.50 - 1.30 mg/dL    eGFR >90 >60 mL/min/1.73m*2    Calcium 9.1 8.6 - 10.3 mg/dL    Albumin 3.8 3.4 - 5.0 g/dL    Alkaline Phosphatase 48 33 - 120 U/L    Total Protein 6.8 6.4 - 8.2 g/dL    AST 35 9 - 39 U/L    Bilirubin, Total 0.6 0.0 - 1.2 mg/dL    ALT 29 10 - 52 U/L   aPTT   Result Value Ref Range    aPTT 34.9 (H) 22.0 - 32.5 seconds   aPTT   Result Value Ref Range    aPTT 46.1 (H) 22.0 - 32.5 seconds       Constitutional:  Alert and oriented to person, place, date/time in no acute distress.  HEENT:  Atraumatic, normocephalic. PERRL. EOMI.  Nares patent.  Mucous membranes moist.  .   Neck:  Trachea midline.  Respiratory:  Clear to auscultation.  Cardiac:  Regular rate and rhythm.  No murmurs.  Cardiovascular:  No edema of the extremities.  Pulse exam: Radial and femoral pulses palpable bilateral. Pedal pulses: Right DP nonpalpable.  Right femoral, popliteal PT palpable.  Left femoral, popliteal DP and PT pulses palpable.  Abdominal:  Soft, nontender, nondistended, bowel sounds present.  Musculoskeletal:  Moves extremities freely.  Dermatological: Clean and dry.  Right groin soft.  Neurological: Alert and oriented to person, place, date/time  Psych:  Calm,  cooperative        Assessment/Plan  Cristhian Thornton is 54 y.o. male with history of CAD and aortic valve replacement who was admitted to [facility] on 5/9/25 with postprandial pain and NSTEMI. His workup also showed a splenic abscess vs infarct. Vascular surgery was consulted after CT abdomen/pelvis with contrast revealed occlusion of the SMA. He also has short distance claudication of the RLE.      Echo shows no concern for valvular vegetation.      Plan:  -Patient's status post aortogram, SMA thrombectomy and right lower extremity angiogram by Dr. Kunz on 5/15/2025.  Please see Dr. Kunz's operative note.  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  SMA thrombectomy performed, 50% remaining stenosis for mural thrombus present  - Patient evaluated on 5/16/2025, much improvement with abdominal pain.  Denies postprandial pain.  Denies food fear  - Discontinue heparin drip  - Start Eliquis 5 mg twice daily.  Prescription sent to [facility] pharmacy  - Start Plavix 75 mg daily.  Prescription sent to [facility] pharmacy  - Patient to follow-up in 2 weeks with Dr. Kunz  - She is to undergo outpatient CTA with runoff, order placed  - He is to follow-up with cardiology with recommendation of MART    He is cleared for discharge from vascular standpoint.  Follow-up orders have been placed.

## 2025-05-16 NOTE — PROGRESS NOTES
05/16/25 1126   Discharge Planning   Living Arrangements Spouse/significant other   Support Systems Spouse/significant other   Expected Discharge Disposition Home     Cleared by vascular for discharge.   No home going skilled needs identified.  Will discharge home with spouse.

## 2025-05-16 NOTE — TELEPHONE ENCOUNTER
Patient called the office to make an appt within 1-2 weeks with a provider for a hospital follow up. He is to be having procedures with Dr. Kunz and needs a MART order. They will not be able to do the procedures until he sees cardiology and has the MART. Message sent in secure chat to Dr. Baltazar to see if we can Double book him sooner than the appt he has on 6/19.

## 2025-05-16 NOTE — CARE PLAN
The patient's goals for the shift include      The clinical goals for the shift include Patient will remain HDS this shift      Problem: ACS/CP/NSTEMI/STEMI  Goal: Chest pain managed (free from pain or at acceptable level)  Outcome: Progressing  Goal: Lab values return to normal range  Outcome: Progressing  Goal: Promote self management  Outcome: Progressing  Goal: Serial ECG will return to baseline  Outcome: Progressing  Goal: Verbalize understanding of procedures/devices  Outcome: Progressing  Goal: Wean vasopressors/achieve hemodynamic stability  Outcome: Progressing     Problem: Arrythmia/Dysrhythmia  Goal: Lab values return to normal range  Outcome: Progressing  Goal: No evidence of post procedure complications  Outcome: Progressing  Goal: Promote self management  Outcome: Progressing  Goal: Serial ECG will return to baseline  Outcome: Progressing  Goal: Verbalize understanding of procedures/devices  Outcome: Progressing  Goal: Vital signs return to baseline  Outcome: Progressing  Goal: Care and maintenance of device (specify)  Outcome: Progressing     Problem: Cardiac catheterization  Goal: Free from dysrhythmias  Outcome: Progressing  Goal: Free from pain  Outcome: Progressing  Goal: No evidence of post procedure complications  Outcome: Progressing  Goal: Promote self management  Outcome: Progressing  Goal: Verbalize understanding of procedure  Outcome: Progressing  Goal: Care and maintenance of device (specify)  Outcome: Progressing     Problem: Hypertensive Emergency/Crisis  Goal: Blood pressure gradually reduced to goal range  Outcome: Progressing  Goal: Free from signs of organ damage  Outcome: Progressing  Goal: Lab values return to normal range  Outcome: Progressing  Goal: Promote self management  Outcome: Progressing     Problem: Pain - Adult  Goal: Verbalizes/displays adequate comfort level or baseline comfort level  Outcome: Progressing     Problem: Safety - Adult  Goal: Free from fall  injury  Outcome: Progressing     Problem: Discharge Planning  Goal: Discharge to home or other facility with appropriate resources  Outcome: Progressing     Problem: Chronic Conditions and Co-morbidities  Goal: Patient's chronic conditions and co-morbidity symptoms are monitored and maintained or improved  Outcome: Progressing     Problem: Nutrition  Goal: Nutrient intake appropriate for maintaining nutritional needs  Outcome: Progressing

## 2025-05-19 ENCOUNTER — PATIENT OUTREACH (OUTPATIENT)
Dept: PRIMARY CARE | Facility: CLINIC | Age: 55
End: 2025-05-19
Payer: COMMERCIAL

## 2025-05-19 NOTE — DOCUMENTATION CLARIFICATION NOTE
PATIENT:               [facility] SHETTY  [id number]  MRN:                       [id number]  :                       1970  ADMIT DATE:       2025 8:02 AM  DISCH DATE:        2025 3:30 PM  RESPONDING PROVIDER #:        [id number]          PROVIDER RESPONSE TEXT:    NSTEMI ruled in for this admission    CDI QUERY TEXT:    Clarification    Instruction:    Based on your assessment of the patient and the clinical information, please provide the requested documentation by clicking on the appropriate radio button and enter any additional information if prompted.    Question: Is there a clear diagnosis indicative of the patient elevated Troponins and symptoms    When answering this query, please exercise your independent professional judgment. The fact that a question is being asked, does not imply that any particular answer is desired or expected.    The patient's clinical indicators include:  Clinical Information: 54y.o. M admitted with NSTEMI, fever & chills, anorexia & weight loss.     H&P: NSTEMI. Presentation not exactly consistent with acute coronary syndrome. I reviewed the EKG & it shows a right bundle branch block. Will get a third set of cardiac troponins.     Cards Consult: Elevated troponin, suspected splenic infarct. Patient presents with 1 to 2 weeks of progressive fatigue, chills, fever. He reports these are symptoms similar to when he had vegetations on his aortic valve replacement approximate 1 year ago. From my perspective I doubt the patient is having an NSTEMI. Given his abnormal anatomy believe that this patient would be more prone to troponin leaks.    5/10 Cards: Do not believe that his mildly elevated troponins are indicative of acute coronary syndrome. Will sign off.    5/15 IM PN: NSTEMI has been ruled out and cardiology no longer following     D/C Summary: Discharge Diagnosis - NSTEMI. 54 y.o. male with history of CAD and aortic valve replacement  admitted to [facility] with postprandial pain and NSTEMI.    Clinical Indicators:  5/9 at 0817 Troponin I:  87  5/9 at 0959 Troponin I: 104  5/9 at 1444 Troponin I: 120    5/9 TTE:  1. Left ventricular ejection fraction is low normal, by visual estimate at 50-55%  2. Spectral Doppler shows a Grade I (impaired relaxation pattern) of left ventricular diastolic filling with normal left atrial filling pressure  3. Abnormal septal motion consistent with post-operative status  4. There is normal right ventricular global systolic function  5. Mild to moderate mitral valve regurgitation  6. Mildly elevated right ventricular systolic pressure  7. Moderate to severe aortic valve stenosis    Treatment:  5/9-5/16 Aspirin 81-325mg po x8 doses  5/9-5/16 Pantoprazole 40mg po x14 doses  5/13-5/16 Heparin gtt  5/16 Plavix & Eliquis po x1 dose    Risk Factors: CAD, Aortic valve replacement, hx staph infections  Options provided:  -- NSTEMI ruled in for this admission  -- NSTEMI ruled out. Elevated Troponins likely associated with non-ischemic Acute Myocardial Injury  -- Other - I will add my own diagnosis  -- Refer to Clinical Documentation Reviewer    Query created by: Nyla Herring on 5/19/2025 7:39 AM      Electronically signed by:  ALCON LANDIS 5/19/2025 12:27 PM

## 2025-05-19 NOTE — PROGRESS NOTES
Discharge Facility: [facility]  Discharge Diagnosis: NSTEMI (non-ST elevated myocardial infarction)   Admission Date: 05/09/2025  Discharge Date: 05/16/2025    PCP Appointment Date: patient declined, will follow up with specialists  Specialist Appointment Date: Vasc Surg 06/03/2025, Cardio 06/19/2025  Hospital Encounter and Summary Linked: Yes  ED to Hosp-Admission (Discharged) with Sander Kennedy MD; Sterling Vargas DO; Jonathan Nur MD (05/09/2025)     See discharge assessment below for further details    Wrap Up  Wrap Up Additional Comments: CM spoke to patient via phone. He states that he is doing okay at home but admits to some fatigue, weight loss and decreased appetite. He has all discharge medications at the home. He has declined PCP follow up with specialists. He has been given this CM's contact information and is encouraged to call with any questions. He was thankful for this call. (5/19/2025 11:45 AM)    Medications  Medications reviewed with patient/caregiver?: Yes (5/19/2025 11:45 AM)  Is the patient having any side effects they believe may be caused by any medication additions or changes?: No (5/19/2025 11:45 AM)  Does the patient have all medications ordered at discharge?: Yes (5/19/2025 11:45 AM)  Prescription Comments: Scripts given at discharge for Eliquis and Plavix (5/19/2025 11:45 AM)  Is the patient taking all medications as directed (includes completed medication regime)?: Yes (5/19/2025 11:45 AM)  Medication Comments: Patient denies any issues obtaining or affording medication (5/19/2025 11:45 AM)    Appointments  Does the patient have a primary care provider?: Yes (5/19/2025 11:45 AM)  Care Management Interventions: -- (patient declined, will follow up with specialists) (5/19/2025 11:45 AM)  Has the patient kept scheduled appointments due by today?: Yes (5/19/2025 11:45 AM)    Self Management  What is the home health agency?: N/A (5/19/2025 11:45 AM)  What Durable  Medical Equipment (DME) was ordered?: N/A (5/19/2025 11:45 AM)    Patient Teaching  Does the patient have access to their discharge instructions?: Yes (5/19/2025 11:45 AM)  Care Management Interventions: Reviewed instructions with patient (5/19/2025 11:45 AM)  What is the patient's perception of their health status since discharge?: Improving (5/19/2025 11:45 AM)  Is the patient/caregiver able to teach back the hierarchy of who to call/visit for symptoms/problems? PCP, Specialist, Home Health nurse, Urgent Care, ED, 911: Yes (5/19/2025 11:45 AM)

## 2025-05-20 ENCOUNTER — OFFICE VISIT (OUTPATIENT)
Dept: CARDIOLOGY | Facility: CLINIC | Age: 55
End: 2025-05-20
Payer: COMMERCIAL

## 2025-05-20 ENCOUNTER — TELEPHONE (OUTPATIENT)
Facility: CLINIC | Age: 55
End: 2025-05-20

## 2025-05-20 ENCOUNTER — APPOINTMENT (OUTPATIENT)
Dept: CARDIOLOGY | Facility: HOSPITAL | Age: 55
End: 2025-05-20
Payer: COMMERCIAL

## 2025-05-20 VITALS
RESPIRATION RATE: 16 BRPM | SYSTOLIC BLOOD PRESSURE: 116 MMHG | OXYGEN SATURATION: 98 % | BODY MASS INDEX: 27.26 KG/M2 | DIASTOLIC BLOOD PRESSURE: 68 MMHG | HEART RATE: 76 BPM | WEIGHT: 190 LBS

## 2025-05-20 DIAGNOSIS — Z95.2 S/P AVR (AORTIC VALVE REPLACEMENT): ICD-10-CM

## 2025-05-20 DIAGNOSIS — K55.029: ICD-10-CM

## 2025-05-20 DIAGNOSIS — I35.0 NONRHEUMATIC AORTIC VALVE STENOSIS: Primary | ICD-10-CM

## 2025-05-20 DIAGNOSIS — Q23.81 BICUSPID AORTIC VALVE: ICD-10-CM

## 2025-05-20 DIAGNOSIS — I71.21 ANEURYSM OF ASCENDING AORTA WITHOUT RUPTURE: Primary | ICD-10-CM

## 2025-05-20 PROBLEM — I73.9 CLAUDICATION: Status: ACTIVE | Noted: 2025-05-20

## 2025-05-20 PROCEDURE — 99214 OFFICE O/P EST MOD 30 MIN: CPT | Performed by: INTERNAL MEDICINE

## 2025-05-20 PROCEDURE — 1036F TOBACCO NON-USER: CPT | Performed by: INTERNAL MEDICINE

## 2025-05-20 ASSESSMENT — LIFESTYLE VARIABLES
HAS A RELATIVE, FRIEND, DOCTOR, OR ANOTHER HEALTH PROFESSIONAL EXPRESSED CONCERN ABOUT YOUR DRINKING OR SUGGESTED YOU CUT DOWN: NO
HOW OFTEN DO YOU HAVE SIX OR MORE DRINKS ON ONE OCCASION: NEVER
HAS A RELATIVE, FRIEND, DOCTOR, OR ANOTHER HEALTH PROFESSIONAL EXPRESSED CONCERN ABOUT YOUR DRINKING OR SUGGESTED YOU CUT DOWN: NO
HOW OFTEN DO YOU HAVE SIX OR MORE DRINKS ON ONE OCCASION: NEVER
HOW MANY STANDARD DRINKS CONTAINING ALCOHOL DO YOU HAVE ON A TYPICAL DAY: PATIENT DOES NOT DRINK
SKIP TO QUESTIONS 9-10: 1
AUDIT TOTAL SCORE: 0
AUDIT-C TOTAL SCORE: 0
HOW MANY STANDARD DRINKS CONTAINING ALCOHOL DO YOU HAVE ON A TYPICAL DAY: PATIENT DOES NOT DRINK
HOW OFTEN DO YOU HAVE A DRINK CONTAINING ALCOHOL: NEVER
SKIP TO QUESTIONS 9-10: 1
AUDIT-C TOTAL SCORE: 0
HOW OFTEN DO YOU HAVE A DRINK CONTAINING ALCOHOL: NEVER
HAVE YOU OR SOMEONE ELSE BEEN INJURED AS A RESULT OF YOUR DRINKING: NO
HAVE YOU OR SOMEONE ELSE BEEN INJURED AS A RESULT OF YOUR DRINKING: NO
AUDIT TOTAL SCORE: 0

## 2025-05-20 ASSESSMENT — ENCOUNTER SYMPTOMS
WHEEZING: 0
CLAUDICATION: 1
COUGH: 0
FEVER: 0
MYALGIAS: 0
PND: 0
WEIGHT LOSS: 0
OCCASIONAL FEELINGS OF UNSTEADINESS: 0
SYNCOPE: 0
DIAPHORESIS: 0
IRREGULAR HEARTBEAT: 0
NEAR-SYNCOPE: 0
ORTHOPNEA: 0
DYSPNEA ON EXERTION: 0
DEPRESSION: 0
WEIGHT GAIN: 0
PALPITATIONS: 0
LOSS OF SENSATION IN FEET: 0
DIZZINESS: 0
WEAKNESS: 0
SHORTNESS OF BREATH: 0

## 2025-05-20 ASSESSMENT — PAIN SCALES - GENERAL: PAINLEVEL_OUTOF10: 0-NO PAIN

## 2025-05-20 ASSESSMENT — PATIENT HEALTH QUESTIONNAIRE - PHQ9
2. FEELING DOWN, DEPRESSED OR HOPELESS: NOT AT ALL
SUM OF ALL RESPONSES TO PHQ9 QUESTIONS 1 AND 2: 0
1. LITTLE INTEREST OR PLEASURE IN DOING THINGS: NOT AT ALL

## 2025-05-20 NOTE — ASSESSMENT & PLAN NOTE
S/p aspiration thrombectomy and angioplasty. Continue clopidogrel and Eliquis. Still searching for an embolic source. No evidence of atrial fibrillation. Would like to proceed with MART to assess AV and for embolic source. Would like to get him in this week.

## 2025-05-20 NOTE — H&P (VIEW-ONLY)
Subjective      Chief Complaint   Patient presents with    Follow-up     Follow up, post hospital discharge, post stent placement, post aortagram C/O abdominal pains, decreased appetite, chronic fatigue.          53 yo male with h/o bicuspid AoV and severe Aortic stenosis s/p AVR/Arch repair in 2020, cardiac catheterization at the time revealed normal coronaries. I sees me on a regular basis. He was hospitalized May 2024 with 6 different infections in 7 months (thigh, calf, left knee, left ring finger). He was admitted, cultures positive for GPC. 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 Inconclusive for veg, MART was without veg. He was on prolonged IV Abx via picc line, last was July 3.  I saw him earlier this month he was experiencing several weeks of significant decline in functional capacity and lack of appetite.  He also mentions chills and rigors.  I arranged for blood cultures and an echocardiogram.  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  He underwent percutaneous intervention where a penumbra catheter was used for aspiration of his SMA followed by angioplasty.  During this admission there was no evidence of atrial fibrillation.  Blood cultures were negative.  He had an echocardiogram which now shows severe prosthetic aortic stenosis with a mean gradient of 45 mmHg, peak velocity 438 cm/s. He has profound fatigue. He has discomfort in his LLE and is suspicious of clot. He has a CT angiogram upcoming.            Review of Systems   Constitutional: Positive for malaise/fatigue. Negative for diaphoresis, fever, weight gain and weight loss.   Eyes:  Negative for visual disturbance.   Cardiovascular:  Positive for claudication. Negative for chest pain, dyspnea on exertion, irregular heartbeat, leg swelling, near-syncope, orthopnea, palpitations, paroxysmal nocturnal dyspnea and  syncope.   Respiratory:  Negative for cough, shortness of breath and wheezing.    Musculoskeletal:  Negative for muscle weakness and myalgias.   Neurological:  Negative for dizziness and weakness.   All other systems reviewed and are negative.       Medical History[1]     Surgical History[2]     Social History     Socioeconomic History    Marital status:      Spouse name: Not on file    Number of children: Not on file    Years of education: Not on file    Highest education level: Not on file   Occupational History    Not on file   Tobacco Use    Smoking status: Never    Smokeless tobacco: Never   Substance and Sexual Activity    Alcohol use: Not Currently     Alcohol/week: 1.0 standard drink of alcohol     Types: 1 Standard drinks or equivalent per week    Drug use: Never    Sexual activity: Not on file   Other Topics Concern    Not on file   Social History Narrative    Not on file     Social Drivers of Health     Financial Resource Strain: Low Risk  (5/9/2025)    Overall Financial Resource Strain (CARDIA)     Difficulty of Paying Living Expenses: Not hard at all   Food Insecurity: No Food Insecurity (5/9/2025)    Hunger Vital Sign     Worried About Running Out of Food in the Last Year: Never true     Ran Out of Food in the Last Year: Never true   Transportation Needs: No Transportation Needs (5/9/2025)    PRAPARE - Transportation     Lack of Transportation (Medical): No     Lack of Transportation (Non-Medical): No   Physical Activity: Inactive (5/9/2025)    Exercise Vital Sign     Days of Exercise per Week: 0 days     Minutes of Exercise per Session: 0 min   Stress: No Stress Concern Present (5/9/2025)    [employer] [place] [employer] - Occupational Stress Questionnaire     Feeling of Stress : Not at all   Social Connections: Moderately Isolated (5/9/2025)    Social Connection and Isolation Panel [NHANES]     Frequency of Communication with Friends and Family: More than three times a week      Frequency of Social Gatherings with Friends and Family: Twice a week     Attends Anabaptism Services: Never     Active Member of Clubs or Organizations: No     Attends Club or Organization Meetings: Never     Marital Status:    Intimate Partner Violence: Not At Risk (5/9/2025)    Humiliation, Afraid, Rape, and Kick questionnaire     Fear of Current or Ex-Partner: No     Emotionally Abused: No     Physically Abused: No     Sexually Abused: No   Housing Stability: Low Risk  (5/9/2025)    Housing Stability Vital Sign     Unable to Pay for Housing in the Last Year: No     Number of Times Moved in the Last Year: 0     Homeless in the Last Year: No        Family History[3]     OBJECTIVE:    Vitals:    05/20/25 1451   BP: 116/68   Pulse: 76   Resp: 16   SpO2: 98%        Vitals reviewed.   Constitutional:       Appearance: Normal and healthy appearance. Not in distress.   Pulmonary:      Effort: Pulmonary effort is normal.      Breath sounds: Normal breath sounds.   Cardiovascular:      Normal rate. Regular rhythm. Normal S1. Normal S2.       Murmurs: There is a harsh midsystolic murmur at the URSB, radiating to the neck.      No gallop.  No click.   Pulses:     Intact distal pulses.   Edema:     Peripheral edema absent.   Skin:     General: Skin is warm and dry.   Neurological:      General: No focal deficit present.          Lab Review:   Lab Results   Component Value Date     05/16/2025     05/07/2025    K 3.7 05/16/2025    K 4.9 05/07/2025     05/16/2025     05/07/2025    CO2 29 05/16/2025    CO2 25 05/07/2025    BUN 17 05/16/2025    BUN 16 05/07/2025    CREATININE 0.98 05/16/2025    CREATININE 1.05 05/07/2025    GLUCOSE 121 (H) 05/16/2025    GLUCOSE 103 (H) 05/07/2025    CALCIUM 9.1 05/16/2025    CALCIUM 8.9 05/07/2025     Lab Results   Component Value Date    CHOL 127 04/03/2025    TRIG 62 04/03/2025    HDL 48 04/03/2025       Lab Results   Component Value Date    LDLCALC 65 04/03/2025         S/P AVR (aortic valve replacement)  With prosthetic aortic stenosis that is severe on TTE. Would like to proceed with MART    Claudication  Distal pulses are 2/4 DP and PT in both extremities. 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 Continue clopidogrel and Eliquis. Still searching for an embolic source. No evidence of atrial fibrillation. Would like to proceed with MART to assess AV and for embolic source. Would like to get him in this week.            [1]   Past Medical History:  Diagnosis Date    Abscess of left index finger 07/03/2024    Aortic valve stenosis     Closed fracture of distal end of humerus 03/10/2025    Elbow pain 03/10/2025    Infection     Multiple    Low testosterone     MRSA (methicillin resistant Staphylococcus aureus) septicemia (Multi) 07/03/2024   [2]   Past Surgical History:  Procedure Laterality Date    AORTIC VALVE REPLACEMENT      CERVICAL SPINE SURGERY      implant    ELBOW BURSA SURGERY Right     related to infection    HERNIA REPAIR      INVASIVE VASCULAR PROCEDURE N/A 5/15/2025    Procedure: Aortogram, SMA thrombectomy;  Surgeon: Pat Kunz MD;  Location: [facility] Lab;  Service: Vascular Surgery;  Laterality: N/A;    JOINT REPLACEMENT Right     knee    MR HEAD ANGIO WO IV CONTRAST  02/24/2023    MR HEAD ANGIO WO IV CONTRAST [facility]    MR HEAD ANGIO WO IV CONTRAST  09/05/2023    MR HEAD ANGIO WO IV CONTRAST [facility]    MR NECK ANGIO WO IV CONTRAST  12/20/2012    MR NECK ANGIO WO IV CONTRAST [facility]   [3]   Family History  Problem Relation Name Age of Onset    Autoimmune disease Mother      Hypertension Mother      PTSD Father      Benign prostatic hyperplasia Father      No Known Problems Sister      Other (htn) Brother      Other (hld) Brother      No Known Problems Daughter      No Known Problems Daughter      Other (watchman) Maternal  Grandfather      Other (cath) Maternal Grandfather      Other (3x bypass) Maternal Grandfather      Heart disease Maternal Grandfather

## 2025-05-20 NOTE — TELEPHONE ENCOUNTER
Dr. Baltazar reached out asking to order MART. Dr. Azevedo to do procedure this week. Will call for prior auth

## 2025-05-20 NOTE — PROGRESS NOTES
Subjective      Chief Complaint   Patient presents with    Follow-up     Follow up, post hospital discharge, post stent placement, post aortagram C/O abdominal pains, decreased appetite, chronic fatigue.          55 yo male with h/o bicuspid AoV and severe Aortic stenosis s/p AVR/Arch repair in 2020, cardiac catheterization at the time revealed normal coronaries. I sees me on a regular basis. He was hospitalized May 2024 with 6 different infections in 7 months (thigh, calf, left knee, left ring finger). He was admitted, cultures positive for GPC. 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 Inconclusive for veg, MART was without veg. He was on prolonged IV Abx via picc line, last was July 3.  I saw him earlier this month he was experiencing several weeks of significant decline in functional capacity and lack of appetite.  He also mentions chills and rigors.  I arranged for blood cultures and an echocardiogram.  He was seen last week at 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  He underwent percutaneous intervention where a penumbra catheter was used for aspiration of his SMA followed by angioplasty.  During this admission there was no evidence of atrial fibrillation.  Blood cultures were negative.  He had an echocardiogram which now shows severe prosthetic aortic stenosis with a mean gradient of 45 mmHg, peak velocity 438 cm/s. He has profound fatigue. He has discomfort in his LLE and is suspicious of clot. He has a CT angiogram upcoming.            Review of Systems   Constitutional: Positive for malaise/fatigue. Negative for diaphoresis, fever, weight gain and weight loss.   Eyes:  Negative for visual disturbance.   Cardiovascular:  Positive for claudication. Negative for chest pain, dyspnea on exertion, irregular heartbeat, leg swelling, near-syncope, orthopnea, palpitations, paroxysmal nocturnal dyspnea and  syncope.   Respiratory:  Negative for cough, shortness of breath and wheezing.    Musculoskeletal:  Negative for muscle weakness and myalgias.   Neurological:  Negative for dizziness and weakness.   All other systems reviewed and are negative.       Medical History[1]     Surgical History[2]     Social History     Socioeconomic History    Marital status:      Spouse name: Not on file    Number of children: Not on file    Years of education: Not on file    Highest education level: Not on file   Occupational History    Not on file   Tobacco Use    Smoking status: Never    Smokeless tobacco: Never   Substance and Sexual Activity    Alcohol use: Not Currently     Alcohol/week: 1.0 standard drink of alcohol     Types: 1 Standard drinks or equivalent per week    Drug use: Never    Sexual activity: Not on file   Other Topics Concern    Not on file   Social History Narrative    Not on file     Social Drivers of Health     Financial Resource Strain: Low Risk  (5/9/2025)    Overall Financial Resource Strain (CARDIA)     Difficulty of Paying Living Expenses: Not hard at all   Food Insecurity: No Food Insecurity (5/9/2025)    Hunger Vital Sign     Worried About Running Out of Food in the Last Year: Never true     Ran Out of Food in the Last Year: Never true   Transportation Needs: No Transportation Needs (5/9/2025)    PRAPARE - Transportation     Lack of Transportation (Medical): No     Lack of Transportation (Non-Medical): No   Physical Activity: Inactive (5/9/2025)    Exercise Vital Sign     Days of Exercise per Week: 0 days     Minutes of Exercise per Session: 0 min   Stress: No Stress Concern Present (5/9/2025)    [employer] [place] [employer] - Occupational Stress Questionnaire     Feeling of Stress : Not at all   Social Connections: Moderately Isolated (5/9/2025)    Social Connection and Isolation Panel [NHANES]     Frequency of Communication with Friends and Family: More than three times a week      Frequency of Social Gatherings with Friends and Family: Twice a week     Attends Episcopal Services: Never     Active Member of Clubs or Organizations: No     Attends Club or Organization Meetings: Never     Marital Status:    Intimate Partner Violence: Not At Risk (5/9/2025)    Humiliation, Afraid, Rape, and Kick questionnaire     Fear of Current or Ex-Partner: No     Emotionally Abused: No     Physically Abused: No     Sexually Abused: No   Housing Stability: Low Risk  (5/9/2025)    Housing Stability Vital Sign     Unable to Pay for Housing in the Last Year: No     Number of Times Moved in the Last Year: 0     Homeless in the Last Year: No        Family History[3]     OBJECTIVE:    Vitals:    05/20/25 1451   BP: 116/68   Pulse: 76   Resp: 16   SpO2: 98%        Vitals reviewed.   Constitutional:       Appearance: Normal and healthy appearance. Not in distress.   Pulmonary:      Effort: Pulmonary effort is normal.      Breath sounds: Normal breath sounds.   Cardiovascular:      Normal rate. Regular rhythm. Normal S1. Normal S2.       Murmurs: There is a harsh midsystolic murmur at the URSB, radiating to the neck.      No gallop.  No click.   Pulses:     Intact distal pulses.   Edema:     Peripheral edema absent.   Skin:     General: Skin is warm and dry.   Neurological:      General: No focal deficit present.          Lab Review:   Lab Results   Component Value Date     05/16/2025     05/07/2025    K 3.7 05/16/2025    K 4.9 05/07/2025     05/16/2025     05/07/2025    CO2 29 05/16/2025    CO2 25 05/07/2025    BUN 17 05/16/2025    BUN 16 05/07/2025    CREATININE 0.98 05/16/2025    CREATININE 1.05 05/07/2025    GLUCOSE 121 (H) 05/16/2025    GLUCOSE 103 (H) 05/07/2025    CALCIUM 9.1 05/16/2025    CALCIUM 8.9 05/07/2025     Lab Results   Component Value Date    CHOL 127 04/03/2025    TRIG 62 04/03/2025    HDL 48 04/03/2025       Lab Results   Component Value Date    LDLCALC 65 04/03/2025         S/P AVR (aortic valve replacement)  With prosthetic aortic stenosis that is severe on TTE. Would like to proceed with MART    Claudication  Distal pulses are 2/4 DP and PT in both extremities. 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 Continue clopidogrel and Eliquis. Still searching for an embolic source. No evidence of atrial fibrillation. Would like to proceed with MART to assess AV and for embolic source. Would like to get him in this week.            [1]   Past Medical History:  Diagnosis Date    Abscess of left index finger 07/03/2024    Aortic valve stenosis     Closed fracture of distal end of humerus 03/10/2025    Elbow pain 03/10/2025    Infection     Multiple    Low testosterone     MRSA (methicillin resistant Staphylococcus aureus) septicemia (Multi) 07/03/2024   [2]   Past Surgical History:  Procedure Laterality Date    AORTIC VALVE REPLACEMENT      CERVICAL SPINE SURGERY      implant    ELBOW BURSA SURGERY Right     related to infection    HERNIA REPAIR      INVASIVE VASCULAR PROCEDURE N/A 5/15/2025    Procedure: Aortogram, SMA thrombectomy;  Surgeon: Pat Kunz MD;  Location: [facility] Cardiac Cath Lab;  Service: Vascular Surgery;  Laterality: N/A;    JOINT REPLACEMENT Right     knee    MR HEAD ANGIO WO IV CONTRAST  02/24/2023    MR HEAD ANGIO WO IV CONTRAST [facility]    MR HEAD ANGIO WO IV CONTRAST  09/05/2023    MR HEAD ANGIO WO IV CONTRAST [facility]    MR NECK ANGIO WO IV CONTRAST  12/20/2012    MR NECK ANGIO WO IV CONTRAST [facility]   [3]   Family History  Problem Relation Name Age of Onset    Autoimmune disease Mother      Hypertension Mother      PTSD Father      Benign prostatic hyperplasia Father      No Known Problems Sister      Other (htn) Brother      Other (hld) Brother      No Known Problems Daughter      No Known Problems Daughter      Other (watchman) Maternal  Grandfather      Other (cath) Maternal Grandfather      Other (3x bypass) Maternal Grandfather      Heart disease Maternal Grandfather

## 2025-05-22 ENCOUNTER — HOSPITAL ENCOUNTER (OUTPATIENT)
Dept: CARDIOLOGY | Facility: HOSPITAL | Age: 55
Discharge: HOME | End: 2025-05-22
Payer: COMMERCIAL

## 2025-05-22 VITALS
OXYGEN SATURATION: 96 % | WEIGHT: 180 LBS | HEIGHT: 70 IN | BODY MASS INDEX: 25.77 KG/M2 | SYSTOLIC BLOOD PRESSURE: 104 MMHG | HEART RATE: 81 BPM | TEMPERATURE: 97.7 F | DIASTOLIC BLOOD PRESSURE: 68 MMHG | RESPIRATION RATE: 20 BRPM

## 2025-05-22 DIAGNOSIS — I35.0 NONRHEUMATIC AORTIC VALVE STENOSIS: ICD-10-CM

## 2025-05-22 LAB — BODY SURFACE AREA: 2.01 M2

## 2025-05-22 PROCEDURE — 2500000004 HC RX 250 GENERAL PHARMACY W/ HCPCS (ALT 636 FOR OP/ED): Performed by: INTERNAL MEDICINE

## 2025-05-22 PROCEDURE — 7100000009 HC PHASE TWO TIME - INITIAL BASE CHARGE

## 2025-05-22 PROCEDURE — 93325 DOPPLER ECHO COLOR FLOW MAPG: CPT

## 2025-05-22 PROCEDURE — 93312 ECHO TRANSESOPHAGEAL: CPT

## 2025-05-22 PROCEDURE — 93320 DOPPLER ECHO COMPLETE: CPT

## 2025-05-22 PROCEDURE — 99152 MOD SED SAME PHYS/QHP 5/>YRS: CPT

## 2025-05-22 PROCEDURE — 2500000005 HC RX 250 GENERAL PHARMACY W/O HCPCS: Performed by: INTERNAL MEDICINE

## 2025-05-22 PROCEDURE — 93318 ECHO TRANSESOPHAGEAL INTRAOP: CPT

## 2025-05-22 PROCEDURE — 7100000010 HC PHASE TWO TIME - EACH INCREMENTAL 1 MINUTE

## 2025-05-22 RX ORDER — LIDOCAINE HYDROCHLORIDE 20 MG/ML
SOLUTION OROPHARYNGEAL AS NEEDED
Status: DISCONTINUED | OUTPATIENT
Start: 2025-05-22 | End: 2025-05-22 | Stop reason: HOSPADM

## 2025-05-22 RX ORDER — MIDAZOLAM HYDROCHLORIDE 1 MG/ML
INJECTION, SOLUTION INTRAMUSCULAR; INTRAVENOUS AS NEEDED
Status: DISCONTINUED | OUTPATIENT
Start: 2025-05-22 | End: 2025-05-22 | Stop reason: HOSPADM

## 2025-05-22 RX ADMIN — LIDOCAINE HYDROCHLORIDE 1.25 ML: 20 SOLUTION ORAL; TOPICAL at 14:22

## 2025-05-22 RX ADMIN — MIDAZOLAM 2 MG: 1 INJECTION INTRAMUSCULAR; INTRAVENOUS at 14:23

## 2025-05-22 RX ADMIN — BENZOCAINE 1 SPRAY: 200 SPRAY DENTAL; ORAL; PERIODONTAL at 14:23

## 2025-05-22 RX ADMIN — MIDAZOLAM 1 MG: 1 INJECTION INTRAMUSCULAR; INTRAVENOUS at 14:27

## 2025-05-22 ASSESSMENT — COLUMBIA-SUICIDE SEVERITY RATING SCALE - C-SSRS
2. HAVE YOU ACTUALLY HAD ANY THOUGHTS OF KILLING YOURSELF?: NO
1. IN THE PAST MONTH, HAVE YOU WISHED YOU WERE DEAD OR WISHED YOU COULD GO TO SLEEP AND NOT WAKE UP?: NO
6. HAVE YOU EVER DONE ANYTHING, STARTED TO DO ANYTHING, OR PREPARED TO DO ANYTHING TO END YOUR LIFE?: NO

## 2025-05-22 ASSESSMENT — PAIN SCALES - GENERAL: PAINLEVEL_OUTOF10: 0 - NO PAIN

## 2025-05-22 ASSESSMENT — PAIN - FUNCTIONAL ASSESSMENT: PAIN_FUNCTIONAL_ASSESSMENT: 0-10

## 2025-05-22 NOTE — Clinical Note
Patient ID band present and verified. Patient contact is in the lobby. Contact(s) present: Daughter.

## 2025-05-22 NOTE — DISCHARGE INSTRUCTIONS
Please follow the instructions below after your transesophageal echocardiogram:    1) Do not eat or drink anything for two hours after your procedure, until 430pm. (5/22/2025).  Start with a little bit of water to be sure you can swallow without coughing.  2) You may have a mild sore throat for a day or two. Cough drops may help after the  two hour wait. Tylenol may also be taken once you can swallow.  3) Although the symptoms below are rare, call your doctor at once, or seek emergency  care if you have:  - Bright red blood in your sputum - Sustained chest pain   - Severe shortness of breath - Severe abdominal pain    You received sedation today, please follow these guidelines:  FOR NEXT 24 HOURS  - Upon discharge, you should return home and rest for the remainder of the day and evening. You do not have to stay on bed rest but should not be very active.  It is recommended a responsible adult be with you for the first 24 hours after the procedure.    -Please resume your blood thinning medication the evening of your procedure.      - No driving for 24 hours after procedure.     - Do not drive, operate machinery, or use power tools for 24 hours after your procedure.     - Do not make any legal decisions for 24 hours after your procedure.     - Do not drink alcoholic beverages for 24 hours after your procedure.     - Allergy symptoms (hives, rash, nausea, vomiting, difficulty breathing   If you are an out-patient, a nurse will have placed an IV during the study for sedation  and contrast injection. The nurse will remove the IV before she sends you home. Leave  the band-aid on for 24 hours and then check the IV site for signs of infection, such as:  ? Increasing soreness  ? Redness  ? Drainage from the puncture site  If you notice any of these conditions, you should contact your doctor immediately.

## 2025-05-23 ENCOUNTER — APPOINTMENT (OUTPATIENT)
Dept: CARDIOLOGY | Facility: HOSPITAL | Age: 55
End: 2025-05-23
Payer: COMMERCIAL

## 2025-05-30 LAB — EJECTION FRACTION: 53 %

## 2025-06-03 ENCOUNTER — APPOINTMENT (OUTPATIENT)
Dept: RADIOLOGY | Facility: HOSPITAL | Age: 55
End: 2025-06-03
Payer: COMMERCIAL

## 2025-06-03 ENCOUNTER — OFFICE VISIT (OUTPATIENT)
Dept: VASCULAR SURGERY | Facility: CLINIC | Age: 55
End: 2025-06-03
Payer: COMMERCIAL

## 2025-06-03 VITALS
SYSTOLIC BLOOD PRESSURE: 106 MMHG | DIASTOLIC BLOOD PRESSURE: 79 MMHG | BODY MASS INDEX: 26.42 KG/M2 | WEIGHT: 184.58 LBS | HEIGHT: 70 IN | HEART RATE: 79 BPM

## 2025-06-03 DIAGNOSIS — B95.8 STAPH INFECTION: ICD-10-CM

## 2025-06-03 DIAGNOSIS — I74.4: ICD-10-CM

## 2025-06-03 DIAGNOSIS — I73.9 CLAUDICATION: ICD-10-CM

## 2025-06-03 DIAGNOSIS — K55.069 OCCLUSION OF SUPERIOR MESENTERIC ARTERY (MULTI): ICD-10-CM

## 2025-06-03 DIAGNOSIS — I73.9 PAD (PERIPHERAL ARTERY DISEASE): ICD-10-CM

## 2025-06-03 DIAGNOSIS — Z95.2 S/P AVR (AORTIC VALVE REPLACEMENT): Primary | ICD-10-CM

## 2025-06-03 DIAGNOSIS — R61 NIGHT SWEATS: ICD-10-CM

## 2025-06-03 PROCEDURE — 75635 CT ANGIO ABDOMINAL ARTERIES: CPT | Performed by: RADIOLOGY

## 2025-06-03 PROCEDURE — 99212 OFFICE O/P EST SF 10 MIN: CPT | Performed by: SURGERY

## 2025-06-03 PROCEDURE — 3008F BODY MASS INDEX DOCD: CPT | Performed by: SURGERY

## 2025-06-03 PROCEDURE — 2550000001 HC RX 255 CONTRASTS: Performed by: NURSE PRACTITIONER

## 2025-06-03 PROCEDURE — 1036F TOBACCO NON-USER: CPT | Performed by: SURGERY

## 2025-06-03 PROCEDURE — 75635 CT ANGIO ABDOMINAL ARTERIES: CPT

## 2025-06-03 RX ADMIN — IOHEXOL 120 ML: 350 INJECTION, SOLUTION INTRAVENOUS at 14:35

## 2025-06-03 ASSESSMENT — PAIN SCALES - GENERAL: PAINLEVEL_OUTOF10: 3

## 2025-06-03 NOTE — PROGRESS NOTES
Patient comes back after his mesenteric thrombectomy.  This was done for chronic mesenteric ischemia resulting in a 20 pound weight loss.  His postprandial abdominal pain has resolved.  He says 99% of the time he will be fine but every once in a while he will get early satiety and some nausea with eating food but this quickly resolves.  His CT angiogram shows patency of his superior mesenteric artery with an area of dissection but all the branch vessels are perfused.  The dissection ends in a thrombosed false lumen that causes a 50% stenosis in the mid superior mesenteric artery but the distal branches are widely patent and perfused.  The celiac axis is widely patent.  He had a previous splenic artery embolism.  We do know that his right popliteal artery is occluded from thromboembolism.  He says that after his discharge he developed left calf claudication as well.  CT angiogram does show occlusion of his posterior tibial artery but this could be contrast timing.    Will concerns me is that his MART does show valve leaflet thickening and a stenosis.  He reports that during and after his hospitalization he was afebrile but lately he has been having chills and night sweats.  I am going to order blood cultures and CBC, ESR, and CRP.  I am going to notify his cardiologist.    I discussed with him the management of claudication.  I am going to have him start exercising by walking.  I went to recheck him in a month with exercise PVRs.  I do not think intervention at this time is warranted for claudication only.  Endovascular thrombectomy may not work well as his popliteal artery below the knee is occluded and his tibial arteries reconstitute.  Despite this he only has claudication and no rest pain.  Also the question of an endocarditis of his prosthetic valve is my concern.

## 2025-06-04 ENCOUNTER — TELEPHONE (OUTPATIENT)
Facility: CLINIC | Age: 55
End: 2025-06-04
Payer: COMMERCIAL

## 2025-06-04 DIAGNOSIS — I33.0 CHRONIC BACTERIAL ENDOCARDITIS (HHS-HCC): ICD-10-CM

## 2025-06-04 DIAGNOSIS — K55.029: Primary | ICD-10-CM

## 2025-06-04 DIAGNOSIS — Z95.2 S/P AVR (AORTIC VALVE REPLACEMENT): Primary | ICD-10-CM

## 2025-06-04 DIAGNOSIS — B95.8 STAPH INFECTION: ICD-10-CM

## 2025-06-04 LAB
CRP SERPL-MCNC: 10 MG/L
ERYTHROCYTE [DISTWIDTH] IN BLOOD BY AUTOMATED COUNT: 12.7 % (ref 11–15)
ERYTHROCYTE [SEDIMENTATION RATE] IN BLOOD BY WESTERGREN METHOD: 2 MM/H
HCT VFR BLD AUTO: 40.4 % (ref 38.5–50)
HGB BLD-MCNC: 13.1 G/DL (ref 13.2–17.1)
MCH RBC QN AUTO: 28.9 PG (ref 27–33)
MCHC RBC AUTO-ENTMCNC: 32.4 G/DL (ref 32–36)
MCV RBC AUTO: 89 FL (ref 80–100)
PLATELET # BLD AUTO: 139 THOUSAND/UL (ref 140–400)
PMV BLD REES-ECKER: 10.7 FL (ref 7.5–12.5)
RBC # BLD AUTO: 4.54 MILLION/UL (ref 4.2–5.8)
WBC # BLD AUTO: 3.6 THOUSAND/UL (ref 3.8–10.8)

## 2025-06-05 ENCOUNTER — ANCILLARY PROCEDURE (OUTPATIENT)
Facility: CLINIC | Age: 55
End: 2025-06-05
Payer: COMMERCIAL

## 2025-06-05 DIAGNOSIS — K55.029: ICD-10-CM

## 2025-06-05 PROCEDURE — 93246 EXT ECG>7D<15D RECORDING: CPT

## 2025-06-10 DIAGNOSIS — B95.8 STAPH INFECTION: Primary | ICD-10-CM

## 2025-06-10 DIAGNOSIS — Z95.2 S/P AVR (AORTIC VALVE REPLACEMENT): ICD-10-CM

## 2025-06-19 ENCOUNTER — APPOINTMENT (OUTPATIENT)
Dept: RADIOLOGY | Facility: CLINIC | Age: 55
End: 2025-06-19
Payer: COMMERCIAL

## 2025-06-19 ENCOUNTER — APPOINTMENT (OUTPATIENT)
Facility: CLINIC | Age: 55
End: 2025-06-19
Payer: COMMERCIAL

## 2025-06-24 PROCEDURE — 93248 EXT ECG>7D<15D REV&INTERPJ: CPT | Performed by: INTERNAL MEDICINE

## 2025-06-25 ENCOUNTER — HOSPITAL ENCOUNTER (OUTPATIENT)
Dept: RADIOLOGY | Facility: HOSPITAL | Age: 55
Discharge: HOME | End: 2025-06-25
Payer: COMMERCIAL

## 2025-06-25 DIAGNOSIS — Z95.2 S/P AVR (AORTIC VALVE REPLACEMENT): ICD-10-CM

## 2025-06-25 DIAGNOSIS — B95.8 STAPH INFECTION: ICD-10-CM

## 2025-06-25 PROCEDURE — 78804 RP LOCLZJ TUM WHBDY 2+D IMG: CPT

## 2025-06-25 PROCEDURE — A9547 IN111 OXYQUINOLINE: HCPCS | Mod: JZ | Performed by: NURSE PRACTITIONER

## 2025-06-25 PROCEDURE — 3430000001 HC RX 343 DIAGNOSTIC RADIOPHARMACEUTICALS: Mod: JZ | Performed by: NURSE PRACTITIONER

## 2025-06-25 RX ORDER — INDIUM IN-111 OXYQUINOLINE 1 UG/ML
505 SOLUTION INTRAVENOUS
Status: COMPLETED | OUTPATIENT
Start: 2025-06-25 | End: 2025-06-25

## 2025-06-25 RX ADMIN — INDIUM IN-111 OXYQUINOLINE 0.51 MILLICURIE: 1 SOLUTION INTRAVENOUS at 10:55

## 2025-06-26 ENCOUNTER — HOSPITAL ENCOUNTER (OUTPATIENT)
Dept: RADIOLOGY | Facility: HOSPITAL | Age: 55
Discharge: HOME | End: 2025-06-26
Payer: COMMERCIAL

## 2025-06-26 PROCEDURE — 78802 RP LOCLZJ TUM WHBDY 1 D IMG: CPT | Performed by: STUDENT IN AN ORGANIZED HEALTH CARE EDUCATION/TRAINING PROGRAM

## 2025-06-26 PROCEDURE — 78803 RP LOCLZJ TUM SPECT 1 AREA: CPT | Performed by: STUDENT IN AN ORGANIZED HEALTH CARE EDUCATION/TRAINING PROGRAM

## 2025-07-07 ENCOUNTER — ANCILLARY PROCEDURE (OUTPATIENT)
Dept: VASCULAR MEDICINE | Facility: CLINIC | Age: 55
End: 2025-07-07
Payer: COMMERCIAL

## 2025-07-07 DIAGNOSIS — I74.4: ICD-10-CM

## 2025-07-07 DIAGNOSIS — B95.8 STAPH INFECTION: ICD-10-CM

## 2025-07-07 DIAGNOSIS — R61 NIGHT SWEATS: ICD-10-CM

## 2025-07-07 DIAGNOSIS — I74.3 EMBOLISM AND THROMBOSIS OF ARTERIES OF THE LOWER EXTREMITIES: ICD-10-CM

## 2025-07-07 DIAGNOSIS — Z95.2 S/P AVR (AORTIC VALVE REPLACEMENT): ICD-10-CM

## 2025-07-07 PROCEDURE — 93924 LWR XTR VASC STDY BILAT: CPT | Performed by: SURGERY

## 2025-07-07 PROCEDURE — 93924 LWR XTR VASC STDY BILAT: CPT

## 2025-07-08 ENCOUNTER — TELEPHONE (OUTPATIENT)
Dept: VASCULAR SURGERY | Facility: CLINIC | Age: 55
End: 2025-07-08

## 2025-07-08 ENCOUNTER — OFFICE VISIT (OUTPATIENT)
Dept: VASCULAR SURGERY | Facility: CLINIC | Age: 55
End: 2025-07-08
Payer: COMMERCIAL

## 2025-07-08 VITALS
WEIGHT: 186 LBS | BODY MASS INDEX: 26.63 KG/M2 | HEART RATE: 76 BPM | RESPIRATION RATE: 16 BRPM | HEIGHT: 70 IN | DIASTOLIC BLOOD PRESSURE: 85 MMHG | SYSTOLIC BLOOD PRESSURE: 117 MMHG

## 2025-07-08 DIAGNOSIS — I73.9 PAD (PERIPHERAL ARTERY DISEASE): ICD-10-CM

## 2025-07-08 DIAGNOSIS — I74.4: Primary | ICD-10-CM

## 2025-07-08 DIAGNOSIS — I73.9 PAD (PERIPHERAL ARTERY DISEASE): Primary | ICD-10-CM

## 2025-07-08 PROCEDURE — 99215 OFFICE O/P EST HI 40 MIN: CPT | Performed by: SURGERY

## 2025-07-08 PROCEDURE — 1036F TOBACCO NON-USER: CPT | Performed by: SURGERY

## 2025-07-08 PROCEDURE — 3008F BODY MASS INDEX DOCD: CPT | Performed by: SURGERY

## 2025-07-08 ASSESSMENT — ENCOUNTER SYMPTOMS
LOSS OF SENSATION IN FEET: 0
OCCASIONAL FEELINGS OF UNSTEADINESS: 0
DEPRESSION: 0

## 2025-07-08 ASSESSMENT — LIFESTYLE VARIABLES
SKIP TO QUESTIONS 9-10: 1
HOW MANY STANDARD DRINKS CONTAINING ALCOHOL DO YOU HAVE ON A TYPICAL DAY: 1 OR 2
AUDIT TOTAL SCORE: 1
HOW OFTEN DO YOU HAVE A DRINK CONTAINING ALCOHOL: MONTHLY OR LESS
AUDIT-C TOTAL SCORE: 1
HOW OFTEN DO YOU HAVE SIX OR MORE DRINKS ON ONE OCCASION: NEVER
HAVE YOU OR SOMEONE ELSE BEEN INJURED AS A RESULT OF YOUR DRINKING: NO
HAS A RELATIVE, FRIEND, DOCTOR, OR ANOTHER HEALTH PROFESSIONAL EXPRESSED CONCERN ABOUT YOUR DRINKING OR SUGGESTED YOU CUT DOWN: NO

## 2025-07-08 ASSESSMENT — VISUAL ACUITY: OU: 1

## 2025-07-08 ASSESSMENT — PATIENT HEALTH QUESTIONNAIRE - PHQ9
1. LITTLE INTEREST OR PLEASURE IN DOING THINGS: NOT AT ALL
SUM OF ALL RESPONSES TO PHQ9 QUESTIONS 1 AND 2: 0
2. FEELING DOWN, DEPRESSED OR HOPELESS: NOT AT ALL

## 2025-07-08 ASSESSMENT — PAIN SCALES - GENERAL: PAINLEVEL_OUTOF10: 4

## 2025-07-08 NOTE — H&P (VIEW-ONLY)
Primary Care Physician: Amberly Del Castillo PA-C  Primary Cardiologist:       Date of Visit: 07/08/2025  3:00 PM EDT  Location of visit: [facility]   Type of Visit: Follow up             Chief Complaint   Patient presents with    Follow-up     1 Month        HPI / Summary:   Cristhian Thornton is a 54 y.o. male  with    who returns for routine follow up   Patient had multiple organs affected by thromboembolism including his spleen and intestines and right leg.  With his elevated sed rate I was worried about bacterial endocarditis and he had a indium scan which was negative for aortic valve endocarditis.  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  He has been tolerating his anticoagulation and Plavix.  I have him on triple therapy.    With regard to the splenic infarctions I did reassure him that most people regenerate spleen quite well.  The mesenteric dissection is stable and can be rechecked with duplex or angiography as needed.  His right leg still claudicates and is debilitating to him.  He is unable to climb stairs.  He is unable to walk more than moderate distances.  His thromboembolism to his right popliteal artery occluded but he is able to avoid rest pain.  His exercise ABIs are stable and do not drop with treadmill testing.  He has near normal PVRs in the right leg and normal ones on the left.    After some discussion with him I am agreeable to proceeding with an arteriogram to try to thrombectomized his occluded popliteal artery.  Given the timeframe it may not thrombectomize well and he may require an angioplasty or even a stent.  The risk and rationale for this was discussed and he is agreeable to proceeding.  He should continue his Plavix up to the night before procedure.  He should continue his anticoagulation up to the night before the procedure.  He should hold his anticoagulation and antiplatelet therapy in the  morning of procedure.    12 system review is negative except as noted above        Medical History:   Medical History[1]    Social History:   Tobacco Use: Low Risk  (7/8/2025)    Patient History     Smoking Tobacco Use: Never     Smokeless Tobacco Use: Never     Passive Exposure: Never         MEDICATIONS:   Current Outpatient Medications   Medication Instructions    ANASTROZOLE ORAL 0.25 mg, 2 times weekly    apixaban (ELIQUIS) 5 mg, oral, 2 times daily    cholecalciferol, vitamin D3, (VITAMIN D3 ORAL) 1 each, Daily    clopidogrel (PLAVIX) 75 mg, oral, Daily    FLUoxetine (PROZAC) 20 mg, oral, Daily    traZODone (DESYREL) 50 mg, oral, Nightly PRN         IMAGING REVIEWED:   Echocardiogram:   ECHOCARDIOGRAM     Narrative  Ordered by an unspecified provider.    Stress Testing: No results found for this or any previous visit from the past 1825 days.    Cardiac Catheterization:   ADULT CATH     Narrative  Ordered by an unspecified provider.    Cardiac Scoring: No results found for this or any previous visit from the past 1825 days.    AAA : No results found for this or any previous visit from the past 1825 days.    OTHER: No results found for this or any previous visit from the past 1825 days.          LABS:  CBC with Differential:    Lab Results   Component Value Date    WBC 3.6 (L) 06/03/2025    RBC 4.54 06/03/2025    HGB 13.1 (L) 06/03/2025    HCT 40.4 06/03/2025     (L) 06/03/2025    MCV 89.0 06/03/2025    MCH 28.9 06/03/2025    MCHC 32.4 06/03/2025    RDW 12.7 06/03/2025    NRBC 0.0 05/15/2025    LYMPHOPCT 12.1 05/09/2025    MONOPCT 8.0 05/09/2025    EOSPCT 0.8 05/09/2025    BASOPCT 0.6 05/09/2025    MONOSABS 0.78 05/09/2025    LYMPHSABS 1.18 (L) 05/09/2025    EOSABS 0.08 05/09/2025    BASOSABS 0.06 05/09/2025     CMP:    Lab Results   Component Value Date     05/16/2025     05/07/2025    K 3.7 05/16/2025    K 4.9 05/07/2025     05/16/2025     05/07/2025    CO2 29 05/16/2025    CO2  "25 05/07/2025    BUN 17 05/16/2025    BUN 16 05/07/2025    CREATININE 0.98 05/16/2025    CREATININE 1.05 05/07/2025    GLUCOSE 121 (H) 05/16/2025    GLUCOSE 103 (H) 05/07/2025    PROT 6.8 05/16/2025    PROT 6.3 04/03/2025    CALCIUM 9.1 05/16/2025    CALCIUM 8.9 05/07/2025    BILITOT 0.6 05/16/2025    BILITOT 0.4 04/03/2025    ALKPHOS 48 05/16/2025    ALKPHOS 55 04/03/2025    AST 35 05/16/2025    AST 19 04/03/2025    ALT 29 05/16/2025    ALT 19 04/03/2025     BMP:    Lab Results   Component Value Date     05/16/2025     05/07/2025    K 3.7 05/16/2025    K 4.9 05/07/2025     05/16/2025     05/07/2025    CO2 29 05/16/2025    CO2 25 05/07/2025    BUN 17 05/16/2025    BUN 16 05/07/2025    CREATININE 0.98 05/16/2025    CREATININE 1.05 05/07/2025    CALCIUM 9.1 05/16/2025    CALCIUM 8.9 05/07/2025    GLUCOSE 121 (H) 05/16/2025    GLUCOSE 103 (H) 05/07/2025     Magnesium:  Lab Results   Component Value Date    MG 2.1 11/04/2020     Troponin:    Lab Results   Component Value Date    TROPHS 120 (HH) 05/09/2025    TROPHS 104 (HH) 05/09/2025    TROPHS 87 (HH) 05/09/2025     BNP: No results found for: \"BNP\"      Lipid Panel:  Lab Results   Component Value Date    HDL 48 04/03/2025    CHHDL 2.6 04/03/2025    TRIG 62 04/03/2025    NHDL 79 04/03/2025        Lab work and imaging results independently reviewed by me         Vascular Physical Exam  Constitutional:       General: He is awake.      Appearance: He is well-developed.   HENT:      Head: Normocephalic and atraumatic.      Nose: Nose normal.   Eyes:      General: Vision grossly intact.      Pupils: Pupils are equal, round, and reactive to light.   Cardiovascular:      Rate and Rhythm: Normal rate.      Pulses:           Femoral pulses are 2+ on the right side and 2+ on the left side.       Dorsalis pedis pulses are non-palpable on the right side and 2+ on the left side.          Posterior tibial pulses are non-palpable on the right side and 2+ on the " left side.    Pulmonary:      Effort: Pulmonary effort is normal.   Abdominal:      Palpations: Abdomen is soft.   Musculoskeletal:         General: Normal range of motion.      Neck: Full passive range of motion without pain.   Skin:     General: Skin is warm.   Neurological:      General: No focal deficit present.      Mental Status: He is alert.   Psychiatric:         Mood and Affect: Mood normal.         Behavior: Behavior normal.           Diagnoses and all orders for this visit:  Artery of extremity, embolism and thrombosis  -     Case Request Cath Lab: Lower Extremity Intervention  PAD (peripheral artery disease)  -     Case Request Cath Lab: Lower Extremity Intervention            Pat Kunz MD       Orders:  No orders of the defined types were placed in this encounter.        Followup Appts:  No future appointments.         [1]   Past Medical History:  Diagnosis Date    Abscess of left index finger 07/03/2024    Aneurysm 2023    Aortic valve stenosis     Closed fracture of distal end of humerus 03/10/2025    Elbow pain 03/10/2025    Infection     Multiple    Low testosterone     MRSA (methicillin resistant Staphylococcus aureus) septicemia (Multi) 07/03/2024

## 2025-07-08 NOTE — TELEPHONE ENCOUNTER
Spoke with patient and reviewed pre op instructions for aortogram scheduled on 7/24/25.  Instructed per Dr. Kunz to hold Eliquis and Plavix the morning of procedure.  Instructed to remain NPO after midnight the night before and have blood work completed 5-7 days prior to procedure.  He voiced understanding.

## 2025-07-11 ENCOUNTER — TELEPHONE (OUTPATIENT)
Dept: CARDIOLOGY | Facility: HOSPITAL | Age: 55
End: 2025-07-11
Payer: COMMERCIAL

## 2025-07-11 ENCOUNTER — TELEPHONE (OUTPATIENT)
Facility: CLINIC | Age: 55
End: 2025-07-11
Payer: COMMERCIAL

## 2025-07-11 DIAGNOSIS — I35.0 NONRHEUMATIC AORTIC VALVE STENOSIS: Primary | ICD-10-CM

## 2025-07-11 NOTE — TELEPHONE ENCOUNTER
Pt called stating Dr Baltazar had called him a few weeks ago and stated he would make a referral to the valve clinic. This is not ordered or documented. Chat message sent to Dr Baltazar to verify

## 2025-07-11 NOTE — TELEPHONE ENCOUNTER
Nurse navigator called patient to discuss upcoming appointment with Dr. Jha. Verified patient with name and . Relayed to patient that he would also be seeing Dr. Gray to evaluate his aortic valve. Patient encouraged to go to ER if symptoms worsen. Contact information provided for any additional questions.

## 2025-07-19 NOTE — PROGRESS NOTES
Chief Complaint  Fatigue, lightheadedness.    HPI:   Mr. Cristhian Thornton is an 54 y.o. male, who is a patient of Dr. 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  He works as a contractor.   He has been admitted with 6-7 infections in the last several months with recurrent chronic thromboembolism.  This is included intervention on the superior mesenteric artery and has had bowel as well as splenic infarction.  He has a relative past surgical history of aortic valve replacement with a 23 mm Inspiris Resilia tissue valve as well as 28 mm Hemashield graft by Dr. Walt Burns on November 20, 2020.  He initially felt well after recovery, but has had night sweats and chills in May of this year, and then went to hospital.  He was treated with antibiotics initially.    He has six staphylococcus infections in the last year and a half.        Medical History[1]    Surgical History[2]    Family History[3]    Social History     Socioeconomic History    Marital status:      Spouse name: Not on file    Number of children: Not on file    Years of education: Not on file    Highest education level: Not on file   Occupational History    Not on file   Tobacco Use    Smoking status: Never     Passive exposure: Never    Smokeless tobacco: Never   Substance and Sexual Activity    Alcohol use: Yes     Alcohol/week: 4.0 standard drinks of alcohol     Types: 1 Standard drinks or equivalent, 3 Cans of beer per week    Drug use: Never    Sexual activity: Yes     Partners: Female     Birth control/protection: None   Other Topics Concern    Not on file   Social History Narrative    Not on file     Social Drivers of Health     Financial Resource Strain: Low Risk  (5/9/2025)    Overall Financial Resource Strain (CARDIA)     Difficulty of Paying Living Expenses: Not hard at all   Food Insecurity: No Food Insecurity (5/9/2025)    Hunger Vital Sign     Worried About  Running Out of Food in the Last Year: Never true     Ran Out of Food in the Last Year: Never true   Transportation Needs: No Transportation Needs (5/9/2025)    PRAPARE - Transportation     Lack of Transportation (Medical): No     Lack of Transportation (Non-Medical): No   Physical Activity: Inactive (5/9/2025)    Exercise Vital Sign     Days of Exercise per Week: 0 days     Minutes of Exercise per Session: 0 min   Stress: No Stress Concern Present (5/9/2025)    [employer] [place] [employer] - Occupational Stress Questionnaire     Feeling of Stress : Not at all   Social Connections: Moderately Isolated (5/9/2025)    Social Connection and Isolation Panel     Frequency of Communication with Friends and Family: More than three times a week     Frequency of Social Gatherings with Friends and Family: Twice a week     Attends Religion Services: Never     Active Member of Clubs or Organizations: No     Attends Club or Organization Meetings: Never     Marital Status:    Intimate Partner Violence: Not At Risk (5/9/2025)    Humiliation, Afraid, Rape, and Kick questionnaire     Fear of Current or Ex-Partner: No     Emotionally Abused: No     Physically Abused: No     Sexually Abused: No   Housing Stability: Low Risk  (5/9/2025)    Housing Stability Vital Sign     Unable to Pay for Housing in the Last Year: No     Number of Times Moved in the Last Year: 0     Homeless in the Last Year: No       RX Allergies[4]    Encounter Medications[5]    Physical Exam  Constitutional:       Appearance: Normal appearance.   HENT:      Head: Normocephalic.     Eyes:      General: No scleral icterus.     Pupils: Pupils are equal, round, and reactive to light.     Abdominal:      Palpations: Abdomen is soft.     Musculoskeletal:         General: Normal range of motion.      Cervical back: Normal range of motion.     Skin:     General: Skin is warm and dry.     Neurological:      Mental Status: He is alert and oriented to  person, place, and time.         Lab Results   Component Value Date    WBC 3.6 (L) 06/03/2025    HGB 13.1 (L) 06/03/2025    HCT 40.4 06/03/2025    MCV 89.0 06/03/2025     (L) 06/03/2025     Lab Results   Component Value Date    GLUCOSE 121 (H) 05/16/2025    CALCIUM 9.1 05/16/2025     05/16/2025    K 3.7 05/16/2025    CO2 29 05/16/2025     05/16/2025    BUN 17 05/16/2025    CREATININE 0.98 05/16/2025       Encounter Date: 05/09/25   ECG 12 lead   Result Value    Ventricular Rate 69    Atrial Rate 69    HI Interval 246    QRS Duration 130    QT Interval 414    QTC Calculation(Bazett) 443    P Axis 62    R Axis -5    T Axis 268    QRS Count 11    Q Onset 215    P Onset 92    P Offset 140    T Offset 422    QTC Fredericia 434    Narrative    Sinus rhythm with 1st degree AV block  Nonspecific intraventricular block  Minimal voltage criteria for LVH, may be normal variant ( [place] product )  T wave abnormality, consider inferior ischemia  Abnormal ECG  Confirmed by Amrit Jha ([id number]) on 5/11/2025 1:19:33 AM     Holter monitor June 5 through June 13, 2025: 0% burden of atrial fibrillation.  Primary rhythm was sinus rhythm first-degree AV block as well as left bundle branch block.  There are also incidences of SVT.    MART May 22, 2025: The ejection fraction of the left ventricle is estimated at 50-55%.  The aortic valve leaflets are moderately thickened, there is no specific vegetation noted.  The aortic valve appears to open reasonably well but has some stenosis.  Aortic valve area by planimetry is 0.7 cm².    TTE May 9, 2025: Severe left ventricular hypertrophy.  Left ventricular ejection fraction estimated 50-55%.  The aortic valve is prosthetic, and has severely thickened leaflets.  The aortic valve velocity is 4.3 m/s, the mean aortic valve gradient is 45 mmHg, the aortic valve area is calculated at 0.7 cm².  There is no significant mitral valve regurgitation.    Nuclear medicine study June 26,  2025: No evidence of aortic valve inflammation or infection.    Assessment and Plan:   Mr. Cristhian Thornton is an 54 y.o. male who presents with prosthetic aortic valve stenosis and multiple episodes of systemic thromboembolism. This is associated with what may likely be halt or leaflet thrombosis of the prosthetic valve; and in the setting of anxiety, osteoarthritis, and systemic thromboembolism.  Their symptoms include Fatigue.  He has NYHA II heart failure symptoms.  Their imaging is consistent with thickened aortic valve leaflets.      STS PROM: 1.7%  [id number] score: 3  HASBLED score: 2    We had a nice discussion regarding the indications for intervention on their valvular disease.  This included percutaneous as well as open surgical approaches. I do believe that a catheter based approach is in his best interest, given his age and co-morbidities.  He understands that the goal of this procedure will be to relieve symptoms if present, preserve left ventricular function, and prolong life.  I discussed the specific risks of vascular access bleeding, stroke and need for pacemaker placement.  In further evaluation we will obtain Computed tomography - TAVR protocol and Dental evaluation     With regards to their surgical fitness, he is a Normal risk candidate.  I believe he will require a re-operative intervention and we discussed mechanical versus another tissue valve.           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    Infection     Multiple    Low testosterone     MRSA (methicillin resistant Staphylococcus aureus) septicemia (Multi) 07/03/2024    Osteoarthritis of both hips 09/11/2023    PAD (peripheral artery disease) 07/08/2025    S/P AVR (aortic valve replacement) 09/11/2023    Somatic dysfunction of sacral region 03/10/2025   [2]   Past Surgical History:  Procedure Laterality Date    AORTIC VALVE REPLACEMENT  11/4/2020    CARDIAC VALVE REPLACEMENT  11/4/2020    CERVICAL SPINE SURGERY      implant    ELBOW BURSA SURGERY Right     related to infection    HERNIA REPAIR      INVASIVE VASCULAR PROCEDURE N/A 05/15/2025    Procedure: Aortogram, SMA thrombectomy;  Surgeon: Pat Kunz MD;  Location: [facility];  Service: Vascular Surgery;  Laterality: N/A;    JOINT REPLACEMENT Right     knee    MR HEAD ANGIO WO IV CONTRAST  02/24/2023    MR HEAD ANGIO WO IV CONTRAST [facility]    MR HEAD ANGIO WO IV CONTRAST  09/05/2023    MR HEAD ANGIO WO IV CONTRAST [facility]    MR NECK ANGIO WO IV CONTRAST  12/20/2012    MR NECK ANGIO WO IV CONTRAST [facility]   [3]   Family History  Problem Relation Name Age of Onset    Autoimmune disease Mother      Hypertension Mother      PTSD Father      Benign prostatic hyperplasia Father      No Known Problems Sister      Other (htn) Brother      Other (hld) Brother      No Known Problems Daughter      No Known Problems Daughter      Other (watchman) Maternal Grandfather      Other (cath) Maternal Grandfather      Other (3x bypass) Maternal Grandfather      Heart disease Maternal Grandfather     [4]   Allergies  Allergen Reactions    Acyclovir Unknown    Opioids - Morphine Analogues Unknown    Hydromorphone Rash   [5]   Outpatient Encounter Medications as of 7/21/2025   Medication Sig Dispense Refill    apixaban (Eliquis) 5 mg tablet Take 1 tablet (5 mg) by mouth 2 times a day. 60  tablet 3    clopidogrel (Plavix) 75 mg tablet Take 1 tablet (75 mg) by mouth once daily. 30 tablet 3    FLUoxetine (PROzac) 20 mg capsule TAKE 1 CAPSULE BY MOUTH EVERY DAY 90 capsule 3    traZODone (Desyrel) 50 mg tablet TAKE 1 TABLET (50 MG) BY MOUTH AS NEEDED AT BEDTIME FOR SLEEP. 90 tablet 3    [DISCONTINUED] busPIRone (Buspar) 5 mg tablet Take 1 tablet (5 mg) by mouth 2 times a day. (Patient not taking: Reported on 5/6/2025)       No facility-administered encounter medications on file as of 7/21/2025.

## 2025-07-21 ENCOUNTER — OFFICE VISIT (OUTPATIENT)
Dept: CARDIAC SURGERY | Facility: CLINIC | Age: 55
End: 2025-07-21
Payer: COMMERCIAL

## 2025-07-21 ENCOUNTER — OFFICE VISIT (OUTPATIENT)
Dept: CARDIOLOGY | Facility: CLINIC | Age: 55
End: 2025-07-21
Payer: COMMERCIAL

## 2025-07-21 VITALS
HEART RATE: 75 BPM | HEIGHT: 70 IN | OXYGEN SATURATION: 97 % | BODY MASS INDEX: 27.03 KG/M2 | DIASTOLIC BLOOD PRESSURE: 72 MMHG | SYSTOLIC BLOOD PRESSURE: 109 MMHG | WEIGHT: 188.8 LBS

## 2025-07-21 DIAGNOSIS — I35.0 NONRHEUMATIC AORTIC (VALVE) STENOSIS: Primary | ICD-10-CM

## 2025-07-21 DIAGNOSIS — Z95.2 S/P AVR (AORTIC VALVE REPLACEMENT): Primary | ICD-10-CM

## 2025-07-21 PROCEDURE — 99213 OFFICE O/P EST LOW 20 MIN: CPT | Performed by: THORACIC SURGERY (CARDIOTHORACIC VASCULAR SURGERY)

## 2025-07-21 PROCEDURE — 99205 OFFICE O/P NEW HI 60 MIN: CPT | Performed by: THORACIC SURGERY (CARDIOTHORACIC VASCULAR SURGERY)

## 2025-07-21 PROCEDURE — 1036F TOBACCO NON-USER: CPT | Performed by: THORACIC SURGERY (CARDIOTHORACIC VASCULAR SURGERY)

## 2025-07-21 PROCEDURE — 99215 OFFICE O/P EST HI 40 MIN: CPT | Performed by: INTERNAL MEDICINE

## 2025-07-21 PROCEDURE — 3008F BODY MASS INDEX DOCD: CPT | Performed by: THORACIC SURGERY (CARDIOTHORACIC VASCULAR SURGERY)

## 2025-07-21 PROCEDURE — 99213 OFFICE O/P EST LOW 20 MIN: CPT | Performed by: INTERNAL MEDICINE

## 2025-07-21 RX ORDER — WARFARIN SODIUM 5 MG/1
5 TABLET ORAL
COMMUNITY
End: 2025-07-21 | Stop reason: WASHOUT

## 2025-07-21 ASSESSMENT — LIFESTYLE VARIABLES
AUDIT-C TOTAL SCORE: 3
HOW MANY STANDARD DRINKS CONTAINING ALCOHOL DO YOU HAVE ON A TYPICAL DAY: 1 OR 2
HOW MANY STANDARD DRINKS CONTAINING ALCOHOL DO YOU HAVE ON A TYPICAL DAY: 1 OR 2
AUDIT-C TOTAL SCORE: 2
SKIP TO QUESTIONS 9-10: 1
HOW OFTEN DO YOU HAVE A DRINK CONTAINING ALCOHOL: 2-3 TIMES A WEEK
SKIP TO QUESTIONS 9-10: 1
HOW OFTEN DO YOU HAVE SIX OR MORE DRINKS ON ONE OCCASION: NEVER
HOW OFTEN DO YOU HAVE A DRINK CONTAINING ALCOHOL: 2-4 TIMES A MONTH
HOW OFTEN DO YOU HAVE SIX OR MORE DRINKS ON ONE OCCASION: NEVER

## 2025-07-21 ASSESSMENT — PATIENT HEALTH QUESTIONNAIRE - PHQ9
SUM OF ALL RESPONSES TO PHQ9 QUESTIONS 1 AND 2: 0
1. LITTLE INTEREST OR PLEASURE IN DOING THINGS: NOT AT ALL
2. FEELING DOWN, DEPRESSED OR HOPELESS: NOT AT ALL

## 2025-07-21 ASSESSMENT — ENCOUNTER SYMPTOMS
DEPRESSION: 0
LOSS OF SENSATION IN FEET: 0
OCCASIONAL FEELINGS OF UNSTEADINESS: 0

## 2025-07-21 ASSESSMENT — PAIN SCALES - GENERAL: PAINLEVEL_OUTOF10: 4

## 2025-07-21 NOTE — LETTER
July 21, 2025     Fco Baltazar DO  [address]  [facility], [address]  [facility] [address]    Patient: Cristhian Thornton   YOB: 1970   Date of Visit: 7/21/2025       Dear Dr. Fco Baltazar DO:    Thank you for referring Cristhian Thornton to me for evaluation.  I saw him with Dr. Jha.  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  We will present him at the [facility] selection committee, but we discussed re-operative surgery with re-operative aortic valve replacement.  Below are my notes for this consultation.  If you have questions, please do not hesitate to call me. I look forward to following your patient along with you.       Sincerely,     Xavier Gray MD      CC: Amberly Del Castillo PA-C  Cristhian Thornton  MD Zakiya Hardin RN  ______________________________________________________________________________________    Chief Complaint  Fatigue, lightheadedness.    HPI:   Mr. Cristhian Thornton is an 54 y.o. male, who is a patient of Dr. 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  He works as a contractor.   He has been admitted with 6-7 infections in the last several months with recurrent chronic thromboembolism.  This is included intervention on the superior mesenteric artery and has had bowel as well as splenic infarction.  He has a relative past surgical history of aortic valve replacement with a 23 mm Inspiris Resilia tissue valve as well as 28 mm Hemashield graft by Dr. Walt Burns on November 20, 2020.  He initially felt well after recovery, but has had night sweats and chills in May of this year, and then went to hospital.  He was treated with antibiotics initially.    He has six staphylococcus infections in the last year and a half.        Medical History[1]    Surgical History[2]    Family History[3]    Social History     Socioeconomic History   • Marital  status:      Spouse name: Not on file   • Number of children: Not on file   • Years of education: Not on file   • Highest education level: Not on file   Occupational History   • Not on file   Tobacco Use   • Smoking status: Never     Passive exposure: Never   • Smokeless tobacco: Never   Substance and Sexual Activity   • Alcohol use: Yes     Alcohol/week: 4.0 standard drinks of alcohol     Types: 1 Standard drinks or equivalent, 3 Cans of beer per week   • Drug use: Never   • Sexual activity: Yes     Partners: Female     Birth control/protection: None   Other Topics Concern   • Not on file   Social History Narrative   • Not on file     Social Drivers of Health     Financial Resource Strain: Low Risk  (5/9/2025)    Overall Financial Resource Strain (CARDIA)    • Difficulty of Paying Living Expenses: Not hard at all   Food Insecurity: No Food Insecurity (5/9/2025)    Hunger Vital Sign    • Worried About Running Out of Food in the Last Year: Never true    • Ran Out of Food in the Last Year: Never true   Transportation Needs: No Transportation Needs (5/9/2025)    PRAPARE - Transportation    • Lack of Transportation (Medical): No    • Lack of Transportation (Non-Medical): No   Physical Activity: Inactive (5/9/2025)    Exercise Vital Sign    • Days of Exercise per Week: 0 days    • Minutes of Exercise per Session: 0 min   Stress: No Stress Concern Present (5/9/2025)    [employer] [place] [employer] - Occupational Stress Questionnaire    • Feeling of Stress : Not at all   Social Connections: Moderately Isolated (5/9/2025)    Social Connection and Isolation Panel    • Frequency of Communication with Friends and Family: More than three times a week    • Frequency of Social Gatherings with Friends and Family: Twice a week    • Attends Tenriism Services: Never    • Active Member of Clubs or Organizations: No    • Attends Club or Organization Meetings: Never    • Marital Status:    Intimate Partner  Violence: Not At Risk (5/9/2025)    Humiliation, Afraid, Rape, and Kick questionnaire    • Fear of Current or Ex-Partner: No    • Emotionally Abused: No    • Physically Abused: No    • Sexually Abused: No   Housing Stability: Low Risk  (5/9/2025)    Housing Stability Vital Sign    • Unable to Pay for Housing in the Last Year: No    • Number of Times Moved in the Last Year: 0    • Homeless in the Last Year: No       RX Allergies[4]    Encounter Medications[5]    Physical Exam  Constitutional:       Appearance: Normal appearance.   HENT:      Head: Normocephalic.     Eyes:      General: No scleral icterus.     Pupils: Pupils are equal, round, and reactive to light.     Abdominal:      Palpations: Abdomen is soft.     Musculoskeletal:         General: Normal range of motion.      Cervical back: Normal range of motion.     Skin:     General: Skin is warm and dry.     Neurological:      Mental Status: He is alert and oriented to person, place, and time.         Lab Results   Component Value Date    WBC 3.6 (L) 06/03/2025    HGB 13.1 (L) 06/03/2025    HCT 40.4 06/03/2025    MCV 89.0 06/03/2025     (L) 06/03/2025     Lab Results   Component Value Date    GLUCOSE 121 (H) 05/16/2025    CALCIUM 9.1 05/16/2025     05/16/2025    K 3.7 05/16/2025    CO2 29 05/16/2025     05/16/2025    BUN 17 05/16/2025    CREATININE 0.98 05/16/2025       Encounter Date: 05/09/25   ECG 12 lead   Result Value    Ventricular Rate 69    Atrial Rate 69    UT Interval 246    QRS Duration 130    QT Interval 414    QTC Calculation(Bazett) 443    P Axis 62    R Axis -5    T Axis 268    QRS Count 11    Q Onset 215    P Onset 92    P Offset 140    T Offset 422    QTC Fredericia 434    Narrative    Sinus rhythm with 1st degree AV block  Nonspecific intraventricular block  Minimal voltage criteria for LVH, may be normal variant ( [place] product )  T wave abnormality, consider inferior ischemia  Abnormal ECG  Confirmed by Amrit Jha  ([id number]) on 5/11/2025 1:19:33 AM     Holter monitor June 5 through June 13, 2025: 0% burden of atrial fibrillation.  Primary rhythm was sinus rhythm first-degree AV block as well as left bundle branch block.  There are also incidences of SVT.    MART May 22, 2025: The ejection fraction of the left ventricle is estimated at 50-55%.  The aortic valve leaflets are moderately thickened, there is no specific vegetation noted.  The aortic valve appears to open reasonably well but has some stenosis.  Aortic valve area by planimetry is 0.7 cm².    TTE May 9, 2025: Severe left ventricular hypertrophy.  Left ventricular ejection fraction estimated 50-55%.  The aortic valve is prosthetic, and has severely thickened leaflets.  The aortic valve velocity is 4.3 m/s, the mean aortic valve gradient is 45 mmHg, the aortic valve area is calculated at 0.7 cm².  There is no significant mitral valve regurgitation.    Nuclear medicine study June 26, 2025: No evidence of aortic valve inflammation or infection.    Assessment and Plan:   Mr. Cristhian Thornton is an 54 y.o. male who presents with prosthetic aortic valve stenosis and multiple episodes of systemic thromboembolism. This is associated with what may likely be halt or leaflet thrombosis of the prosthetic valve; and in the setting of anxiety, osteoarthritis, and systemic thromboembolism.  Their symptoms include Fatigue.  He has NYHA II heart failure symptoms.  Their imaging is consistent with thickened aortic valve leaflets.      STS PROM: 1.7%  [id number] score: 3  HASBLED score: 2    We had a nice discussion regarding the indications for intervention on their valvular disease.  This included percutaneous as well as open surgical approaches. I do believe that a catheter based approach is in his best interest, given his age and co-morbidities.  He understands that the goal of this procedure will be to relieve symptoms if present, preserve left ventricular function, and prolong life.   I discussed the specific risks of vascular access bleeding, stroke and need for pacemaker placement.  In further evaluation we will obtain Computed tomography - TAVR protocol and Dental evaluation     With regards to their surgical fitness, he is a Normal risk candidate.  I believe he will require a re-operative intervention and we discussed mechanical versus another tissue valve.           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   • Infection     Multiple   • Low testosterone    • MRSA (methicillin resistant Staphylococcus aureus) septicemia (Multi) 07/03/2024   • Osteoarthritis of both hips 09/11/2023   • PAD (peripheral artery disease) 07/08/2025   • S/P AVR (aortic valve replacement) 09/11/2023   • Somatic dysfunction of sacral region 03/10/2025   [2]  Past Surgical History:  Procedure Laterality Date   • AORTIC VALVE REPLACEMENT  11/4/2020   • CARDIAC VALVE REPLACEMENT  11/4/2020   • CERVICAL SPINE SURGERY      implant   • ELBOW BURSA SURGERY Right     related to infection   • HERNIA REPAIR     • INVASIVE VASCULAR PROCEDURE N/A 05/15/2025    Procedure: Aortogram, SMA thrombectomy;  Surgeon: Pat Kunz MD;  Location: [facility] Cardiac Cath Lab;  Service: Vascular Surgery;  Laterality: N/A;   • JOINT REPLACEMENT Right     knee   • MR HEAD ANGIO WO IV CONTRAST  02/24/2023    MR HEAD ANGIO WO IV CONTRAST [facility]   •  MR HEAD ANGIO WO IV CONTRAST  09/05/2023    MR HEAD ANGIO WO IV CONTRAST [facility]   • MR NECK ANGIO WO IV CONTRAST  12/20/2012    MR NECK ANGIO WO IV CONTRAST [facility]   [3]  Family History  Problem Relation Name Age of Onset   • Autoimmune disease Mother     • Hypertension Mother     • PTSD Father     • Benign prostatic hyperplasia Father     • No Known Problems Sister     • Other (htn) Brother     • Other (hld) Brother     • No Known Problems Daughter     • No Known Problems Daughter     • Other (watchman) Maternal Grandfather     • Other (cath) Maternal Grandfather     • Other (3x bypass) Maternal Grandfather     • Heart disease Maternal Grandfather     [4]  Allergies  Allergen Reactions   • Acyclovir Unknown   • Opioids - Morphine Analogues Unknown   • Hydromorphone Rash   [5]  Outpatient Encounter Medications as of 7/21/2025   Medication Sig Dispense Refill   • apixaban (Eliquis) 5 mg tablet Take 1 tablet (5 mg) by mouth 2 times a day. 60 tablet 3   • clopidogrel (Plavix) 75 mg tablet Take 1 tablet (75 mg) by mouth once daily. 30 tablet 3   • FLUoxetine (PROzac) 20 mg capsule TAKE 1 CAPSULE BY MOUTH EVERY DAY 90 capsule 3   • traZODone (Desyrel) 50 mg tablet TAKE 1 TABLET (50 MG) BY MOUTH AS NEEDED AT BEDTIME FOR SLEEP. 90 tablet 3   • [DISCONTINUED] busPIRone (Buspar) 5 mg tablet Take 1 tablet (5 mg) by mouth 2 times a day. (Patient not taking: Reported on 5/6/2025)       No facility-administered encounter medications on file as of 7/21/2025.          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   • Infection     Multiple   • Low testosterone    • MRSA (methicillin resistant Staphylococcus aureus) septicemia (Multi) 07/03/2024   • Osteoarthritis of both hips 09/11/2023   • PAD (peripheral artery disease) 07/08/2025   • S/P AVR (aortic valve replacement) 09/11/2023   • Somatic dysfunction of sacral region 03/10/2025   [2]  Past Surgical History:  Procedure Laterality Date   • AORTIC VALVE REPLACEMENT  11/4/2020   • CARDIAC VALVE REPLACEMENT  11/4/2020   • CERVICAL SPINE SURGERY      implant   • ELBOW BURSA SURGERY Right     related to infection   • HERNIA REPAIR     • INVASIVE VASCULAR PROCEDURE N/A 05/15/2025    Procedure: Aortogram, SMA thrombectomy;  Surgeon: Pat Kunz MD;  Location: [facility] Cardiac Cath Lab;  Service: Vascular Surgery;  Laterality: N/A;   • JOINT REPLACEMENT Right     knee   • MR HEAD ANGIO WO IV CONTRAST  02/24/2023    MR HEAD ANGIO WO IV CONTRAST [facility]   • MR HEAD ANGIO WO IV CONTRAST  09/05/2023    MR HEAD ANGIO WO IV CONTRAST [facility]   • MR NECK ANGIO WO IV CONTRAST  12/20/2012    MR NECK ANGIO WO IV CONTRAST [facility]   [3]  Family History  Problem Relation Name Age of Onset   • Autoimmune disease Mother     • Hypertension Mother     • PTSD Father     • Benign prostatic hyperplasia Father     • No Known Problems Sister     • Other (htn) Brother     • Other (hld) Brother     • No Known Problems Daughter     • No Known Problems Daughter     • Other (watchman) Maternal Grandfather     • Other (cath) Maternal Grandfather     • Other (3x bypass) Maternal Grandfather     • Heart disease Maternal Grandfather     [4]  Allergies  Allergen Reactions   • Acyclovir Unknown   • Opioids - Morphine Analogues Unknown   • Hydromorphone Rash   [5]  Outpatient  Encounter Medications as of 7/21/2025   Medication Sig Dispense Refill   • apixaban (Eliquis) 5 mg tablet Take 1 tablet (5 mg) by mouth 2 times a day. 60 tablet 3   • clopidogrel (Plavix) 75 mg tablet Take 1 tablet (75 mg) by mouth once daily. 30 tablet 3   • FLUoxetine (PROzac) 20 mg capsule TAKE 1 CAPSULE BY MOUTH EVERY DAY 90 capsule 3   • traZODone (Desyrel) 50 mg tablet TAKE 1 TABLET (50 MG) BY MOUTH AS NEEDED AT BEDTIME FOR SLEEP. 90 tablet 3   • [DISCONTINUED] busPIRone (Buspar) 5 mg tablet Take 1 tablet (5 mg) by mouth 2 times a day. (Patient not taking: Reported on 5/6/2025)       No facility-administered encounter medications on file as of 7/21/2025.

## 2025-07-21 NOTE — PROGRESS NOTES
Referred by Fco Smallwood DO  PCP: Amberly Del Castillo PA-C   Chief complaint: Evaluation of aortic stenosis.       [facility]    Interventional Cardiologist: Amrit Jha MD    Cardiothoracic Surgeon: Xavier Gray MD    ROS:    Mr. Thornton is a very pleasant 55 y/o male who presents for evaluation of severe, symptomatic bioprosthetic aortic stenosis.  He has a history of bioprosthetic aortic valve replacement and ascending aortic aneurysm repair in 2020.  He felt quite well after surgery though last summer he started to notice some worsening shortness of breath with exertion as well as increased fatigue.  He noted over the last year and a half he has also had several skin infections with staph of unknown etiology.  In May of this year he was admitted to the hospital with several weeks of night sweats and fevers and chills as well as some abdominal pain.  He was found to have embolus to his SMA as well as to his lower extremities and his bioprosthetic aortic valve was noted to be thickened.  He did have positive blood cultures 1 year ago with staph aureus after being admitted with finger cellulitis.  He was treated with 6 weeks of IV antibiotics at that time.  During his recent admission he was started on apixaban and clopidogrel due to concern for thrombus on his aortic valve.  He is continuing to have shortness of breath and lightheadedness with exertion.  He denies any angina or syncope.  He denies any significant orthopnea or lower extremity edema.    Full 14 point ROS complete and negative except as noted above.     PMH: Medical History[1]   PSH: Surgical History[2]     Outpatient Medications:  Current Outpatient Medications   Medication Instructions    apixaban (ELIQUIS) 5 mg, oral, 2 times daily    clopidogrel (PLAVIX) 75 mg, oral, Daily    FLUoxetine (PROZAC) 20 mg, oral, Daily    traZODone (DESYREL) 50 mg, oral, Nightly PRN         Vitals:    109/72    BP Location  "Left arm   Patient Position Sitting   Heart Rate 75   Weight 85.6 kg (188 lb 12.8 oz)   Height 1.778 m (5' 10\")   SpO2 97 %   Pain Score 4       Physical Exam:  General: NAD, well-appearing  HEENT: moist mucous membranes, no jaundice  Neck: No JVD, no carotid bruit  Lungs: CTA waldemar, no wheezing or rales  Cardiac: RRR, 3/6 systolic ejection murmur  Abdomen: soft, non-tender, non-distended  Extremities: 2+ radial pulses, no edema  Skin: warm, dry, no wound  Neurologic: AAOx3,  no focal deficits    VALVE & STRUCTURAL HEART WORK-UP    - NYHA:   - Frailty: -1/5  - EKG: NSR with 1st degree AV block  - Echo:TTE completed 5/9/25. EF 50-55 %, mod-severe aortic stenosis  MG/PG 78/45, Peak velocity 4.38 m/s TAMI 0.78 cm/2. MART completed 5/22/25.  - R/LHC: pending  - CT TAVR: pending  - Dental clearance: follow up  - STS: Procedure Type: Isolated AVR PERIOPERATIVE OUTCOME ESTIMATE %  Operative Mortality 1.67%  Morbidity & Mortality 8.1%  Stroke 0.926%  Renal Failure 1.19%  Reoperation 4.93%  Prolonged Ventilation 4.53%  Deep Sternal Wound Infection 0.143%  Long Hospital Stay (>14 days) 2.23%  Short Hospital Stay (<6 days)* 57.4%    - [facility] walk: completed    Assessment/Plan   It is likely he had endocarditis last year which caused lasting damage and stenosis to his valve.  His valve is not that old though so thrombus is still a possibility.  He has been on apixaban and clopidogrel making this less likely, but a conservative approach would be to switch apixaban to warfarin for 3 months then repeat his echo given better data with warfarin in valve thrombosis.  I am concerned however that he has already embolized presumably from his valve.  It would also be reasonable to proceed with aortic valve replacement at this time, likely with a mechanical valve given his young age.  We will initiate warfarin and discuss at multidisciplinary valve conference.          The overall decision regarding the best treatment for this condition is " complex.  We discussed options of both surgical aortic valve replacement and transcatheter aortic valve replacement along with risks and benefits involved with both of them in detail.    We discussed all the risks associated with the procedure, including but not limited to stroke, MI, pericardial tamponade, vascular complications, infection and death were discussed with the patient. The risk of needing a permament pacemaker was also discussed in detail.     We will discuss this patient's case at our Valve Team meeting with representatives from Structural Heart and Cardiac Surgery. Our nurse navigators will contact patient with further diagnostic needs and formal plan.     Amrit Jha MD MPH  Interventional Cardiologist/[facility] [place]    I,Yvette Pickens, RN, am scribing for, and in the presence of Dr. Amrit Jha    I, Dr. Amrit Jha, personally performed the services described in the documentation as scribed by Yvette Pickens, RN in my presence, and confirm it is both accurate and complete.     **Disclaimer: This note was dictated by speech recognition, and every effort has been made to prevent any error in transcription, however minor errors may be present**           [1]   Past Medical History:  Diagnosis Date    Abscess of left index finger 07/03/2024    Aneurysm 2023    Anxiety disorder, unspecified 09/11/2023    Aortic valve stenosis     Artery of extremity, embolism and thrombosis 07/08/2025    Ascending aortic aneurysm 09/11/2023    Bicuspid aortic valve 06/08/2019    Cervical radiculopathy 09/11/2023    Claudication 05/20/2025    Closed fracture of distal end of humerus 03/10/2025    Elbow pain 03/10/2025    Folliculitis 04/10/2020    History of total right knee replacement (TKR) 09/11/2023    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    Infection     Multiple    Low testosterone     MRSA  (methicillin resistant Staphylococcus aureus) septicemia (Multi) 07/03/2024    Osteoarthritis of both hips 09/11/2023    PAD (peripheral artery disease) 07/08/2025    S/P AVR (aortic valve replacement) 09/11/2023    Somatic dysfunction of sacral region 03/10/2025   [2]   Past Surgical History:  Procedure Laterality Date    AORTIC VALVE REPLACEMENT  11/4/2020    CARDIAC VALVE REPLACEMENT  11/4/2020    CERVICAL SPINE SURGERY      implant    ELBOW BURSA SURGERY Right     related to infection    HERNIA REPAIR      INVASIVE VASCULAR PROCEDURE N/A 05/15/2025    Procedure: Aortogram, SMA thrombectomy;  Surgeon: Pat Kunz MD;  Location: [facility] Cardiac Cath Lab;  Service: Vascular Surgery;  Laterality: N/A;    JOINT REPLACEMENT Right     knee    MR HEAD ANGIO WO IV CONTRAST  02/24/2023    MR HEAD ANGIO WO IV CONTRAST [facility]    MR HEAD ANGIO WO IV CONTRAST  09/05/2023    MR HEAD ANGIO WO IV CONTRAST [facility]    MR NECK ANGIO WO IV CONTRAST  12/20/2012    MR NECK ANGIO WO IV CONTRAST [facility]

## 2025-07-21 NOTE — PROGRESS NOTES
[facility]Q Questionnaire      1  Heart failure affects different people in different ways. Some feel shortness of breath while others feel fatigue. Please indicate how much you are limited by heart failure (shortness of breath or fatigue) in your ability to do the following activities over the past 2 weeks. PRE PROCEDURE    A.) Showering/bathing  5. Not at All  B.) Walking 1 block on level ground 2. Quite a bit  C.) Hurrying or Jogging   2. Quite a bit    2.  Over the past 2 weeks, how many times did you have swelling in your feet, ankles or legs when you woke up in the morning? 4. Less than once a week or 5. Never    3.  Over the past 2 weeks, on average, how many times has fatigue limited your ability to do what you wanted? 2. Several times a day    4.  Over the past 2 weeks, on average, how many times has shortness of breath limited your ability to do what you wanted? 2. Several times a day    5.  Over the past 2 weeks, on average, how many times have you been forced to sleep sitting up in a chair or with at least 3 pillows to prop you up because of shortness of breath? Less than once a week    6. Over the past 2 weeks, how much has your heart failure limited your enjoyment of life? It has limited my enjoyment of life quite a bit    7. If you had to spend the rest of your life with your heart failure the way it is right now, how would you feel about this? 2. Mostly dissatisfied    8. How much does your heart failure affect your lifestyle? Please indicate how your heart failure may have limited yourparticipation in the following activities over the past 2 weeks    A.)  Hobbies, recreational activities  1. Severely limited    B.) Working or doing household chores  1. Severely limited    C.) Visiting family or friends out of your home  6. Does not apply for other reasons    5 Meter Walk____5.65______seconds

## 2025-07-22 DIAGNOSIS — Z95.2 S/P AVR (AORTIC VALVE REPLACEMENT): Primary | ICD-10-CM

## 2025-07-22 RX ORDER — WARFARIN SODIUM 5 MG/1
TABLET ORAL
Qty: 30 TABLET | Refills: 11 | Status: SHIPPED | OUTPATIENT
Start: 2025-07-22 | End: 2026-07-22

## 2025-07-22 NOTE — PROGRESS NOTES
Spoke with him, we will start him on Coumadin 5 mg daily.  He will take his last dose of Eliquis and his first dose of Coumadin this evening.  Will work on getting him in with the Coumadin clinic with the first visit hopefully on Friday for an INR check.

## 2025-07-28 ENCOUNTER — ANTICOAGULATION - WARFARIN VISIT (OUTPATIENT)
Dept: CARDIOLOGY | Facility: HOSPITAL | Age: 55
End: 2025-07-28
Payer: COMMERCIAL

## 2025-07-28 ENCOUNTER — TELEPHONE (OUTPATIENT)
Dept: VASCULAR SURGERY | Facility: CLINIC | Age: 55
End: 2025-07-28

## 2025-07-28 DIAGNOSIS — Z95.2 S/P AVR (AORTIC VALVE REPLACEMENT): Primary | ICD-10-CM

## 2025-07-28 DIAGNOSIS — I73.9 PAD (PERIPHERAL ARTERY DISEASE): ICD-10-CM

## 2025-07-28 DIAGNOSIS — I73.9 PAD (PERIPHERAL ARTERY DISEASE): Primary | ICD-10-CM

## 2025-07-28 LAB
POC INR: 2.9 (ref 0.9–1.1)
POC PROTHROMBIN TIME: ABNORMAL (ref 9.3–12.5)

## 2025-07-28 PROCEDURE — 99211 OFF/OP EST MAY X REQ PHY/QHP: CPT | Performed by: INTERNAL MEDICINE

## 2025-07-28 PROCEDURE — 85610 PROTHROMBIN TIME: CPT | Mod: QW | Performed by: INTERNAL MEDICINE

## 2025-07-28 RX ORDER — ENOXAPARIN SODIUM 100 MG/ML
85 INJECTION SUBCUTANEOUS 2 TIMES DAILY
Qty: 4 EACH | Refills: 0 | Status: SHIPPED | OUTPATIENT
Start: 2025-07-28 | End: 2025-07-29

## 2025-07-28 NOTE — TELEPHONE ENCOUNTER
Spoke with patient regarding stopping coumadin today and holding Wednesday and Thursday.  He is instructed to come in Wednesday for a PTINR and start lovenox injections Wednesday as prescribed. Hold plavix and lovenox on Thursday for procedure.  Npo after midnight Wednesday for Thursdays procedure.

## 2025-07-28 NOTE — PROGRESS NOTES
Patient identification verified with 2 identifiers.    Location: [facility] - 1st Floor Anticoagulation Clinic [address] [id number]    Referring Physician: DR Baltazar  Enrollment/ Re-enrollment date: 2026   INR Goal: 2.0-3.0  INR monitoring is per [facility] protocol.  Anticoagulation Medication: warfarin  Indication: Aortic Valve Replacement    Subjective   Bleeding signs/symptoms: No    Bruising: No   Major bleeding event: No  Thrombosis signs/symptoms: No  Thromboembolic event: No  Missed doses: No  Extra doses: No  Medication changes: No  Dietary changes: No  Change in health: No  Change in activity: No  Alcohol: No  Other concerns: No    Upcoming Procedures:  Does the Patient Have any upcoming procedures that require interruption in anticoagulation therapy? no  Does the patient require bridging? no      Anticoagulation Summary  As of 7/28/2025      INR goal:  2.0-3.0   TTR:  --   INR used for dosing:  --               Assessment/Plan   Therapeutic     1. New dose: no change    2. Next INR: 1 week      Education provided to patient during the visit:  Patient instructed to call in interim with questions, concerns and changes.

## 2025-07-29 LAB
ANION GAP SERPL CALCULATED.4IONS-SCNC: 15 MMOL/L (CALC) (ref 7–17)
BUN SERPL-MCNC: 20 MG/DL (ref 7–25)
BUN/CREAT SERPL: NORMAL (CALC) (ref 6–22)
CALCIUM SERPL-MCNC: 9.2 MG/DL (ref 8.6–10.3)
CHLORIDE SERPL-SCNC: 102 MMOL/L (ref 98–110)
CO2 SERPL-SCNC: 22 MMOL/L (ref 20–32)
CREAT SERPL-MCNC: 1.3 MG/DL (ref 0.7–1.3)
EGFRCR SERPLBLD CKD-EPI 2021: 66 ML/MIN/1.73M2
ERYTHROCYTE [DISTWIDTH] IN BLOOD BY AUTOMATED COUNT: 14.1 % (ref 11–15)
GLUCOSE SERPL-MCNC: 82 MG/DL (ref 65–99)
HCT VFR BLD AUTO: 45.4 % (ref 38.5–50)
HGB BLD-MCNC: 14.5 G/DL (ref 13.2–17.1)
MCH RBC QN AUTO: 28.9 PG (ref 27–33)
MCHC RBC AUTO-ENTMCNC: 31.9 G/DL (ref 32–36)
MCV RBC AUTO: 90.4 FL (ref 80–100)
PLATELET # BLD AUTO: 216 THOUSAND/UL (ref 140–400)
PMV BLD REES-ECKER: 9.9 FL (ref 7.5–12.5)
POTASSIUM SERPL-SCNC: 4.4 MMOL/L (ref 3.5–5.3)
RBC # BLD AUTO: 5.02 MILLION/UL (ref 4.2–5.8)
SODIUM SERPL-SCNC: 139 MMOL/L (ref 135–146)
WBC # BLD AUTO: 8.7 THOUSAND/UL (ref 3.8–10.8)

## 2025-07-29 RX ORDER — ENOXAPARIN SODIUM 100 MG/ML
85 INJECTION SUBCUTANEOUS 2 TIMES DAILY
Qty: 6 EACH | Refills: 0 | Status: SHIPPED | OUTPATIENT
Start: 2025-07-29

## 2025-07-30 ENCOUNTER — TELEPHONE (OUTPATIENT)
Dept: VASCULAR SURGERY | Facility: HOSPITAL | Age: 55
End: 2025-07-30
Payer: COMMERCIAL

## 2025-07-30 DIAGNOSIS — I74.4: Primary | ICD-10-CM

## 2025-07-30 DIAGNOSIS — I73.9 PAD (PERIPHERAL ARTERY DISEASE): ICD-10-CM

## 2025-07-31 ENCOUNTER — APPOINTMENT (OUTPATIENT)
Dept: CARDIOLOGY | Facility: HOSPITAL | Age: 55
End: 2025-07-31
Payer: COMMERCIAL

## 2025-07-31 ENCOUNTER — HOSPITAL ENCOUNTER (OUTPATIENT)
Facility: HOSPITAL | Age: 55
Setting detail: OUTPATIENT SURGERY
Discharge: HOME | End: 2025-07-31
Attending: SURGERY | Admitting: SURGERY
Payer: COMMERCIAL

## 2025-07-31 ENCOUNTER — TELEPHONE (OUTPATIENT)
Dept: VASCULAR SURGERY | Facility: CLINIC | Age: 55
End: 2025-07-31
Payer: COMMERCIAL

## 2025-07-31 VITALS
DIASTOLIC BLOOD PRESSURE: 80 MMHG | HEIGHT: 70 IN | OXYGEN SATURATION: 97 % | SYSTOLIC BLOOD PRESSURE: 121 MMHG | BODY MASS INDEX: 27.02 KG/M2 | HEART RATE: 58 BPM | TEMPERATURE: 98.2 F | RESPIRATION RATE: 14 BRPM | WEIGHT: 188.71 LBS

## 2025-07-31 DIAGNOSIS — I74.4: Primary | ICD-10-CM

## 2025-07-31 DIAGNOSIS — I73.9 PAD (PERIPHERAL ARTERY DISEASE): ICD-10-CM

## 2025-07-31 DIAGNOSIS — I70.221: ICD-10-CM

## 2025-07-31 LAB
INR PPP: 2.1
POCT INTERNATIONAL NORMALIZATION RATIO: 1.8
POCT PROTHROMBIN TIME: 21.3 SECONDS
PROTHROMBIN TIME: 20.7 SEC (ref 9–11.5)

## 2025-07-31 PROCEDURE — 99152 MOD SED SAME PHYS/QHP 5/>YRS: CPT | Performed by: SURGERY

## 2025-07-31 PROCEDURE — 2500000004 HC RX 250 GENERAL PHARMACY W/ HCPCS (ALT 636 FOR OP/ED): Performed by: NURSE PRACTITIONER

## 2025-07-31 PROCEDURE — 99153 MOD SED SAME PHYS/QHP EA: CPT | Performed by: SURGERY

## 2025-07-31 PROCEDURE — 36247 INS CATH ABD/L-EXT ART 3RD: CPT | Performed by: SURGERY

## 2025-07-31 PROCEDURE — 7100000009 HC PHASE TWO TIME - INITIAL BASE CHARGE: Performed by: SURGERY

## 2025-07-31 PROCEDURE — C1760 CLOSURE DEV, VASC: HCPCS | Performed by: SURGERY

## 2025-07-31 PROCEDURE — 76937 US GUIDE VASCULAR ACCESS: CPT | Performed by: SURGERY

## 2025-07-31 PROCEDURE — C1769 GUIDE WIRE: HCPCS | Performed by: SURGERY

## 2025-07-31 PROCEDURE — C1887 CATHETER, GUIDING: HCPCS | Performed by: SURGERY

## 2025-07-31 PROCEDURE — 36247 INS CATH ABD/L-EXT ART 3RD: CPT

## 2025-07-31 PROCEDURE — 36140 INTRO NDL ICATH UPR/LXTR ART: CPT | Mod: LT | Performed by: SURGERY

## 2025-07-31 PROCEDURE — 7100000010 HC PHASE TWO TIME - EACH INCREMENTAL 1 MINUTE: Performed by: SURGERY

## 2025-07-31 PROCEDURE — C1894 INTRO/SHEATH, NON-LASER: HCPCS | Performed by: SURGERY

## 2025-07-31 PROCEDURE — 75710 ARTERY X-RAYS ARM/LEG: CPT | Performed by: SURGERY

## 2025-07-31 PROCEDURE — 2780000003 HC OR 278 NO HCPCS: Performed by: SURGERY

## 2025-07-31 PROCEDURE — 2500000004 HC RX 250 GENERAL PHARMACY W/ HCPCS (ALT 636 FOR OP/ED): Performed by: SURGERY

## 2025-07-31 PROCEDURE — 75710 ARTERY X-RAYS ARM/LEG: CPT | Mod: RT | Performed by: SURGERY

## 2025-07-31 PROCEDURE — 2500000005 HC RX 250 GENERAL PHARMACY W/O HCPCS: Performed by: SURGERY

## 2025-07-31 PROCEDURE — 2720000007 HC OR 272 NO HCPCS: Performed by: SURGERY

## 2025-07-31 PROCEDURE — 2550000001 HC RX 255 CONTRASTS: Performed by: SURGERY

## 2025-07-31 RX ORDER — ENOXAPARIN SODIUM 100 MG/ML
80 INJECTION SUBCUTANEOUS EVERY 12 HOURS
Qty: 10 EACH | Refills: 0 | Status: SHIPPED | OUTPATIENT
Start: 2025-07-31 | End: 2025-08-10 | Stop reason: HOSPADM

## 2025-07-31 RX ORDER — ACETAMINOPHEN 325 MG/1
650 TABLET ORAL EVERY 6 HOURS PRN
Status: DISCONTINUED | OUTPATIENT
Start: 2025-07-31 | End: 2025-07-31 | Stop reason: HOSPADM

## 2025-07-31 RX ORDER — MIDAZOLAM HYDROCHLORIDE 1 MG/ML
INJECTION, SOLUTION INTRAMUSCULAR; INTRAVENOUS AS NEEDED
Status: DISCONTINUED | OUTPATIENT
Start: 2025-07-31 | End: 2025-07-31 | Stop reason: HOSPADM

## 2025-07-31 RX ORDER — SODIUM CHLORIDE 9 MG/ML
125 INJECTION, SOLUTION INTRAVENOUS CONTINUOUS
Status: ACTIVE | OUTPATIENT
Start: 2025-07-31 | End: 2025-07-31

## 2025-07-31 RX ORDER — LIDOCAINE HYDROCHLORIDE 10 MG/ML
INJECTION, SOLUTION EPIDURAL; INFILTRATION; INTRACAUDAL; PERINEURAL AS NEEDED
Status: DISCONTINUED | OUTPATIENT
Start: 2025-07-31 | End: 2025-07-31 | Stop reason: HOSPADM

## 2025-07-31 RX ORDER — FENTANYL CITRATE 50 UG/ML
INJECTION, SOLUTION INTRAMUSCULAR; INTRAVENOUS AS NEEDED
Status: DISCONTINUED | OUTPATIENT
Start: 2025-07-31 | End: 2025-07-31 | Stop reason: HOSPADM

## 2025-07-31 RX ORDER — IODIXANOL 320 MG/ML
INJECTION, SOLUTION INTRAVASCULAR AS NEEDED
Status: DISCONTINUED | OUTPATIENT
Start: 2025-07-31 | End: 2025-07-31 | Stop reason: HOSPADM

## 2025-07-31 RX ADMIN — SODIUM CHLORIDE 125 ML/HR: 9 INJECTION, SOLUTION INTRAVENOUS at 10:55

## 2025-07-31 NOTE — DISCHARGE INSTRUCTIONS
***CONTINUE LOVENOX INJECTIONS POST PROCEDURE***  *****RESTART COUMADIN TONIGHT.*****  *****Please call whomever manages your Coumadin (Warfarin) for an INR check in 2-3 days.  *****        PERIPHERAL ANGIOGRAPHY DISCHARGE INSTRUCTIONS    FOR SUDDEN AND SEVERE CHEST PAIN, SHORTNESS OF BREATH, EXCESSIVE BLEEDING, SIGNS OF STROKE, OR CHANGES IN MENTAL STATUS YOU SHOULD CALL 911 IMMEDIATELY.     If your provider has prescribed aspirin and/or clopidogrel (Plavix), or prasugrel (Effient), or ticagrelor (Brilinta), DO NOT STOP THESE MEDICATIONS for any reason without talking to your cardiologist first. If any of these were prescribed, you must take them every day without missing a single dose. If you are getting low on these medications, contact your provider immediately for a refill.     You received sedation today, please follow these guidelines:  FOR NEXT 24 HOURS  - Upon discharge, you should return home and rest for the remainder of the day and evening. You do not have to stay on bed rest but should not be very active.  It is recommended a responsible adult be with you for the first 24 hours after the procedure.    - No driving for 24 hours after procedure. Please arrange for someone to drive you home from the hospital today.     - Do not drive, operate machinery, or use power tools for 24 hours after your procedure.     - Do not make any legal decisions for 24 hours after your procedure.     - Do not drink alcoholic beverages for 24 hours after your procedure.    WOUND CARE   *FOR FEMORAL (LEG) ACCESS*  ·      Avoid heavy lifting (over 10 pounds) for 7 days, squatting or excessive bending for 2 days, and strenuous exercise for 7 days.  ·      No submerged bathing, swimming, or hot tubs for the next 7 days, or until fully healed.  ·      Avoid sexual activity for 3-4 days until any groin discomfort has ceased.    - The transparent dressing should be removed from the site 24 hours after the procedure.  Wash the site  gently with soap and water. Rinse well and pat dry. Keep the area clean and dry. You may apply a Band-Aid to the site. Avoid lotions, ointments, or powders until fully healed.     - You may shower the day after your procedure.      - It is normal to notice a small bruise around the puncture site and/or a small grape sized or smaller lump. Any large bruising or large lump warrants a call to the office.     - If bleeding should occur, lay down and apply pressure to the affected area for 10 minutes.  If the bleeding stops notify your physician.  If there is a large amount of bleeding or spurting of blood CALL 911 immediately.  DO NOT drive yourself to the hospital.    - You may experience some tenderness, bruising or minimal inflammation.  If you have any concerns, you may contact the Cath Lab or if any of these symptoms become excessive, contact your cardiologist or go to the emergency room.     OTHER INSTRUCTIONS  - You may take acetaminophen (Tylenol) as directed for discomfort.  If pain is not relieved with acetaminophen (Tylenol), contact your doctor.    - It can be normal to have slight swelling in the leg the procedure was performed on (not the puncture site leg) post-procedure. Elevate the leg as much as possible above the level of your heart. Any excessive swelling, warmth or redness to the leg warrants a call to the office.    - If you notice or experience any of the following, you should notify your doctor or seek medical attention  Chest pain or discomfort  Change in mental status or weakness in extremities.  Dizziness, light headedness, or feeling faint.  Change in the site where the procedure was performed, such as bleeding or an increased area of bruising or swelling.  Tingling, numbness, pain, or coolness in the leg/arm beyond the site where the procedure was performed.  Signs of infection (i.e. shaking chills, temperature > 100 degrees Fahrenheit, warmth, redness) in the leg/arm area where the  procedure was performed.  Changes in urination   Bloody or black stools  Vomiting blood  Severe nose bleeds

## 2025-07-31 NOTE — TELEPHONE ENCOUNTER
Spoke with patient regarding surgery scheduled for 8/6/25.  Instructed to stop plavix and coumadin now until instructed after surgery. He is to continue lovenox injections until surgery with last dose given night before surgery.  He voiced understanding.  Instructed to call office with any questions or concerns.

## 2025-07-31 NOTE — Clinical Note
Vessel(s): right iliac artery, right CFA, right PFA, right SFA, right popliteal artery, right PTA, right peroneal artery, right TAWANNA, right tibio-peroneal trunk and right dorsalis pedis artery. Injected with hand injections. Multiple views taken.

## 2025-07-31 NOTE — OP NOTE
Lower Extremity Angiogram (R) Operative Note     Date: 2025  OR Location: [facility] Cardiac Cath Lab    Name: Cristhian Thornton, : 1970, Age: 54 y.o., MRN: [id number], Sex: male    Diagnosis  Pre-op Diagnosis      * PAD (peripheral artery disease) [I73.9] Post-op Diagnosis     * PAD (peripheral artery disease) [I73.9]     Procedures  Lower Extremity Angiogram  [address]75 - [facility]G ANGIOGRAPHY EXTREMITY UNILATERAL RS&I      Surgeons      * Pat Kunz - Primary    Resident/Fellow/Other Assistant:  Surgeons and Role:  * No surgeons found with a matching role *    Staff:   Circulator: Clifford Woods Person: Jace  Circulator: Jolene    Anesthesia Staff: No anesthesia staff entered.    Procedure Summary  Anesthesia: Anesthesia type not filed in the log.  ASA: ASA status not filed in the log.  Estimated Blood Loss: 0mL  Intra-op Medications:   Administrations occurring from 1003 to 1053 on 25:   Medication Name Total Dose   oxygen (O2) therapy 1 Dose   midazolam (Versed) injection 1 mg   fentaNYL PF (Sublimaze) injection 50 mcg   lidocaine PF (Xylocaine) 10 mg/mL (1 %) injection 5 mL   iodixanol (VISIPaque) 320 mg iodine/mL injection 40 mL         Intraprocedure I/O Totals       None           Specimen: No specimens collected              Drains and/or Catheters: * None in log *    Tourniquet Times:         Implants:     Findings: popliteal occlusion with occlusion origins of anterior tibial and posterior tibial and peroneal arteries which reconstitute via collaterals.     Indications: Cristhian Thornton is an 54 y.o. male who is having surgery for native vessel atherosclerosis with rest pain right leg and thromboembolism to right leg arteries.  Today on review with the patient he does report nocturnal rest pain which wakes him up at night and resulted in him having to place his leg dependently.  This is consistent with his occlusion.  CT scan was limited by the presence of a knee prosthetic which obscures  visualization of the popliteal artery across the knee.  Therefore contrast arteriography was selective injection was required today.    Initially the patient reported claudication only but  The patient was seen in the preoperative area. The risks, benefits, complications, treatment options, non-operative alternatives, expected recovery and outcomes were discussed with the patient. The possibilities of reaction to medication, pulmonary aspiration, injury to surrounding structures, bleeding, recurrent infection, the need for additional procedures, failure to diagnose a condition, and creating a complication requiring transfusion or operation were discussed with the patient. The patient concurred with the proposed plan, giving informed consent.  The site of surgery was properly noted/marked if necessary per policy. The patient has been actively warmed in preoperative area. Preoperative antibiotics are not indicated. Venous thrombosis prophylaxis are not indicated.    Procedure Details: Supine position, both groins prepped and draped.  The left common femoral artery accessed under ultrasound guidance with micropuncture kit using lidocaine 1% as local anesthesia total volume used was 6 mL.  The wire was passed into the abdominal aorta under fluoroscopy and a 5 Georgian sheath was placed.  Up and over access with Omni Flush catheter to the right groin.  Right leg arteriogram performed in stages.  The right common femoral profundofemoral and superficial femoral artery are patent.  Pushing the wire into the superficial femoral artery and I then placed a angled tapered glide catheter into the superficial femoral artery to perform the remainder of the arteriogram.  For review across the knee joint which is post a prosthetic replacement, I placed patient in a frog-leg position and angled Icelandic to obtain images behind the knee.  The popliteal artery occludes in the P1 segment and there is no reconstitution.  The proximal anterior  tibial posterior tibial and peroneal arteries reconstitute and are patent into the foot.  Small collaterals trickle into these tibial vessels.    The sheath was retracted and a Vascade device deployed after a left iliofemoral arteriogram confirmed common femoral puncture.  Hemostasis obtained.      Evidence of Infection: No   Complications:  None; patient tolerated the procedure well.    Disposition: PACU - hemodynamically stable.  Condition: stable                 Additional Details: To book for bypass    Attending Attestation: I was present and scrubbed for the entire procedure.        Pat Kunz  Phone Number: [phone]

## 2025-07-31 NOTE — INTERVAL H&P NOTE
H&P reviewed. The patient was examined and there are no changes to the H&P.      Now patient reports nocturnal rest pain in right foot. Will proceed with intervention possibly or get good pictures for arteriography for bypass.

## 2025-07-31 NOTE — NURSING NOTE
END OF PROCEDURE MONITORING CRITERIA  01. BP is within +/- 20% of preprocedure  02. Oxygen sat is at 92% or above on RA, or existing order for O2 treatment, or at pre-sedation levels, otherwise new O2 order needed.  03. Unless the patient has a pre-procedure history of diminished level of consciousness, s/he is easily arousable and when aroused is able to responds appropriately for his/her age.  04. Significant complications related to the specific procedure are absent, have been controlled, or have been evaluated, including:      A. Pain.      B. Wound drainage.      C. All drains and tubes are patent.      D. Nausea and Vomiting. Vomiting is not persisten and has not occurred within 15 minutes prior to discharge.      E. Bladder distention. Voided bladder and/or no symptoms or urinary retention (e.g., bladder distention, frequent voiding in small amounts).      F. Neurovascular status.      G. Level of Consciousness consistent with pre procedural status.        friend(s)  affirmed that they were driving the patient home.

## 2025-08-01 DIAGNOSIS — I73.9 PAD (PERIPHERAL ARTERY DISEASE): Primary | ICD-10-CM

## 2025-08-01 ASSESSMENT — PAIN SCALES - GENERAL: PAINLEVEL_OUTOF10: 0 - NO PAIN

## 2025-08-04 ENCOUNTER — ANCILLARY PROCEDURE (OUTPATIENT)
Dept: VASCULAR MEDICINE | Facility: CLINIC | Age: 55
End: 2025-08-04
Payer: COMMERCIAL

## 2025-08-04 DIAGNOSIS — I73.9 PAD (PERIPHERAL ARTERY DISEASE): ICD-10-CM

## 2025-08-04 DIAGNOSIS — Z01.818 ENCOUNTER FOR OTHER PREPROCEDURAL EXAMINATION: ICD-10-CM

## 2025-08-04 PROCEDURE — 93970 EXTREMITY STUDY: CPT | Performed by: SURGERY

## 2025-08-04 PROCEDURE — 93970 EXTREMITY STUDY: CPT

## 2025-08-05 ENCOUNTER — PRE-ADMISSION TESTING (OUTPATIENT)
Dept: PREADMISSION TESTING | Facility: HOSPITAL | Age: 55
End: 2025-08-05
Payer: COMMERCIAL

## 2025-08-05 ENCOUNTER — LAB REQUISITION (OUTPATIENT)
Dept: LAB | Facility: HOSPITAL | Age: 55
End: 2025-08-05

## 2025-08-05 ENCOUNTER — ANESTHESIA EVENT (OUTPATIENT)
Dept: OPERATING ROOM | Facility: HOSPITAL | Age: 55
End: 2025-08-05
Payer: COMMERCIAL

## 2025-08-05 VITALS
OXYGEN SATURATION: 99 % | RESPIRATION RATE: 16 BRPM | SYSTOLIC BLOOD PRESSURE: 102 MMHG | BODY MASS INDEX: 26.8 KG/M2 | HEIGHT: 70 IN | DIASTOLIC BLOOD PRESSURE: 81 MMHG | HEART RATE: 70 BPM | WEIGHT: 187.17 LBS | TEMPERATURE: 97.7 F

## 2025-08-05 DIAGNOSIS — I74.4 EMBOLISM AND THROMBOSIS OF ARTERIES OF EXTREMITIES, UNSPECIFIED: ICD-10-CM

## 2025-08-05 DIAGNOSIS — Z01.818 PRE-OP EXAMINATION: Primary | ICD-10-CM

## 2025-08-05 LAB
INR PPP: 1.1 (ref 0.9–1.2)
PROTHROMBIN TIME: 11.9 SECONDS (ref 9.3–12.7)

## 2025-08-05 PROCEDURE — 87081 CULTURE SCREEN ONLY: CPT | Mod: WESLAB

## 2025-08-05 PROCEDURE — 99204 OFFICE O/P NEW MOD 45 MIN: CPT

## 2025-08-05 PROCEDURE — 85610 PROTHROMBIN TIME: CPT

## 2025-08-05 RX ORDER — ACETAMINOPHEN 500 MG
1000 TABLET ORAL EVERY 6 HOURS PRN
COMMUNITY

## 2025-08-05 ASSESSMENT — PAIN - FUNCTIONAL ASSESSMENT: PAIN_FUNCTIONAL_ASSESSMENT: 0-10

## 2025-08-05 ASSESSMENT — DUKE ACTIVITY SCORE INDEX (DASI)
CAN YOU PARTICIPATE IN MODERATE RECREATIONAL ACTIVITIES LIKE GOLF, BOWLING, DANCING, DOUBLES TENNIS OR THROWING A BASEBALL OR FOOTBALL: NO
CAN YOU RUN A SHORT DISTANCE: NO
CAN YOU CLIMB A FLIGHT OF STAIRS OR WALK UP A HILL: YES
CAN YOU DO YARD WORK LIKE RAKING LEAVES, WEEDING OR PUSHING A MOWER: YES
CAN YOU TAKE CARE OF YOURSELF (EAT, DRESS, BATHE, OR USE TOILET): YES
CAN YOU DO LIGHT WORK AROUND THE HOUSE LIKE DUSTING OR WASHING DISHES: YES
TOTAL_SCORE: 28.7
CAN YOU WALK A BLOCK OR TWO ON LEVEL GROUND: YES
DASI METS SCORE: 6.3
CAN YOU DO HEAVY WORK AROUND THE HOUSE LIKE SCRUBBING FLOORS OR LIFTING AND MOVING HEAVY FURNITURE: NO
CAN YOU HAVE SEXUAL RELATIONS: YES
CAN YOU DO MODERATE WORK AROUND THE HOUSE LIKE VACUUMING, SWEEPING FLOORS OR CARRYING GROCERIES: YES
CAN YOU WALK INDOORS, SUCH AS AROUND YOUR HOUSE: YES
CAN YOU PARTICIPATE IN STRENOUS SPORTS LIKE SWIMMING, SINGLES TENNIS, FOOTBALL, BASKETBALL, OR SKIING: NO

## 2025-08-05 ASSESSMENT — ENCOUNTER SYMPTOMS
HEMATOLOGIC/LYMPHATIC NEGATIVE: 1
DIZZINESS: 1
PSYCHIATRIC NEGATIVE: 1
GASTROINTESTINAL NEGATIVE: 1
ALLERGIC/IMMUNOLOGIC NEGATIVE: 1
SHORTNESS OF BREATH: 1
EYES NEGATIVE: 1
CONSTITUTIONAL NEGATIVE: 1
CARDIOVASCULAR NEGATIVE: 1
ENDOCRINE NEGATIVE: 1

## 2025-08-05 ASSESSMENT — PAIN SCALES - GENERAL: PAINLEVEL_OUTOF10: 0 - NO PAIN

## 2025-08-05 NOTE — PREPROCEDURE INSTRUCTIONS
Medication List            Accurate as of August 5, 2025  7:15 AM. Always use your most recent med list.                acetaminophen 500 mg tablet  Commonly known as: Tylenol  Medication Adjustments for Surgery: Take/Use as prescribed     clopidogrel 75 mg tablet  Commonly known as: Plavix  Take 1 tablet (75 mg) by mouth once daily.  Additional Medication Adjustments for Surgery: Take last dose 7 days before surgery  Notes to patient: Last dose was 7/30/25 per Dr. Kunz        * enoxaparin 80 mg/0.8 mL syringe  Commonly known as: Lovenox  Inject 0.8 mL (80 mg) under the skin every 12 hours.  Medication Adjustments for Surgery: Do Not take on the morning of surgery  Notes to patient: Take last dose the night before then hold day of surgery per Dr. Kunz     FLUoxetine 20 mg capsule  Commonly known as: PROzac  TAKE 1 CAPSULE BY MOUTH EVERY DAY  Medication Adjustments for Surgery: Take on the morning of surgery     traZODone 50 mg tablet  Commonly known as: Desyrel  TAKE 1 TABLET (50 MG) BY MOUTH AS NEEDED AT BEDTIME FOR SLEEP.  Medication Adjustments for Surgery: Take/Use as prescribed     warfarin 5 mg tablet  Commonly known as: Coumadin  Take as directed by the anticoagulation clinic. If you are unsure how to take this medication, talk to your nurse or doctor.  Original instructions: Take as directed per After Visit Summary.  Additional Medication Adjustments for Surgery: Take last dose 7 days before surgery  Notes to patient: Last dose was 7/28/25 per Dr. Kunz           * This list has 2 medication(s) that are the same as other medications prescribed for you. Read the directions carefully, and ask your doctor or other care provider to review them with you.                    The medication bridging plan has been discussed with the surgeon and the patient has been informed of the plan.                Why must I stop eating and drinking near surgery time?  With sedation, food or liquid in your stomach can enter  your lungs causing serious complications  Increases nausea and vomiting    When do I need to stop eating and drinking before my surgery?   Do not eat or drink after midnight the night before your surgery/procedure.  You may have small sips of water to take your medication.    PAT DISCHARGE INSTRUCTIONS    The Same Day Surgery (SDS) Department of the hospital where your procedure will be performed will contact you after 2:00 PM the day before your surgery. If you are scheduled on a Monday, or a Tuesday following a Monday holiday, they will call on the last business day prior to your surgery.  Please check your voicemail for any missed messages.        [facility]  [address] [place] [address], [address] [phone]  Second Floor  *PLEASE CALL ABOVE NUMBER FOR ARRIVAL TIME         Please let your surgeon know if:      You develop any open sores, shingles, burning or painful urination as these may increase your risk of an infection.   You no longer wish to have the surgery.   Any other personal circumstances change that may lead to the need to cancel or defer this surgery-such as being sick or getting admitted to any hospital within one week of your planned procedure.    Your contact details change, such as a change of address or phone number.    Starting now:     Please DO NOT drink alcohol or smoke for 24 hours before surgery. It is well known that quitting smoking can make a huge difference to your health and recovery from surgery. The longer you abstain from smoking, the better your chances of a healthy recovery. If you need help with quitting, call 1-947-QUIT-NOW to be connected to a trained counselor who will discuss the best methods to help you quit.     Before your surgery:    Please stop all supplements 7 days prior to surgery. Or as directed by your surgeon.   Please stop taking NSAID pain medicine such as Advil and Motrin 7 days before surgery.    If you develop any  fever, cough, cold, rashes, cuts, scratches, scrapes, urinary symptoms or infection anywhere on your body (including teeth and gums) prior to surgery, please call your surgeon’s office as soon as possible. This may require treatment to reduce the chance of cancellation on the day of surgery.    The day before your surgery:   DIET- Please follow the diet instructions at the top of your packet.   Get a good night’s rest.  Use the special soap for bathing if you have been instructed to use one.    Scheduled surgery times may change and you will be notified if this occurs - please check your personal voicemail for any updates.     On the morning of surgery:   Wear comfortable, loose fitting clothes which open in the front. Please do not wear moisturizers, creams, lotions, makeup or perfume.    Please bring with you to surgery:   Photo ID and insurance card   Current list of medicines and allergies   Pacemaker/ Defibrillator/Heart stent cards   CPAP machine and mask    Slings/ splints/ crutches   A copy of your complete advanced directive/DHPOA.    Please do NOT bring with you to surgery:   All jewelry and valuables should be left at home.   Prosthetic devices such as contact lenses, hearing aids, dentures, eyelash extensions, hairpins and body piercings must be removed prior to going in to the surgical suite.    After outpatient surgery:   A responsible adult MUST accompany you at the time of discharge and stay with you for 24 hours after your surgery. You may NOT drive yourself home after surgery.    Do not drive, operate machinery, make critical decisions or do activities that require co-ordination or balance until after a night’s sleep.   Do not drink alcoholic beverages for 24 hours.   Instructions for resuming your medications will be provided by your surgeon.    CALL YOUR DOCTOR AFTER SURGERY IF YOU HAVE:     Chills and/or a fever of 101° F or higher.    Redness, swelling, pus or drainage from your surgical wound  or a bad smell from the wound.    Lightheadedness, fainting or confusion.    Persistent vomiting (throwing up) and are not able to eat or drink for 12 hours.    Three or more loose, watery bowel movements in 24 hours (diarrhea).   Difficulty or pain while urinating( after non-urological surgery)    Pain and swelling in your legs, especially if it is only on one side.    Difficulty breathing or are breathing faster than normal.    Any new concerning symptoms.      Patient Information: Pre-Operative Infection Prevention Measures     Why did I have my nose, under my arms, and groin swabbed?  The purpose of the swab is to identify Staphylococcus aureus inside your nose or on your skin.  The swab was sent to the laboratory for culture.  A positive swab/culture for Staphylococcus aureus is called colonization or carriage.      What is Staphylococcus aureus?  Staphylococcus aureus, also known as “staph”, is a germ found on the skin or in the nose of healthy people.  Sometimes Staphylococcus aureus can get into the body and cause an infection.  This can be minor (such as pimples, boils, or other skin problems).  It might also be serious (such as a blood infection, pneumonia, or a surgical site infection).    What is Staphylococcus aureus colonization or carriage?  Colonization or carriage means that a person has the germ but is not sick from it.  These bacteria can be spread on the hands or when breathing or sneezing.    How is Staphylococcus aureus spread?  It is most often spread by close contact with a person or item that carries it.    What happens if my culture is positive for Staphylococcus aureus?  Your doctor/medical team will use this information to guide any antibiotic treatment which may be necessary.  Regardless of the culture results, we will clean the inside of your nose with a betadine swab just before you have your surgery.      Will I get an infection if I have Staphylococcus aureus in my nose or on my  skin?  Anyone can get an infection with Staphylococcus aureus.  However, the best way to reduce your risk of infection is to follow the instructions provided to you for the use of your CHG soap and dental rinse.        Patient Information: Oral/Dental Rinse    What is oral/dental rinse?   It is a mouthwash. It is a way of cleaning the mouth with a germ-killing solution before your surgery.  The solution contains chlorhexidine, commonly known as CHG.   It is used inside the mouth to kill a bacteria known as Staphylococcus aureus.  Let your doctor know if you are allergic to Chlorhexidine.    Why do I need to use CHG oral/dental rinse?  The CHG oral/dental rinse helps to kill a bacteria in your mouth known as Staphylococcus aureus.     This reduces the risk of infection at the surgical site.      Using your CHG oral/dental rinse  STEPS:  Use your CHG oral/dental rinse after you brush your teeth the night before (at bedtime) and the morning of your surgery.  Follow all directions on your prescription label.    Use the cap on the container to measure 15ml   Swish (gargle if you can) the mouthwash in your mouth for at least 30 seconds, (do not swallow) and spit out  After you use your CHG rinse, do not rinse your mouth with water, drink or eat.  Please refer to the prescription label for the appropriate time to resume oral intake      What side effects might I have using the CHG oral/dental rinse?  CHG rinse will stick to plaque on the teeth.  Brush and floss just before use.  Teeth brushing will help avoid staining of plaque during use.      Patient Information: Home Preoperative Antibacterial Shower      What is a home preoperative antibacterial shower?  This shower is a way of cleaning the skin with a germ-killing solution before surgery.  The solution contains chlorhexidine, commonly known as CHG.  CHG is a skin cleanser with germ-killing ability.  Let your doctor know if you are allergic to chlorhexidine.    Why do  I need to take a preoperative antibacterial shower?  Skin is not sterile.  It is best to try to make your skin as free of germs as possible before surgery.  Proper cleansing with a germ-killing soap before surgery can lower the number of germs on your skin.  This helps to reduce the risk of infection at the surgical site.  Following the instructions listed below will help you prepare your skin for surgery.      How do I use the solution?  Steps:  Begin using your CHG soap THE NIGHT BEFORE AND THE MORNING OF your scheduled surgery on ____8/6/25___.    First, wash and rinse your hair using the CHG soap. Keep CHG soap away from ear canals and eyes.  Rinse completely, do not condition.  Hair extensions should be removed.  Wash your face with your normal soap and rinse.    Apply the CHG solution to a clean wet washcloth.  Turn the water off or move away from the water spray to avoid premature rinsing of the CHG soap as you are applying.   Firmly lather your entire body from the neck down.  Do not use on your face.  Pay special attention to the area(s) where your incision(s) will be located unless they are on your face.  Avoid scrubbing your skin too hard.  The important point is to have the CHG soap sit on your skin for 3 minutes.    When the 3 minutes are up, turn on the water and rinse the CHG solution off your body completely.   DO NOT wash with regular soap after you have used the CHG soap solution  Pat yourself dry with a clean, freshly-laundered towel.  DO NOT apply powders, deodorants, or lotions.  Dress in clean, freshly laundered nightclothes.    Be sure to sleep with clean, freshly laundered sheets.  Be aware that CHG will cause stains on fabrics; if you wash them with bleach after use.  Rinse your washcloth and other linens that have contact with CHG completely.  Use only non-chlorine detergents to launder the items used.   The morning of surgery is the fifth day.  Repeat the above steps and dress in clean  comfortable clothing     Whom should I contact if I have any questions regarding the use of CHG soap?  Call the [facility] at [phone] if you have any questions.

## 2025-08-05 NOTE — CPM/PAT H&P
CPM/PAT Evaluation       Name: Cristhian Thornton (Cristhian Thornton)  /Age: 1970/54 y.o.     In-Person       Chief Complaint: PAD    HPI: Cristhian Thornton is a 54 year old male who was recently admitted   in May. He was treated for chronic mesenteric ischemia resulting in a 20 pound weight loss. He had a mesenteric thrombectomy. There was also evidence the patient had a right popliteal artery occlusion. He had a follow up angiogram outpatient that shows popliteal occlusion with occlusion origins of the anterior tibial and posterior tibial and peroneal arteries with reconstitute vis collaterals. He states he has pain in the right leg when walking with tightness, cramping, and numbness in his toes. He is scheduled for a creation of a arterial femoral bypass. He denies fever, chills, nausea, vomiting, chest pain, SOB, dizziness,and palpitations.   Medical History[1]    Surgical History[2]    Social History     Tobacco Use    Smoking status: Never     Passive exposure: Never    Smokeless tobacco: Never   Substance Use Topics    Alcohol use: Yes     Alcohol/week: 1.0 - 2.0 standard drink of alcohol     Types: 1 - 2 Standard drinks or equivalent per week     Comment: social     Social History     Substance and Sexual Activity   Drug Use Never       Family History[3]    Allergies[4]  Current Outpatient Medications   Medication Sig Dispense Refill    acetaminophen (Tylenol) 500 mg tablet Take 2 tablets (1,000 mg) by mouth every 6 hours if needed for mild pain (1 - 3).      enoxaparin (Lovenox) 80 mg/0.8 mL syringe Inject 0.8 mL (80 mg) under the skin every 12 hours. 10 each 0    FLUoxetine (PROzac) 20 mg capsule TAKE 1 CAPSULE BY MOUTH EVERY DAY 90 capsule 3    traZODone (Desyrel) 50 mg tablet TAKE 1 TABLET (50 MG) BY MOUTH AS NEEDED AT BEDTIME FOR SLEEP. 90 tablet 3    clopidogrel (Plavix) 75 mg tablet Take 1 tablet (75 mg) by mouth once daily. 30 tablet 3    enoxaparin (Lovenox) 30 mg/0.3 mL syringe INJECT 0.9 ML (90  MG) UNDER THE SKIN 2 TIMES A DAY. (Patient not taking: Reported on 8/5/2025) 6 each 0    warfarin (Coumadin) 5 mg tablet Take as directed per After Visit Summary. 30 tablet 11     No current facility-administered medications for this visit.        Review of Systems   Constitutional: Negative.    HENT: Negative.     Eyes: Negative.    Respiratory:  Positive for shortness of breath (occasional).    Cardiovascular: Negative.    Gastrointestinal: Negative.    Endocrine: Negative.    Genitourinary: Negative.    Musculoskeletal:  Positive for gait problem.        Rigth leg pain     Skin: Negative.    Allergic/Immunologic: Negative.    Neurological:  Positive for dizziness (occasional).   Hematological: Negative.    Psychiatric/Behavioral: Negative.               Physical Exam  Vitals reviewed.   Constitutional:       Appearance: Normal appearance.   HENT:      Head: Normocephalic and atraumatic.      Nose: Nose normal.      Mouth/Throat:      Mouth: Mucous membranes are moist.      Pharynx: Oropharynx is clear.     Eyes:      Extraocular Movements: Extraocular movements intact.      Conjunctiva/sclera: Conjunctivae normal.       Cardiovascular:      Rate and Rhythm: Normal rate and regular rhythm.      Heart sounds: Murmur heard.   Abdominal:      Palpations: Abdomen is soft.   Genitourinary:     Comments: Assessment deferred to physician    Musculoskeletal:         General: Normal range of motion.      Cervical back: Normal range of motion and neck supple.     Skin:     General: Skin is warm and dry.     Neurological:      General: No focal deficit present.      Mental Status: He is alert and oriented to person, place, and time.     Psychiatric:         Mood and Affect: Mood normal.         Behavior: Behavior normal.         Thought Content: Thought content normal.         Judgment: Judgment normal.          PAT AIRWAY:   Airway:     Mallampati::  I    TM distance::  >3 FB    Neck ROM::  Full  normal        Visit  "Vitals  /81   Pulse 70   Temp 36.5 °C (97.7 °F) (Temporal)   Resp 16   Ht 1.778 m (5' 10\")   Wt 84.9 kg (187 lb 2.7 oz)   SpO2 99%   BMI 26.86 kg/m²   Smoking Status Never   BSA 2.05 m²     ASA: III  CHADS2: 1.9%  RCRI: 6.6%  DASI: 28.7  METS: 6.3  STOP BAN      Assessment and Plan:     PAD , Atherosclerosis of native artery of right leg with rest pain : CREATION, BYPASS, ARTERIAL, FEMORAL, USING GRAFT , Coumadin, Plavix, bridge with Lovenox   History of bicuspid AoV and Severe aortic stenosis: s/p AVR/Arch repair in , cardiac catheterization at the time revealed normal coronaries. 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  BMI: 26.86    LABS: 25, MRSA ordered in PAT  EKG 25  MART 25  CONCLUSIONS:   1. The left ventricular systolic function is low normal with a visually estimated ejection fraction of 50-55%.   2. There is normal right ventricular global systolic function.   3. Trace mitral valve regurgitation.   4. Trace tricuspid regurgitation is visualized.   5. The prosthetic aortic valve has stenosis present.   6. There is significant thickening of the bioprosthetic aortic valve leaflets with fusion of at least 2 cusps compared to transesophageal echocardiogram done from . Valve still opening reasonably well but the aortic valve area by planimetry is about 0.7 cmï¿½. There is turbulence at the level of the valve. No clear evidence of vegetation.      Taylor Mcrae, APRN-CNP         [1]   Past Medical History:  Diagnosis Date    Abscess of left index finger 2024    Aneurysm     Anxiety disorder, unspecified 2023    Aortic valve stenosis     Artery of extremity, embolism and thrombosis 2025    Ascending aortic aneurysm 2023    Bicuspid aortic valve 2019    Cervical radiculopathy " 09/11/2023    Claudication 05/20/2025    Closed fracture of distal end of humerus 03/10/2025    Elbow pain 03/10/2025    Folliculitis 04/10/2020    History of total right knee replacement (TKR) 09/11/2023    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    Infection     Multiple    Low testosterone     MRSA (methicillin resistant Staphylococcus aureus) septicemia (Multi) 07/03/2024    Osteoarthritis of both hips 09/11/2023    PAD (peripheral artery disease) 07/08/2025    S/P AVR (aortic valve replacement) 09/11/2023    Somatic dysfunction of sacral region 03/10/2025   [2]   Past Surgical History:  Procedure Laterality Date    AORTIC VALVE REPLACEMENT  11/4/2020    CARDIAC VALVE REPLACEMENT  11/4/2020    CERVICAL SPINE SURGERY      implant    ELBOW BURSA SURGERY Right     related to infection    HERNIA REPAIR      INVASIVE VASCULAR PROCEDURE N/A 05/15/2025    Procedure: Aortogram, SMA thrombectomy;  Surgeon: Pat Kunz MD;  Location: [facility];  Service: Vascular Surgery;  Laterality: N/A;    INVASIVE VASCULAR PROCEDURE Right 07/31/2025    Procedure: Lower Extremity Angiogram;  Surgeon: Pat Kuzn MD;  Location: [facility];  Service: Vascular Surgery;  Laterality: Right;    JOINT REPLACEMENT Right     knee    MR HEAD ANGIO WO IV CONTRAST  02/24/2023    MR HEAD ANGIO WO IV CONTRAST [facility]    MR HEAD ANGIO WO IV CONTRAST  09/05/2023    MR HEAD ANGIO WO IV CONTRAST [facility]    MR NECK ANGIO WO IV CONTRAST  12/20/2012    MR NECK ANGIO WO IV CONTRAST [facility]    SEPTOPLASTY     [3]   Family History  Problem Relation Name Age of Onset    Autoimmune disease Mother      Hypertension Mother      PTSD Father      Benign prostatic hyperplasia Father      No Known Problems Sister      Other (htn) Brother      Other (hld) Brother      No Known Problems Daughter      No Known Problems Daughter      Other (watchman) Maternal  Grandfather      Other (cath) Maternal Grandfather      Other (3x bypass) Maternal Grandfather      Heart disease Maternal Grandfather     [4]   Allergies  Allergen Reactions    Dilaudid [Hydromorphone] Rash     Has used with Benadryl

## 2025-08-06 ENCOUNTER — APPOINTMENT (OUTPATIENT)
Dept: CARDIOLOGY | Facility: HOSPITAL | Age: 55
End: 2025-08-06
Payer: COMMERCIAL

## 2025-08-06 ENCOUNTER — ANESTHESIA (OUTPATIENT)
Dept: OPERATING ROOM | Facility: HOSPITAL | Age: 55
End: 2025-08-06
Payer: COMMERCIAL

## 2025-08-06 ENCOUNTER — HOSPITAL ENCOUNTER (INPATIENT)
Facility: HOSPITAL | Age: 55
LOS: 4 days | Discharge: HOME | End: 2025-08-10
Attending: SURGERY | Admitting: INTERNAL MEDICINE
Payer: COMMERCIAL

## 2025-08-06 DIAGNOSIS — I70.221: ICD-10-CM

## 2025-08-06 DIAGNOSIS — I73.9 PAD (PERIPHERAL ARTERY DISEASE): Primary | ICD-10-CM

## 2025-08-06 LAB
APTT PPP: 24.6 SECONDS (ref 22–32.5)
ERYTHROCYTE [DISTWIDTH] IN BLOOD BY AUTOMATED COUNT: 13.6 % (ref 11.5–14.5)
HCT VFR BLD AUTO: 39.1 % (ref 41–52)
HGB BLD-MCNC: 12.7 G/DL (ref 13.5–17.5)
MCH RBC QN AUTO: 28.9 PG (ref 26–34)
MCHC RBC AUTO-ENTMCNC: 32.5 G/DL (ref 32–36)
MCV RBC AUTO: 89 FL (ref 80–100)
NRBC BLD-RTO: 0 /100 WBCS (ref 0–0)
PLATELET # BLD AUTO: 171 X10*3/UL (ref 150–450)
POCT INTERNATIONAL NORMALIZATION RATIO: 1.1
POCT PROTHROMBIN TIME: 13.4 SECONDS
RBC # BLD AUTO: 4.39 X10*6/UL (ref 4.5–5.9)
WBC # BLD AUTO: 10.9 X10*3/UL (ref 4.4–11.3)

## 2025-08-06 PROCEDURE — 35566 ART BYP FEM-ANT-POST TIB/PRL: CPT | Performed by: SURGERY

## 2025-08-06 PROCEDURE — 85027 COMPLETE CBC AUTOMATED: CPT | Performed by: NURSE PRACTITIONER

## 2025-08-06 PROCEDURE — 85347 COAGULATION TIME ACTIVATED: CPT

## 2025-08-06 PROCEDURE — 36415 COLL VENOUS BLD VENIPUNCTURE: CPT | Performed by: INTERNAL MEDICINE

## 2025-08-06 PROCEDURE — 99222 1ST HOSP IP/OBS MODERATE 55: CPT | Performed by: INTERNAL MEDICINE

## 2025-08-06 PROCEDURE — 2500000004 HC RX 250 GENERAL PHARMACY W/ HCPCS (ALT 636 FOR OP/ED): Mod: JZ | Performed by: ANESTHESIOLOGY

## 2025-08-06 PROCEDURE — 2500000005 HC RX 250 GENERAL PHARMACY W/O HCPCS: Performed by: SURGERY

## 2025-08-06 PROCEDURE — 2720000007 HC OR 272 NO HCPCS: Performed by: SURGERY

## 2025-08-06 PROCEDURE — 36620 INSERTION CATHETER ARTERY: CPT | Performed by: ANESTHESIOLOGY

## 2025-08-06 PROCEDURE — 2500000004 HC RX 250 GENERAL PHARMACY W/ HCPCS (ALT 636 FOR OP/ED): Performed by: SURGERY

## 2025-08-06 PROCEDURE — 85730 THROMBOPLASTIN TIME PARTIAL: CPT | Performed by: INTERNAL MEDICINE

## 2025-08-06 PROCEDURE — 85730 THROMBOPLASTIN TIME PARTIAL: CPT | Performed by: NURSE PRACTITIONER

## 2025-08-06 PROCEDURE — 2500000001 HC RX 250 WO HCPCS SELF ADMINISTERED DRUGS (ALT 637 FOR MEDICARE OP): Performed by: ANESTHESIOLOGY

## 2025-08-06 PROCEDURE — A35566 PR VEIN BYPASS GRAFT,FEM-TIBIAL

## 2025-08-06 PROCEDURE — C1757 CATH, THROMBECTOMY/EMBOLECT: HCPCS | Performed by: SURGERY

## 2025-08-06 PROCEDURE — 2060000001 HC INTERMEDIATE ICU ROOM DAILY

## 2025-08-06 PROCEDURE — 7100000002 HC RECOVERY ROOM TIME - EACH INCREMENTAL 1 MINUTE: Performed by: SURGERY

## 2025-08-06 PROCEDURE — 36415 COLL VENOUS BLD VENIPUNCTURE: CPT | Performed by: NURSE PRACTITIONER

## 2025-08-06 PROCEDURE — 2500000001 HC RX 250 WO HCPCS SELF ADMINISTERED DRUGS (ALT 637 FOR MEDICARE OP): Performed by: NURSE PRACTITIONER

## 2025-08-06 PROCEDURE — 3700000001 HC GENERAL ANESTHESIA TIME - INITIAL BASE CHARGE: Performed by: SURGERY

## 2025-08-06 PROCEDURE — 3600000010 HC OR TIME - EACH INCREMENTAL 1 MINUTE - PROCEDURE LEVEL FIVE: Performed by: SURGERY

## 2025-08-06 PROCEDURE — 2500000004 HC RX 250 GENERAL PHARMACY W/ HCPCS (ALT 636 FOR OP/ED)

## 2025-08-06 PROCEDURE — 2500000001 HC RX 250 WO HCPCS SELF ADMINISTERED DRUGS (ALT 637 FOR MEDICARE OP): Performed by: REGISTERED NURSE

## 2025-08-06 PROCEDURE — 7100000001 HC RECOVERY ROOM TIME - INITIAL BASE CHARGE: Performed by: SURGERY

## 2025-08-06 PROCEDURE — 041K09L BYPASS RIGHT FEMORAL ARTERY TO POPLITEAL ARTERY WITH AUTOLOGOUS VENOUS TISSUE, OPEN APPROACH: ICD-10-PCS | Performed by: SURGERY

## 2025-08-06 PROCEDURE — A4550 SURGICAL TRAYS: HCPCS | Performed by: SURGERY

## 2025-08-06 PROCEDURE — A35566 PR VEIN BYPASS GRAFT,FEM-TIBIAL: Performed by: ANESTHESIOLOGY

## 2025-08-06 PROCEDURE — 3700000002 HC GENERAL ANESTHESIA TIME - EACH INCREMENTAL 1 MINUTE: Performed by: SURGERY

## 2025-08-06 PROCEDURE — C1894 INTRO/SHEATH, NON-LASER: HCPCS | Performed by: SURGERY

## 2025-08-06 PROCEDURE — 3600000005 HC OR TIME - INITIAL BASE CHARGE - PROCEDURE LEVEL FIVE: Performed by: SURGERY

## 2025-08-06 PROCEDURE — 2500000004 HC RX 250 GENERAL PHARMACY W/ HCPCS (ALT 636 FOR OP/ED): Performed by: NURSE PRACTITIONER

## 2025-08-06 RX ORDER — SCOPOLAMINE 1 MG/3D
PATCH, EXTENDED RELEASE TRANSDERMAL AS NEEDED
Status: DISCONTINUED | OUTPATIENT
Start: 2025-08-06 | End: 2025-08-06

## 2025-08-06 RX ORDER — METOCLOPRAMIDE 10 MG/1
10 TABLET ORAL ONCE
Status: COMPLETED | OUTPATIENT
Start: 2025-08-06 | End: 2025-08-06

## 2025-08-06 RX ORDER — DIPHENHYDRAMINE HYDROCHLORIDE 50 MG/ML
INJECTION, SOLUTION INTRAMUSCULAR; INTRAVENOUS AS NEEDED
Status: DISCONTINUED | OUTPATIENT
Start: 2025-08-06 | End: 2025-08-06

## 2025-08-06 RX ORDER — DEXTROSE MONOHYDRATE, SODIUM CHLORIDE, AND POTASSIUM CHLORIDE 50; 1.49; 9 G/1000ML; G/1000ML; G/1000ML
100 INJECTION, SOLUTION INTRAVENOUS CONTINUOUS
Status: DISCONTINUED | OUTPATIENT
Start: 2025-08-06 | End: 2025-08-07

## 2025-08-06 RX ORDER — LABETALOL HYDROCHLORIDE 5 MG/ML
10 INJECTION, SOLUTION INTRAVENOUS ONCE AS NEEDED
Status: DISCONTINUED | OUTPATIENT
Start: 2025-08-06 | End: 2025-08-06 | Stop reason: HOSPADM

## 2025-08-06 RX ORDER — HEPARIN SODIUM 5000 [USP'U]/ML
2000-4000 INJECTION, SOLUTION INTRAVENOUS; SUBCUTANEOUS AS NEEDED
Status: DISCONTINUED | OUTPATIENT
Start: 2025-08-06 | End: 2025-08-06

## 2025-08-06 RX ORDER — MIDAZOLAM HYDROCHLORIDE 1 MG/ML
INJECTION, SOLUTION INTRAMUSCULAR; INTRAVENOUS AS NEEDED
Status: DISCONTINUED | OUTPATIENT
Start: 2025-08-06 | End: 2025-08-06

## 2025-08-06 RX ORDER — HEPARIN SODIUM 10000 [USP'U]/100ML
0-4000 INJECTION, SOLUTION INTRAVENOUS CONTINUOUS
Status: DISCONTINUED | OUTPATIENT
Start: 2025-08-06 | End: 2025-08-07

## 2025-08-06 RX ORDER — LIDOCAINE HYDROCHLORIDE 10 MG/ML
INJECTION, SOLUTION INFILTRATION; PERINEURAL AS NEEDED
Status: DISCONTINUED | OUTPATIENT
Start: 2025-08-06 | End: 2025-08-06

## 2025-08-06 RX ORDER — PROTAMINE SULFATE 10 MG/ML
INJECTION, SOLUTION INTRAVENOUS AS NEEDED
Status: DISCONTINUED | OUTPATIENT
Start: 2025-08-06 | End: 2025-08-06

## 2025-08-06 RX ORDER — ONDANSETRON 4 MG/1
4 TABLET, ORALLY DISINTEGRATING ORAL ONCE AS NEEDED
Status: ACTIVE | OUTPATIENT
Start: 2025-08-06 | End: 2025-08-06

## 2025-08-06 RX ORDER — MIDAZOLAM HYDROCHLORIDE 1 MG/ML
1 INJECTION, SOLUTION INTRAMUSCULAR; INTRAVENOUS ONCE AS NEEDED
Status: DISCONTINUED | OUTPATIENT
Start: 2025-08-06 | End: 2025-08-06 | Stop reason: HOSPADM

## 2025-08-06 RX ORDER — FAMOTIDINE 20 MG/1
20 TABLET, FILM COATED ORAL ONCE
Status: COMPLETED | OUTPATIENT
Start: 2025-08-06 | End: 2025-08-06

## 2025-08-06 RX ORDER — LIDOCAINE HYDROCHLORIDE 10 MG/ML
0.1 INJECTION, SOLUTION INFILTRATION; PERINEURAL ONCE
Status: DISCONTINUED | OUTPATIENT
Start: 2025-08-06 | End: 2025-08-06 | Stop reason: HOSPADM

## 2025-08-06 RX ORDER — FLUOXETINE 20 MG/1
20 CAPSULE ORAL DAILY
Status: DISCONTINUED | OUTPATIENT
Start: 2025-08-06 | End: 2025-08-10 | Stop reason: HOSPADM

## 2025-08-06 RX ORDER — SODIUM CHLORIDE, SODIUM LACTATE, POTASSIUM CHLORIDE, CALCIUM CHLORIDE 600; 310; 30; 20 MG/100ML; MG/100ML; MG/100ML; MG/100ML
INJECTION, SOLUTION INTRAVENOUS CONTINUOUS PRN
Status: DISCONTINUED | OUTPATIENT
Start: 2025-08-06 | End: 2025-08-06

## 2025-08-06 RX ORDER — HYDRALAZINE HYDROCHLORIDE 20 MG/ML
10 INJECTION INTRAMUSCULAR; INTRAVENOUS EVERY 30 MIN PRN
Status: DISCONTINUED | OUTPATIENT
Start: 2025-08-06 | End: 2025-08-06 | Stop reason: HOSPADM

## 2025-08-06 RX ORDER — ROCURONIUM BROMIDE 10 MG/ML
INJECTION, SOLUTION INTRAVENOUS AS NEEDED
Status: DISCONTINUED | OUTPATIENT
Start: 2025-08-06 | End: 2025-08-06

## 2025-08-06 RX ORDER — PHENYLEPHRINE 10 MG/250 ML(40 MCG/ML)IN 0.9 % SOD.CHLORIDE INTRAVENOUS
CONTINUOUS PRN
Status: DISCONTINUED | OUTPATIENT
Start: 2025-08-06 | End: 2025-08-06

## 2025-08-06 RX ORDER — ACETAMINOPHEN 325 MG/1
650 TABLET ORAL EVERY 4 HOURS PRN
Status: DISCONTINUED | OUTPATIENT
Start: 2025-08-06 | End: 2025-08-07

## 2025-08-06 RX ORDER — MEPERIDINE HYDROCHLORIDE 25 MG/ML
12.5 INJECTION INTRAMUSCULAR; INTRAVENOUS; SUBCUTANEOUS EVERY 10 MIN PRN
Status: DISCONTINUED | OUTPATIENT
Start: 2025-08-06 | End: 2025-08-06 | Stop reason: HOSPADM

## 2025-08-06 RX ORDER — HYDROMORPHONE HYDROCHLORIDE 0.2 MG/ML
0.1 INJECTION INTRAMUSCULAR; INTRAVENOUS; SUBCUTANEOUS EVERY 5 MIN PRN
Status: DISCONTINUED | OUTPATIENT
Start: 2025-08-06 | End: 2025-08-06 | Stop reason: HOSPADM

## 2025-08-06 RX ORDER — OXYCODONE AND ACETAMINOPHEN 5; 325 MG/1; MG/1
1 TABLET ORAL EVERY 4 HOURS PRN
Status: DISCONTINUED | OUTPATIENT
Start: 2025-08-06 | End: 2025-08-10 | Stop reason: HOSPADM

## 2025-08-06 RX ORDER — OXYCODONE AND ACETAMINOPHEN 10; 325 MG/1; MG/1
1 TABLET ORAL EVERY 4 HOURS PRN
Status: DISCONTINUED | OUTPATIENT
Start: 2025-08-06 | End: 2025-08-10 | Stop reason: HOSPADM

## 2025-08-06 RX ORDER — ONDANSETRON HYDROCHLORIDE 2 MG/ML
INJECTION, SOLUTION INTRAVENOUS AS NEEDED
Status: DISCONTINUED | OUTPATIENT
Start: 2025-08-06 | End: 2025-08-06

## 2025-08-06 RX ORDER — ONDANSETRON HYDROCHLORIDE 2 MG/ML
4 INJECTION, SOLUTION INTRAVENOUS ONCE AS NEEDED
Status: DISCONTINUED | OUTPATIENT
Start: 2025-08-06 | End: 2025-08-06 | Stop reason: HOSPADM

## 2025-08-06 RX ORDER — ALBUTEROL SULFATE 0.83 MG/ML
2.5 SOLUTION RESPIRATORY (INHALATION) ONCE AS NEEDED
Status: DISCONTINUED | OUTPATIENT
Start: 2025-08-06 | End: 2025-08-06 | Stop reason: HOSPADM

## 2025-08-06 RX ORDER — ONDANSETRON HYDROCHLORIDE 2 MG/ML
4 INJECTION, SOLUTION INTRAVENOUS EVERY 8 HOURS PRN
Status: ACTIVE | OUTPATIENT
Start: 2025-08-06 | End: 2025-08-06

## 2025-08-06 RX ORDER — NORETHINDRONE AND ETHINYL ESTRADIOL 0.5-0.035
10 KIT ORAL EVERY 5 MIN PRN
Status: DISCONTINUED | OUTPATIENT
Start: 2025-08-06 | End: 2025-08-06 | Stop reason: SDUPTHER

## 2025-08-06 RX ORDER — CEFAZOLIN 1 G/1
INJECTION, POWDER, FOR SOLUTION INTRAVENOUS AS NEEDED
Status: DISCONTINUED | OUTPATIENT
Start: 2025-08-06 | End: 2025-08-06

## 2025-08-06 RX ORDER — OXYCODONE HYDROCHLORIDE 5 MG/1
5 TABLET ORAL ONCE AS NEEDED
Status: DISCONTINUED | OUTPATIENT
Start: 2025-08-06 | End: 2025-08-10 | Stop reason: HOSPADM

## 2025-08-06 RX ORDER — PHENYLEPHRINE HYDROCHLORIDE 10 MG/ML
INJECTION INTRAVENOUS AS NEEDED
Status: DISCONTINUED | OUTPATIENT
Start: 2025-08-06 | End: 2025-08-06

## 2025-08-06 RX ORDER — PHENYLEPHRINE HCL IN 0.9% NACL 1 MG/10 ML
SYRINGE (ML) INTRAVENOUS AS NEEDED
Status: DISCONTINUED | OUTPATIENT
Start: 2025-08-06 | End: 2025-08-06

## 2025-08-06 RX ORDER — GLYCOPYRROLATE 0.2 MG/ML
0.2 INJECTION INTRAMUSCULAR; INTRAVENOUS ONCE
Status: DISCONTINUED | OUTPATIENT
Start: 2025-08-06 | End: 2025-08-06 | Stop reason: HOSPADM

## 2025-08-06 RX ORDER — SODIUM CHLORIDE 0.9 G/100ML
INJECTION, SOLUTION IRRIGATION AS NEEDED
Status: DISCONTINUED | OUTPATIENT
Start: 2025-08-06 | End: 2025-08-06 | Stop reason: HOSPADM

## 2025-08-06 RX ORDER — FENTANYL CITRATE 50 UG/ML
INJECTION, SOLUTION INTRAMUSCULAR; INTRAVENOUS AS NEEDED
Status: DISCONTINUED | OUTPATIENT
Start: 2025-08-06 | End: 2025-08-06

## 2025-08-06 RX ORDER — ALBUTEROL SULFATE 0.83 MG/ML
2.5 SOLUTION RESPIRATORY (INHALATION) ONCE
Status: DISCONTINUED | OUTPATIENT
Start: 2025-08-06 | End: 2025-08-06 | Stop reason: HOSPADM

## 2025-08-06 RX ORDER — MORPHINE SULFATE 2 MG/ML
2 INJECTION, SOLUTION INTRAMUSCULAR; INTRAVENOUS EVERY 2 HOUR PRN
Status: DISCONTINUED | OUTPATIENT
Start: 2025-08-06 | End: 2025-08-10 | Stop reason: HOSPADM

## 2025-08-06 RX ORDER — TRAZODONE HYDROCHLORIDE 50 MG/1
50 TABLET ORAL NIGHTLY PRN
Status: DISCONTINUED | OUTPATIENT
Start: 2025-08-06 | End: 2025-08-10 | Stop reason: HOSPADM

## 2025-08-06 RX ORDER — PROPOFOL 10 MG/ML
INJECTION, EMULSION INTRAVENOUS AS NEEDED
Status: DISCONTINUED | OUTPATIENT
Start: 2025-08-06 | End: 2025-08-06

## 2025-08-06 RX ORDER — NALOXONE HYDROCHLORIDE 0.4 MG/ML
0.2 INJECTION, SOLUTION INTRAMUSCULAR; INTRAVENOUS; SUBCUTANEOUS EVERY 5 MIN PRN
Status: DISCONTINUED | OUTPATIENT
Start: 2025-08-06 | End: 2025-08-10 | Stop reason: HOSPADM

## 2025-08-06 RX ORDER — HEPARIN SODIUM 1000 [USP'U]/ML
INJECTION, SOLUTION INTRAVENOUS; SUBCUTANEOUS AS NEEDED
Status: DISCONTINUED | OUTPATIENT
Start: 2025-08-06 | End: 2025-08-06

## 2025-08-06 RX ORDER — HYDROMORPHONE HYDROCHLORIDE 1 MG/ML
INJECTION, SOLUTION INTRAMUSCULAR; INTRAVENOUS; SUBCUTANEOUS AS NEEDED
Status: DISCONTINUED | OUTPATIENT
Start: 2025-08-06 | End: 2025-08-06

## 2025-08-06 RX ORDER — NORETHINDRONE AND ETHINYL ESTRADIOL 0.5-0.035
50 KIT ORAL ONCE AS NEEDED
Status: DISCONTINUED | OUTPATIENT
Start: 2025-08-06 | End: 2025-08-10 | Stop reason: HOSPADM

## 2025-08-06 RX ORDER — CLOPIDOGREL BISULFATE 75 MG/1
75 TABLET ORAL DAILY
Status: DISCONTINUED | OUTPATIENT
Start: 2025-08-06 | End: 2025-08-10 | Stop reason: HOSPADM

## 2025-08-06 RX ORDER — CEFAZOLIN SODIUM 2 G/50ML
2 SOLUTION INTRAVENOUS EVERY 8 HOURS
Status: COMPLETED | OUTPATIENT
Start: 2025-08-06 | End: 2025-08-07

## 2025-08-06 RX ORDER — DIPHENHYDRAMINE HYDROCHLORIDE 50 MG/ML
12.5 INJECTION, SOLUTION INTRAMUSCULAR; INTRAVENOUS ONCE AS NEEDED
Status: DISCONTINUED | OUTPATIENT
Start: 2025-08-06 | End: 2025-08-06 | Stop reason: HOSPADM

## 2025-08-06 RX ADMIN — PROTAMINE SULFATE 5 MG: 10 INJECTION, SOLUTION INTRAVENOUS at 12:45

## 2025-08-06 RX ADMIN — HYDROMORPHONE HYDROCHLORIDE 0.4 MG: 1 INJECTION, SOLUTION INTRAMUSCULAR; INTRAVENOUS; SUBCUTANEOUS at 13:18

## 2025-08-06 RX ADMIN — PHENYLEPHRINE HYDROCHLORIDE 100 MCG: 10 INJECTION INTRAVENOUS at 08:26

## 2025-08-06 RX ADMIN — CEFAZOLIN SODIUM 2 G: 2 SOLUTION INTRAVENOUS at 20:13

## 2025-08-06 RX ADMIN — METOCLOPRAMIDE 10 MG: 10 TABLET ORAL at 07:25

## 2025-08-06 RX ADMIN — PHENYLEPHRINE HYDROCHLORIDE 50 MCG: 10 INJECTION INTRAVENOUS at 13:48

## 2025-08-06 RX ADMIN — SCOPOLAMINE 1 PATCH: 1.5 PATCH, EXTENDED RELEASE TRANSDERMAL at 08:07

## 2025-08-06 RX ADMIN — PHENYLEPHRINE HYDROCHLORIDE 50 MCG: 10 INJECTION INTRAVENOUS at 09:30

## 2025-08-06 RX ADMIN — PROTAMINE SULFATE 25 MG: 10 INJECTION, SOLUTION INTRAVENOUS at 12:49

## 2025-08-06 RX ADMIN — ROCURONIUM BROMIDE 10 MG: 10 INJECTION, SOLUTION INTRAVENOUS at 09:46

## 2025-08-06 RX ADMIN — PHENYLEPHRINE-NACL IV SOLUTION 10 MG/250ML-0.9% 0.2 MCG/KG/MIN: 10-0.9/25 SOLUTION at 09:07

## 2025-08-06 RX ADMIN — ROCURONIUM BROMIDE 10 MG: 10 INJECTION, SOLUTION INTRAVENOUS at 11:59

## 2025-08-06 RX ADMIN — PHENYLEPHRINE HYDROCHLORIDE 50 MCG: 10 INJECTION INTRAVENOUS at 09:16

## 2025-08-06 RX ADMIN — FLUOXETINE HYDROCHLORIDE 20 MG: 20 CAPSULE ORAL at 17:58

## 2025-08-06 RX ADMIN — CEFAZOLIN 2 G: 1 INJECTION, POWDER, FOR SOLUTION INTRAMUSCULAR; INTRAVENOUS at 13:01

## 2025-08-06 RX ADMIN — MIDAZOLAM 2 MG: 1 INJECTION INTRAMUSCULAR; INTRAVENOUS at 08:06

## 2025-08-06 RX ADMIN — HYDROMORPHONE HYDROCHLORIDE 0.6 MG: 1 INJECTION, SOLUTION INTRAMUSCULAR; INTRAVENOUS; SUBCUTANEOUS at 13:45

## 2025-08-06 RX ADMIN — ROCURONIUM BROMIDE 10 MG: 10 INJECTION, SOLUTION INTRAVENOUS at 10:46

## 2025-08-06 RX ADMIN — ONDANSETRON 4 MG: 2 INJECTION, SOLUTION INTRAMUSCULAR; INTRAVENOUS at 13:15

## 2025-08-06 RX ADMIN — ROCURONIUM BROMIDE 50 MG: 10 INJECTION, SOLUTION INTRAVENOUS at 08:56

## 2025-08-06 RX ADMIN — FENTANYL CITRATE 50 MCG: 50 INJECTION, SOLUTION INTRAMUSCULAR; INTRAVENOUS at 08:17

## 2025-08-06 RX ADMIN — HEPARIN SODIUM 500 UNITS/HR: 10000 INJECTION, SOLUTION INTRAVENOUS at 18:14

## 2025-08-06 RX ADMIN — PHENYLEPHRINE HYDROCHLORIDE 100 MCG: 10 INJECTION INTRAVENOUS at 08:20

## 2025-08-06 RX ADMIN — HYDROMORPHONE HYDROCHLORIDE 0.5 MG: 0.5 INJECTION, SOLUTION INTRAMUSCULAR; INTRAVENOUS; SUBCUTANEOUS at 14:56

## 2025-08-06 RX ADMIN — BENZOCAINE AND MENTHOL 1 LOZENGE: 15; 3.6 LOZENGE ORAL at 20:53

## 2025-08-06 RX ADMIN — ROCURONIUM BROMIDE 50 MG: 10 INJECTION, SOLUTION INTRAVENOUS at 08:19

## 2025-08-06 RX ADMIN — LIDOCAINE HYDROCHLORIDE 80 MG: 10 INJECTION, SOLUTION INFILTRATION; PERINEURAL at 08:17

## 2025-08-06 RX ADMIN — PROPOFOL 140 MG: 10 INJECTION, EMULSION INTRAVENOUS at 08:17

## 2025-08-06 RX ADMIN — HEPARIN SODIUM 10000 UNITS: 1000 INJECTION, SOLUTION INTRAVENOUS; SUBCUTANEOUS at 11:26

## 2025-08-06 RX ADMIN — DEXTROSE MONOHYDRATE, SODIUM CHLORIDE, AND POTASSIUM CHLORIDE 100 ML/HR: 50; 9; 1.49 INJECTION, SOLUTION INTRAVENOUS at 20:15

## 2025-08-06 RX ADMIN — HEPARIN SODIUM 2000 UNITS: 1000 INJECTION, SOLUTION INTRAVENOUS; SUBCUTANEOUS at 12:14

## 2025-08-06 RX ADMIN — SODIUM CHLORIDE, SODIUM LACTATE, POTASSIUM CHLORIDE, CALCIUM CHLORIDE: 600; 310; 30; 20 INJECTION, SOLUTION INTRAVENOUS at 08:24

## 2025-08-06 RX ADMIN — DIPHENHYDRAMINE HYDROCHLORIDE 25 MG: 50 INJECTION, SOLUTION INTRAMUSCULAR; INTRAVENOUS at 12:57

## 2025-08-06 RX ADMIN — CEFAZOLIN 2 G: 1 INJECTION, POWDER, FOR SOLUTION INTRAMUSCULAR; INTRAVENOUS at 09:00

## 2025-08-06 RX ADMIN — SODIUM CHLORIDE, POTASSIUM CHLORIDE, SODIUM LACTATE AND CALCIUM CHLORIDE: 600; 310; 30; 20 INJECTION, SOLUTION INTRAVENOUS at 08:06

## 2025-08-06 RX ADMIN — Medication 50 MCG: at 11:06

## 2025-08-06 RX ADMIN — FENTANYL CITRATE 50 MCG: 50 INJECTION, SOLUTION INTRAMUSCULAR; INTRAVENOUS at 09:21

## 2025-08-06 RX ADMIN — ROCURONIUM BROMIDE 20 MG: 10 INJECTION, SOLUTION INTRAVENOUS at 11:23

## 2025-08-06 RX ADMIN — SUGAMMADEX 200 MG: 100 INJECTION, SOLUTION INTRAVENOUS at 13:53

## 2025-08-06 RX ADMIN — BENZOCAINE AND MENTHOL 1 LOZENGE: 15; 3.6 LOZENGE ORAL at 23:53

## 2025-08-06 RX ADMIN — DEXAMETHASONE SODIUM PHOSPHATE 4 MG: 4 INJECTION, SOLUTION INTRAMUSCULAR; INTRAVENOUS at 09:08

## 2025-08-06 RX ADMIN — FAMOTIDINE 20 MG: 20 TABLET, FILM COATED ORAL at 07:25

## 2025-08-06 RX ADMIN — OXYCODONE AND ACETAMINOPHEN 1 TABLET: 10; 325 TABLET ORAL at 17:58

## 2025-08-06 RX ADMIN — ROCURONIUM BROMIDE 20 MG: 10 INJECTION, SOLUTION INTRAVENOUS at 12:40

## 2025-08-06 SDOH — ECONOMIC STABILITY: FOOD INSECURITY: WITHIN THE PAST 12 MONTHS, THE FOOD YOU BOUGHT JUST DIDN'T LAST AND YOU DIDN'T HAVE MONEY TO GET MORE.: NEVER TRUE

## 2025-08-06 SDOH — SOCIAL STABILITY: SOCIAL INSECURITY: WITHIN THE LAST YEAR, HAVE YOU BEEN AFRAID OF YOUR PARTNER OR EX-PARTNER?: NO

## 2025-08-06 SDOH — SOCIAL STABILITY: SOCIAL INSECURITY: HAS ANYONE EVER THREATENED TO HURT YOUR FAMILY OR YOUR PETS?: NO

## 2025-08-06 SDOH — HEALTH STABILITY: MENTAL HEALTH: CURRENT SMOKER: 0

## 2025-08-06 SDOH — ECONOMIC STABILITY: FOOD INSECURITY: WITHIN THE PAST 12 MONTHS, YOU WORRIED THAT YOUR FOOD WOULD RUN OUT BEFORE YOU GOT THE MONEY TO BUY MORE.: NEVER TRUE

## 2025-08-06 SDOH — SOCIAL STABILITY: SOCIAL INSECURITY: WITHIN THE LAST YEAR, HAVE YOU BEEN HUMILIATED OR EMOTIONALLY ABUSED IN OTHER WAYS BY YOUR PARTNER OR EX-PARTNER?: NO

## 2025-08-06 SDOH — ECONOMIC STABILITY: INCOME INSECURITY: IN THE PAST 12 MONTHS HAS THE ELECTRIC, GAS, OIL, OR WATER COMPANY THREATENED TO SHUT OFF SERVICES IN YOUR HOME?: NO

## 2025-08-06 SDOH — SOCIAL STABILITY: SOCIAL INSECURITY: ARE YOU OR HAVE YOU BEEN THREATENED OR ABUSED PHYSICALLY, EMOTIONALLY, OR SEXUALLY BY ANYONE?: NO

## 2025-08-06 SDOH — SOCIAL STABILITY: SOCIAL INSECURITY: DO YOU FEEL ANYONE HAS EXPLOITED OR TAKEN ADVANTAGE OF YOU FINANCIALLY OR OF YOUR PERSONAL PROPERTY?: NO

## 2025-08-06 SDOH — SOCIAL STABILITY: SOCIAL INSECURITY: ABUSE: ADULT

## 2025-08-06 SDOH — SOCIAL STABILITY: SOCIAL INSECURITY: HAVE YOU HAD THOUGHTS OF HARMING ANYONE ELSE?: NO

## 2025-08-06 SDOH — SOCIAL STABILITY: SOCIAL INSECURITY: DO YOU FEEL UNSAFE GOING BACK TO THE PLACE WHERE YOU ARE LIVING?: NO

## 2025-08-06 SDOH — SOCIAL STABILITY: SOCIAL INSECURITY: DOES ANYONE TRY TO KEEP YOU FROM HAVING/CONTACTING OTHER FRIENDS OR DOING THINGS OUTSIDE YOUR HOME?: NO

## 2025-08-06 SDOH — SOCIAL STABILITY: SOCIAL INSECURITY: HAVE YOU HAD ANY THOUGHTS OF HARMING ANYONE ELSE?: NO

## 2025-08-06 ASSESSMENT — PAIN - FUNCTIONAL ASSESSMENT
PAIN_FUNCTIONAL_ASSESSMENT: 0-10
PAIN_FUNCTIONAL_ASSESSMENT: CPOT (CRITICAL CARE PAIN OBSERVATION TOOL)
PAIN_FUNCTIONAL_ASSESSMENT: 0-10

## 2025-08-06 ASSESSMENT — LIFESTYLE VARIABLES
AUDIT-C TOTAL SCORE: 2
SKIP TO QUESTIONS 9-10: 1
HOW OFTEN DO YOU HAVE 6 OR MORE DRINKS ON ONE OCCASION: NEVER
HOW OFTEN DO YOU HAVE A DRINK CONTAINING ALCOHOL: 2-4 TIMES A MONTH
AUDIT-C TOTAL SCORE: 2
HOW MANY STANDARD DRINKS CONTAINING ALCOHOL DO YOU HAVE ON A TYPICAL DAY: 1 OR 2

## 2025-08-06 ASSESSMENT — PAIN SCALES - GENERAL
PAINLEVEL_OUTOF10: 2
PAINLEVEL_OUTOF10: 2
PAINLEVEL_OUTOF10: 0 - NO PAIN
PAINLEVEL_OUTOF10: 5 - MODERATE PAIN
PAINLEVEL_OUTOF10: 0 - NO PAIN
PAINLEVEL_OUTOF10: 7

## 2025-08-06 ASSESSMENT — COGNITIVE AND FUNCTIONAL STATUS - GENERAL
WALKING IN HOSPITAL ROOM: A LITTLE
MOBILITY SCORE: 22
DRESSING REGULAR LOWER BODY CLOTHING: A LITTLE
DAILY ACTIVITIY SCORE: 23
DAILY ACTIVITIY SCORE: 24
PATIENT BASELINE BEDBOUND: NO
MOBILITY SCORE: 24
CLIMB 3 TO 5 STEPS WITH RAILING: A LITTLE

## 2025-08-06 ASSESSMENT — ACTIVITIES OF DAILY LIVING (ADL)
DRESSING YOURSELF: INDEPENDENT
PATIENT'S MEMORY ADEQUATE TO SAFELY COMPLETE DAILY ACTIVITIES?: YES
ASSISTIVE_DEVICE: EYEGLASSES
ADEQUATE_TO_COMPLETE_ADL: YES
WALKS IN HOME: INDEPENDENT
BATHING: INDEPENDENT
TOILETING: INDEPENDENT
HEARING - LEFT EAR: FUNCTIONAL
FEEDING YOURSELF: INDEPENDENT
GROOMING: INDEPENDENT
JUDGMENT_ADEQUATE_SAFELY_COMPLETE_DAILY_ACTIVITIES: YES
HEARING - RIGHT EAR: FUNCTIONAL
LACK_OF_TRANSPORTATION: NO

## 2025-08-06 ASSESSMENT — PAIN DESCRIPTION - ORIENTATION
ORIENTATION: RIGHT
ORIENTATION: RIGHT

## 2025-08-06 ASSESSMENT — PAIN DESCRIPTION - LOCATION: LOCATION: LEG

## 2025-08-06 ASSESSMENT — PAIN DESCRIPTION - DESCRIPTORS: DESCRIPTORS: ACHING

## 2025-08-06 NOTE — INTERVAL H&P NOTE
H&P reviewed. The patient was examined and there are no changes to the H&P.    Patient with progression of symptoms to rest pain with popliteal and tibial thrombosis. Will proceed with thrombectomy/bypass of right leg popliteal artery to tibial artery with vein.

## 2025-08-06 NOTE — ANESTHESIA PROCEDURE NOTES
Airway  Date/Time: 8/6/2025 8:21 AM  Reason: elective    Airway not difficult    Staffing  Performed: TAMANNA   Authorized by: TAMANNA Gaviria    Performed by: TAMANNA Gaviria  Patient location during procedure: OR    Patient Condition  Indications for airway management: anesthesia and airway protection  Patient position: sniffing  MILS not maintained throughout  Planned trial extubation  Sedation level: deep     Final Airway Details   Preoxygenated: yes  Final airway type: endotracheal airway  Successful airway: ETT  Cuffed: yes   Successful intubation technique: direct laryngoscopy  Adjuncts used in placement: intubating stylet  Blade: Patience  Blade size: #4  ETT size (mm): 7.5  Cormack-Lehane Classification: grade IIa - partial view of glottis  Placement verified by: chest auscultation and capnometry   Measured from: lips  ETT to lips (cm): 22  Number of attempts at approach: 1    Additional Comments  Atraumatic intubation. Lips and teeth in re-intubation condition. ETT secured with tape.

## 2025-08-06 NOTE — ANESTHESIA PREPROCEDURE EVALUATION
Patient: Cristhian Thornton    Procedure Information       Date/Time: 08/06/25 0745    Procedure: CREATION, BYPASS, ARTERIAL, FEMORAL, USING GRAFT (Right)    Location: [facility] / [facility]    Surgeons: Pat Kunz MD            Relevant Problems   Cardiac   (+) Ascending aortic aneurysm   (+) Atherosclerosis of native artery of right leg with rest pain (Multi)   (+) Bicuspid aortic valve   (+) Heart murmur   (+) Nonrheumatic aortic valve stenosis   (+) PAD (peripheral artery disease)      Neuro   (+) Anxiety disorder, unspecified   (+) Cervical radiculopathy   (+) Panic attack      Hematology   (+) Artery of extremity, embolism and thrombosis      Musculoskeletal   (+) Osteoarthritis of both hips   (+) Scoliosis      ID   (+) Staph infection       Clinical information reviewed:                   NPO Detail:  No data recorded     Physical Exam    Airway  Mallampati: II  TM distance: >3 FB  Mouth opening: 3 or more finger widths     Cardiovascular   Rhythm: regular  Rate: normal     Dental - normal exam     Pulmonary    Abdominal            Anesthesia Plan    History of general anesthesia?: unknown/emergency  History of complications of general anesthesia?: no    ASA 3     general and other   There is reasoning for not using neuraxial anesthesia or a peripheral nerve block.  (Labs acceptable, no EKG since 2020 -will order preop, significant cardiac hx and PAD, GLMA and Bison today)  The patient is not a current smoker.  Education provided regarding risk of obstructive sleep apnea. (in appropriate circumstances)  intravenous induction   Anesthetic plan and risks discussed with patient.    Plan discussed with CRNA, CAA and attending.

## 2025-08-06 NOTE — ANESTHESIA POSTPROCEDURE EVALUATION
Patient: Cristhian Thornton    Procedure Summary       Date: 08/06/25 Room / Location: [facility] [place] OR 04 / [facility]    Anesthesia Start: 0806 Anesthesia Stop: 1415    Procedure: CREATION, BYPASS, ARTERIAL, FEMORAL, USING GRAFT (Right) Diagnosis:       PAD (peripheral artery disease)      Atherosclerosis of native artery of right leg with rest pain (Multi)      (PAD (peripheral artery disease) [I73.9])      (Atherosclerosis of native artery of right leg with rest pain (Multi) [I70.221])    Surgeons: Pat Kunz MD Responsible Provider: Marko Leiva DO    Anesthesia Type: general, other ASA Status: 3            Anesthesia Type: general, other    Vitals Value Taken Time   /61 08/06/25 14:16   Temp 36.5 08/06/25 14:20   Pulse 75 08/06/25 14:20   Resp 20 08/06/25 14:20   SpO2 100% 08/06/25 14:20   Vitals shown include unfiled device data.    Anesthesia Post Evaluation    Patient location during evaluation: PACU  Patient participation: complete - patient participated  Level of consciousness: awake  Pain management: adequate  Airway patency: patent  Cardiovascular status: acceptable  Respiratory status: acceptable, face mask, nonlabored ventilation and spontaneous ventilation  Hydration status: acceptable  Postoperative Nausea and Vomiting: none        There were no known notable events for this encounter.

## 2025-08-06 NOTE — BRIEF OP NOTE
Date: 2025  OR Location: [facility]    Name: Cristhian Thornton, : 1970, Age: 54 y.o., MRN: [id number], Sex: male    Diagnosis  Pre-op Diagnosis      * PAD (peripheral artery disease) [I73.9]     * Atherosclerosis of native artery of right leg with rest pain (Multi) [I70.221] Post-op Diagnosis     * PAD (peripheral artery disease) [I73.9]     * Atherosclerosis of native artery of right leg with rest pain (Multi) [I70.221]     Procedures  CREATION, BYPASS, ARTERIAL, FEMORAL, USING GRAFT  01819 - [place] BYP FEM-ANT TIBL PST TIBL PRONEAL ART/OTH DSTL    Right superficial femoral to tibio-peroneal trunk arterial bypass with non-reversed ipsilateral great saphenous vein     Surgeons      * Pat Kunz - Primary    Resident/Fellow/Other Assistant:  Surgeons and Role:     * Ross Romero - Resident, assisting    Staff:   Circulator: Cary HOLLINSA: Dedrick  Circulator: Monik Woods Person: Bhumika Donato Circulator: Tiana Donato Scrub: Thelma    Anesthesia Staff: Anesthesiologist: Marko Leiva DO  C-AA: TAMANNA Gaviria  YOVANI: Ravinder Sandra  Frontline Breaker: AURORA Camacho-CRNA    Procedure Summary  Anesthesia: General  ASA: III  Estimated Blood Loss: 200mL  Intra-op Medications:   Administrations occurring from 0745 to 1130 on 25:   Medication Name Total Dose   ceFAZolin (Ancef) vial 1 g 2 g   dexAMETHasone (Decadron) 4 mg/mL IV Syringe 2 mL 4 mg   fentaNYL (Sublimaze) injection 50 mcg/mL 100 mcg   heparin injection 1,000 units/mL 10,000 Units   LR infusion Cannot be calculated   LR infusion Cannot be calculated   lidocaine (Xylocaine) injection 1 % 80 mg   midazolam (Versed) injection 1 mg/mL 2 mg   phenylephrine (Lm-Synephrine) 10 mg/250 mL NS (40 mcg/mL) infusion 3.23 mg   phenylephrine (Lm-Synephrine) injection 300 mcg   phenylephrine 100 mcg/mL syringe 10 mL (prefilled) 50 mcg   propofol (Diprivan) injection 10 mg/mL 140 mg   rocuronium (ZeMuron) 50 mg/5 mL injection 140 mg    scopolamine (Transderm-Scop) 1 mg/3 days patch 1 patch              Anesthesia Record               Intraprocedure I/O Totals          Intake    Phenylephrine Drip 0.00 mL    The total shown is the total volume documented since Anesthesia Start was filed.    LR infusion 2100.00 mL    Total Intake 2100 mL       Output    Urine 400 mL    Est. Blood Loss 200 mL    Total Output 600 mL       Net    Net Volume 1500 mL          Specimen: No specimens collected               Findings: multiphasic PT and peroneal signals at case conclusion     Complications:  None; patient tolerated the procedure well.     Disposition: PACU - hemodynamically stable.  Condition: stable  Specimens Collected: No specimens collected    Ross Romero MD, PhD  Vascular Surgery, PGY4      Attending Attestation: I was present and scrubbed for the entire procedure.    Pat Kunz  Phone Number: [phone]

## 2025-08-06 NOTE — OP NOTE
CREATION, BYPASS, ARTERIAL, FEMORAL, USING GRAFT (R) Operative Note     Date: 2025  OR Location: [facility]    Name: Cristhian Thornton, : 1970, Age: 54 y.o., MRN: [id number], Sex: male    Diagnosis  Pre-op Diagnosis      * PAD (peripheral artery disease) [I73.9]     * Atherosclerosis of native artery of right leg with rest pain (Multi) [I70.221] Post-op Diagnosis     * PAD (peripheral artery disease) [I73.9]     * Atherosclerosis of native artery of right leg with rest pain (Multi) [I70.221]     Procedures  CREATION, BYPASS, ARTERIAL, FEMORAL, USING GRAFT  62428 - [place] BYP FEM-ANT TIBL PST TIBL PRONEAL ART/OTH DSTL      Surgeons      * Pat Kunz - Primary    Resident/Fellow/Other Assistant:  Ross Romero MD    Staff:   Circulator: Cary HOLLINSA: Dedrick  Circulator: Monik Woods Person: Bhumika Donato Circulator: Tiana Donato Scrub: Thelma    Anesthesia Staff: Anesthesiologist: Marko Leiva DO  C-AA: TAMANNA Gaviria  YOVANI: Ravinder Snadra  Frontline Breaker: AURORA Camacho-CRNA    Procedure Summary  Anesthesia: Anesthesia type not filed in the log.  ASA: III  Estimated Blood Loss: 50mL  Intra-op Medications:   Administrations occurring from 0745 to 1130 on 25:   Medication Name Total Dose   ceFAZolin (Ancef) vial 1 g 2 g   dexAMETHasone (Decadron) 4 mg/mL IV Syringe 2 mL 4 mg   fentaNYL (Sublimaze) injection 50 mcg/mL 100 mcg   heparin injection 1,000 units/mL 10,000 Units   LR infusion Cannot be calculated   LR infusion Cannot be calculated   lidocaine (Xylocaine) injection 1 % 80 mg   midazolam (Versed) injection 1 mg/mL 2 mg   phenylephrine (Lm-Synephrine) 10 mg/250 mL NS (40 mcg/mL) infusion 3.23 mg   phenylephrine (Lm-Synephrine) injection 300 mcg   phenylephrine 100 mcg/mL syringe 10 mL (prefilled) 50 mcg   propofol (Diprivan) injection 10 mg/mL 140 mg   rocuronium (ZeMuron) 50 mg/5 mL injection 140 mg   scopolamine (Transderm-Scop) 1 mg/3 days patch 1 patch               Anesthesia Record               Intraprocedure I/O Totals          Intake    Phenylephrine Drip 0.00 mL    The total shown is the total volume documented since Anesthesia Start was filed.    LR infusion 2100.00 mL    Total Intake 2100 mL       Output    Urine 400 mL    Est. Blood Loss 200 mL    Total Output 600 mL       Net    Net Volume 1500 mL          Specimen: No specimens collected              Drains and/or Catheters:   Urethral Catheter Non-latex 16 Fr. (Active)       Tourniquet Times:         Implants:     Findings:       Indications: Cristhian Thornton is an 54 y.o. male who is having surgery for PAD (peripheral artery disease) [I73.9]  Atherosclerosis of native artery of right leg with rest pain (Multi) [I70.221]. Patient with thromboembolism to right popliteal artery with progression of symptoms from claudication to rest pain with popliteal and tibial occlusion for bypass    The patient was seen in the preoperative area. The risks, benefits, complications, treatment options, non-operative alternatives, expected recovery and outcomes were discussed with the patient. The possibilities of reaction to medication, pulmonary aspiration, injury to surrounding structures, bleeding, recurrent infection, the need for additional procedures, failure to diagnose a condition, and creating a complication requiring transfusion or operation were discussed with the patient. The patient concurred with the proposed plan, giving informed consent.  The site of surgery was properly noted/marked if necessary per policy. The patient has been actively warmed in preoperative area. Preoperative antibiotics have been ordered and given within 1 hours of incision. Venous thrombosis prophylaxis are not indicated.    Procedure Details:   Supine position, abdomen groins and right leg prepped and draped.  The below-knee space was opened and the below-knee popliteal artery and anterior tibial artery and tibioperoneal trunk and proximal  peroneal and posterior tibial arteries were exposed after mobilizing the popliteal and tibial veins.  Heavy inflammation surrounding the popliteal artery which had been thrombosed for several months now.  It was hard and sclerotic and the tibioperoneal trunk was controlled with a vessel loop.  The anterior tibial artery was not lassoed because of the heavy inflammation around it and there was a hazard for bleeding from the veins.  The peroneal vein had to be divided to expose the tibioperoneal trunk distally at the bifurcation and the peroneal and posterior tibial arteries were controlled with Vesseloops.  The posterior tibial vein was further exposed and additional length of the posterior tibial artery was controlled.  The posterior tibial artery was patent and soft as was the peroneal artery.  The saphenous vein was seen in the below-knee popliteal incision and was preserved.  It was mobilized from here up to the proximal thigh through multiple skip incisions while carefully ligating tributaries and dividing them.    The vein was gently dilated with heparinized saline and was of suitable caliber for bypass conduit purposes.  I chose not to reverse this as the caliber change was best without reversing.  The vein was ligated in the proximal thigh and brought out in preserved for bypass graft.    This is done in a saline soaked lap pad.    The above-knee popliteal space was then opened and the above-knee popliteal artery and distal superficial femoral artery through the Kodi's canal was noted to be palpable.  There was an excellent pulse here.  The artery here was controlled for about a 4 cm length and the patient was then heparinized.  Heparin was periodically redosed to maintain therapeutic anticoagulation using activated clotting times.    The above-knee popliteal artery proximal to the occlusion was opened to be a longitudinal arteriotomy of about 2 cm length.  The vein was spatulated and anastomosed end to  side with 6-0 Prolene suture.  The anastomosis was hemostatic.  Several valve stations were lysed manually with a Mills valvulotome.  Flow through the graft was excellent.  Using blunt finger dissection my middle fingers met behind the knee from above and below the knee incisions.  This was bridged with a DeBakey aortic clamp and the graft which had been marked on its anterior surface was brought through in a way to ensure no twists.  Once tunneled to the graft still had excellent flow.  The graft was clamped with a DeBakey clamp proximally.  The posterior tibial artery at its origin was opened with a 11 blade and the incision was extended proximally onto the tibioperoneal trunk.  The peroneal ostium was seen.  There was backbleeding from the peroneal ostium.  There was chronic thrombotic material into the tibioperoneal trunk.  I did attempt to pass a Hernando catheter proximally to the popliteal artery but it would not pass due to the chronic occlusion.    The peroneal and posterior tibial arteries excepted a 1.5 mm probe excellent without resistance.  I extended the arteriotomy for another centimeter onto the posterior tibial artery and the backbleeding was excellent.    The graft was then pulled down to removing the redundancies and anastomosed end to side with a 2 cm venotomy to match the arteriotomy.  6-0 Prolene was used to perform the anastomosis.  Anastomosis was hemostatic.  Flows restored first to the peroneal into the posterior tibial artery.  On release of clamps patient had a palpable posterior tibial artery pulse and multiphasic signal there.  The outflow signal was excellent.  There is also flow in the peroneal artery.  There was a peroneal signal above the ankle that was mildly multiphasic.    Hemostasis was achieved in all the incisions.  The skip incisions were not bleeding.  They were irrigated and closed with Vicryl Monocryl.  The above the incision was closed with Vicryl and Monocryl.  The  incision was closed with Vicryl and interrupted nylons.  I recheck this because of the saphenous vein harvest is here requires creating a slight flap on the undersurface of the wound.  Dressings were placed and Tubigrip's were applied      Evidence of Infection: No   Complications:  None; patient tolerated the procedure well.    Disposition: PACU - hemodynamically stable.  Condition: stable         Task Performed by RNFA or Surgical Assistant:            Additional Details:       Attending Attestation: I was present and scrubbed for the entire procedure.    Pat Kunz  Phone Number: [phone]

## 2025-08-06 NOTE — PERIOPERATIVE NURSING NOTE
0668--Patient ambulated to [facility] [address] and is accompanied by his wife Debbie.  Patient is alert and oriented, denies any pain at this time and reports his skin is intact.  Patient states his last dose of Coumadin was 9 days ago, Lovenox last night, and Plavix 7 days ago. CHG wash and mouthwash were done last night and this morning.    6813--Dr. Kunz at the bedside doing the consent and marking the surgical site.    8491--Patient ambulated to the restroom.    3860--Dr. Leiva at the bedside.    5596--Updated patient's family in the lobby and notified them that procedure will go another 1.5-2 hours.

## 2025-08-06 NOTE — H&P
History Of Present Illness  Cristhian Thornton is a 54 y.o. male presenting with history of peripheral artery disease status post right lower extremity revascularization procedure.  Has expected right lower extremity pain.  No chest pain, shortness of breath, fever, chills     Past Medical History  He has a past medical history of Abscess of left index finger (07/03/2024), Aneurysm (2023), Anxiety disorder, unspecified (09/11/2023), Aortic valve stenosis, Artery of extremity, embolism and thrombosis (07/08/2025), Arthritis, Ascending aortic aneurysm (09/11/2023), Bicuspid aortic valve (06/08/2019), Cervical disc disease, Cervical radiculopathy (09/11/2023), Claudication (05/20/2025), Closed fracture of distal end of humerus (03/10/2025), Elbow pain (03/10/2025), Folliculitis (04/10/2020), Hernia, internal, History of total right knee replacement (TKR) (09/11/2023), Immunocompromised, 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    Surgical History  He has a past surgical history that includes MR angio neck wo IV contrast (12/20/2012); MR angio head wo IV contrast (02/24/2023); MR angio head wo IV contrast (09/05/2023); Joint replacement (Right); Aortic valve replacement (11/4/2020); Elbow bursa surgery (Right); Cervical spine surgery; Hernia repair; Invasive Vascular Procedure (N/A, 05/15/2025); Cardiac valve replacement (11/4/2020); Invasive Vascular Procedure (Right, 07/31/2025); and Septoplasty.     Social History  He reports that he has never smoked. He has never been exposed to tobacco smoke. He has never used smokeless tobacco. He reports current alcohol use  of about 1.0 - 2.0 standard drink of alcohol per week. He reports that he does not use drugs.    Family History  Family History[1]     Allergies  Dilaudid [hydromorphone]    Review of Systems  As noted in HPI     Physical Exam  Alert and oriented x 3  Chest: Clear  Heart: Regular rate rhythm  Abdomen: Soft, non tender  Extremities: No pedal edema  Neuro: No focal    Last Recorded Vitals  /74   Pulse 77   Temp 36.3 °C (97.3 °F) (Temporal)   Resp 17   SpO2 98%     Relevant Results     Assessment/Plan   Assessment & Plan  Atherosclerosis of native artery of right leg with rest pain (Multi)  Status post revascularization procedure  Continue postop orders per vascular surgery  PAD (peripheral artery disease)                 Chai Quinteros MD         [1]   Family History  Problem Relation Name Age of Onset    Autoimmune disease Mother      Hypertension Mother      PTSD Father      Benign prostatic hyperplasia Father      No Known Problems Sister      Other (htn) Brother      Other (hld) Brother      No Known Problems Daughter      No Known Problems Daughter      Other (watchman) Maternal Grandfather      Other (cath) Maternal Grandfather      Other (3x bypass) Maternal Grandfather      Heart disease Maternal Grandfather

## 2025-08-06 NOTE — NURSING NOTE
Received report from Walt ROMANO.  Pt resting comfortably in bed.  Family at bedside.  Call light within reach, no needs at this time.      20:23-  Spoke to Karolyn Dean CNP regarding continuous fluid order for patient.  When scanned, I received an alert to contact provider due to potassium in fluids.  Dr. Kunz specifically ordered fluids and I was given the verbal okay to proceed.

## 2025-08-06 NOTE — ANESTHESIA PROCEDURE NOTES
Peripheral IV  Date/Time: 8/6/2025 8:24 AM      Placement  Needle size: 16 G  Laterality: right  Location: hand  Local anesthetic: none  Site prep: alcohol  Technique: anatomical landmarks  Attempts: 1

## 2025-08-06 NOTE — ANESTHESIA PROCEDURE NOTES
Arterial Line:    Date/Time: 8/6/2025 8:27 AM    Staffing  Performed: YOVANI   Authorized by: Marko Leiva DO    Performed by: TAMANNA Gaviria    An arterial line was placed.   Procedure performed using surface landmarks.in the OR for the following indication(s): continuous blood pressure monitoring and blood sampling needed.    A 20 gauge (size), 1 and 3/4 inch (length), Angiocath (type) catheter was placed into the Left radial artery, secured by Tegaderm,   Seldinger technique used.  Events:  patient tolerated procedure well with no complications.

## 2025-08-07 ENCOUNTER — APPOINTMENT (OUTPATIENT)
Dept: CARDIOLOGY | Facility: HOSPITAL | Age: 55
DRG: 253 | End: 2025-08-07
Payer: COMMERCIAL

## 2025-08-07 LAB
ACT BLD: 151 SEC (ref 89–169)
ACT BLD: 283 SEC (ref 89–169)
ACT BLD: 296 SEC (ref 89–169)
ACT BLD: 319 SEC (ref 89–169)
ANION GAP SERPL CALCULATED.3IONS-SCNC: 9 MMOL/L (ref 10–20)
APTT PPP: 26.2 SECONDS (ref 22–32.5)
APTT PPP: 27.6 SECONDS (ref 22–32.5)
BUN SERPL-MCNC: 10 MG/DL (ref 6–23)
CALCIUM SERPL-MCNC: 8.2 MG/DL (ref 8.6–10.3)
CHLORIDE SERPL-SCNC: 105 MMOL/L (ref 98–107)
CO2 SERPL-SCNC: 28 MMOL/L (ref 21–32)
CREAT SERPL-MCNC: 0.89 MG/DL (ref 0.5–1.3)
EGFRCR SERPLBLD CKD-EPI 2021: >90 ML/MIN/1.73M*2
ERYTHROCYTE [DISTWIDTH] IN BLOOD BY AUTOMATED COUNT: 13.6 % (ref 11.5–14.5)
GLUCOSE SERPL-MCNC: 114 MG/DL (ref 74–99)
HCT VFR BLD AUTO: 33.9 % (ref 41–52)
HGB BLD-MCNC: 11.6 G/DL (ref 13.5–17.5)
MCH RBC QN AUTO: 29 PG (ref 26–34)
MCHC RBC AUTO-ENTMCNC: 34.2 G/DL (ref 32–36)
MCV RBC AUTO: 85 FL (ref 80–100)
NRBC BLD-RTO: 0 /100 WBCS (ref 0–0)
PLATELET # BLD AUTO: 168 X10*3/UL (ref 150–450)
POTASSIUM SERPL-SCNC: 3.7 MMOL/L (ref 3.5–5.3)
RBC # BLD AUTO: 4 X10*6/UL (ref 4.5–5.9)
SODIUM SERPL-SCNC: 138 MMOL/L (ref 136–145)
STAPHYLOCOCCUS SPEC CULT: ABNORMAL
WBC # BLD AUTO: 8.2 X10*3/UL (ref 4.4–11.3)

## 2025-08-07 PROCEDURE — 2500000004 HC RX 250 GENERAL PHARMACY W/ HCPCS (ALT 636 FOR OP/ED): Performed by: NURSE PRACTITIONER

## 2025-08-07 PROCEDURE — 2500000004 HC RX 250 GENERAL PHARMACY W/ HCPCS (ALT 636 FOR OP/ED): Mod: JW

## 2025-08-07 PROCEDURE — 2500000001 HC RX 250 WO HCPCS SELF ADMINISTERED DRUGS (ALT 637 FOR MEDICARE OP)

## 2025-08-07 PROCEDURE — 2500000001 HC RX 250 WO HCPCS SELF ADMINISTERED DRUGS (ALT 637 FOR MEDICARE OP): Performed by: REGISTERED NURSE

## 2025-08-07 PROCEDURE — 85730 THROMBOPLASTIN TIME PARTIAL: CPT | Performed by: INTERNAL MEDICINE

## 2025-08-07 PROCEDURE — 36415 COLL VENOUS BLD VENIPUNCTURE: CPT | Performed by: INTERNAL MEDICINE

## 2025-08-07 PROCEDURE — 93005 ELECTROCARDIOGRAM TRACING: CPT

## 2025-08-07 PROCEDURE — 2060000001 HC INTERMEDIATE ICU ROOM DAILY

## 2025-08-07 PROCEDURE — 97110 THERAPEUTIC EXERCISES: CPT | Mod: GP

## 2025-08-07 PROCEDURE — 85027 COMPLETE CBC AUTOMATED: CPT | Performed by: NURSE PRACTITIONER

## 2025-08-07 PROCEDURE — 97161 PT EVAL LOW COMPLEX 20 MIN: CPT | Mod: GP

## 2025-08-07 PROCEDURE — 2500000001 HC RX 250 WO HCPCS SELF ADMINISTERED DRUGS (ALT 637 FOR MEDICARE OP): Performed by: NURSE PRACTITIONER

## 2025-08-07 PROCEDURE — 80048 BASIC METABOLIC PNL TOTAL CA: CPT | Performed by: NURSE PRACTITIONER

## 2025-08-07 PROCEDURE — 99232 SBSQ HOSP IP/OBS MODERATE 35: CPT | Performed by: INTERNAL MEDICINE

## 2025-08-07 RX ORDER — POLYETHYLENE GLYCOL 3350 17 G/17G
17 POWDER, FOR SOLUTION ORAL DAILY
Status: DISCONTINUED | OUTPATIENT
Start: 2025-08-07 | End: 2025-08-10 | Stop reason: HOSPADM

## 2025-08-07 RX ORDER — ATORVASTATIN CALCIUM 40 MG/1
40 TABLET, FILM COATED ORAL NIGHTLY
Status: DISCONTINUED | OUTPATIENT
Start: 2025-08-07 | End: 2025-08-10 | Stop reason: HOSPADM

## 2025-08-07 RX ORDER — ENOXAPARIN SODIUM 100 MG/ML
1 INJECTION SUBCUTANEOUS 2 TIMES DAILY
Status: DISCONTINUED | OUTPATIENT
Start: 2025-08-07 | End: 2025-08-10 | Stop reason: HOSPADM

## 2025-08-07 RX ORDER — ACETAMINOPHEN 325 MG/1
650 TABLET ORAL EVERY 4 HOURS PRN
Status: DISCONTINUED | OUTPATIENT
Start: 2025-08-07 | End: 2025-08-10 | Stop reason: HOSPADM

## 2025-08-07 RX ADMIN — OXYCODONE HYDROCHLORIDE AND ACETAMINOPHEN 1 TABLET: 5; 325 TABLET ORAL at 08:09

## 2025-08-07 RX ADMIN — FLUOXETINE HYDROCHLORIDE 20 MG: 20 CAPSULE ORAL at 08:05

## 2025-08-07 RX ADMIN — ENOXAPARIN SODIUM 90 MG: 100 INJECTION SUBCUTANEOUS at 20:16

## 2025-08-07 RX ADMIN — CEFAZOLIN SODIUM 2 G: 2 SOLUTION INTRAVENOUS at 04:01

## 2025-08-07 RX ADMIN — OXYCODONE HYDROCHLORIDE AND ACETAMINOPHEN 1 TABLET: 5; 325 TABLET ORAL at 17:15

## 2025-08-07 RX ADMIN — ATORVASTATIN CALCIUM 40 MG: 40 TABLET, FILM COATED ORAL at 20:16

## 2025-08-07 RX ADMIN — ACETAMINOPHEN 650 MG: 325 TABLET ORAL at 23:16

## 2025-08-07 RX ADMIN — ENOXAPARIN SODIUM 90 MG: 100 INJECTION SUBCUTANEOUS at 10:00

## 2025-08-07 SDOH — SOCIAL STABILITY: SOCIAL INSECURITY: WITHIN THE LAST YEAR, HAVE YOU BEEN HUMILIATED OR EMOTIONALLY ABUSED IN OTHER WAYS BY YOUR PARTNER OR EX-PARTNER?: NO

## 2025-08-07 SDOH — ECONOMIC STABILITY: INCOME INSECURITY: IN THE PAST 12 MONTHS HAS THE ELECTRIC, GAS, OIL, OR WATER COMPANY THREATENED TO SHUT OFF SERVICES IN YOUR HOME?: NO

## 2025-08-07 SDOH — HEALTH STABILITY: MENTAL HEALTH: HOW OFTEN DO YOU HAVE A DRINK CONTAINING ALCOHOL?: 4 OR MORE TIMES A WEEK

## 2025-08-07 SDOH — HEALTH STABILITY: MENTAL HEALTH: HOW OFTEN DO YOU HAVE SIX OR MORE DRINKS ON ONE OCCASION?: NEVER

## 2025-08-07 SDOH — SOCIAL STABILITY: SOCIAL INSECURITY: ARE YOU MARRIED, WIDOWED, DIVORCED, SEPARATED, NEVER MARRIED, OR LIVING WITH A PARTNER?: MARRIED

## 2025-08-07 SDOH — ECONOMIC STABILITY: HOUSING INSECURITY: AT ANY TIME IN THE PAST 12 MONTHS, WERE YOU HOMELESS OR LIVING IN A SHELTER (INCLUDING NOW)?: NO

## 2025-08-07 SDOH — HEALTH STABILITY: MENTAL HEALTH: HOW MANY DRINKS CONTAINING ALCOHOL DO YOU HAVE ON A TYPICAL DAY WHEN YOU ARE DRINKING?: 1 OR 2

## 2025-08-07 SDOH — ECONOMIC STABILITY: FOOD INSECURITY: WITHIN THE PAST 12 MONTHS, YOU WORRIED THAT YOUR FOOD WOULD RUN OUT BEFORE YOU GOT THE MONEY TO BUY MORE.: NEVER TRUE

## 2025-08-07 SDOH — SOCIAL STABILITY: SOCIAL INSECURITY: WITHIN THE LAST YEAR, HAVE YOU BEEN AFRAID OF YOUR PARTNER OR EX-PARTNER?: NO

## 2025-08-07 SDOH — ECONOMIC STABILITY: TRANSPORTATION INSECURITY: IN THE PAST 12 MONTHS, HAS LACK OF TRANSPORTATION KEPT YOU FROM MEDICAL APPOINTMENTS OR FROM GETTING MEDICATIONS?: NO

## 2025-08-07 SDOH — HEALTH STABILITY: PHYSICAL HEALTH: ON AVERAGE, HOW MANY MINUTES DO YOU ENGAGE IN EXERCISE AT THIS LEVEL?: 0 MIN

## 2025-08-07 SDOH — SOCIAL STABILITY: SOCIAL NETWORK: HOW OFTEN DO YOU ATTEND CHURCH OR RELIGIOUS SERVICES?: NEVER

## 2025-08-07 SDOH — HEALTH STABILITY: PHYSICAL HEALTH: ON AVERAGE, HOW MANY DAYS PER WEEK DO YOU ENGAGE IN MODERATE TO STRENUOUS EXERCISE (LIKE A BRISK WALK)?: 0 DAYS

## 2025-08-07 SDOH — ECONOMIC STABILITY: HOUSING INSECURITY: IN THE LAST 12 MONTHS, WAS THERE A TIME WHEN YOU WERE NOT ABLE TO PAY THE MORTGAGE OR RENT ON TIME?: NO

## 2025-08-07 SDOH — SOCIAL STABILITY: SOCIAL NETWORK: HOW OFTEN DO YOU ATTEND MEETINGS OF THE CLUBS OR ORGANIZATIONS YOU BELONG TO?: NEVER

## 2025-08-07 SDOH — ECONOMIC STABILITY: FOOD INSECURITY: WITHIN THE PAST 12 MONTHS, THE FOOD YOU BOUGHT JUST DIDN'T LAST AND YOU DIDN'T HAVE MONEY TO GET MORE.: NEVER TRUE

## 2025-08-07 SDOH — ECONOMIC STABILITY: FOOD INSECURITY: HOW HARD IS IT FOR YOU TO PAY FOR THE VERY BASICS LIKE FOOD, HOUSING, MEDICAL CARE, AND HEATING?: NOT HARD AT ALL

## 2025-08-07 SDOH — ECONOMIC STABILITY: HOUSING INSECURITY: IN THE PAST 12 MONTHS, HOW MANY TIMES HAVE YOU MOVED WHERE YOU WERE LIVING?: 0

## 2025-08-07 SDOH — SOCIAL STABILITY: SOCIAL NETWORK: HOW OFTEN DO YOU GET TOGETHER WITH FRIENDS OR RELATIVES?: NEVER

## 2025-08-07 ASSESSMENT — ACTIVITIES OF DAILY LIVING (ADL)
LACK_OF_TRANSPORTATION: NO
LACK_OF_TRANSPORTATION: NO
ADL_ASSISTANCE: INDEPENDENT

## 2025-08-07 ASSESSMENT — PAIN SCALES - GENERAL
PAINLEVEL_OUTOF10: 3
PAINLEVEL_OUTOF10: 4
PAINLEVEL_OUTOF10: 3

## 2025-08-07 ASSESSMENT — COGNITIVE AND FUNCTIONAL STATUS - GENERAL
MOBILITY SCORE: 19
DRESSING REGULAR LOWER BODY CLOTHING: A LITTLE
WALKING IN HOSPITAL ROOM: A LITTLE
CLIMB 3 TO 5 STEPS WITH RAILING: A LOT
WALKING IN HOSPITAL ROOM: A LITTLE
MOBILITY SCORE: 22
CLIMB 3 TO 5 STEPS WITH RAILING: A LITTLE
DAILY ACTIVITIY SCORE: 23
STANDING UP FROM CHAIR USING ARMS: A LITTLE
MOVING TO AND FROM BED TO CHAIR: A LITTLE

## 2025-08-07 ASSESSMENT — PAIN - FUNCTIONAL ASSESSMENT
PAIN_FUNCTIONAL_ASSESSMENT: 0-10

## 2025-08-07 ASSESSMENT — LIFESTYLE VARIABLES
AUDIT-C TOTAL SCORE: 4
SKIP TO QUESTIONS 9-10: 1

## 2025-08-07 ASSESSMENT — PAIN DESCRIPTION - DESCRIPTORS: DESCRIPTORS: ACHING;SORE

## 2025-08-07 NOTE — CARE PLAN
Pt said that his wife Debbie is his POA --document is not on file  ADOD: 2 days    Pt lives at home with his wife Debbie in a 2 story home with a basement. Pt said that he does not use the basement.  Pt is normally very active. Makes a living as a contractor. He is independent with ADL's He drives works [employer]  No DME at home  He wears glasses, no hearing aids  Denies depression and anxiety  Pt has surgery yesterday for right lower extremity re-vascularization.  No anticipated discharge needs at this time    DISCHARGE PLAN: HOME WITH WIFE

## 2025-08-07 NOTE — PROGRESS NOTES
Spoke with MD Romero over secure chat to clarify orders:   - Okay to restart diet after 2015.   - Okay to start heparin gtt post-op, no need for aPTT recheck prior to starting. Gtt started at 500U/hr, ordered aPTT recheck at 0015   - Will start IVF, waiting for pharmacy for medication.

## 2025-08-07 NOTE — PROGRESS NOTES
Physical Therapy                 Therapy Communication Note    Patient Name: Cristhian Thornton  MRN: [id number]  Department: [facility]  Room: 11/11-A  Today's Date: 8/7/2025     Discipline: Physical Therapy    Missed Visit: PT Missed Visit: Yes     Missed Visit Reason: Missed Visit Reason: Cancel (pt with recent right LE vascular sx; On Heparin drip with PTT 27.6 which is below the safe level for PT mobilization. Will recheck this afternoon per nursing request after PTT redraw.)    Missed Time: Cancel    Comment:

## 2025-08-07 NOTE — PROGRESS NOTES
08/07/25 1315   [facility] Disability Status   Are you deaf or do you have serious difficulty hearing? N   Are you blind or do you have serious difficulty seeing, even when wearing glasses? N   Because of a physical, mental, or emotional condition, do you have serious difficulty concentrating, remembering, or making decisions? (5 years old or older) N   Do you have serious difficulty walking or climbing stairs? N   Do you have serious difficulty dressing or bathing? N   Because of a physical, mental, or emotional condition, do you have serious difficulty doing errands alone such as visiting the doctor? N

## 2025-08-07 NOTE — PROGRESS NOTES
Cristhian Thornton is a 54 y.o. male on day 1 of admission presenting with Atherosclerosis of native artery of right leg with rest pain (Multi).      Subjective   Patient has no complaints today.  He is status post right revascularization procedure  Right foot numbness which she had prior to the procedure is resolved       Objective     Last Recorded Vitals  BP 94/56 (BP Location: Left arm, Patient Position: Sitting)   Pulse 73   Temp 36.8 °C (98.2 °F) (Temporal)   Resp 18   Wt 90 kg (198 lb 6.6 oz)   SpO2 98%   Intake/Output last 3 Shifts:    Intake/Output Summary (Last 24 hours) at 8/7/2025 1411  Last data filed at 8/7/2025 0516  Gross per 24 hour   Intake 1478.97 ml   Output 2950 ml   Net -1471.03 ml       Admission Weight  Weight: 90 kg (198 lb 6.6 oz) (08/07/25 0221)    Daily Weight  08/07/25 : 90 kg (198 lb 6.6 oz)      Physical Exam    He is alert and oriented x 3.  No distress  HEENT: Normocephalic  Chest: Clear to auscultation  Heart: Regular rate rhythm  Abdomen: Soft, nontender  Extremities: No pedal edema.  Peripheral pulses palpable  Neuro: No focal deficit    Relevant Results  Results for orders placed or performed during the hospital encounter of 08/06/25 (from the past 24 hours)   aPTT   Result Value Ref Range    aPTT 24.6 22.0 - 32.5 seconds   CBC   Result Value Ref Range    WBC 10.9 4.4 - 11.3 x10*3/uL    nRBC 0.0 0.0 - 0.0 /100 WBCs    RBC 4.39 (L) 4.50 - 5.90 x10*6/uL    Hemoglobin 12.7 (L) 13.5 - 17.5 g/dL    Hematocrit 39.1 (L) 41.0 - 52.0 %    MCV 89 80 - 100 fL    MCH 28.9 26.0 - 34.0 pg    MCHC 32.5 32.0 - 36.0 g/dL    RDW 13.6 11.5 - 14.5 %    Platelets 171 150 - 450 x10*3/uL   APTT   Result Value Ref Range    aPTT 26.2 22.0 - 32.5 seconds   CBC   Result Value Ref Range    WBC 8.2 4.4 - 11.3 x10*3/uL    nRBC 0.0 0.0 - 0.0 /100 WBCs    RBC 4.00 (L) 4.50 - 5.90 x10*6/uL    Hemoglobin 11.6 (L) 13.5 - 17.5 g/dL    Hematocrit 33.9 (L) 41.0 - 52.0 %    MCV 85 80 - 100 fL    MCH 29.0 26.0 - 34.0  pg    MCHC 34.2 32.0 - 36.0 g/dL    RDW 13.6 11.5 - 14.5 %    Platelets 168 150 - 450 x10*3/uL   Basic Metabolic Panel   Result Value Ref Range    Glucose 114 (H) 74 - 99 mg/dL    Sodium 138 136 - 145 mmol/L    Potassium 3.7 3.5 - 5.3 mmol/L    Chloride 105 98 - 107 mmol/L    Bicarbonate 28 21 - 32 mmol/L    Anion Gap 9 (L) 10 - 20 mmol/L    Urea Nitrogen 10 6 - 23 mg/dL    Creatinine 0.89 0.50 - 1.30 mg/dL    eGFR >90 >60 mL/min/1.73m*2    Calcium 8.2 (L) 8.6 - 10.3 mg/dL   APTT   Result Value Ref Range    aPTT 27.6 22.0 - 32.5 seconds              This patient has a urinary catheter   Reason for the urinary catheter remaining today? Urine catheter unnecessary, will be removed today          Assessment & Plan  Atherosclerosis of native artery of right leg with rest pain (Multi)  Status post revascularization procedure  Anticoagulation per vascular surgery-patient currently on therapeutic dose Lovenox, Plavix  PAD (peripheral artery disease)               Chai Quinteros MD

## 2025-08-07 NOTE — PROGRESS NOTES
Physical Therapy    Physical Therapy Evaluation & Treatment    Patient Name: Cristhian Thornton  MRN: [id number]  Department: [address]  Room: 11/11-A  Today's Date: 8/7/2025   Time Calculation  Start Time: 1115  Stop Time: 1145  Time Calculation (min): 30 min    Assessment/Plan   PT Assessment  PT Assessment Results: Decreased strength, Decreased range of motion, Decreased endurance, Decreased mobility, Decreased coordination, Decreased safety awareness, Pain  Rehab Prognosis: Excellent  Barriers to Discharge Home: No anticipated barriers  Evaluation/Treatment Tolerance: Patient tolerated treatment well  Medical Staff Made Aware: Yes  Strengths: Premorbid level of function  Barriers to Participation: Comorbidities  End of Session Communication: Bedside nurse  Assessment Comment: pt required close supervision to contact guard of 1 for the above mobility. Pt demonstrates decreased strength, balance, endurance, AROM right LE, mobility safety/tolerance as comapred to baseline however pt should progress well enough for safe d/c home with potential PRN use of LRAD for safe/ease of amb; Will benefit from continued skilled PT services while in acute care.  End of Session Patient Position: Alarm on, Up in chair (all needs in reach)   IP OR SWING BED PT PLAN  Inpatient or Swing Bed: Inpatient  PT Plan  Treatment/Interventions: Transfer training, Gait training, Stair training, Balance training, Strengthening, Endurance training, Range of motion, Therapeutic exercise, Therapeutic activity  PT Plan: Ongoing PT  PT Frequency: 5 times per week  PT Discharge Recommendations: No PT needed after discharge  Equipment Recommended upon Discharge: Other (comment) (TBD)  PT Recommended Transfer Status: Stand by assist, Assistive device  PT - OK to Discharge: Yes      Subjective     PT Visit Info:  PT Received On: 08/07/25  General Visit Information:  General  Reason for Referral: impaired mobility; s/p right LE revascularization  Referred By:  Chai Quinteros MD  Past Medical History Relevant to Rehab: PVD, AAA, anxiety, s/p right TKR, heart murmur, scoliosis, CAD with AVR, neck sx,  Family/Caregiver Present: No  Prior to Session Communication: Bedside nurse  Patient Position Received: Bed, 3 rail up, Alarm off, not on at start of session  Preferred Learning Style: verbal, visual  General Comment: 55 yo WM admitted to [facility] 8/6/25 via ED with c/o rest pain right foot. + right throboembolism right popliteal art; Pt taken to sx 8/6/25 for right superficial fem to tibio-peroneal trunk arterial bypass; Cleared by nurse for therapy; pt agbreeable to therapy; Pt is now on Lovenox injections.  Home Living:  Home Living  Type of Home: House  Lives With: Spouse  Home Adaptive Equipment: None  Home Layout: Multi-level, Laundry in basement, Laundry second level, 1/2 bath on main level, Bed/bath upstairs, Full bath main level, Stairs to alternate level with rails, Stairs to alternate level without rails  Alternate Level Stairs-Rails:  (unilateral)  Alternate Level Stairs-Number of Steps: 13 steps up to bedroom/full bath, laundry; 13 steps down to laundry and full bath  Home Access: Stairs to enter without rails  Entrance Stairs-Rails: None  Entrance Stairs-Number of Steps: 1  Bathroom Shower/Tub: Walk-in shower  Bathroom Toilet: Standard  Bathroom Equipment: Grab bars in shower, Shower chair with back  Bathroom Accessibility: full baths on 2nd floor and in basement  Prior Level of Function:  Prior Function Per Pt/Caregiver Report  Level of [place]: Independent with ADLs and functional transfers, Independent with homemaking with ambulation  ADL Assistance: Independent  Homemaking Assistance: Independent (shares with spouse)  Ambulatory Assistance: Independent  Vocational: Full time employment (contractor; 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)  Prior Function Comments: pt drives, denies falls, independent with meds  Precautions:  Precautions  Hearing/Visual Limitations:  reading glasses  LE Weight Bearing Status: Weight Bearing as Tolerated  Medical Precautions: Fall precautions     Date/Time Vitals Session Patient Position Pulse Resp SpO2 BP MAP (mmHg)    08/07/25 1115 During PT  --  --  --  --  --  --     08/07/25 1119 --  --  72  18  98 %  94/56  66     08/07/25 1200 --  --  73  --  --  --  --      Vital Signs Comment: O2 sat 96% with HR 78 bpm after amb trial---room air    Objective   Pain:  Pain Assessment  Pain Assessment: 0-10  0-10 (Numeric) Pain Score: 3  Pain Type: Surgical pain  Pain Location: Leg  Pain Orientation: Right  Pain Interventions: Repositioned  Response to Interventions: Decrease in pain  Cognition:  Cognition  Overall Cognitive Status: Within Functional Limits  Orientation Level: Oriented X4  Safety/Judgement:  (decreased safety insight during functional mobility)  Impulsive: Mildly    General Assessments:  General Observation  General Observation: pleasant, cooperative, multiple small dressing right medial LE with stretchy sleeve over/around right LE               Activity Tolerance  Endurance: Decreased tolerance for upright activites  Activity Tolerance Comments: good -    Sensation  Light Touch: No apparent deficits    Strength  Strength Comments: left LE 4+/5; right LE 3/5 or > except right hip 3-/5  Coordination  Movements are Fluid and Coordinated: No  Lower Body Coordination: decreased timing/accuracy of right LE due to recent surgery, edema, pain    Postural Control  Posture Comment: mild forward head    Static Sitting Balance  Static Sitting-Balance Support: Feet supported  Static Sitting-Level of Assistance: Distant supervision  Static Sitting-Comment/Number of Minutes: good  Dynamic Sitting Balance  Dynamic Sitting-Balance Support: Feet supported  Dynamic Sitting-Level of Assistance: Close supervision  Dynamic Sitting-Comments: good    Static Standing Balance  Static Standing-Balance Support: Bilateral upper extremity supported  Static  Standing-Level of Assistance: Close supervision  Static Standing-Comment/Number of Minutes: FWW, good  Dynamic Standing Balance  Dynamic Standing-Balance Support: Bilateral upper extremity supported  Dynamic Standing-Level of Assistance: Contact guard, Close supervision  Dynamic Standing-Comments: FWW, good  Functional Assessments:  Bed Mobility  Bed Mobility: Yes  Bed Mobility 1  Bed Mobility 1: Supine to sitting  Level of Assistance 1: Modified independent  Bed Mobility Comments 1: head of bed elevated    Transfers  Transfer: Yes  Transfer 1  Transfer From 1: Bed to  Transfer to 1: Stand  Technique 1: Sit to stand  Transfer Device 1: Walker  Transfer Level of Assistance 1: Close supervision, Minimal verbal cues  Trials/Comments 1: verbal cues for waldemar hand placement on bed  Transfers 2  Transfer From 2: Stand to  Transfer to 2: Chair with arms  Technique 2: Stand to sit  Transfer Device 2: Walker  Transfer Level of Assistance 2: Close supervision, Minimal verbal cues  Trials/Comments 2: verbal cues for reaching back with both hands for arms of chair    Ambulation/Gait Training  Ambulation/Gait Training Performed: Yes  Ambulation/Gait Training 1  Surface 1: Level tile  Device 1: Rolling walker  Assistance 1: Contact guard, Close supervision, Minimal verbal cues  Quality of Gait 1: Diminished heel strike  Comments/Distance (ft) 1: pt amb 150 ft x 1, + turns, verbal cues for step through sequencing and staying within frame of FWW. Initially contact guard of 1 progressing to close supervision of 1.    Stairs  Stairs: No  Extremity/Trunk Assessments:  Cervical Spine   Cervical Spine:  (mild forward head)  RLE   RLE :  (see above strength comments)  LLE   LLE :  (see above strength comments)  Treatments:  Therapeutic Exercise  Therapeutic Exercise Performed: Yes  Therapeutic Exercise Activity 1: seated waldemar AP/heel raises x 10 reps each  Therapeutic Exercise Activity 2: seated waldemar LAQ's x 10 reps  Therapeutic Exercise  Activity 3: seated waldemar marching x 8 reps  Therapeutic Exercise Activity 4: seated waldemar hip abd x 5 reps  Therapeutic Exercise Activity 5: seated right heel slides x 10 reps    Therapeutic Activity  Therapeutic Activity Performed: Yes (see bed mobility, transfers, amb with FWW)  Outcome Measures:  [facility] Basic Mobility  Turning from your back to your side while in a flat bed without using bedrails: None  Moving from lying on your back to sitting on the side of a flat bed without using bedrails: None  Moving to and from bed to chair (including a wheelchair): A little  Standing up from a chair using your arms (e.g. wheelchair or bedside chair): A little  To walk in hospital room: A little  Climbing 3-5 steps with railing: A lot  Basic Mobility - Total Score: 19    Encounter Problems       Encounter Problems (Active)       Balance       STG - Maintains dynamic standing balance with upper extremity support (Progressing)       Start:  08/07/25    Expected End:  09/04/25       INTERVENTIONS:  1. Practice standing with minimal support.  2. Educate patient about standing tolerance.  3. Educate patient about independence with gait, transfers, and ADL's.  4. Educate patient about use of assistive device.  5. Educate patient about self-directed care.            Mobility       STG - Patient will ambulate 300 ft, + turns, LRAD, mod ind (Progressing)       Start:  08/07/25    Expected End:  09/04/25            pt ascends/descends 13 steps, 1 rail, nonreciprocally, mod ind (Progressing)       Start:  08/07/25    Expected End:  09/04/25               PT Transfers       STG - Patient will transfer sit to and from stand mod ind (Progressing)       Start:  08/07/25    Expected End:  09/04/25               Pain       pt will verbalize no > 1/10 pain during functional mobility (Progressing)       Start:  08/07/25    Expected End:  09/04/25                   Education Documentation  Mobility Training, taught by Little Muhammad, PT at 8/7/2025  12:15 PM.  Learner: Patient  Readiness: Acceptance  Method: Explanation  Response: Verbalizes Understanding  Comment: PT educated pt in safe transfer technique using FWW and safe use of FWW    Education Comments  No comments found.

## 2025-08-07 NOTE — NURSING NOTE
Received report from Karolyn ROMANO.  Pt resting comfortably in bed with family at bedside.  Call light within reach, no other needs at this time.

## 2025-08-07 NOTE — PROGRESS NOTES
08/07/25 1314   Discharge Planning   Living Arrangements Spouse/significant other   Support Systems Spouse/significant other   Type of Residence Private residence   Number of Stairs Within Residence 24   Do you have animals or pets at home? Yes   Type of Animals or Pets 2 cats   Who is requesting discharge planning? Provider   Home or Post Acute Services None   Expected Discharge Disposition Home   Does the patient need discharge transport arranged? No   Financial Resource Strain   How hard is it for you to pay for the very basics like food, housing, medical care, and heating? Not hard   Housing Stability   In the last 12 months, was there a time when you were not able to pay the mortgage or rent on time? N   In the past 12 months, how many times have you moved where you were living? 0   At any time in the past 12 months, were you homeless or living in a shelter (including now)? N   Transportation Needs   In the past 12 months, has lack of transportation kept you from medical appointments or from getting medications? no   In the past 12 months, has lack of transportation kept you from meetings, work, or from getting things needed for daily living? No   Patient Choice   Patient / Family choosing to utilize agency / facility established prior to hospitalization No   Stroke Family Assessment   Stroke Family Assessment Needed No   Intensity of Service   Intensity of Service 0-30 min

## 2025-08-07 NOTE — PROGRESS NOTES
08/07/25 1313   Physical Activity   On average, how many days per week do you engage in moderate to strenuous exercise (like a brisk walk)? 0 days   On average, how many minutes do you engage in exercise at this level? 0 min   Financial Resource Strain   How hard is it for you to pay for the very basics like food, housing, medical care, and heating? Not hard   Housing Stability   In the last 12 months, was there a time when you were not able to pay the mortgage or rent on time? N   In the past 12 months, how many times have you moved where you were living? 0   At any time in the past 12 months, were you homeless or living in a shelter (including now)? N   Transportation Needs   In the past 12 months, has lack of transportation kept you from medical appointments or from getting medications? no   In the past 12 months, has lack of transportation kept you from meetings, work, or from getting things needed for daily living? No   Food Insecurity   Within the past 12 months, you worried that your food would run out before you got the money to buy more. Never true   Within the past 12 months, the food you bought just didn't last and you didn't have money to get more. Never true   Stress   Do you feel stress - tense, restless, nervous, or anxious, or unable to sleep at night because your mind is troubled all the time - these days? Not at all   Social Connections   In a typical week, how many times do you talk on the phone with family, friends, or neighbors? More than 3   How often do you get together with friends or relatives? Never   How often do you attend Shinto or Mandaeism services? Never   Do you belong to any clubs or organizations such as Shinto groups, unions, fraternal or athletic groups, or school groups? No   How often do you attend meetings of the clubs or organizations you belong to? Never   Are you , , , , never , or living with a partner?    Intimate Partner  Violence   Within the last year, have you been afraid of your partner or ex-partner? No   Within the last year, have you been humiliated or emotionally abused in other ways by your partner or ex-partner? No   Within the last year, have you been kicked, hit, slapped, or otherwise physically hurt by your partner or ex-partner? No   Within the last year, have you been raped or forced to have any kind of sexual activity by your partner or ex-partner? No   Alcohol Use   Q1: How often do you have a drink containing alcohol? 4 or more ti   Q2: How many drinks containing alcohol do you have on a typical day when you are drinking? 1 or 2   Q3: How often do you have six or more drinks on one occasion? Never   Utilities   In the past 12 months has the electric, gas, oil, or water company threatened to shut off services in your home? No   Health Literacy   How often do you need to have someone help you when you read instructions, pamphlets, or other written material from your doctor or pharmacy? Never   Follow-Ups   We make community resources available to all of our patients to assist with everyday needs. We may be able to connect you with those resources. Would you be interested? N

## 2025-08-07 NOTE — CARE PLAN
The patient's goals for the shift include      The clinical goals for the shift include monitor circulation, pain managemant    Over the shift, the patient did not make progress toward the following goals. Barriers to progression include R fem bypass. Recommendations to address these barriers include monitor and assessment.

## 2025-08-07 NOTE — PROGRESS NOTES
"Vascular Surgery Daily Progress Note    Cristhian Thornton is a 54 y.o. male on day 1 of admission presenting with Atherosclerosis of native artery of right leg with rest pain (Multi).    Subjective   Overnight events: none  Pain well controlled this morning. Reports numbness in right toes and pain in the foot is gone now. Requesting for blackburn to be removed. Denies CP, SOB, N/V.        Objective     Last Recorded Vitals  /63 (BP Location: Left arm, Patient Position: Lying)   Pulse 69   Temp 37.1 °C (98.8 °F) (Temporal)   Resp 17   Ht 1.778 m (5' 10\")   Wt 90 kg (198 lb 6.6 oz)   SpO2 97%   BMI 28.47 kg/m²     Physical Exam:  Constitutional: no acute distress  Neuro: awake, alert, oriented; no gross deficits noted   HEENT: No deformities, no scleral icterus   Cardiac: regular rate  Pulmonary: unlabored respirations on room air  Abdomen: soft, non-tender, non-distended  Skin: warm and dry; wounds: right thigh and leg surgical incisions with dressings in place, minimal strikethrough on the SFA and BK pop exposure wounds  Extremities: minimal peripheral edema noted in the RLE  MSK: motor and sensory intact in all extremities  Vascular:   PT: R: palpable. L: palpable.     Intake/Output last 3 Shifts:  I/O last 3 completed shifts:  In: 3579 (39.8 mL/kg) [P.O.:280; I.V.:2297.3 (25.5 mL/kg); IV Piggyback:1001.7]  Out: 3550 (39.4 mL/kg) [Urine:3350 (1 mL/kg/hr); Blood:200]  Weight: 90 kg     Relevant Lab Results  Lab Review   Recent Labs     08/07/25  0713 07/28/25  1259 05/16/25  0143 05/15/25  1248 05/12/25  0414 11/05/20  0324 11/04/20  0402 11/03/20  1754 11/03/20  1214    139 137 138 139   < > 141  --  142   K 3.7 4.4 3.7 4.4 3.9   < > 4.6   < > 4.7   BUN 10 20 17 16 12   < > 17  --  17   CREATININE 0.89 1.30 0.98 0.96 1.05   < > 1.0  --  1.1   EGFR >90 66 >90 >90 85   < >  --   --   --    MG  --   --   --   --   --   --  2.1  --  3.4*    < > = values in this interval not displayed.     Recent Labs     " 05/16/25  0143 05/15/25  1248 05/12/25  0414 05/10/25  0440 05/09/25  0817 01/30/23  1018 06/07/21  1453   ALBUMIN 3.8 3.5 3.8 3.6 3.9   < > 4.3   ALKPHOS 48 44 50 49 53   < > 72   ALT 29 23 18 15 14   < > 30   AST 35 21 18 15 16   < > 31   BILITOT 0.6 0.6 0.7 0.6 0.7   < > 0.3   LIPASE  --   --   --   --  12  --  27    < > = values in this interval not displayed.     Recent Labs     08/07/25  0713 08/06/25  1635 07/28/25  1259 06/03/25  1630 05/15/25  1247   WBC 8.2 10.9 8.7 3.6* 9.8   HGB 11.6* 12.7* 14.5 13.1* 13.3*   HCT 33.9* 39.1* 45.4 40.4 39.1*    171 216 139* 213   MCV 85 89 90.4 89.0 85     Recent Labs     08/06/25  0746 08/05/25  1113 07/31/25  0930 07/30/25  1223 07/28/25  1238   INR 1.1 1.1 1.8 2.1* 2.90*     PTT - 8/7/2025:  7:13 AM  1.1   13.4 27.6     Recent Labs     04/03/25  0731   CHOL 127   HDL 48   TRIG 62     Lab Results   Component Value Date    HGBA1C 5.0 10/28/2020     Lab Results   Component Value Date    TSH 1.00 05/09/2025       Imaging:   Imaging  No results found.    Cardiology, Vascular, and Other Imaging  No other imaging results found for the past 2 days       Current medications:   Scheduled medications  Scheduled Medications[1]  Continuous medications  Continuous Medications[2]  PRN medications  PRN Medications[3]    Assessment/Plan   Assessment & Plan  Atherosclerosis of native artery of right leg with rest pain (Multi)    PAD (peripheral artery disease)      54 year old male with right popliteal thromboembolism with CLTI III and recent progression to rest pain now s/p right SFA to TP-trunk arterial bypass with ipsilateral non-reversed GSV.     8/7: pain well controlled, reports resolution of numbness and pain in the right toes, palpable PT and no incisional hematomas     Neuro:  - pain control with Tylenol and PRN oxycodone  - q4 hours NV checks  - home trazodone and fluoxetine     Pulm:  - encourage IS    CV:  - resume plavix  - heparin low intensity is subtherapeutic  this morning, start weight-based therapeutic Lovenox and discontinue heparin infusion  - resume home Warfarin 5mg on 8/8/25    GI:  - Diet: regular  - Bowel Regimen: miraoax  - PPI: none indicated    :   - remove blackburn, follow up trial of void  - HLIV    Heme:   - AM CBC  - Anticoagulation as above  - Transfuse for Hgb < 7     Endo:  - no issues     ID:   - completed karen-op ancef     MSK:  - PT today  - OOB to chair. Ambulate as tolerated     DVT ppx: Lovenox    Dispo: RNF    Discussed with attending, Dr. Joaquina Romero MD, PhD  Vascular Surgery, PGY4  [id number]         [1] [Held by provider] clopidogrel, 75 mg, oral, Daily  FLUoxetine, 20 mg, oral, Daily  [2] potassium chloride-D5-0.9%NaCl, 100 mL/hr, Last Rate: Stopped (08/07/25 0655)  heparin, 0-4,000 Units/hr, Last Rate: 1,100 Units/hr (08/07/25 0811)  [3] PRN medications: acetaminophen, benzocaine-menthol, [Transfer Hold] ePHEDrine, morphine, naloxone, oxyCODONE, oxyCODONE-acetaminophen, oxyCODONE-acetaminophen, oxygen, traZODone

## 2025-08-07 NOTE — ASSESSMENT & PLAN NOTE
Status post revascularization procedure  Anticoagulation per vascular surgery-patient currently on therapeutic dose Lovenox, Plavix

## 2025-08-08 LAB
ANION GAP SERPL CALCULATED.3IONS-SCNC: 8 MMOL/L (ref 10–20)
BUN SERPL-MCNC: 8 MG/DL (ref 6–23)
CALCIUM SERPL-MCNC: 8.8 MG/DL (ref 8.6–10.3)
CHLORIDE SERPL-SCNC: 103 MMOL/L (ref 98–107)
CO2 SERPL-SCNC: 31 MMOL/L (ref 21–32)
CREAT SERPL-MCNC: 0.96 MG/DL (ref 0.5–1.3)
EGFRCR SERPLBLD CKD-EPI 2021: >90 ML/MIN/1.73M*2
ERYTHROCYTE [DISTWIDTH] IN BLOOD BY AUTOMATED COUNT: 13.6 % (ref 11.5–14.5)
ERYTHROCYTE [DISTWIDTH] IN BLOOD BY AUTOMATED COUNT: 13.9 % (ref 11.5–14.5)
GLUCOSE SERPL-MCNC: 94 MG/DL (ref 74–99)
HCT VFR BLD AUTO: 37.9 % (ref 41–52)
HCT VFR BLD AUTO: 38.8 % (ref 41–52)
HGB BLD-MCNC: 12.3 G/DL (ref 13.5–17.5)
HGB BLD-MCNC: 12.7 G/DL (ref 13.5–17.5)
MCH RBC QN AUTO: 28.9 PG (ref 26–34)
MCH RBC QN AUTO: 29.1 PG (ref 26–34)
MCHC RBC AUTO-ENTMCNC: 32.5 G/DL (ref 32–36)
MCHC RBC AUTO-ENTMCNC: 32.7 G/DL (ref 32–36)
MCV RBC AUTO: 89 FL (ref 80–100)
MCV RBC AUTO: 89 FL (ref 80–100)
NRBC BLD-RTO: 0 /100 WBCS (ref 0–0)
NRBC BLD-RTO: 0 /100 WBCS (ref 0–0)
PLATELET # BLD AUTO: 158 X10*3/UL (ref 150–450)
PLATELET # BLD AUTO: 167 X10*3/UL (ref 150–450)
POTASSIUM SERPL-SCNC: 4.3 MMOL/L (ref 3.5–5.3)
RBC # BLD AUTO: 4.25 X10*6/UL (ref 4.5–5.9)
RBC # BLD AUTO: 4.37 X10*6/UL (ref 4.5–5.9)
SODIUM SERPL-SCNC: 138 MMOL/L (ref 136–145)
WBC # BLD AUTO: 9 X10*3/UL (ref 4.4–11.3)
WBC # BLD AUTO: 9.1 X10*3/UL (ref 4.4–11.3)

## 2025-08-08 PROCEDURE — 85027 COMPLETE CBC AUTOMATED: CPT | Performed by: NURSE PRACTITIONER

## 2025-08-08 PROCEDURE — 36415 COLL VENOUS BLD VENIPUNCTURE: CPT | Performed by: NURSE PRACTITIONER

## 2025-08-08 PROCEDURE — 80048 BASIC METABOLIC PNL TOTAL CA: CPT | Performed by: NURSE PRACTITIONER

## 2025-08-08 PROCEDURE — 2500000002 HC RX 250 W HCPCS SELF ADMINISTERED DRUGS (ALT 637 FOR MEDICARE OP, ALT 636 FOR OP/ED): Performed by: INTERNAL MEDICINE

## 2025-08-08 PROCEDURE — 2500000004 HC RX 250 GENERAL PHARMACY W/ HCPCS (ALT 636 FOR OP/ED)

## 2025-08-08 PROCEDURE — 99232 SBSQ HOSP IP/OBS MODERATE 35: CPT | Performed by: INTERNAL MEDICINE

## 2025-08-08 PROCEDURE — 2500000001 HC RX 250 WO HCPCS SELF ADMINISTERED DRUGS (ALT 637 FOR MEDICARE OP)

## 2025-08-08 PROCEDURE — 2060000001 HC INTERMEDIATE ICU ROOM DAILY

## 2025-08-08 PROCEDURE — 99024 POSTOP FOLLOW-UP VISIT: CPT | Performed by: NURSE PRACTITIONER

## 2025-08-08 PROCEDURE — 2500000001 HC RX 250 WO HCPCS SELF ADMINISTERED DRUGS (ALT 637 FOR MEDICARE OP): Performed by: NURSE PRACTITIONER

## 2025-08-08 PROCEDURE — 2500000001 HC RX 250 WO HCPCS SELF ADMINISTERED DRUGS (ALT 637 FOR MEDICARE OP): Performed by: INTERNAL MEDICINE

## 2025-08-08 RX ORDER — ALUMINUM HYDROXIDE, MAGNESIUM HYDROXIDE, AND SIMETHICONE 1200; 120; 1200 MG/30ML; MG/30ML; MG/30ML
30 SUSPENSION ORAL 4 TIMES DAILY PRN
Status: DISCONTINUED | OUTPATIENT
Start: 2025-08-08 | End: 2025-08-10 | Stop reason: HOSPADM

## 2025-08-08 RX ORDER — WARFARIN SODIUM 5 MG/1
5 TABLET ORAL DAILY
Status: DISCONTINUED | OUTPATIENT
Start: 2025-08-08 | End: 2025-08-09

## 2025-08-08 RX ADMIN — ALUMINUM HYDROXIDE, MAGNESIUM HYDROXIDE, AND DIMETHICONE 30 ML: 200; 20; 200 SUSPENSION ORAL at 17:37

## 2025-08-08 RX ADMIN — ENOXAPARIN SODIUM 90 MG: 100 INJECTION SUBCUTANEOUS at 09:54

## 2025-08-08 RX ADMIN — CLOPIDOGREL 75 MG: 75 TABLET ORAL at 09:54

## 2025-08-08 RX ADMIN — FLUOXETINE HYDROCHLORIDE 20 MG: 20 CAPSULE ORAL at 09:54

## 2025-08-08 RX ADMIN — ATORVASTATIN CALCIUM 40 MG: 40 TABLET, FILM COATED ORAL at 22:36

## 2025-08-08 RX ADMIN — ENOXAPARIN SODIUM 90 MG: 100 INJECTION SUBCUTANEOUS at 22:36

## 2025-08-08 RX ADMIN — WARFARIN SODIUM 5 MG: 5 TABLET ORAL at 17:37

## 2025-08-08 ASSESSMENT — COGNITIVE AND FUNCTIONAL STATUS - GENERAL
CLIMB 3 TO 5 STEPS WITH RAILING: A LITTLE
MOBILITY SCORE: 22
DRESSING REGULAR LOWER BODY CLOTHING: A LITTLE
WALKING IN HOSPITAL ROOM: A LITTLE
DAILY ACTIVITIY SCORE: 23

## 2025-08-08 ASSESSMENT — PAIN SCALES - GENERAL: PAINLEVEL_OUTOF10: 0 - NO PAIN

## 2025-08-08 ASSESSMENT — PAIN - FUNCTIONAL ASSESSMENT: PAIN_FUNCTIONAL_ASSESSMENT: 0-10

## 2025-08-08 NOTE — CARE PLAN
The patient's goals for the shift include  pain control, and rest    The clinical goals for the shift include pain control    Over the shift, the patient did not make progress toward the following goals. Barriers to progression include surgical incision discomfort. Recommendations to address these barriers include monitoring pain levels, administering PRN pain medications as needed.      Problem: Pain - Adult  Goal: Verbalizes/displays adequate comfort level or baseline comfort level  Outcome: Progressing     Problem: Safety - Adult  Goal: Free from fall injury  Outcome: Progressing     Problem: Discharge Planning  Goal: Discharge to home or other facility with appropriate resources  Outcome: Progressing     Problem: Chronic Conditions and Co-morbidities  Goal: Patient's chronic conditions and co-morbidity symptoms are monitored and maintained or improved  Outcome: Progressing     Problem: Nutrition  Goal: Nutrient intake appropriate for maintaining nutritional needs  Outcome: Progressing

## 2025-08-08 NOTE — PROGRESS NOTES
Cristhian Thornton is a 54 y.o. male on day 2 of admission presenting with Atherosclerosis of native artery of right leg with rest pain (Multi).      Subjective  Patient no acute distress on exam  Plan to start Coumadin this morning  Dressing intact  DP and PT palpable  Low-grade fever last evening, most recent 36.7 °C  Ambulated the halls with physical therapy yesterday    Objective        Last Recorded Vitals      8/7/2025    11:03 PM 8/8/2025    12:00 AM 8/8/2025    12:31 AM 8/8/2025     3:28 AM 8/8/2025     4:00 AM 8/8/2025     7:29 AM 8/8/2025     8:00 AM   Vitals   Systolic 103   113  114    Diastolic 71   75  73    BP Location Left arm   Left arm  Left arm    Heart Rate 80 76   72 60 71   Temp 38.2 °C (100.8 °F)  36.9 °C (98.4 °F) 36.3 °C (97.3 °F)  36.7 °C (98.1 °F)    Resp 18   16  17    Weight (lb)    192.68      BMI    27.65 kg/m2      BSA (m2)    2.08 m2          Imaging  Imaging  No results found.    Cardiology, Vascular, and Other Imaging  No other imaging results found for the past 2 days        Relevant Results  Results for orders placed or performed during the hospital encounter of 08/06/25 (from the past 24 hours)   CBC   Result Value Ref Range    WBC 9.0 4.4 - 11.3 x10*3/uL    nRBC 0.0 0.0 - 0.0 /100 WBCs    RBC 4.25 (L) 4.50 - 5.90 x10*6/uL    Hemoglobin 12.3 (L) 13.5 - 17.5 g/dL    Hematocrit 37.9 (L) 41.0 - 52.0 %    MCV 89 80 - 100 fL    MCH 28.9 26.0 - 34.0 pg    MCHC 32.5 32.0 - 36.0 g/dL    RDW 13.9 11.5 - 14.5 %    Platelets 158 150 - 450 x10*3/uL   Basic Metabolic Panel   Result Value Ref Range    Glucose 94 74 - 99 mg/dL    Sodium 138 136 - 145 mmol/L    Potassium 4.3 3.5 - 5.3 mmol/L    Chloride 103 98 - 107 mmol/L    Bicarbonate 31 21 - 32 mmol/L    Anion Gap 8 (L) 10 - 20 mmol/L    Urea Nitrogen 8 6 - 23 mg/dL    Creatinine 0.96 0.50 - 1.30 mg/dL    eGFR >90 >60 mL/min/1.73m*2    Calcium 8.8 8.6 - 10.3 mg/dL         Physical Exam:  Constitutional: no acute distress  Neuro: awake, alert,  oriented; no gross deficits noted   HEENT: No deformities, no scleral icterus   Cardiac: regular rate  Pulmonary: unlabored respirations on room air  Abdomen: soft, non-tender, non-distended  Skin: warm and dry; wounds: right thigh and leg surgical incisions with dressings in place, minimal strikethrough on the SFA and BK pop exposure wounds  Extremities: minimal peripheral edema noted in the RLE  MSK: motor and sensory intact in all extremities  Vascular:  PT and DP palpable bilaterally      54 year old male with right popliteal thromboembolism with CLTI III and recent progression to rest pain now s/p right SFA to TP-trunk arterial bypass with ipsilateral non-reversed GSV.      8/7: pain well controlled, reports resolution of numbness and pain in the right toes, palpable PT and no incisional hematomas      Neuro:  - pain control with Tylenol and PRN oxycodone  - q4 hours NV checks  - home trazodone and fluoxetine      Pulm:  - encourage IS     CV:  - Continue plavix  - weight-based therapeutic Lovenox (90 mg twice daily), continue outpatient  - resume home Warfarin 5mg on 8/8/25  Continue lovenox until therapeutic INR achieved, will need a Coumadin clinic in order to check INRs and stop the lovenox      GI:  - Diet: regular  - Bowel Regimen: MiraLAX  - PPI: none indicated     :   - No problems with urination  - HLIV     Heme:   - AM CBC  - Anticoagulation as above  - Transfuse for Hgb < 7     Endo:  - no issues     ID:   - completed karen-op ancef     MSK:  - PT: Weightbearing as tolerated, no difficulty ambulating the halls     DVT ppx: Lovenox 90 mg twice daily     Dispo: Home    Plan for dressing change on Saturday, 8/9/2025 and likely discharge home.  Check INR    Follow-up orders placed, patient to be seen in 2 weeks

## 2025-08-08 NOTE — DISCHARGE INSTRUCTIONS
Discharge instructions    May shower in 1 day  No tub soaks or swimming  Change dressing daily, apply iodine to incision sites, gauze, continue wearing Tubigrip compression dressing until follow-up appointment  Elevate throughout the day  No strenuous activity  No pushing no pulling  No heavy weightlifting  No driving while on pain medication    Call our office if headache, fever, redness, drainage, skin temperature increase at incision site or increased pain    Continue Plavix 75 mg daily  Continue Coumadin  Continue Lovenox 90 mg every 12 hours, Coumadin clinic to manage INR, okay to discontinue Lovenox once therapeutic    [phone], option 2

## 2025-08-08 NOTE — PROGRESS NOTES
Cristhian Thornton is a 54 y.o. male on day 2 of admission presenting with Atherosclerosis of native artery of right leg with rest pain (Multi).  He is status post revascularization procedure of right lower extremity.      Subjective   Patient feels fine today.  Pain is tolerable  No new complaint  Warfarin restarted today       Objective     Last Recorded Vitals  /72 (BP Location: Left arm, Patient Position: Lying)   Pulse 75   Temp 36.4 °C (97.5 °F) (Temporal)   Resp 18   Wt 87.4 kg (192 lb 10.9 oz)   SpO2 99%   Intake/Output last 3 Shifts:    Intake/Output Summary (Last 24 hours) at 8/8/2025 1315  Last data filed at 8/7/2025 1945  Gross per 24 hour   Intake 360 ml   Output --   Net 360 ml       Admission Weight  Weight: 90 kg (198 lb 6.6 oz) (08/07/25 0221)    Daily Weight  08/08/25 : 87.4 kg (192 lb 10.9 oz)      Physical Exam    He is alert and oriented x 3.  No distress  HEENT: Normocephalic  Chest: Clear to auscultation  Heart: Regular rate rhythm  Abdomen: Soft, nontender  Extremities: No pedal edema.  Peripheral pulses palpable  Neuro: No focal deficit    Relevant Results  Results for orders placed or performed during the hospital encounter of 08/06/25 (from the past 24 hours)   CBC   Result Value Ref Range    WBC 9.0 4.4 - 11.3 x10*3/uL    nRBC 0.0 0.0 - 0.0 /100 WBCs    RBC 4.25 (L) 4.50 - 5.90 x10*6/uL    Hemoglobin 12.3 (L) 13.5 - 17.5 g/dL    Hematocrit 37.9 (L) 41.0 - 52.0 %    MCV 89 80 - 100 fL    MCH 28.9 26.0 - 34.0 pg    MCHC 32.5 32.0 - 36.0 g/dL    RDW 13.9 11.5 - 14.5 %    Platelets 158 150 - 450 x10*3/uL   Basic Metabolic Panel   Result Value Ref Range    Glucose 94 74 - 99 mg/dL    Sodium 138 136 - 145 mmol/L    Potassium 4.3 3.5 - 5.3 mmol/L    Chloride 103 98 - 107 mmol/L    Bicarbonate 31 21 - 32 mmol/L    Anion Gap 8 (L) 10 - 20 mmol/L    Urea Nitrogen 8 6 - 23 mg/dL    Creatinine 0.96 0.50 - 1.30 mg/dL    eGFR >90 >60 mL/min/1.73m*2    Calcium 8.8 8.6 - 10.3 mg/dL               This patient has a urinary catheter   Reason for the urinary catheter remaining today? Urine catheter unnecessary, will be removed today          Assessment & Plan  Atherosclerosis of native artery of right leg with rest pain (Multi)  Status post revascularization procedure  Warfarin restarted today.  Continue Plavix Plavix  PAD (peripheral artery disease)               Chai Quinteros MD

## 2025-08-08 NOTE — PROGRESS NOTES
08/08/25 1449   Discharge Planning   Living Arrangements Spouse/significant other   Support Systems Spouse/significant other   Expected Discharge Disposition Home     S/p revascularization procedure.     No home going needs anticipated. Will discharge home with spouse when medically ready. Will follow.

## 2025-08-09 LAB
ANION GAP SERPL CALCULATED.3IONS-SCNC: 10 MMOL/L (ref 10–20)
BUN SERPL-MCNC: 9 MG/DL (ref 6–23)
CALCIUM SERPL-MCNC: 9.1 MG/DL (ref 8.6–10.3)
CHLORIDE SERPL-SCNC: 103 MMOL/L (ref 98–107)
CO2 SERPL-SCNC: 27 MMOL/L (ref 21–32)
CREAT SERPL-MCNC: 0.87 MG/DL (ref 0.5–1.3)
EGFRCR SERPLBLD CKD-EPI 2021: >90 ML/MIN/1.73M*2
ERYTHROCYTE [DISTWIDTH] IN BLOOD BY AUTOMATED COUNT: 13.6 % (ref 11.5–14.5)
GLUCOSE SERPL-MCNC: 89 MG/DL (ref 74–99)
HCT VFR BLD AUTO: 40.4 % (ref 41–52)
HGB BLD-MCNC: 13.8 G/DL (ref 13.5–17.5)
INR PPP: 1.1 (ref 0.9–1.2)
MCH RBC QN AUTO: 29 PG (ref 26–34)
MCHC RBC AUTO-ENTMCNC: 34.2 G/DL (ref 32–36)
MCV RBC AUTO: 85 FL (ref 80–100)
NRBC BLD-RTO: 0 /100 WBCS (ref 0–0)
PLATELET # BLD AUTO: 192 X10*3/UL (ref 150–450)
POTASSIUM SERPL-SCNC: 4.1 MMOL/L (ref 3.5–5.3)
PROTHROMBIN TIME: 12.2 SECONDS (ref 9.3–12.7)
RBC # BLD AUTO: 4.76 X10*6/UL (ref 4.5–5.9)
SODIUM SERPL-SCNC: 136 MMOL/L (ref 136–145)
WBC # BLD AUTO: 8 X10*3/UL (ref 4.4–11.3)

## 2025-08-09 PROCEDURE — 85610 PROTHROMBIN TIME: CPT | Performed by: INTERNAL MEDICINE

## 2025-08-09 PROCEDURE — 2500000001 HC RX 250 WO HCPCS SELF ADMINISTERED DRUGS (ALT 637 FOR MEDICARE OP): Performed by: NURSE PRACTITIONER

## 2025-08-09 PROCEDURE — 2500000004 HC RX 250 GENERAL PHARMACY W/ HCPCS (ALT 636 FOR OP/ED)

## 2025-08-09 PROCEDURE — 85027 COMPLETE CBC AUTOMATED: CPT | Performed by: NURSE PRACTITIONER

## 2025-08-09 PROCEDURE — 99024 POSTOP FOLLOW-UP VISIT: CPT | Performed by: NURSE PRACTITIONER

## 2025-08-09 PROCEDURE — 36415 COLL VENOUS BLD VENIPUNCTURE: CPT | Performed by: NURSE PRACTITIONER

## 2025-08-09 PROCEDURE — 2500000001 HC RX 250 WO HCPCS SELF ADMINISTERED DRUGS (ALT 637 FOR MEDICARE OP)

## 2025-08-09 PROCEDURE — 99232 SBSQ HOSP IP/OBS MODERATE 35: CPT | Performed by: INTERNAL MEDICINE

## 2025-08-09 PROCEDURE — 82374 ASSAY BLOOD CARBON DIOXIDE: CPT | Performed by: NURSE PRACTITIONER

## 2025-08-09 PROCEDURE — 80048 BASIC METABOLIC PNL TOTAL CA: CPT | Performed by: NURSE PRACTITIONER

## 2025-08-09 PROCEDURE — 2060000001 HC INTERMEDIATE ICU ROOM DAILY

## 2025-08-09 PROCEDURE — 2500000002 HC RX 250 W HCPCS SELF ADMINISTERED DRUGS (ALT 637 FOR MEDICARE OP, ALT 636 FOR OP/ED): Performed by: INTERNAL MEDICINE

## 2025-08-09 RX ORDER — WARFARIN SODIUM 5 MG/1
10 TABLET ORAL ONCE
Status: COMPLETED | OUTPATIENT
Start: 2025-08-09 | End: 2025-08-09

## 2025-08-09 RX ADMIN — CLOPIDOGREL 75 MG: 75 TABLET ORAL at 08:57

## 2025-08-09 RX ADMIN — ATORVASTATIN CALCIUM 40 MG: 40 TABLET, FILM COATED ORAL at 21:32

## 2025-08-09 RX ADMIN — FLUOXETINE HYDROCHLORIDE 20 MG: 20 CAPSULE ORAL at 08:57

## 2025-08-09 RX ADMIN — ENOXAPARIN SODIUM 90 MG: 100 INJECTION SUBCUTANEOUS at 08:57

## 2025-08-09 RX ADMIN — ENOXAPARIN SODIUM 90 MG: 100 INJECTION SUBCUTANEOUS at 21:33

## 2025-08-09 RX ADMIN — WARFARIN SODIUM 10 MG: 5 TABLET ORAL at 17:56

## 2025-08-09 ASSESSMENT — COGNITIVE AND FUNCTIONAL STATUS - GENERAL
CLIMB 3 TO 5 STEPS WITH RAILING: A LITTLE
MOBILITY SCORE: 23
HELP NEEDED FOR BATHING: A LITTLE
DAILY ACTIVITIY SCORE: 22
DRESSING REGULAR LOWER BODY CLOTHING: A LITTLE

## 2025-08-09 ASSESSMENT — PAIN SCALES - GENERAL
PAINLEVEL_OUTOF10: 0 - NO PAIN
PAINLEVEL_OUTOF10: 0 - NO PAIN

## 2025-08-09 NOTE — ASSESSMENT & PLAN NOTE
Status post revascularization procedure  Warfarin 10 mg today    Monitor PT/INR  Continue Plavix Plavix

## 2025-08-09 NOTE — CARE PLAN
The patient's goals for the shift include      The clinical goals for the shift include improve pain    Over the shift, the patient did not make progress toward the following goals. Barriers to progression include surgical site. Recommendations to address these barriers include monitor pain.

## 2025-08-09 NOTE — PROGRESS NOTES
Cristhian Thornton is a 54 y.o. male on day 3 of admission presenting with Atherosclerosis of native artery of right leg with rest pain (Multi).      Subjective  Patient evaluated bedside with his wife present  Dressing changed without incidence  INR too low so told him he will probably have to stay another day  He is getting Lovenox and Coumadin    Objective        Last Recorded Vitals      8/8/2025     8:00 PM 8/9/2025    12:00 AM 8/9/2025    12:36 AM 8/9/2025     4:00 AM 8/9/2025     4:58 AM 8/9/2025     8:00 AM 8/9/2025     8:20 AM   Vitals   Systolic   113  111  104   Diastolic   72  68  68   BP Location   Left arm  Left arm  Left arm   Heart Rate 67 68 65 61 62 80    Temp   36.7 °C (98.1 °F)  36.6 °C (97.9 °F)  36.5 °C (97.7 °F)   Resp   17  18  18   Weight (lb)     188.71     BMI     27.08 kg/m2     BSA (m2)     2.06 m2         Imaging  Imaging  No results found.    Cardiology, Vascular, and Other Imaging  No other imaging results found for the past 2 days        Relevant Results  Results for orders placed or performed during the hospital encounter of 08/06/25 (from the past 24 hours)   CBC   Result Value Ref Range    WBC 9.1 4.4 - 11.3 x10*3/uL    nRBC 0.0 0.0 - 0.0 /100 WBCs    RBC 4.37 (L) 4.50 - 5.90 x10*6/uL    Hemoglobin 12.7 (L) 13.5 - 17.5 g/dL    Hematocrit 38.8 (L) 41.0 - 52.0 %    MCV 89 80 - 100 fL    MCH 29.1 26.0 - 34.0 pg    MCHC 32.7 32.0 - 36.0 g/dL    RDW 13.6 11.5 - 14.5 %    Platelets 167 150 - 450 x10*3/uL   CBC   Result Value Ref Range    WBC 8.0 4.4 - 11.3 x10*3/uL    nRBC 0.0 0.0 - 0.0 /100 WBCs    RBC 4.76 4.50 - 5.90 x10*6/uL    Hemoglobin 13.8 13.5 - 17.5 g/dL    Hematocrit 40.4 (L) 41.0 - 52.0 %    MCV 85 80 - 100 fL    MCH 29.0 26.0 - 34.0 pg    MCHC 34.2 32.0 - 36.0 g/dL    RDW 13.6 11.5 - 14.5 %    Platelets 192 150 - 450 x10*3/uL   Basic Metabolic Panel   Result Value Ref Range    Glucose 89 74 - 99 mg/dL    Sodium 136 136 - 145 mmol/L    Potassium 4.1 3.5 - 5.3 mmol/L    Chloride  103 98 - 107 mmol/L    Bicarbonate 27 21 - 32 mmol/L    Anion Gap 10 10 - 20 mmol/L    Urea Nitrogen 9 6 - 23 mg/dL    Creatinine 0.87 0.50 - 1.30 mg/dL    eGFR >90 >60 mL/min/1.73m*2    Calcium 9.1 8.6 - 10.3 mg/dL   Protime-INR   Result Value Ref Range    Protime 12.2 9.3 - 12.7 seconds    INR 1.1 0.9 - 1.2       Physical Exam:  Constitutional: no acute distress  Neuro: awake, alert, oriented; no gross deficits noted   HEENT: No deformities, no scleral icterus   Cardiac: regular rate  Pulmonary: unlabored respirations on room air  Abdomen: soft, non-tender, non-distended  Skin: Right leg surgical incision sites well coapted, no erythema, Steri-Strips in place.  Extremities: minimal peripheral edema noted in the RLE  MSK: motor and sensory intact in all extremities  Vascular:  PT and DP palpable bilaterally        54 year old male with right popliteal thromboembolism with CLTI III and recent progression to rest pain now s/p right SFA to TP-trunk arterial bypass with ipsilateral non-reversed GSV.      8/7: pain well controlled, reports resolution of numbness and pain in the right toes, palpable PT and no incisional hematomas     8/9: Dressing changed at bedside by our service.  INR 1.1 which is too low for discharge.  Need therapeutic     Neuro:  - pain control with Tylenol and PRN oxycodone  - q4 hours NV checks  - home trazodone and fluoxetine      Pulm:  - encourage IS     CV:  - Continue plavix  - weight-based therapeutic Lovenox (90 mg twice daily), continue outpatient  - Continue warfarin (on 5 mg at home)  - INR 1.1 today, too low for discharge, need therapeutic  Continue lovenox until therapeutic INR achieved, will need a Coumadin clinic in order to check INRs and stop the lovenox      GI:  - Diet: regular  - Bowel Regimen: MiraLAX  - PPI: none indicated     :   - No problems with urination  - HLIV     Heme:   - AM CBC  - Anticoagulation as above  - Transfuse for Hgb < 7     Endo:  - no issues     ID:   -  completed karen-op ancef     MSK:  - PT: Weightbearing as tolerated, no difficulty ambulating the halls     DVT ppx: Lovenox 90 mg twice daily     Dispo: Home     Plan for dressing change on Saturday, 8/9/2025 and likely discharge home.  Check INR     Follow-up orders placed, patient to be seen in 2 weeks

## 2025-08-09 NOTE — PROGRESS NOTES
Cristhian Thornton is a 54 y.o. male on day 3 of admission presenting with Atherosclerosis of native artery of right leg with rest pain (Multi).  He is status post revascularization procedure of right lower extremity.      Subjective   No new complaint   INR subtherapeutic 1.1   Presumed warfarin 5 mg daily yesterday         Objective     Last Recorded Vitals  /62 (BP Location: Left arm, Patient Position: Lying)   Pulse 70   Temp 36.5 °C (97.7 °F) (Temporal)   Resp 18   Wt 85.6 kg (188 lb 11.4 oz)   SpO2 99%   Intake/Output last 3 Shifts:    Intake/Output Summary (Last 24 hours) at 8/9/2025 1544  Last data filed at 8/9/2025 1300  Gross per 24 hour   Intake 440 ml   Output --   Net 440 ml       Admission Weight  Weight: 90 kg (198 lb 6.6 oz) (08/07/25 0221)    Daily Weight  08/09/25 : 85.6 kg (188 lb 11.4 oz)      Physical Exam    He is alert and oriented x 3.  No distress  HEENT: Normocephalic  Chest: Clear to auscultation  Heart: Regular rate rhythm  Abdomen: Soft, nontender  Extremities: No pedal edema.  Peripheral pulses palpable  Neuro: No focal deficit    Relevant Results  Results for orders placed or performed during the hospital encounter of 08/06/25 (from the past 24 hours)   CBC   Result Value Ref Range    WBC 8.0 4.4 - 11.3 x10*3/uL    nRBC 0.0 0.0 - 0.0 /100 WBCs    RBC 4.76 4.50 - 5.90 x10*6/uL    Hemoglobin 13.8 13.5 - 17.5 g/dL    Hematocrit 40.4 (L) 41.0 - 52.0 %    MCV 85 80 - 100 fL    MCH 29.0 26.0 - 34.0 pg    MCHC 34.2 32.0 - 36.0 g/dL    RDW 13.6 11.5 - 14.5 %    Platelets 192 150 - 450 x10*3/uL   Basic Metabolic Panel   Result Value Ref Range    Glucose 89 74 - 99 mg/dL    Sodium 136 136 - 145 mmol/L    Potassium 4.1 3.5 - 5.3 mmol/L    Chloride 103 98 - 107 mmol/L    Bicarbonate 27 21 - 32 mmol/L    Anion Gap 10 10 - 20 mmol/L    Urea Nitrogen 9 6 - 23 mg/dL    Creatinine 0.87 0.50 - 1.30 mg/dL    eGFR >90 >60 mL/min/1.73m*2    Calcium 9.1 8.6 - 10.3 mg/dL   Protime-INR   Result Value  Ref Range    Protime 12.2 9.3 - 12.7 seconds    INR 1.1 0.9 - 1.2         Assessment & Plan  Atherosclerosis of native artery of right leg with rest pain (Multi)  Status post revascularization procedure  Warfarin 10 mg today    Monitor PT/INR  Continue Plavix Plavix  PAD (peripheral artery disease)               Chai Quinteros MD       Albendazole Pregnancy And Lactation Text: This medication is Pregnancy Category C and it isn't known if it is safe during pregnancy. It is also excreted in breast milk.

## 2025-08-09 NOTE — CARE PLAN
The patient's goals for the shift include      The clinical goals for the shift include Remain HDS

## 2025-08-10 VITALS
DIASTOLIC BLOOD PRESSURE: 74 MMHG | RESPIRATION RATE: 18 BRPM | BODY MASS INDEX: 26.67 KG/M2 | TEMPERATURE: 97.2 F | HEART RATE: 73 BPM | WEIGHT: 186.29 LBS | OXYGEN SATURATION: 98 % | HEIGHT: 70 IN | SYSTOLIC BLOOD PRESSURE: 111 MMHG

## 2025-08-10 LAB
ANION GAP SERPL CALCULATED.3IONS-SCNC: 9 MMOL/L (ref 10–20)
BUN SERPL-MCNC: 16 MG/DL (ref 6–23)
CALCIUM SERPL-MCNC: 9.2 MG/DL (ref 8.6–10.3)
CHLORIDE SERPL-SCNC: 104 MMOL/L (ref 98–107)
CO2 SERPL-SCNC: 28 MMOL/L (ref 21–32)
CREAT SERPL-MCNC: 0.8 MG/DL (ref 0.5–1.3)
EGFRCR SERPLBLD CKD-EPI 2021: >90 ML/MIN/1.73M*2
ERYTHROCYTE [DISTWIDTH] IN BLOOD BY AUTOMATED COUNT: 13.6 % (ref 11.5–14.5)
GLUCOSE SERPL-MCNC: 88 MG/DL (ref 74–99)
HCT VFR BLD AUTO: 42.1 % (ref 41–52)
HGB BLD-MCNC: 13.8 G/DL (ref 13.5–17.5)
INR PPP: 1.3 (ref 0.9–1.2)
MCH RBC QN AUTO: 28.6 PG (ref 26–34)
MCHC RBC AUTO-ENTMCNC: 32.8 G/DL (ref 32–36)
MCV RBC AUTO: 87 FL (ref 80–100)
NRBC BLD-RTO: 0 /100 WBCS (ref 0–0)
PLATELET # BLD AUTO: 215 X10*3/UL (ref 150–450)
POTASSIUM SERPL-SCNC: 4.4 MMOL/L (ref 3.5–5.3)
PROTHROMBIN TIME: 14 SECONDS (ref 9.3–12.7)
RBC # BLD AUTO: 4.82 X10*6/UL (ref 4.5–5.9)
SODIUM SERPL-SCNC: 137 MMOL/L (ref 136–145)
WBC # BLD AUTO: 7.3 X10*3/UL (ref 4.4–11.3)

## 2025-08-10 PROCEDURE — 85610 PROTHROMBIN TIME: CPT | Performed by: INTERNAL MEDICINE

## 2025-08-10 PROCEDURE — 99239 HOSP IP/OBS DSCHRG MGMT >30: CPT | Performed by: INTERNAL MEDICINE

## 2025-08-10 PROCEDURE — 2500000001 HC RX 250 WO HCPCS SELF ADMINISTERED DRUGS (ALT 637 FOR MEDICARE OP)

## 2025-08-10 PROCEDURE — 2500000004 HC RX 250 GENERAL PHARMACY W/ HCPCS (ALT 636 FOR OP/ED): Mod: JW

## 2025-08-10 PROCEDURE — 36415 COLL VENOUS BLD VENIPUNCTURE: CPT | Performed by: NURSE PRACTITIONER

## 2025-08-10 PROCEDURE — 80048 BASIC METABOLIC PNL TOTAL CA: CPT | Performed by: NURSE PRACTITIONER

## 2025-08-10 PROCEDURE — 85027 COMPLETE CBC AUTOMATED: CPT | Performed by: NURSE PRACTITIONER

## 2025-08-10 PROCEDURE — 2500000001 HC RX 250 WO HCPCS SELF ADMINISTERED DRUGS (ALT 637 FOR MEDICARE OP): Performed by: NURSE PRACTITIONER

## 2025-08-10 RX ORDER — ENOXAPARIN SODIUM 100 MG/ML
1 INJECTION SUBCUTANEOUS 2 TIMES DAILY
Qty: 14 EACH | Refills: 0 | Status: SHIPPED | OUTPATIENT
Start: 2025-08-10 | End: 2025-08-10

## 2025-08-10 RX ORDER — ENOXAPARIN SODIUM 100 MG/ML
1 INJECTION SUBCUTANEOUS 2 TIMES DAILY
Qty: 14 EACH | Refills: 0 | Status: SHIPPED | OUTPATIENT
Start: 2025-08-10

## 2025-08-10 RX ORDER — ATORVASTATIN CALCIUM 40 MG/1
40 TABLET, FILM COATED ORAL NIGHTLY
Qty: 30 TABLET | Refills: 0 | Status: SHIPPED | OUTPATIENT
Start: 2025-08-10 | End: 2025-08-10

## 2025-08-10 RX ORDER — WARFARIN 7.5 MG/1
7.5 TABLET ORAL ONCE
Status: DISCONTINUED | OUTPATIENT
Start: 2025-08-10 | End: 2025-08-10 | Stop reason: HOSPADM

## 2025-08-10 RX ORDER — ATORVASTATIN CALCIUM 40 MG/1
40 TABLET, FILM COATED ORAL NIGHTLY
Qty: 30 TABLET | Refills: 0 | Status: SHIPPED | OUTPATIENT
Start: 2025-08-10

## 2025-08-10 RX ADMIN — CLOPIDOGREL 75 MG: 75 TABLET ORAL at 08:40

## 2025-08-10 RX ADMIN — FLUOXETINE HYDROCHLORIDE 20 MG: 20 CAPSULE ORAL at 08:40

## 2025-08-10 RX ADMIN — ENOXAPARIN SODIUM 90 MG: 100 INJECTION SUBCUTANEOUS at 08:40

## 2025-08-10 ASSESSMENT — COGNITIVE AND FUNCTIONAL STATUS - GENERAL
DAILY ACTIVITIY SCORE: 22
HELP NEEDED FOR BATHING: A LITTLE
MOBILITY SCORE: 23
DRESSING REGULAR LOWER BODY CLOTHING: A LITTLE
CLIMB 3 TO 5 STEPS WITH RAILING: A LITTLE

## 2025-08-10 ASSESSMENT — PAIN SCALES - GENERAL
PAINLEVEL_OUTOF10: 0 - NO PAIN
PAINLEVEL_OUTOF10: 0 - NO PAIN

## 2025-08-10 NOTE — CARE PLAN
The patient's goals for the shift include      The clinical goals for the shift include control pain    Over the shift, the patient did not make progress toward the following goals. Barriers to progression include surgical incision. Recommendations to address these barriers include monitor for pain.

## 2025-08-10 NOTE — DISCHARGE SUMMARY
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     CHANGE how you take these medications     enoxaparin 100 mg/mL syringe; Commonly known as: Lovenox; Inject 0.9 mL   (90 mg) under the skin 2 times a day for 7 days. Until INR is 2 or   greater; What changed: medication strength, additional instructions,   Another medication with the same name was removed. Continue taking this   medication, and follow the directions you see here.     CONTINUE taking these medications     acetaminophen 500 mg tablet; Commonly known as: Tylenol   clopidogrel 75 mg tablet; Commonly known as: Plavix; Take 1 tablet (75   mg) by mouth once daily.   FLUoxetine 20 mg capsule; Commonly known as: PROzac; TAKE 1 CAPSULE BY   MOUTH EVERY DAY   traZODone 50 mg tablet; Commonly known as: Desyrel; TAKE 1 TABLET (50   MG) BY MOUTH AS NEEDED AT BEDTIME FOR SLEEP.   warfarin 5 mg tablet; Commonly known as: Coumadin; Take as directed. If   you are unsure how to take this medication, talk to your nurse or doctor.;   Original instructions: Take as directed per After Visit Summary.       Test Results Pending At Discharge  Pending Labs       No current pending labs.            Hospital Course   54-year-old male with history of peripheral vascular disease who had right lower extremity revascularization surgery for atherosclerosis of native artery of the right leg with rest pain.  Please see vascular surgery operation note for details.  He was continued on warfarin and Lovenox.  He is to continue both until INR is up to 2 and that he can stop the Lovenox.  Patient follow-up with Coumadin clinic  Follow-up with  vascular surgery.    Pertinent Physical Exam At Time of Discharge  Physical Exam    He is alert and oriented x 3.  No distress  HEENT: Normocephalic  Chest: Clear to auscultation  Heart: Regular rate rhythm  Abdomen: Soft, nontender  Extremities: No pedal edema.  Peripheral pulses palpable  Neuro: No focal deficit    Outpatient Follow-Up  Future Appointments   Date Time Provider Department Center   8/21/2025 11:20 AM [id number]   10/13/2025 10:00 AM Amberly Del Castillo PA-C [id number]         Chai Quinteros MD

## 2025-08-10 NOTE — PROGRESS NOTES
08/10/25 0837   Discharge Planning   Expected Discharge Disposition Home   Does the patient need discharge transport arranged? No     Patient with active discharge order, home with no needs

## 2025-08-11 ENCOUNTER — ANTICOAGULATION - WARFARIN VISIT (OUTPATIENT)
Dept: CARDIOLOGY | Facility: HOSPITAL | Age: 55
End: 2025-08-11
Payer: COMMERCIAL

## 2025-08-11 DIAGNOSIS — Z95.2 S/P AVR (AORTIC VALVE REPLACEMENT): Primary | ICD-10-CM

## 2025-08-11 LAB
POC INR: 2.1 (ref 0.9–1.1)
POC PROTHROMBIN TIME: ABNORMAL (ref 9.3–12.5)

## 2025-08-11 PROCEDURE — 85610 PROTHROMBIN TIME: CPT | Mod: QW | Performed by: INTERNAL MEDICINE

## 2025-08-11 PROCEDURE — 99211 OFF/OP EST MAY X REQ PHY/QHP: CPT | Performed by: INTERNAL MEDICINE

## 2025-08-13 LAB — ACT BLD: 139 SEC (ref 89–169)

## 2025-08-18 ENCOUNTER — ANTICOAGULATION - WARFARIN VISIT (OUTPATIENT)
Dept: CARDIOLOGY | Facility: HOSPITAL | Age: 55
End: 2025-08-18
Payer: COMMERCIAL

## 2025-08-18 DIAGNOSIS — Z95.2 S/P AVR (AORTIC VALVE REPLACEMENT): Primary | ICD-10-CM

## 2025-08-18 LAB
POC INR: 2.9 (ref 0.9–1.1)
POC PROTHROMBIN TIME: ABNORMAL (ref 9.3–12.5)

## 2025-08-18 PROCEDURE — 85610 PROTHROMBIN TIME: CPT | Mod: QW | Performed by: INTERNAL MEDICINE

## 2025-08-18 PROCEDURE — 99211 OFF/OP EST MAY X REQ PHY/QHP: CPT | Performed by: INTERNAL MEDICINE

## 2025-08-21 ENCOUNTER — OFFICE VISIT (OUTPATIENT)
Dept: VASCULAR SURGERY | Facility: CLINIC | Age: 55
End: 2025-08-21
Payer: COMMERCIAL

## 2025-08-21 VITALS
OXYGEN SATURATION: 100 % | WEIGHT: 181.6 LBS | HEIGHT: 70 IN | BODY MASS INDEX: 26 KG/M2 | HEART RATE: 69 BPM | SYSTOLIC BLOOD PRESSURE: 100 MMHG | DIASTOLIC BLOOD PRESSURE: 76 MMHG

## 2025-08-21 DIAGNOSIS — I73.9 PAD (PERIPHERAL ARTERY DISEASE): Primary | ICD-10-CM

## 2025-08-21 DIAGNOSIS — L03.115 CELLULITIS OF RIGHT LEG: ICD-10-CM

## 2025-08-21 PROCEDURE — 3008F BODY MASS INDEX DOCD: CPT | Performed by: NURSE PRACTITIONER

## 2025-08-21 PROCEDURE — 1036F TOBACCO NON-USER: CPT | Performed by: NURSE PRACTITIONER

## 2025-08-21 PROCEDURE — 99211 OFF/OP EST MAY X REQ PHY/QHP: CPT | Performed by: NURSE PRACTITIONER

## 2025-08-21 RX ORDER — CLINDAMYCIN HYDROCHLORIDE 300 MG/1
300 CAPSULE ORAL 3 TIMES DAILY
Qty: 21 CAPSULE | Refills: 0 | Status: SHIPPED | OUTPATIENT
Start: 2025-08-21 | End: 2025-08-28

## 2025-08-21 ASSESSMENT — PAIN SCALES - GENERAL: PAINLEVEL_OUTOF10: 2

## 2025-08-23 LAB
BASOPHILS # BLD AUTO: 92 CELLS/UL (ref 0–200)
BASOPHILS NFR BLD AUTO: 1.1 %
EOSINOPHIL # BLD AUTO: 67 CELLS/UL (ref 15–500)
EOSINOPHIL NFR BLD AUTO: 0.8 %
ERYTHROCYTE [DISTWIDTH] IN BLOOD BY AUTOMATED COUNT: 13.1 % (ref 11–15)
HCT VFR BLD AUTO: 40.8 % (ref 38.5–50)
HGB BLD-MCNC: 13.5 G/DL (ref 13.2–17.1)
LYMPHOCYTES # BLD AUTO: 1764 CELLS/UL (ref 850–3900)
LYMPHOCYTES NFR BLD AUTO: 21 %
MCH RBC QN AUTO: 29.1 PG (ref 27–33)
MCHC RBC AUTO-ENTMCNC: 33.1 G/DL (ref 32–36)
MCV RBC AUTO: 87.9 FL (ref 80–100)
MONOCYTES # BLD AUTO: 773 CELLS/UL (ref 200–950)
MONOCYTES NFR BLD AUTO: 9.2 %
NEUTROPHILS # BLD AUTO: 5704 CELLS/UL (ref 1500–7800)
NEUTROPHILS NFR BLD AUTO: 67.9 %
PLATELET # BLD AUTO: 290 THOUSAND/UL (ref 140–400)
PMV BLD REES-ECKER: 9.9 FL (ref 7.5–12.5)
RBC # BLD AUTO: 4.64 MILLION/UL (ref 4.2–5.8)
WBC # BLD AUTO: 8.4 THOUSAND/UL (ref 3.8–10.8)

## 2025-08-28 ENCOUNTER — OFFICE VISIT (OUTPATIENT)
Dept: VASCULAR SURGERY | Facility: CLINIC | Age: 55
End: 2025-08-28
Payer: COMMERCIAL

## 2025-08-28 VITALS
WEIGHT: 182.2 LBS | HEART RATE: 74 BPM | HEIGHT: 70 IN | OXYGEN SATURATION: 97 % | SYSTOLIC BLOOD PRESSURE: 129 MMHG | BODY MASS INDEX: 26.08 KG/M2 | DIASTOLIC BLOOD PRESSURE: 89 MMHG

## 2025-08-28 DIAGNOSIS — I73.9 PAD (PERIPHERAL ARTERY DISEASE): Primary | ICD-10-CM

## 2025-08-28 DIAGNOSIS — L03.115 CELLULITIS OF RIGHT LEG: ICD-10-CM

## 2025-08-28 PROCEDURE — 99211 OFF/OP EST MAY X REQ PHY/QHP: CPT | Performed by: NURSE PRACTITIONER

## 2025-08-28 PROCEDURE — 3008F BODY MASS INDEX DOCD: CPT | Performed by: NURSE PRACTITIONER

## 2025-08-28 RX ORDER — CLOPIDOGREL BISULFATE 75 MG/1
75 TABLET ORAL DAILY
Qty: 90 TABLET | Refills: 3 | Status: SHIPPED | OUTPATIENT
Start: 2025-08-28 | End: 2026-08-28

## 2025-08-28 ASSESSMENT — PAIN SCALES - GENERAL: PAINLEVEL_OUTOF10: 0-NO PAIN

## 2025-09-03 ENCOUNTER — ANTICOAGULATION - WARFARIN VISIT (OUTPATIENT)
Dept: CARDIOLOGY | Facility: HOSPITAL | Age: 55
End: 2025-09-03
Payer: COMMERCIAL

## 2025-09-03 DIAGNOSIS — Z95.2 S/P AVR (AORTIC VALVE REPLACEMENT): Primary | ICD-10-CM

## 2025-09-03 LAB
POC INR: 4 (ref 0.9–1.1)
POC PROTHROMBIN TIME: ABNORMAL (ref 9.3–12.5)

## 2025-09-03 PROCEDURE — 99211 OFF/OP EST MAY X REQ PHY/QHP: CPT | Performed by: INTERNAL MEDICINE

## 2025-09-03 PROCEDURE — 85610 PROTHROMBIN TIME: CPT | Mod: QW | Performed by: INTERNAL MEDICINE

## 2025-09-04 ENCOUNTER — TELEPHONE (OUTPATIENT)
Dept: VASCULAR SURGERY | Facility: CLINIC | Age: 55
End: 2025-09-04
Payer: COMMERCIAL

## 2025-09-05 ENCOUNTER — HOSPITAL ENCOUNTER (INPATIENT)
Facility: HOSPITAL | Age: 55
End: 2025-09-05
Attending: EMERGENCY MEDICINE | Admitting: INTERNAL MEDICINE
Payer: COMMERCIAL

## 2025-09-05 ENCOUNTER — TELEPHONE (OUTPATIENT)
Dept: CARDIOLOGY | Facility: HOSPITAL | Age: 55
End: 2025-09-05
Payer: COMMERCIAL

## 2025-09-05 DIAGNOSIS — I35.0 AORTIC VALVE STENOSIS, ETIOLOGY OF CARDIAC VALVE DISEASE UNSPECIFIED: ICD-10-CM

## 2025-09-05 DIAGNOSIS — R53.83 MALAISE AND FATIGUE: ICD-10-CM

## 2025-09-05 DIAGNOSIS — K92.1 MELENA: ICD-10-CM

## 2025-09-05 DIAGNOSIS — D64.9 ANEMIA, UNSPECIFIED TYPE: Primary | ICD-10-CM

## 2025-09-05 DIAGNOSIS — R55 NEAR SYNCOPE: ICD-10-CM

## 2025-09-05 DIAGNOSIS — R53.81 MALAISE AND FATIGUE: ICD-10-CM

## 2025-09-05 DIAGNOSIS — Z95.2 PRESENCE OF PROSTHETIC HEART VALVE: ICD-10-CM

## 2025-09-05 PROBLEM — R42 DIZZINESS: Status: ACTIVE | Noted: 2025-09-05

## 2025-09-05 LAB
ALBUMIN SERPL BCP-MCNC: 3.8 G/DL (ref 3.4–5)
ALP SERPL-CCNC: 67 U/L (ref 33–120)
ALT SERPL W P-5'-P-CCNC: 12 U/L (ref 10–52)
ANION GAP SERPL CALCULATED.3IONS-SCNC: 9 MMOL/L (ref 10–20)
APPEARANCE UR: CLEAR
AST SERPL W P-5'-P-CCNC: 15 U/L (ref 9–39)
BASOPHILS # BLD AUTO: 0.04 X10*3/UL (ref 0–0.1)
BASOPHILS NFR BLD AUTO: 0.6 %
BILIRUB SERPL-MCNC: 0.4 MG/DL (ref 0–1.2)
BILIRUB UR STRIP.AUTO-MCNC: NEGATIVE MG/DL
BNP SERPL-MCNC: 185 PG/ML (ref 0–99)
BUN SERPL-MCNC: 18 MG/DL (ref 6–23)
CALCIUM SERPL-MCNC: 8.8 MG/DL (ref 8.6–10.3)
CARDIAC TROPONIN I PNL SERPL HS: 46 NG/L (ref 0–20)
CARDIAC TROPONIN I PNL SERPL HS: 48 NG/L (ref 0–20)
CHLORIDE SERPL-SCNC: 108 MMOL/L (ref 98–107)
CO2 SERPL-SCNC: 27 MMOL/L (ref 21–32)
COLOR UR: NORMAL
CREAT SERPL-MCNC: 0.99 MG/DL (ref 0.5–1.3)
EGFRCR SERPLBLD CKD-EPI 2021: >90 ML/MIN/1.73M*2
EOSINOPHIL # BLD AUTO: 0.05 X10*3/UL (ref 0–0.7)
EOSINOPHIL NFR BLD AUTO: 0.7 %
ERYTHROCYTE [DISTWIDTH] IN BLOOD BY AUTOMATED COUNT: 13.6 % (ref 11.5–14.5)
FLUAV RNA RESP QL NAA+PROBE: NOT DETECTED
FLUBV RNA RESP QL NAA+PROBE: NOT DETECTED
GLUCOSE SERPL-MCNC: 93 MG/DL (ref 74–99)
GLUCOSE UR STRIP.AUTO-MCNC: NORMAL MG/DL
HCT VFR BLD AUTO: 24.7 % (ref 41–52)
HGB BLD-MCNC: 8.3 G/DL (ref 13.5–17.5)
IMM GRANULOCYTES # BLD AUTO: 0.02 X10*3/UL (ref 0–0.7)
IMM GRANULOCYTES NFR BLD AUTO: 0.3 % (ref 0–0.9)
INR PPP: 3.1 (ref 0.9–1.2)
KETONES UR STRIP.AUTO-MCNC: NEGATIVE MG/DL
LEUKOCYTE ESTERASE UR QL STRIP.AUTO: NEGATIVE
LYMPHOCYTES # BLD AUTO: 1.76 X10*3/UL (ref 1.2–4.8)
LYMPHOCYTES NFR BLD AUTO: 24.3 %
MCH RBC QN AUTO: 29.2 PG (ref 26–34)
MCHC RBC AUTO-ENTMCNC: 33.6 G/DL (ref 32–36)
MCV RBC AUTO: 87 FL (ref 80–100)
MONOCYTES # BLD AUTO: 0.45 X10*3/UL (ref 0.1–1)
MONOCYTES NFR BLD AUTO: 6.2 %
NEUTROPHILS # BLD AUTO: 4.93 X10*3/UL (ref 1.2–7.7)
NEUTROPHILS NFR BLD AUTO: 67.9 %
NITRITE UR QL STRIP.AUTO: NEGATIVE
NRBC BLD-RTO: 0 /100 WBCS (ref 0–0)
PH UR STRIP.AUTO: 5.5 [PH]
PLATELET # BLD AUTO: 263 X10*3/UL (ref 150–450)
POTASSIUM SERPL-SCNC: 3.6 MMOL/L (ref 3.5–5.3)
PROT SERPL-MCNC: 6 G/DL (ref 6.4–8.2)
PROT UR STRIP.AUTO-MCNC: NEGATIVE MG/DL
PROTHROMBIN TIME: 31.1 SECONDS (ref 9.3–12.7)
RBC # BLD AUTO: 2.84 X10*6/UL (ref 4.5–5.9)
RBC # UR STRIP.AUTO: NEGATIVE MG/DL
SARS-COV-2 RNA RESP QL NAA+PROBE: NOT DETECTED
SODIUM SERPL-SCNC: 140 MMOL/L (ref 136–145)
SP GR UR STRIP.AUTO: 1.02
UROBILINOGEN UR STRIP.AUTO-MCNC: NORMAL MG/DL
WBC # BLD AUTO: 7.3 X10*3/UL (ref 4.4–11.3)

## 2025-09-05 PROCEDURE — 80053 COMPREHEN METABOLIC PANEL: CPT | Performed by: PHYSICIAN ASSISTANT

## 2025-09-05 PROCEDURE — 83880 ASSAY OF NATRIURETIC PEPTIDE: CPT | Performed by: PHYSICIAN ASSISTANT

## 2025-09-05 PROCEDURE — 36415 COLL VENOUS BLD VENIPUNCTURE: CPT | Performed by: PHYSICIAN ASSISTANT

## 2025-09-05 PROCEDURE — 85610 PROTHROMBIN TIME: CPT | Performed by: PHYSICIAN ASSISTANT

## 2025-09-05 PROCEDURE — 81003 URINALYSIS AUTO W/O SCOPE: CPT | Performed by: PHYSICIAN ASSISTANT

## 2025-09-05 PROCEDURE — 84484 ASSAY OF TROPONIN QUANT: CPT | Performed by: PHYSICIAN ASSISTANT

## 2025-09-05 PROCEDURE — 85025 COMPLETE CBC W/AUTO DIFF WBC: CPT | Performed by: PHYSICIAN ASSISTANT

## 2025-09-05 PROCEDURE — 99285 EMERGENCY DEPT VISIT HI MDM: CPT | Mod: 25 | Performed by: EMERGENCY MEDICINE

## 2025-09-05 PROCEDURE — 87636 SARSCOV2 & INF A&B AMP PRB: CPT | Performed by: PHYSICIAN ASSISTANT

## 2025-09-05 PROCEDURE — 99223 1ST HOSP IP/OBS HIGH 75: CPT | Performed by: INTERNAL MEDICINE

## 2025-09-05 ASSESSMENT — LIFESTYLE VARIABLES
HAVE YOU EVER FELT YOU SHOULD CUT DOWN ON YOUR DRINKING: NO
TOTAL SCORE: 0
EVER FELT BAD OR GUILTY ABOUT YOUR DRINKING: NO
HAVE PEOPLE ANNOYED YOU BY CRITICIZING YOUR DRINKING: NO
EVER HAD A DRINK FIRST THING IN THE MORNING TO STEADY YOUR NERVES TO GET RID OF A HANGOVER: NO

## 2025-09-05 ASSESSMENT — PAIN - FUNCTIONAL ASSESSMENT: PAIN_FUNCTIONAL_ASSESSMENT: 0-10

## 2025-09-05 ASSESSMENT — PAIN SCALES - GENERAL: PAINLEVEL_OUTOF10: 0 - NO PAIN

## 2025-09-06 ENCOUNTER — APPOINTMENT (OUTPATIENT)
Dept: CARDIOLOGY | Facility: HOSPITAL | Age: 55
End: 2025-09-06
Payer: COMMERCIAL

## 2025-09-06 LAB
AMYLASE SERPL-CCNC: 36 U/L (ref 29–103)
ANION GAP SERPL CALCULATED.3IONS-SCNC: 9 MMOL/L (ref 10–20)
AORTIC VALVE MEAN GRADIENT: 36 MMHG
AORTIC VALVE PEAK VELOCITY: 4.17 M/S
AV PEAK GRADIENT: 70 MMHG
AVA (PEAK VEL): 0.69 CM2
AVA (VTI): 0.71 CM2
BUN SERPL-MCNC: 16 MG/DL (ref 6–23)
CALCIUM SERPL-MCNC: 8.6 MG/DL (ref 8.6–10.3)
CHLORIDE SERPL-SCNC: 107 MMOL/L (ref 98–107)
CO2 SERPL-SCNC: 25 MMOL/L (ref 21–32)
CREAT SERPL-MCNC: 0.82 MG/DL (ref 0.5–1.3)
EGFRCR SERPLBLD CKD-EPI 2021: >90 ML/MIN/1.73M*2
EJECTION FRACTION APICAL 4 CHAMBER: 41.4
EJECTION FRACTION: 53 %
ERYTHROCYTE [DISTWIDTH] IN BLOOD BY AUTOMATED COUNT: 13.7 % (ref 11.5–14.5)
GLUCOSE SERPL-MCNC: 90 MG/DL (ref 74–99)
HCT VFR BLD AUTO: 25 % (ref 41–52)
HEMOCCULT SP1 STL QL: POSITIVE
HGB BLD-MCNC: 8.1 G/DL (ref 13.5–17.5)
INR PPP: 2.4 (ref 0.9–1.2)
IRON SATN MFR SERPL: 15 % (ref 25–45)
IRON SERPL-MCNC: 41 UG/DL (ref 35–150)
LEFT ATRIUM VOLUME AREA LENGTH INDEX BSA: 23.2 ML/M2
LEFT VENTRICLE INTERNAL DIMENSION DIASTOLE: 5.22 CM (ref 3.5–6)
LEFT VENTRICULAR OUTFLOW TRACT DIAMETER: 2.07 CM
LIPASE SERPL-CCNC: 15 U/L (ref 9–82)
LV EJECTION FRACTION BIPLANE: 47 %
MAGNESIUM SERPL-MCNC: 2.12 MG/DL (ref 1.6–2.4)
MCH RBC QN AUTO: 28.5 PG (ref 26–34)
MCHC RBC AUTO-ENTMCNC: 32.4 G/DL (ref 32–36)
MCV RBC AUTO: 88 FL (ref 80–100)
MITRAL VALVE E/A RATIO: 1.07
MITRAL VALVE E/E' RATIO: 10.81
NRBC BLD-RTO: 0 /100 WBCS (ref 0–0)
PLATELET # BLD AUTO: 250 X10*3/UL (ref 150–450)
POTASSIUM SERPL-SCNC: 3.8 MMOL/L (ref 3.5–5.3)
PROTHROMBIN TIME: 24.2 SECONDS (ref 9.3–12.7)
RBC # BLD AUTO: 2.84 X10*6/UL (ref 4.5–5.9)
RIGHT VENTRICLE FREE WALL PEAK S': 15.56 CM/S
RIGHT VENTRICLE PEAK SYSTOLIC PRESSURE: 34 MMHG
SODIUM SERPL-SCNC: 137 MMOL/L (ref 136–145)
TIBC SERPL-MCNC: 277 UG/DL (ref 240–445)
TRICUSPID ANNULAR PLANE SYSTOLIC EXCURSION: 2.3 CM
UIBC SERPL-MCNC: 236 UG/DL (ref 110–370)
WBC # BLD AUTO: 6.6 X10*3/UL (ref 4.4–11.3)

## 2025-09-06 PROCEDURE — 82270 OCCULT BLOOD FECES: CPT | Performed by: INTERNAL MEDICINE

## 2025-09-06 PROCEDURE — 99222 1ST HOSP IP/OBS MODERATE 55: CPT

## 2025-09-06 PROCEDURE — 99233 SBSQ HOSP IP/OBS HIGH 50: CPT | Performed by: NURSE PRACTITIONER

## 2025-09-06 PROCEDURE — 85027 COMPLETE CBC AUTOMATED: CPT | Performed by: INTERNAL MEDICINE

## 2025-09-06 PROCEDURE — 85610 PROTHROMBIN TIME: CPT | Performed by: INTERNAL MEDICINE

## 2025-09-06 PROCEDURE — 80048 BASIC METABOLIC PNL TOTAL CA: CPT | Performed by: INTERNAL MEDICINE

## 2025-09-06 PROCEDURE — 36415 COLL VENOUS BLD VENIPUNCTURE: CPT | Performed by: NURSE PRACTITIONER

## 2025-09-06 PROCEDURE — 1200000002 HC GENERAL ROOM WITH TELEMETRY DAILY

## 2025-09-06 PROCEDURE — 82150 ASSAY OF AMYLASE: CPT | Performed by: NURSE PRACTITIONER

## 2025-09-06 PROCEDURE — 2500000001 HC RX 250 WO HCPCS SELF ADMINISTERED DRUGS (ALT 637 FOR MEDICARE OP): Performed by: INTERNAL MEDICINE

## 2025-09-06 PROCEDURE — 2550000001 HC RX 255 CONTRASTS: Performed by: INTERNAL MEDICINE

## 2025-09-06 PROCEDURE — 83550 IRON BINDING TEST: CPT | Performed by: INTERNAL MEDICINE

## 2025-09-06 PROCEDURE — 93306 TTE W/DOPPLER COMPLETE: CPT

## 2025-09-06 PROCEDURE — 83690 ASSAY OF LIPASE: CPT | Performed by: NURSE PRACTITIONER

## 2025-09-06 PROCEDURE — 83735 ASSAY OF MAGNESIUM: CPT

## 2025-09-06 PROCEDURE — 99223 1ST HOSP IP/OBS HIGH 75: CPT | Performed by: NURSE PRACTITIONER

## 2025-09-06 PROCEDURE — 93306 TTE W/DOPPLER COMPLETE: CPT | Performed by: INTERNAL MEDICINE

## 2025-09-06 PROCEDURE — 36415 COLL VENOUS BLD VENIPUNCTURE: CPT | Performed by: INTERNAL MEDICINE

## 2025-09-06 PROCEDURE — 2500000004 HC RX 250 GENERAL PHARMACY W/ HCPCS (ALT 636 FOR OP/ED): Performed by: NURSE PRACTITIONER

## 2025-09-06 RX ORDER — FLUOXETINE 20 MG/1
20 CAPSULE ORAL DAILY
Status: DISPENSED | OUTPATIENT
Start: 2025-09-06

## 2025-09-06 RX ORDER — CLOPIDOGREL BISULFATE 75 MG/1
75 TABLET ORAL DAILY
Status: DISPENSED | OUTPATIENT
Start: 2025-09-06

## 2025-09-06 RX ORDER — PANTOPRAZOLE SODIUM 40 MG/10ML
40 INJECTION, POWDER, LYOPHILIZED, FOR SOLUTION INTRAVENOUS 2 TIMES DAILY
Status: DISPENSED | OUTPATIENT
Start: 2025-09-06

## 2025-09-06 RX ORDER — ATORVASTATIN CALCIUM 40 MG/1
40 TABLET, FILM COATED ORAL NIGHTLY
Status: DISPENSED | OUTPATIENT
Start: 2025-09-06

## 2025-09-06 RX ORDER — ONDANSETRON HYDROCHLORIDE 2 MG/ML
4 INJECTION, SOLUTION INTRAVENOUS EVERY 8 HOURS PRN
Status: ACTIVE | OUTPATIENT
Start: 2025-09-06

## 2025-09-06 RX ORDER — ONDANSETRON 4 MG/1
4 TABLET, ORALLY DISINTEGRATING ORAL EVERY 8 HOURS PRN
Status: ACTIVE | OUTPATIENT
Start: 2025-09-06

## 2025-09-06 RX ORDER — TALC
3 POWDER (GRAM) TOPICAL NIGHTLY PRN
Status: ACTIVE | OUTPATIENT
Start: 2025-09-06

## 2025-09-06 RX ORDER — POLYETHYLENE GLYCOL 3350 17 G/17G
17 POWDER, FOR SOLUTION ORAL DAILY
Status: DISPENSED | OUTPATIENT
Start: 2025-09-06

## 2025-09-06 RX ORDER — TRAZODONE HYDROCHLORIDE 50 MG/1
50 TABLET ORAL NIGHTLY PRN
Status: ACTIVE | OUTPATIENT
Start: 2025-09-06

## 2025-09-06 RX ORDER — WARFARIN SODIUM 5 MG/1
5 TABLET ORAL
Status: ACTIVE | OUTPATIENT
Start: 2025-09-06

## 2025-09-06 RX ORDER — WARFARIN SODIUM 5 MG/1
2.5 TABLET ORAL
Status: ACTIVE | OUTPATIENT
Start: 2025-09-08

## 2025-09-06 RX ORDER — ACETAMINOPHEN 325 MG/1
650 TABLET ORAL EVERY 4 HOURS PRN
Status: ACTIVE | OUTPATIENT
Start: 2025-09-06

## 2025-09-06 RX ADMIN — ATORVASTATIN CALCIUM 40 MG: 40 TABLET, FILM COATED ORAL at 21:20

## 2025-09-06 RX ADMIN — FLUOXETINE HYDROCHLORIDE 20 MG: 20 CAPSULE ORAL at 09:09

## 2025-09-06 RX ADMIN — PANTOPRAZOLE SODIUM 40 MG: 40 INJECTION, POWDER, FOR SOLUTION INTRAVENOUS at 10:42

## 2025-09-06 RX ADMIN — CLOPIDOGREL 75 MG: 75 TABLET ORAL at 09:09

## 2025-09-06 RX ADMIN — ATORVASTATIN CALCIUM 40 MG: 40 TABLET, FILM COATED ORAL at 02:07

## 2025-09-06 RX ADMIN — PANTOPRAZOLE SODIUM 40 MG: 40 INJECTION, POWDER, FOR SOLUTION INTRAVENOUS at 21:20

## 2025-09-06 RX ADMIN — IOHEXOL 75 ML: 350 INJECTION, SOLUTION INTRAVENOUS at 16:15

## 2025-09-06 SDOH — SOCIAL STABILITY: SOCIAL INSECURITY: DO YOU FEEL UNSAFE GOING BACK TO THE PLACE WHERE YOU ARE LIVING?: NO

## 2025-09-06 SDOH — ECONOMIC STABILITY: HOUSING INSECURITY: AT ANY TIME IN THE PAST 12 MONTHS, WERE YOU HOMELESS OR LIVING IN A SHELTER (INCLUDING NOW)?: NO

## 2025-09-06 SDOH — ECONOMIC STABILITY: INCOME INSECURITY: IN THE PAST 12 MONTHS HAS THE ELECTRIC, GAS, OIL, OR WATER COMPANY THREATENED TO SHUT OFF SERVICES IN YOUR HOME?: NO

## 2025-09-06 SDOH — SOCIAL STABILITY: SOCIAL INSECURITY: HAVE YOU HAD THOUGHTS OF HARMING ANYONE ELSE?: NO

## 2025-09-06 SDOH — ECONOMIC STABILITY: FOOD INSECURITY: WITHIN THE PAST 12 MONTHS, YOU WORRIED THAT YOUR FOOD WOULD RUN OUT BEFORE YOU GOT THE MONEY TO BUY MORE.: NEVER TRUE

## 2025-09-06 SDOH — ECONOMIC STABILITY: FOOD INSECURITY: HOW HARD IS IT FOR YOU TO PAY FOR THE VERY BASICS LIKE FOOD, HOUSING, MEDICAL CARE, AND HEATING?: NOT VERY HARD

## 2025-09-06 SDOH — ECONOMIC STABILITY: HOUSING INSECURITY: IN THE PAST 12 MONTHS, HOW MANY TIMES HAVE YOU MOVED WHERE YOU WERE LIVING?: 0

## 2025-09-06 SDOH — ECONOMIC STABILITY: HOUSING INSECURITY: IN THE LAST 12 MONTHS, WAS THERE A TIME WHEN YOU WERE NOT ABLE TO PAY THE MORTGAGE OR RENT ON TIME?: NO

## 2025-09-06 SDOH — ECONOMIC STABILITY: FOOD INSECURITY: WITHIN THE PAST 12 MONTHS, THE FOOD YOU BOUGHT JUST DIDN'T LAST AND YOU DIDN'T HAVE MONEY TO GET MORE.: NEVER TRUE

## 2025-09-06 SDOH — SOCIAL STABILITY: SOCIAL INSECURITY: WITHIN THE LAST YEAR, HAVE YOU BEEN AFRAID OF YOUR PARTNER OR EX-PARTNER?: NO

## 2025-09-06 SDOH — SOCIAL STABILITY: SOCIAL INSECURITY: WITHIN THE LAST YEAR, HAVE YOU BEEN HUMILIATED OR EMOTIONALLY ABUSED IN OTHER WAYS BY YOUR PARTNER OR EX-PARTNER?: NO

## 2025-09-06 SDOH — SOCIAL STABILITY: SOCIAL INSECURITY: DOES ANYONE TRY TO KEEP YOU FROM HAVING/CONTACTING OTHER FRIENDS OR DOING THINGS OUTSIDE YOUR HOME?: NO

## 2025-09-06 SDOH — SOCIAL STABILITY: SOCIAL INSECURITY: HAVE YOU HAD ANY THOUGHTS OF HARMING ANYONE ELSE?: NO

## 2025-09-06 SDOH — ECONOMIC STABILITY: TRANSPORTATION INSECURITY: IN THE PAST 12 MONTHS, HAS LACK OF TRANSPORTATION KEPT YOU FROM MEDICAL APPOINTMENTS OR FROM GETTING MEDICATIONS?: NO

## 2025-09-06 SDOH — SOCIAL STABILITY: SOCIAL INSECURITY: WERE YOU ABLE TO COMPLETE ALL THE BEHAVIORAL HEALTH SCREENINGS?: YES

## 2025-09-06 SDOH — SOCIAL STABILITY: SOCIAL INSECURITY: ARE YOU OR HAVE YOU BEEN THREATENED OR ABUSED PHYSICALLY, EMOTIONALLY, OR SEXUALLY BY ANYONE?: NO

## 2025-09-06 SDOH — SOCIAL STABILITY: SOCIAL INSECURITY: DO YOU FEEL ANYONE HAS EXPLOITED OR TAKEN ADVANTAGE OF YOU FINANCIALLY OR OF YOUR PERSONAL PROPERTY?: NO

## 2025-09-06 SDOH — SOCIAL STABILITY: SOCIAL INSECURITY: ABUSE: ADULT

## 2025-09-06 SDOH — SOCIAL STABILITY: SOCIAL INSECURITY: HAS ANYONE EVER THREATENED TO HURT YOUR FAMILY OR YOUR PETS?: NO

## 2025-09-06 SDOH — SOCIAL STABILITY: SOCIAL INSECURITY: ARE THERE ANY APPARENT SIGNS OF INJURIES/BEHAVIORS THAT COULD BE RELATED TO ABUSE/NEGLECT?: NO

## 2025-09-06 ASSESSMENT — ACTIVITIES OF DAILY LIVING (ADL)
HEARING - LEFT EAR: FUNCTIONAL
LACK_OF_TRANSPORTATION: NO
LACK_OF_TRANSPORTATION: NO
WALKS IN HOME: INDEPENDENT
FEEDING YOURSELF: INDEPENDENT
BATHING: INDEPENDENT
PATIENT'S MEMORY ADEQUATE TO SAFELY COMPLETE DAILY ACTIVITIES?: YES
TOILETING: INDEPENDENT
GROOMING: INDEPENDENT
JUDGMENT_ADEQUATE_SAFELY_COMPLETE_DAILY_ACTIVITIES: YES
HEARING - RIGHT EAR: FUNCTIONAL
ADEQUATE_TO_COMPLETE_ADL: YES
DRESSING YOURSELF: INDEPENDENT

## 2025-09-06 ASSESSMENT — COGNITIVE AND FUNCTIONAL STATUS - GENERAL
MOBILITY SCORE: 24
DAILY ACTIVITIY SCORE: 24
MOBILITY SCORE: 24
PATIENT BASELINE BEDBOUND: NO
DAILY ACTIVITIY SCORE: 24

## 2025-09-06 ASSESSMENT — PATIENT HEALTH QUESTIONNAIRE - PHQ9
1. LITTLE INTEREST OR PLEASURE IN DOING THINGS: NOT AT ALL
SUM OF ALL RESPONSES TO PHQ9 QUESTIONS 1 & 2: 1
2. FEELING DOWN, DEPRESSED OR HOPELESS: SEVERAL DAYS

## 2025-09-06 ASSESSMENT — PAIN - FUNCTIONAL ASSESSMENT: PAIN_FUNCTIONAL_ASSESSMENT: 0-10

## 2025-09-06 ASSESSMENT — LIFESTYLE VARIABLES
HOW OFTEN DO YOU HAVE A DRINK CONTAINING ALCOHOL: NEVER
HOW OFTEN DO YOU HAVE 6 OR MORE DRINKS ON ONE OCCASION: NEVER
AUDIT-C TOTAL SCORE: 0
HOW MANY STANDARD DRINKS CONTAINING ALCOHOL DO YOU HAVE ON A TYPICAL DAY: PATIENT DOES NOT DRINK
AUDIT-C TOTAL SCORE: 0
SKIP TO QUESTIONS 9-10: 1

## 2025-09-06 ASSESSMENT — ENCOUNTER SYMPTOMS
ALLERGIC/IMMUNOLOGIC NEGATIVE: 1
GASTROINTESTINAL NEGATIVE: 1
SHORTNESS OF BREATH: 1
PALPITATIONS: 1
ENDOCRINE NEGATIVE: 1
MUSCULOSKELETAL NEGATIVE: 1
CONSTITUTIONAL NEGATIVE: 1
PSYCHIATRIC NEGATIVE: 1
DIZZINESS: 1
HEMATOLOGIC/LYMPHATIC NEGATIVE: 1
EYES NEGATIVE: 1

## 2025-09-06 ASSESSMENT — PAIN SCALES - GENERAL
PAINLEVEL_OUTOF10: 0 - NO PAIN
PAINLEVEL_OUTOF10: 0 - NO PAIN

## 2025-09-07 LAB
ANION GAP SERPL CALCULATED.3IONS-SCNC: 8 MMOL/L (ref 10–20)
APTT PPP: 30.2 SECONDS (ref 22–32.5)
APTT PPP: 37.9 SECONDS (ref 22–32.5)
BUN SERPL-MCNC: 14 MG/DL (ref 6–23)
CALCIUM SERPL-MCNC: 8.9 MG/DL (ref 8.6–10.3)
CHLORIDE SERPL-SCNC: 107 MMOL/L (ref 98–107)
CO2 SERPL-SCNC: 29 MMOL/L (ref 21–32)
CREAT SERPL-MCNC: 0.97 MG/DL (ref 0.5–1.3)
EGFRCR SERPLBLD CKD-EPI 2021: >90 ML/MIN/1.73M*2
ERYTHROCYTE [DISTWIDTH] IN BLOOD BY AUTOMATED COUNT: 13.9 % (ref 11.5–14.5)
ERYTHROCYTE [DISTWIDTH] IN BLOOD BY AUTOMATED COUNT: 14.1 % (ref 11.5–14.5)
GLUCOSE SERPL-MCNC: 93 MG/DL (ref 74–99)
HCT VFR BLD AUTO: 25.2 % (ref 41–52)
HCT VFR BLD AUTO: 25.4 % (ref 41–52)
HCT VFR BLD AUTO: 25.6 % (ref 41–52)
HGB BLD-MCNC: 8.2 G/DL (ref 13.5–17.5)
HGB BLD-MCNC: 8.4 G/DL (ref 13.5–17.5)
HGB BLD-MCNC: 8.6 G/DL (ref 13.5–17.5)
INR PPP: 1.6 (ref 0.9–1.2)
MCH RBC QN AUTO: 28.4 PG (ref 26–34)
MCH RBC QN AUTO: 28.5 PG (ref 26–34)
MCHC RBC AUTO-ENTMCNC: 32 G/DL (ref 32–36)
MCHC RBC AUTO-ENTMCNC: 33.3 G/DL (ref 32–36)
MCV RBC AUTO: 85 FL (ref 80–100)
MCV RBC AUTO: 89 FL (ref 80–100)
NRBC BLD-RTO: 0 /100 WBCS (ref 0–0)
NRBC BLD-RTO: 0 /100 WBCS (ref 0–0)
PLATELET # BLD AUTO: 246 X10*3/UL (ref 150–450)
PLATELET # BLD AUTO: 266 X10*3/UL (ref 150–450)
POTASSIUM SERPL-SCNC: 4.6 MMOL/L (ref 3.5–5.3)
PROTHROMBIN TIME: 17 SECONDS (ref 9.3–12.7)
RBC # BLD AUTO: 2.89 X10*6/UL (ref 4.5–5.9)
RBC # BLD AUTO: 2.95 X10*6/UL (ref 4.5–5.9)
SODIUM SERPL-SCNC: 139 MMOL/L (ref 136–145)
WBC # BLD AUTO: 6.2 X10*3/UL (ref 4.4–11.3)
WBC # BLD AUTO: 6.9 X10*3/UL (ref 4.4–11.3)

## 2025-09-07 PROCEDURE — 85027 COMPLETE CBC AUTOMATED: CPT | Performed by: NURSE PRACTITIONER

## 2025-09-07 PROCEDURE — 80048 BASIC METABOLIC PNL TOTAL CA: CPT | Performed by: NURSE PRACTITIONER

## 2025-09-07 PROCEDURE — 85610 PROTHROMBIN TIME: CPT | Performed by: INTERNAL MEDICINE

## 2025-09-07 PROCEDURE — 85014 HEMATOCRIT: CPT | Performed by: NURSE PRACTITIONER

## 2025-09-07 PROCEDURE — 2060000001 HC INTERMEDIATE ICU ROOM DAILY

## 2025-09-07 PROCEDURE — 2500000004 HC RX 250 GENERAL PHARMACY W/ HCPCS (ALT 636 FOR OP/ED): Performed by: NURSE PRACTITIONER

## 2025-09-07 PROCEDURE — 36415 COLL VENOUS BLD VENIPUNCTURE: CPT | Performed by: NURSE PRACTITIONER

## 2025-09-07 PROCEDURE — 2500000001 HC RX 250 WO HCPCS SELF ADMINISTERED DRUGS (ALT 637 FOR MEDICARE OP): Performed by: INTERNAL MEDICINE

## 2025-09-07 PROCEDURE — 85730 THROMBOPLASTIN TIME PARTIAL: CPT | Performed by: NURSE PRACTITIONER

## 2025-09-07 PROCEDURE — 36415 COLL VENOUS BLD VENIPUNCTURE: CPT | Performed by: INTERNAL MEDICINE

## 2025-09-07 PROCEDURE — 85730 THROMBOPLASTIN TIME PARTIAL: CPT | Performed by: INTERNAL MEDICINE

## 2025-09-07 PROCEDURE — 2500000001 HC RX 250 WO HCPCS SELF ADMINISTERED DRUGS (ALT 637 FOR MEDICARE OP)

## 2025-09-07 RX ORDER — HEPARIN SODIUM 10000 [USP'U]/100ML
0-4000 INJECTION, SOLUTION INTRAVENOUS CONTINUOUS
Status: DISPENSED | OUTPATIENT
Start: 2025-09-07

## 2025-09-07 RX ORDER — POLYETHYLENE GLYCOL 3350, SODIUM CHLORIDE, SODIUM BICARBONATE, POTASSIUM CHLORIDE 420; 11.2; 5.72; 1.48 G/4L; G/4L; G/4L; G/4L
4000 POWDER, FOR SOLUTION ORAL ONCE
Status: COMPLETED | OUTPATIENT
Start: 2025-09-07 | End: 2025-09-07

## 2025-09-07 RX ORDER — HEPARIN SODIUM 5000 [USP'U]/ML
2000-4000 INJECTION, SOLUTION INTRAVENOUS; SUBCUTANEOUS AS NEEDED
Status: ACTIVE | OUTPATIENT
Start: 2025-09-07

## 2025-09-07 RX ADMIN — PANTOPRAZOLE SODIUM 40 MG: 40 INJECTION, POWDER, FOR SOLUTION INTRAVENOUS at 09:56

## 2025-09-07 RX ADMIN — ATORVASTATIN CALCIUM 40 MG: 40 TABLET, FILM COATED ORAL at 20:12

## 2025-09-07 RX ADMIN — FLUOXETINE HYDROCHLORIDE 20 MG: 20 CAPSULE ORAL at 08:55

## 2025-09-07 RX ADMIN — PANTOPRAZOLE SODIUM 40 MG: 40 INJECTION, POWDER, FOR SOLUTION INTRAVENOUS at 20:12

## 2025-09-07 RX ADMIN — HEPARIN SODIUM 1000 UNITS/HR: 10000 INJECTION, SOLUTION INTRAVENOUS at 11:54

## 2025-09-07 RX ADMIN — POLYETHYLENE GLYCOL 3350, SODIUM SULFATE ANHYDROUS, SODIUM BICARBONATE, SODIUM CHLORIDE, POTASSIUM CHLORIDE 4000 ML: 236; 22.74; 6.74; 5.86; 2.97 POWDER, FOR SOLUTION ORAL at 19:40

## 2025-09-07 ASSESSMENT — COGNITIVE AND FUNCTIONAL STATUS - GENERAL
MOBILITY SCORE: 24
MOBILITY SCORE: 24
DAILY ACTIVITIY SCORE: 24
DAILY ACTIVITIY SCORE: 24

## 2025-09-07 ASSESSMENT — PAIN - FUNCTIONAL ASSESSMENT
PAIN_FUNCTIONAL_ASSESSMENT: 0-10
PAIN_FUNCTIONAL_ASSESSMENT: 0-10

## 2025-09-07 ASSESSMENT — PAIN SCALES - GENERAL
PAINLEVEL_OUTOF10: 0 - NO PAIN
PAINLEVEL_OUTOF10: 0 - NO PAIN

## 2025-09-08 VITALS
HEIGHT: 70 IN | BODY MASS INDEX: 26.89 KG/M2 | WEIGHT: 187.83 LBS | SYSTOLIC BLOOD PRESSURE: 111 MMHG | DIASTOLIC BLOOD PRESSURE: 75 MMHG | OXYGEN SATURATION: 100 % | TEMPERATURE: 98.4 F | HEART RATE: 93 BPM | RESPIRATION RATE: 18 BRPM

## (undated) DEVICE — SUTURE, MONOCRYL, 4-0, 18 IN, PS2, UNDYED

## (undated) DEVICE — SUTURE, PROLENE, 5-0, 18 IN, C-1, BLUE

## (undated) DEVICE — SUTURE, PROLENE, 7-0, 24 IN, BV1, DA, BLUE

## (undated) DEVICE — SUTURE, ETHILON, 3-0, 18 IN, PS1, BLACK

## (undated) DEVICE — SYRINGE, 20 CC, LUER LOCK, MONOJECT, W/O CAP, LF

## (undated) DEVICE — SUTURE, VICRYL, 2-0, 27 IN, BR/SH 27, VIOLET

## (undated) DEVICE — DRESSING, TRANSPARENT, TEGADERM, 4 X 4-3/4 IN

## (undated) DEVICE — BANDAGE, ESMARK, 6 IN X 9 FT, STERILE

## (undated) DEVICE — SUTURE, VICRYL PLUS 3-0, SH, 27IN

## (undated) DEVICE — STRIP, SKIN CLOSURE, STERI STRIP, NON-REINFORCED, 0.5 X 4 IN

## (undated) DEVICE — DRAPE, ISOLATION, BAG, DRAW STRING CLOSURE, STERI DRAPE, LARGE, 20 X 20 IN, DISPOSABLE, STERILE

## (undated) DEVICE — Device

## (undated) DEVICE — DEVICE, CLOSURE, PERCLOSE, PROSTYLE

## (undated) DEVICE — SUTURE, VICRYL, 2-0, 36 IN, CT-1, UNDYED

## (undated) DEVICE — COVER, EQUIPMENT, SOLUTION, SLUSH, LF, 122CM X 122 CM, STERILE

## (undated) DEVICE — CATHETER, SOFT VU, NON-BRAID FLUSH, .035, 5FR, 65CM, 6-SIDEHOLE

## (undated) DEVICE — CATHETER TRAY, SURESTEP, 16FR, URINE METER W/STATLOCK

## (undated) DEVICE — CATHETER, 5 FR. 65CM, ANGLE TAPER AT TIP

## (undated) DEVICE — SUTURE, VICRYL, 3-0, 27 IN, SH

## (undated) DEVICE — GOWN, SURGICAL, SMARTGOWN, XLARGE, STERILE

## (undated) DEVICE — DRESSING, NON-ADHERENT, CURAD, ABSORBENT, 3 X 8 IN, STERILE

## (undated) DEVICE — TAPE, UMBILICAL, 1/8 X 30 IN, MULTIPACK, COTTON, WHITE

## (undated) DEVICE — GUIDEWIRE, COMMAND LT, HI-TORQUE, 0.18 X 300CM

## (undated) DEVICE — APPLICATOR, CHLORAPREP, W/ORANGE TINT, 26ML

## (undated) DEVICE — BANDAGE, ELASTIC, SELF-CLOSE, 4 IN, HONEYCOMB, STERILE

## (undated) DEVICE — SUTURE, PROLENE, 5-0, 24 IN, C1, DA, BLUE

## (undated) DEVICE — CATHETER, ANGIO, SOFT-VU, SOS 0, 5 FR X 80 CM

## (undated) DEVICE — SHEATH, PINNACLE, W/.038 GUIDEWIRE, 10 CM,  7FR INTRODUCER, 7FR DIA, 2.5 CM DIALATOR

## (undated) DEVICE — MICROINTRODUCER KIT, VSI, 4FR X 40CM, STIFFEN

## (undated) DEVICE — CATHETER, 5 FR. 100CM, ANGLE TAPER AT TIP

## (undated) DEVICE — DRAPE, INCISE, ANTIMICROBIAL, IOBAN 2, STERI DRAPE, 23 X 33 IN, DISPOSABLE, STERILE

## (undated) DEVICE — GUIDEWIRE, EXCHANGE, HEAVY DUTY, CURVED, COATED, ROSEN, 0.035 IN X 260 CM

## (undated) DEVICE — SUTURE, SILK, 3-0, 18 IN SH/CR, BLACK

## (undated) DEVICE — GUIDEWIRE, STIFF SHAFT, ANGLE TIP, .035 DIA, 260 CM,  3 CM TIP"

## (undated) DEVICE — LOOP, VESSEL, MAXI, RED

## (undated) DEVICE — INFLATION KIT, ADVANTAGE, ENCORE 26 (1/BOX)

## (undated) DEVICE — STAPLER, SKIN PROXIMATE, 35 WIDE

## (undated) DEVICE — STOPCOCK, 4 WAY, SMALL BODY, W/SWIVEL, ULTRA, LIPD RESISTANT, LUER LOCK, MALE, LF

## (undated) DEVICE — GLOVE, SURGICAL, PROTEXIS PI MICRO, 7.5, PF, LF

## (undated) DEVICE — DRAPE, PAD, PREP, W/ 9 IN CUFF, 24 X 41, LF, NS

## (undated) DEVICE — PACK, ANGIO P2, CUSTOM, LAKE

## (undated) DEVICE — COVER, TABLE, 44 X 75 IN, DISPOSABLE, LF, STERILE

## (undated) DEVICE — SUTURE, PROLENE, 6-0, 24 IN, BV1, DA, BLUE

## (undated) DEVICE — CUFF, TOURNIQUET, 24 X 4, SNGL PORT/SNGL BLADDER, DISP, LF

## (undated) DEVICE — COVER PROBE, SOFT FLEX W/ GEL, 5 X 48 IN (13X122CM)

## (undated) DEVICE — EXTENSION SET W/MALE LUER LOCK ADAPTER, VOLUME 2.4ML

## (undated) DEVICE — CLOSURE SYSTEM, VASCULAR, VASCADE, 5 F

## (undated) DEVICE — MANIFOLD, 4 PORT NEPTUNE STANDARD

## (undated) DEVICE — SUTURE, SILK, 3-0, 18 IN, MULTIPACK, BLACK

## (undated) DEVICE — SUTURE, SILK, 2-0, 30 IN, SH, BLACK

## (undated) DEVICE — CANISTER, INDIGO MAX, FOR PMXENGN, NON-STERILE

## (undated) DEVICE — SUTURE, MONOCRYL, 4-0, 27 IN, PS-2, UNDYED

## (undated) DEVICE — DRAPE, SHEET, FAN FOLDED, HALF, 44 X 58 IN, DISPOSABLE, LF, STERILE

## (undated) DEVICE — GUIDEWIRE, ANGLE TIP,  .035 DIA, 260 CM, 3 CM TIP"

## (undated) DEVICE — LOOP, VESSEL, MINI, WHITE

## (undated) DEVICE — CONTAINER, SPECIMEN, 120 ML, STERILE

## (undated) DEVICE — PADDING, WEBRIL, UNDERCAST, STERILE, 6 IN

## (undated) DEVICE — SHEATH, PINNACLE, W/.038 GW 10CM, 5FR INTRODUCER, 2.5 CM DIALATOR

## (undated) DEVICE — DRAPE PACK, UNIVERSAL II

## (undated) DEVICE — INSERT, CLAMP, SURGICAL, SOFT/TRACTION, STEALTH, 1 MM

## (undated) DEVICE — PREP TRAY, DRY SKIN SCRUB KIT

## (undated) DEVICE — DISSECTOR, EXACT, LIGASURE

## (undated) DEVICE — SUTURE, SILK, 2-0, 18 IN, BLACK

## (undated) DEVICE — CATHETER, CAT 7 130 TORQ, PLUS LIGHTNING, NO CE

## (undated) DEVICE — BOLSTER, HIP